# Patient Record
Sex: FEMALE | Race: WHITE | NOT HISPANIC OR LATINO | Employment: OTHER | URBAN - METROPOLITAN AREA
[De-identification: names, ages, dates, MRNs, and addresses within clinical notes are randomized per-mention and may not be internally consistent; named-entity substitution may affect disease eponyms.]

---

## 2017-01-27 ENCOUNTER — HOSPITAL ENCOUNTER (OUTPATIENT)
Dept: NON INVASIVE DIAGNOSTICS | Facility: HOSPITAL | Age: 77
Discharge: HOME | End: 2017-01-27
Attending: INTERNAL MEDICINE
Payer: MEDICARE

## 2017-01-27 ENCOUNTER — HOSPITAL ENCOUNTER (OUTPATIENT)
Dept: NUCLEAR MEDICINE | Facility: HOSPITAL | Age: 77
Discharge: HOME | End: 2017-01-27
Attending: INTERNAL MEDICINE
Payer: MEDICARE

## 2017-01-27 DIAGNOSIS — R07.89 OTHER CHEST PAIN: ICD-10-CM

## 2017-01-27 DIAGNOSIS — I35.0 NONRHEUMATIC AORTIC VALVE STENOSIS: ICD-10-CM

## 2017-01-27 PROCEDURE — A9502 TC99M TETROFOSMIN: HCPCS

## 2017-01-27 PROCEDURE — 93017 CV STRESS TEST TRACING ONLY: CPT

## 2017-01-27 PROCEDURE — 78452 HT MUSCLE IMAGE SPECT MULT: CPT

## 2017-01-27 PROCEDURE — 93306 TTE W/DOPPLER COMPLETE: CPT

## 2017-02-28 ENCOUNTER — ALLSCRIPTS OFFICE VISIT (OUTPATIENT)
Dept: OTHER | Facility: OTHER | Age: 77
End: 2017-02-28

## 2017-05-23 ENCOUNTER — HOSPITAL ENCOUNTER (EMERGENCY)
Facility: HOSPITAL | Age: 77
Discharge: HOME | End: 2017-05-23
Attending: EMERGENCY MEDICINE | Admitting: EMERGENCY MEDICINE
Payer: MEDICARE

## 2017-05-23 ENCOUNTER — APPOINTMENT (EMERGENCY)
Dept: RADIOLOGY | Facility: HOSPITAL | Age: 77
End: 2017-05-23
Payer: MEDICARE

## 2017-05-23 ENCOUNTER — GENERIC CONVERSION - ENCOUNTER (OUTPATIENT)
Dept: OTHER | Facility: OTHER | Age: 77
End: 2017-05-23

## 2017-05-23 VITALS
TEMPERATURE: 97.7 F | DIASTOLIC BLOOD PRESSURE: 78 MMHG | HEART RATE: 79 BPM | BODY MASS INDEX: 23.22 KG/M2 | OXYGEN SATURATION: 98 % | HEIGHT: 61 IN | RESPIRATION RATE: 18 BRPM | SYSTOLIC BLOOD PRESSURE: 163 MMHG | WEIGHT: 123 LBS

## 2017-05-23 DIAGNOSIS — R09.81 NASAL CONGESTION: ICD-10-CM

## 2017-05-23 DIAGNOSIS — R53.1 ASTHENIA: Primary | ICD-10-CM

## 2017-05-23 LAB
ALBUMIN SERPL BCP-MCNC: 3.5 G/DL (ref 3.5–5)
ALP SERPL-CCNC: 60 U/L (ref 46–116)
ALT SERPL W P-5'-P-CCNC: 11 U/L (ref 12–78)
ANION GAP SERPL CALCULATED.3IONS-SCNC: 6 MMOL/L (ref 4–13)
AST SERPL W P-5'-P-CCNC: 12 U/L (ref 5–45)
BACTERIA UR QL AUTO: ABNORMAL /HPF
BASOPHILS # BLD AUTO: 0.01 THOUSANDS/ΜL (ref 0–0.1)
BASOPHILS NFR BLD AUTO: 0 % (ref 0–1)
BILIRUB SERPL-MCNC: 0.69 MG/DL (ref 0.2–1)
BILIRUB UR QL STRIP: NEGATIVE
BUN SERPL-MCNC: 24 MG/DL (ref 5–25)
CALCIUM SERPL-MCNC: 8.7 MG/DL (ref 8.3–10.1)
CHLORIDE SERPL-SCNC: 104 MMOL/L (ref 100–108)
CLARITY UR: CLEAR
CO2 SERPL-SCNC: 30 MMOL/L (ref 21–32)
COLOR UR: YELLOW
COLOR, POC: NORMAL
CREAT SERPL-MCNC: 0.88 MG/DL (ref 0.6–1.3)
EOSINOPHIL # BLD AUTO: 0.08 THOUSAND/ΜL (ref 0–0.61)
EOSINOPHIL NFR BLD AUTO: 1 % (ref 0–6)
ERYTHROCYTE [DISTWIDTH] IN BLOOD BY AUTOMATED COUNT: 12.9 % (ref 11.6–15.1)
GFR SERPL CREATININE-BSD FRML MDRD: >60 ML/MIN/1.73SQ M
GLUCOSE SERPL-MCNC: 95 MG/DL (ref 65–140)
GLUCOSE UR STRIP-MCNC: NEGATIVE MG/DL
HCT VFR BLD AUTO: 38.5 % (ref 34.8–46.1)
HGB BLD-MCNC: 13 G/DL (ref 11.5–15.4)
HGB UR QL STRIP.AUTO: ABNORMAL
KETONES UR STRIP-MCNC: NEGATIVE MG/DL
LEUKOCYTE ESTERASE UR QL STRIP: ABNORMAL
LYMPHOCYTES # BLD AUTO: 1.01 THOUSANDS/ΜL (ref 0.6–4.47)
LYMPHOCYTES NFR BLD AUTO: 18 % (ref 14–44)
MCH RBC QN AUTO: 31.2 PG (ref 26.8–34.3)
MCHC RBC AUTO-ENTMCNC: 33.8 G/DL (ref 31.4–37.4)
MCV RBC AUTO: 92 FL (ref 82–98)
MONOCYTES # BLD AUTO: 0.31 THOUSAND/ΜL (ref 0.17–1.22)
MONOCYTES NFR BLD AUTO: 6 % (ref 4–12)
NEUTROPHILS # BLD AUTO: 4.19 THOUSANDS/ΜL (ref 1.85–7.62)
NEUTS SEG NFR BLD AUTO: 75 % (ref 43–75)
NITRITE UR QL STRIP: NEGATIVE
NON-SQ EPI CELLS URNS QL MICRO: ABNORMAL /HPF
NRBC BLD AUTO-RTO: 0 /100 WBCS
PH UR STRIP.AUTO: 6 [PH] (ref 4.5–8)
PLATELET # BLD AUTO: 141 THOUSANDS/UL (ref 149–390)
PMV BLD AUTO: 11.8 FL (ref 8.9–12.7)
POTASSIUM SERPL-SCNC: 3.5 MMOL/L (ref 3.5–5.3)
PROT SERPL-MCNC: 7 G/DL (ref 6.4–8.2)
PROT UR STRIP-MCNC: NEGATIVE MG/DL
RBC # BLD AUTO: 4.17 MILLION/UL (ref 3.81–5.12)
RBC #/AREA URNS AUTO: ABNORMAL /HPF
SODIUM SERPL-SCNC: 140 MMOL/L (ref 136–145)
SP GR UR STRIP.AUTO: 1.02 (ref 1–1.03)
SPECIMEN SOURCE: NORMAL
TROPONIN I BLD-MCNC: 0 NG/ML (ref 0–0.08)
TSH SERPL DL<=0.05 MIU/L-ACNC: 5.37 UIU/ML (ref 0.36–3.74)
UROBILINOGEN UR QL STRIP.AUTO: 1 E.U./DL
WBC # BLD AUTO: 5.61 THOUSAND/UL (ref 4.31–10.16)
WBC #/AREA URNS AUTO: ABNORMAL /HPF

## 2017-05-23 PROCEDURE — 93005 ELECTROCARDIOGRAM TRACING: CPT

## 2017-05-23 PROCEDURE — 81001 URINALYSIS AUTO W/SCOPE: CPT

## 2017-05-23 PROCEDURE — 99285 EMERGENCY DEPT VISIT HI MDM: CPT

## 2017-05-23 PROCEDURE — 36415 COLL VENOUS BLD VENIPUNCTURE: CPT | Performed by: EMERGENCY MEDICINE

## 2017-05-23 PROCEDURE — 71020 HB CHEST X-RAY 2VW FRONTAL&LATL: CPT

## 2017-05-23 PROCEDURE — 96360 HYDRATION IV INFUSION INIT: CPT

## 2017-05-23 PROCEDURE — 85025 COMPLETE CBC W/AUTO DIFF WBC: CPT | Performed by: EMERGENCY MEDICINE

## 2017-05-23 PROCEDURE — 80053 COMPREHEN METABOLIC PANEL: CPT | Performed by: EMERGENCY MEDICINE

## 2017-05-23 PROCEDURE — 84443 ASSAY THYROID STIM HORMONE: CPT | Performed by: EMERGENCY MEDICINE

## 2017-05-23 PROCEDURE — 84484 ASSAY OF TROPONIN QUANT: CPT

## 2017-05-23 PROCEDURE — 81002 URINALYSIS NONAUTO W/O SCOPE: CPT | Performed by: EMERGENCY MEDICINE

## 2017-05-23 RX ORDER — DIPHENHYDRAMINE HCL 25 MG
25 CAPSULE ORAL
Qty: 30 CAPSULE | Refills: 0 | Status: SHIPPED | OUTPATIENT
Start: 2017-05-23 | End: 2018-05-27 | Stop reason: HOSPADM

## 2017-05-23 RX ORDER — OXYMETAZOLINE HYDROCHLORIDE 0.05 G/100ML
2 SPRAY NASAL ONCE
Status: COMPLETED | OUTPATIENT
Start: 2017-05-23 | End: 2017-05-23

## 2017-05-23 RX ORDER — FLUTICASONE PROPIONATE 50 MCG
2 SPRAY, SUSPENSION (ML) NASAL
Qty: 16 G | Refills: 0 | Status: SHIPPED | OUTPATIENT
Start: 2017-05-23 | End: 2018-05-27 | Stop reason: HOSPADM

## 2017-05-23 RX ADMIN — OXYMETAZOLINE HYDROCHLORIDE 2 SPRAY: 5 SPRAY NASAL at 11:07

## 2017-05-23 RX ADMIN — SODIUM CHLORIDE 1000 ML: 0.9 INJECTION, SOLUTION INTRAVENOUS at 11:07

## 2017-05-24 LAB
ATRIAL RATE: 234 BPM
QRS AXIS: -32 DEGREES
QRSD INTERVAL: 88 MS
QT INTERVAL: 364 MS
QTC INTERVAL: 450 MS
T WAVE AXIS: 44 DEGREES
VENTRICULAR RATE: 92 BPM

## 2017-06-05 ENCOUNTER — TRANSCRIBE ORDERS (OUTPATIENT)
Dept: ADMINISTRATIVE | Facility: HOSPITAL | Age: 77
End: 2017-06-05

## 2017-06-05 DIAGNOSIS — H93.11 RIGHT-SIDED TINNITUS: Primary | ICD-10-CM

## 2017-06-05 DIAGNOSIS — R42 DIZZINESS AND GIDDINESS: ICD-10-CM

## 2017-06-05 DIAGNOSIS — R55 SYNCOPE AND COLLAPSE: ICD-10-CM

## 2017-06-05 DIAGNOSIS — G93.3 POSTVIRAL FATIGUE SYNDROME: ICD-10-CM

## 2017-06-05 DIAGNOSIS — R55 SYNCOPE AND COLLAPSE: Primary | ICD-10-CM

## 2017-06-08 ENCOUNTER — HOSPITAL ENCOUNTER (OUTPATIENT)
Dept: NON INVASIVE DIAGNOSTICS | Facility: HOSPITAL | Age: 77
Discharge: HOME | End: 2017-06-08
Payer: MEDICARE

## 2017-06-08 DIAGNOSIS — R55 SYNCOPE AND COLLAPSE: ICD-10-CM

## 2017-06-08 DIAGNOSIS — G93.3 POSTVIRAL FATIGUE SYNDROME: ICD-10-CM

## 2017-06-08 PROCEDURE — 93226 XTRNL ECG REC<48 HR SCAN A/R: CPT

## 2017-06-08 PROCEDURE — 93225 XTRNL ECG REC<48 HRS REC: CPT

## 2017-06-11 ENCOUNTER — HOSPITAL ENCOUNTER (OUTPATIENT)
Dept: RADIOLOGY | Facility: HOSPITAL | Age: 77
Discharge: HOME | End: 2017-06-11
Payer: MEDICARE

## 2017-06-11 DIAGNOSIS — R55 SYNCOPE AND COLLAPSE: ICD-10-CM

## 2017-06-11 DIAGNOSIS — H93.11 RIGHT-SIDED TINNITUS: ICD-10-CM

## 2017-06-11 DIAGNOSIS — R42 DIZZINESS AND GIDDINESS: ICD-10-CM

## 2017-06-11 PROCEDURE — 70551 MRI BRAIN STEM W/O DYE: CPT

## 2017-06-22 ENCOUNTER — ALLSCRIPTS OFFICE VISIT (OUTPATIENT)
Dept: OTHER | Facility: OTHER | Age: 77
End: 2017-06-22

## 2017-06-22 DIAGNOSIS — I10 ESSENTIAL (PRIMARY) HYPERTENSION: ICD-10-CM

## 2017-06-22 DIAGNOSIS — R00.2 PALPITATIONS: ICD-10-CM

## 2017-06-22 DIAGNOSIS — I35.0 NONRHEUMATIC AORTIC VALVE STENOSIS: ICD-10-CM

## 2018-01-13 VITALS
DIASTOLIC BLOOD PRESSURE: 88 MMHG | SYSTOLIC BLOOD PRESSURE: 138 MMHG | HEART RATE: 94 BPM | HEIGHT: 61 IN | WEIGHT: 138 LBS | BODY MASS INDEX: 26.06 KG/M2

## 2018-01-14 VITALS
SYSTOLIC BLOOD PRESSURE: 122 MMHG | HEIGHT: 61 IN | HEART RATE: 71 BPM | BODY MASS INDEX: 26.46 KG/M2 | DIASTOLIC BLOOD PRESSURE: 80 MMHG | WEIGHT: 140.13 LBS

## 2018-03-07 ENCOUNTER — HOSPITAL ENCOUNTER (EMERGENCY)
Facility: HOSPITAL | Age: 78
Discharge: HOME | End: 2018-03-07
Attending: EMERGENCY MEDICINE | Admitting: EMERGENCY MEDICINE
Payer: MEDICARE

## 2018-03-07 ENCOUNTER — APPOINTMENT (EMERGENCY)
Dept: RADIOLOGY | Facility: HOSPITAL | Age: 78
End: 2018-03-07
Payer: MEDICARE

## 2018-03-07 VITALS
DIASTOLIC BLOOD PRESSURE: 65 MMHG | RESPIRATION RATE: 18 BRPM | HEART RATE: 85 BPM | OXYGEN SATURATION: 98 % | SYSTOLIC BLOOD PRESSURE: 150 MMHG | TEMPERATURE: 97.8 F

## 2018-03-07 DIAGNOSIS — R26.2 AMBULATORY DYSFUNCTION: ICD-10-CM

## 2018-03-07 DIAGNOSIS — R44.3 HALLUCINATION: Primary | ICD-10-CM

## 2018-03-07 LAB
ALBUMIN SERPL BCP-MCNC: 4.2 G/DL (ref 3.5–5)
ALP SERPL-CCNC: 64 U/L (ref 46–116)
ALT SERPL W P-5'-P-CCNC: 11 U/L (ref 12–78)
AMPHETAMINES SERPL QL SCN: NEGATIVE
ANION GAP SERPL CALCULATED.3IONS-SCNC: 7 MMOL/L (ref 4–13)
APAP SERPL-MCNC: <2 UG/ML (ref 10–30)
AST SERPL W P-5'-P-CCNC: 29 U/L (ref 5–45)
BACTERIA UR QL AUTO: NORMAL /HPF
BARBITURATES UR QL: NEGATIVE
BASOPHILS # BLD AUTO: 0.01 THOUSANDS/ΜL (ref 0–0.1)
BASOPHILS NFR BLD AUTO: 0 % (ref 0–1)
BENZODIAZ UR QL: NEGATIVE
BILIRUB SERPL-MCNC: 0.95 MG/DL (ref 0.2–1)
BILIRUB UR QL STRIP: NEGATIVE
BUN SERPL-MCNC: 18 MG/DL (ref 5–25)
CALCIUM SERPL-MCNC: 8.8 MG/DL (ref 8.3–10.1)
CHLORIDE SERPL-SCNC: 106 MMOL/L (ref 100–108)
CLARITY UR: CLEAR
CO2 SERPL-SCNC: 28 MMOL/L (ref 21–32)
COCAINE UR QL: NEGATIVE
COLOR UR: YELLOW
CREAT SERPL-MCNC: 0.91 MG/DL (ref 0.6–1.3)
EOSINOPHIL # BLD AUTO: 0.07 THOUSAND/ΜL (ref 0–0.61)
EOSINOPHIL NFR BLD AUTO: 1 % (ref 0–6)
ERYTHROCYTE [DISTWIDTH] IN BLOOD BY AUTOMATED COUNT: 13.1 % (ref 11.6–15.1)
ETHANOL SERPL-MCNC: <3 MG/DL (ref 0–3)
GFR SERPL CREATININE-BSD FRML MDRD: 61 ML/MIN/1.73SQ M
GLUCOSE SERPL-MCNC: 101 MG/DL (ref 65–140)
GLUCOSE UR STRIP-MCNC: NEGATIVE MG/DL
HCT VFR BLD AUTO: 41.1 % (ref 34.8–46.1)
HGB BLD-MCNC: 13.8 G/DL (ref 11.5–15.4)
HGB UR QL STRIP.AUTO: ABNORMAL
HYALINE CASTS #/AREA URNS LPF: NORMAL /LPF
KETONES UR STRIP-MCNC: NEGATIVE MG/DL
LEUKOCYTE ESTERASE UR QL STRIP: NEGATIVE
LYMPHOCYTES # BLD AUTO: 1.36 THOUSANDS/ΜL (ref 0.6–4.47)
LYMPHOCYTES NFR BLD AUTO: 20 % (ref 14–44)
MCH RBC QN AUTO: 30.6 PG (ref 26.8–34.3)
MCHC RBC AUTO-ENTMCNC: 33.6 G/DL (ref 31.4–37.4)
MCV RBC AUTO: 91 FL (ref 82–98)
METHADONE UR QL: NEGATIVE
MONOCYTES # BLD AUTO: 0.42 THOUSAND/ΜL (ref 0.17–1.22)
MONOCYTES NFR BLD AUTO: 6 % (ref 4–12)
NEUTROPHILS # BLD AUTO: 5.07 THOUSANDS/ΜL (ref 1.85–7.62)
NEUTS SEG NFR BLD AUTO: 73 % (ref 43–75)
NITRITE UR QL STRIP: NEGATIVE
NON-SQ EPI CELLS URNS QL MICRO: NORMAL /HPF
NRBC BLD AUTO-RTO: 0 /100 WBCS
OPIATES UR QL SCN: NEGATIVE
PCP UR QL: NEGATIVE
PH UR STRIP.AUTO: 7 [PH] (ref 4.5–8)
PLATELET # BLD AUTO: 120 THOUSANDS/UL (ref 149–390)
PMV BLD AUTO: 11.9 FL (ref 8.9–12.7)
POTASSIUM SERPL-SCNC: 5.2 MMOL/L (ref 3.5–5.3)
PROT SERPL-MCNC: 7.5 G/DL (ref 6.4–8.2)
PROT UR STRIP-MCNC: NEGATIVE MG/DL
RBC # BLD AUTO: 4.51 MILLION/UL (ref 3.81–5.12)
RBC #/AREA URNS AUTO: NORMAL /HPF
SALICYLATES SERPL-MCNC: <3 MG/DL (ref 3–20)
SODIUM SERPL-SCNC: 141 MMOL/L (ref 136–145)
SP GR UR STRIP.AUTO: 1.01 (ref 1–1.03)
THC UR QL: NEGATIVE
TSH SERPL DL<=0.05 MIU/L-ACNC: 4.24 UIU/ML (ref 0.36–3.74)
UROBILINOGEN UR QL STRIP.AUTO: 1 E.U./DL
WBC # BLD AUTO: 6.96 THOUSAND/UL (ref 4.31–10.16)
WBC #/AREA URNS AUTO: NORMAL /HPF

## 2018-03-07 PROCEDURE — 85025 COMPLETE CBC W/AUTO DIFF WBC: CPT | Performed by: EMERGENCY MEDICINE

## 2018-03-07 PROCEDURE — 36415 COLL VENOUS BLD VENIPUNCTURE: CPT | Performed by: EMERGENCY MEDICINE

## 2018-03-07 PROCEDURE — 81001 URINALYSIS AUTO W/SCOPE: CPT

## 2018-03-07 PROCEDURE — 80053 COMPREHEN METABOLIC PANEL: CPT | Performed by: EMERGENCY MEDICINE

## 2018-03-07 PROCEDURE — 80329 ANALGESICS NON-OPIOID 1 OR 2: CPT | Performed by: EMERGENCY MEDICINE

## 2018-03-07 PROCEDURE — 81002 URINALYSIS NONAUTO W/O SCOPE: CPT | Performed by: EMERGENCY MEDICINE

## 2018-03-07 PROCEDURE — 84443 ASSAY THYROID STIM HORMONE: CPT | Performed by: EMERGENCY MEDICINE

## 2018-03-07 PROCEDURE — 80320 DRUG SCREEN QUANTALCOHOLS: CPT | Performed by: EMERGENCY MEDICINE

## 2018-03-07 PROCEDURE — 93005 ELECTROCARDIOGRAM TRACING: CPT | Performed by: EMERGENCY MEDICINE

## 2018-03-07 PROCEDURE — 80307 DRUG TEST PRSMV CHEM ANLYZR: CPT | Performed by: EMERGENCY MEDICINE

## 2018-03-07 PROCEDURE — 99285 EMERGENCY DEPT VISIT HI MDM: CPT

## 2018-03-07 PROCEDURE — 70450 CT HEAD/BRAIN W/O DYE: CPT

## 2018-03-07 NOTE — ED CARE HANDOFF
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  Recommend OP follow up  Denies HI and SI  Home health/CM referral made  I personally discussed return precautions with this patient and family  I provided the patient with written discharge instructions and particularly highlighted specific areas of interest to this patient, including but not limited to: medications for symptom managment, follow up recommendations, and return precautions  Patient and family are in agreement with this plan as outlined above  Procedures  MDM  CritCare Time      Disposition  Final diagnoses:   Hallucination   Ambulatory dysfunction     Time reflects when diagnosis was documented in both MDM as applicable and the Disposition within this note     Time User Action Codes Description Comment    3/7/2018  5:03 PM Kimmy Kruger [R44 3] Hallucination     3/7/2018  5:04 PM Kimmy Kruger [R26 2] Ambulatory dysfunction       ED Disposition     ED Disposition Condition Comment    Discharge  Emre Daley discharge to home/self care  Condition at discharge: Stable        Follow-up Information     Follow up With Specialties Details Why [address], DO Family Medicine Schedule an appointment as soon as possible for a visit in 1 week For reevaluation [address]  [phone]          Patient's Medications   Discharge Prescriptions    No medications on file       Outpatient Discharge Orders  Ambulatory Referral to Home Health   Standing Status: Future  Standing Exp  Date: 09/07/18            ED Provider  Electronically Signed by     Roxanna Yancey MD  03/07/18 7142

## 2018-03-07 NOTE — DISCHARGE INSTRUCTIONS
Hallucinations   WHAT YOU NEED TO KNOW:   Hallucinations are things you see, hear, feel, taste, or smell that seem real but are not  Some hallucinations are temporary  Hallucinations that continue, interfere with daily activities, or worsen may be a sign of a serious medical or mental condition that needs treatment  DISCHARGE INSTRUCTIONS:   Call 911 if you or someone else notices any of the following:   · You want to harm yourself or someone else  · You hear voices telling you to harm yourself or someone else  · You have a seizure  · You are confused, do not know where you are, or are not making sense when you speak  Return to the emergency department if:   · Your hallucinations get worse  · You vomit several times in a row  · Your heartbeat or breathing is faster or slower than usual      · You have trouble breathing or shortness of breath  Contact your healthcare provider if:   · You have new hallucinations  · You have questions or concerns about your condition or care  Medicines:   · Medicines  may be given to stop the hallucinations, reduce anxiety, or relax your muscles  · Take your medicine as directed  Contact your healthcare provider if you think your medicine is not helping or if you have side effects  Tell him or her if you are allergic to any medicine  Keep a list of the medicines, vitamins, and herbs you take  Include the amounts, and when and why you take them  Bring the list or the pill bottles to follow-up visits  Carry your medicine list with you in case of an emergency  Follow up with your healthcare provider as directed:  Write down your questions so you remember to ask them during your visits  © 2017 [address] Information is for End User's use only and may not be sold, redistributed or otherwise used for commercial purposes   All illustrations and images included in CareNotes® are the copyrighted property of A D [employer] M [employer]  or [employer]  The above information is an  only  It is not intended as medical advice for individual conditions or treatments  Talk to your doctor, nurse or pharmacist before following any medical regimen to see if it is safe and effective for you

## 2018-03-07 NOTE — ED ATTENDING ATTESTATION
Taty Lechuga MD, saw and evaluated the patient  All available labs and X-rays were ordered by me or the resident and have been reviewed by myself  I discussed the patient with the resident / non-physician and agree with the resident's / non-physician practitioner's findings and plan as documented in the resident's / non-physician practicitioner's note, except where noted  At this point, I agree with the current assessment done in the ED  Chief Complaint   Patient presents with    Altered Mental Status     as per EMS, patient has been having episodes where she's been seeing shadows of things thar aren't in the room  Patient also has been feeling tightness in her ears  When asking patient questions during triage, patient becomes very tearful, stating that she isn't allowed to do things she used to do at home  Patiient has been woozy since this morning     This is a 66year old female who has been feeling anxious x6 months (worked in ) and thinks that her job made her anxious  She is anxious about the world, because so much is going wrong  She instead is watching cowboy movies to avoid watching news  +unstbale / unsteady on feet  She feels her ears are tight non-specifically without changes in hearing  +visual hallucinations: seeing dogs, children floating into her vision without any auditory hallucination component  Today, EMS was called b/c  is concerned that she is crazy  No reported f/ch/n/v/cp/sob/oral intake  PMH:  - HTN  - Hypothyroidism  - HLD  PSH:  - Hysterectomy  Denies smoking, drinking, drugs  PE:  Vitals:    03/07/18 1417 03/07/18 1600 03/07/18 1630 03/07/18 1730   BP:  138/63 149/73 150/65   BP Location:  Right arm  Right arm   Pulse:  93 103 85   Resp:  20 18 18   Temp: 97 8 °F (36 6 °C)      TempSrc: Oral      SpO2:  97% 99% 98%   General: VSS, NAD, awake, alert  Well-nourished, well-developed  Appears stated age  Speaking normally in full sentences     Head: Normocephalic, atraumatic, nontender  Eyes: PERRL, EOM-I  No diplopia  No hyphema  No subconjunctival hemorrhages  Symmetrical lids  ENT: Atraumatic external nose and ears  MMM  No malocclusion  No stridor  Normal phonation  No drooling  Normal swallowing  Neck: Symmetric, trachea midline  No JVD  CV: RRR  +S1/S2  No murmurs or gallops  Peripheral pulses +2 throughout  No chest wall tenderness  Lungs:   Unlabored No retractions  CTAB, lungs sounds equal bilateral    No tachypnea  Abd: +BS, soft, NT/ND    MSK:   FROM   Back:   No rashes  Skin: Dry, intact  Neuro: AAOx3, GCS 15, CN II-XII grossly intact  Motor grossly intact  Psychiatric/Behavioral: tangential, frequent crying during exam   Exam: deferred  A/P:  - Unclear reason for this CHRONIC gradually worsening syndrome  - Will do WAYNE-psych workup for this  - Including HCT for the ambulatory dysfunction / over months for NPH  - Urine for UTI  - Disposition: admission to [facility] given gradual changes if negative w/u    - 13 point ROS was performed and all are normal unless stated in the history above  - Nursing note reviewed  Vitals reviewed  - Orders placed by myself and/or advanced practitioner / resident     - Previous chart was not reviewed  - No language barrier    - History obtained from  ems patient  - There are no limitations to the history obtained  - Critical care time: Not applicable for this patient  Final Diagnosis:  1  Hallucination    2  Ambulatory dysfunction      ED Course      Medications - No data to display  CT head without contrast   ED Interpretation   CT ordered by myself and the report from radiologist has been reviewed  Final Result      No acute intracranial abnormality                    Workstation performed: [id number]           Orders Placed This Encounter   Procedures    CT head without contrast    Comprehensive metabolic panel    CBC and differential    TSH    Rapid drug screen, urine    Ethanol    Salicylate level    Acetaminophen level    Urine Microscopic    Ambulatory Referral to [address] to ED crisis worker    POCT urinalysis dipstick    ECG 12 lead     Labs Reviewed   COMPREHENSIVE METABOLIC PANEL - Abnormal        Result Value Ref Range Status    Sodium 141  136 - 145 mmol/L Final    Potassium 5 2  3 5 - 5 3 mmol/L Final    Comment: Slightly Hemolyzed; Results May be Affected    Chloride 106  100 - 108 mmol/L Final    CO2 28  21 - 32 mmol/L Final    Anion Gap 7  4 - 13 mmol/L Final    BUN 18  5 - 25 mg/dL Final    Creatinine 0 91  0 60 - 1 30 mg/dL Final    Comment: Standardized to IDMS reference method    Glucose 101  65 - 140 mg/dL Final    Comment:   If the patient is fasting, the ADA then defines impaired fasting glucose as > 100 mg/dL and diabetes as > or equal to 123 mg/dL  Specimen collection should occur prior to Sulfasalazine administration due to the potential for falsely depressed results  Specimen collection should occur prior to Sulfapyridine administration due to the potential for falsely elevated results  Calcium 8 8  8 3 - 10 1 mg/dL Final    AST 29  5 - 45 U/L Final    Comment: Slightly Hemolyzed; Results May be Affected  Specimen collection should occur prior to Sulfasalazine administration due to the potential for falsely depressed results  ALT 11 (*) 12 - 78 U/L Final    Comment:   Specimen collection should occur prior to Sulfasalazine and/or Sulfapyridine administration due to the potential for falsely depressed results       Alkaline Phosphatase 64  46 - 116 U/L Final    Total Protein 7 5  6 4 - 8 2 g/dL Final    Albumin 4 2  3 5 - 5 0 g/dL Final    Total Bilirubin 0 95  0 20 - 1 00 mg/dL Final    eGFR 61  ml/min/1 73sq m Final    Narrative:     National Kidney Disease Education Program recommendations are as follows:  GFR calculation is accurate only with a steady state creatinine  Chronic Kidney disease less than 60 ml/min/1 73 sq  meters  Kidney failure less than 15 ml/min/1 73 sq  meters  CBC AND DIFFERENTIAL - Abnormal     WBC 6 96  4 31 - 10 16 Thousand/uL Final    RBC 4 51  3 81 - 5 12 Million/uL Final    Hemoglobin 13 8  11 5 - 15 4 g/dL Final    Hematocrit 41 1  34 8 - 46 1 % Final    MCV 91  82 - 98 fL Final    MCH 30 6  26 8 - 34 3 pg Final    MCHC 33 6  31 4 - 37 4 g/dL Final    RDW 13 1  11 6 - 15 1 % Final    MPV 11 9  8 9 - 12 7 fL Final    Platelets 982 (*) 600 - 390 Thousands/uL Final    nRBC 0  /100 WBCs Final    Neutrophils Relative 73  43 - 75 % Final    Lymphocytes Relative 20  14 - 44 % Final    Monocytes Relative 6  4 - 12 % Final    Eosinophils Relative 1  0 - 6 % Final    Basophils Relative 0  0 - 1 % Final    Neutrophils Absolute 5 07  1 85 - 7 62 Thousands/µL Final    Lymphocytes Absolute 1 36  0 60 - 4 47 Thousands/µL Final    Monocytes Absolute 0 42  0 17 - 1 22 Thousand/µL Final    Eosinophils Absolute 0 07  0 00 - 0 61 Thousand/µL Final    Basophils Absolute 0 01  0 00 - 0 10 Thousands/µL Final   TSH, 3RD GENERATION - Abnormal     TSH 3RD GENERATON 4 240 (*) 0 358 - 3 740 uIU/mL Final    Narrative:     Patients undergoing fluorescein dye angiography may retain small amounts of fluorescein in the body for 48-72 hours post procedure  Samples containing fluorescein can produce falsely depressed TSH values  If the patient had this procedure,a specimen should be resubmitted post fluorescein clearance            The recommended reference ranges for TSH during pregnancy are as follows:  First trimester 0 1 to 2 5 uIU/mL  Second trimester  0 2 to 3 0 uIU/mL  Third trimester 0 3 to 3 0 uIU/m     SALICYLATE LEVEL - Abnormal     Salicylate Lvl <3 (*) 3 - 20 mg/dL Final   ACETAMINOPHEN LEVEL - Abnormal     Acetaminophen Level <2 (*) 10 - 30 ug/mL Final   POCT URINALYSIS DIPSTICK - Abnormal    ED URINE MACROSCOPIC - Abnormal     Color, UA Yellow   Final    Clarity, UA Clear   Final    pH, UA 7 0  4 5 - 8 0 Final    Leukocytes, UA Negative  Negative Final    Nitrite, UA Negative  Negative Final    Protein, UA Negative  Negative mg/dl Final    Glucose, UA Negative  Negative mg/dl Final    Ketones, UA Negative  Negative mg/dl Final    Urobilinogen, UA 1 0  0 2, 1 0 E U /dl E U /dl Final    Bilirubin, UA Negative  Negative Final    Blood, UA Trace (*) Negative Final    Specific [place], UA 1 015  1 003 - 1 030 Final    Narrative:     CLINITEK RESULT   RAPID DRUG SCREEN, URINE - Normal    Amph/Meth UR Negative  Negative Final    Barbiturate Ur Negative  Negative Final    Benzodiazepine Urine Negative  Negative Final    Cocaine Urine Negative  Negative Final    Methadone Urine Negative  Negative Final    Opiate Urine Negative  Negative Final    PCP Ur Negative  Negative Final    THC Urine Negative  Negative Final    Narrative:     FOR MEDICAL PURPOSES ONLY  IF CONFIRMATION NEEDED PLEASE CONTACT THE LAB WITHIN 5 DAYS  Drug Screen Cutoff Levels:  AMPHETAMINE/METHAMPHETAMINES  1000 ng/mL  BARBITURATES     200 ng/mL  BENZODIAZEPINES     200 ng/mL  COCAINE      300 ng/mL  METHADONE      300 ng/mL  OPIATES      300 ng/mL  PHENCYCLIDINE     25 ng/mL  THC       50 ng/mL   MEDICAL ALCOHOL - Normal    Ethanol Lvl <3  0 - 3 mg/dL Final   URINE MICROSCOPIC - Normal    RBC, UA None Seen  None Seen, 0-5 /hpf Final    WBC, UA None Seen  None Seen, 0-5, 5-55, 5-65 /hpf Final    Epithelial Cells None Seen  None Seen, Occasional /hpf Final    Bacteria, UA None Seen  None Seen, Occasional /hpf Final    Hyaline Casts, UA None Seen  None Seen /lpf Final   COMA PANEL    Narrative: The following orders were created for panel order Coma panel    Procedure                               Abnormality         Status                     ---------                               -----------         ------                     [facility][[id number]]                       Normal              Final result               Salicylate FXGQG[[id number]] Abnormal            Final result               Acetaminophen level[[id number]]           Abnormal            Final result                 Please view results for these tests on the individual orders  Time reflects when diagnosis was documented in both MDM as applicable and the Disposition within this note     Time User Action Codes Description Comment    3/7/2018  5:03 PM Keri Tapia Add [R44 3] Hallucination     3/7/2018  5:04 PM Keri Tapia Add [R26 2] Ambulatory dysfunction       ED Disposition     ED Disposition Condition Comment    Discharge  Luma Gallegos discharge to home/self care  Condition at discharge: Stable        MD Documentation    David Nydia Most Recent Value   Sending MD Dr Rome      Follow-up Information     Follow up With Specialties Details Why Contact Info    Chandu Campos DO Family Medicine Schedule an appointment as soon as possible for a visit in 1 week For reevaluation [address] [facility] [address]  [phone]          Discharge Medication List as of 3/7/2018  5:04 PM      CONTINUE these medications which have NOT CHANGED    Details   diphenhydrAMINE (BENADRYL) 25 mg capsule Take 1 capsule by mouth daily at bedtime for 30 days, Starting 2017, Until u 17, Print      fluticasone (FLONASE) 50 mcg/act nasal spray 2 sprays into each nostril daily at bedtime for 30 days, Starting 2017, Until Thu 17, Print             Outpatient Discharge Orders  Ambulatory Referral to [address] [place] [address] Nilesh Mari   Standing Status: Future  Standing Exp  Date: 18       Prior to Admission Medications   Prescriptions Last Dose Informant Patient Reported? Taking?    diphenhydrAMINE (BENADRYL) 25 mg capsule   No No   Sig: Take 1 capsule by mouth daily at bedtime for 30 days   fluticasone (FLONASE) 50 mcg/act nasal spray   No No   Si sprays into each nostril daily at bedtime for 30 days      Facility-Administered Medications: None Portions of the record may have been created with voice recognition software  Occasional wrong word or "sound a like" substitutions may have occurred due to the inherent limitations of voice recognition software  Read the chart carefully and recognize, using context, where substitutions have occurred      Electronically signed by:  Myrtle King

## 2018-03-07 NOTE — ED NOTES
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  The pt recently about 2 months ago start to become forgetful and confused  The pt also has complaints of feeling light headed  The pt stated that she only came to the ED for her medical concerns  The pt denies current SI/AH/HI  The pt is having VH of cats and dogs  The pt states that this start about 2 months ago  The pt has no past history of SI/HI/AH/VH  The pt has no past SA  The pt is requesting DC with OP resources  The pt states that she wont hurt herself or others if DC home  Pt will be DC with OP resources given  CW gave her the OP MH, support group and [facility] phone number [phone]  Dr Prado in agreement with DC and DC plan

## 2018-03-07 NOTE — ED PROVIDER NOTES
History  Chief Complaint   Patient presents with    Altered Mental Status     as per EMS, patient has been having episodes where she's been seeing shadows of things thar aren't in the room  Patient also has been feeling tightness in her ears  When asking patient questions during triage, patient becomes very tearful, stating that she isn't allowed to do things she used to do at home  Patiient has been woozy since this morning     Patient is a 51-year-old female presenting to the ER today with a chief complaint of altered mental status  Patient states that over the past 6 months she has had worsening anxiety  Patient states she is anxious because of the world  Patient stated repeatedly that she had to stop watching the news in favor of cold caliber white movies because of her anxiety about the world  Patient also complains that she has been unsteady on her feet for the past 6 months  Has not fallen but feels unsteady  Additionally patient complains that her ears feel tight  When asked to describe this patient is unable to  Finally, patient complains about seeing dogs, cats and little kids flashing in front of her field of vision  States they come and go and do not harm her  Because of these symptoms patient's  called EMS and patient was brought to the ER to be evaluated        History provided by:  Patient   used: No    Altered Mental Status   Presenting symptoms: behavior changes and confusion    Severity:  Mild  Episode history:  Single  Timing:  Constant  Progression:  Worsening  Chronicity:  New  Associated symptoms: no abdominal pain, no fever, no nausea and no vomiting        Prior to Admission Medications   Prescriptions Last Dose Informant Patient Reported? Taking?    diphenhydrAMINE (BENADRYL) 25 mg capsule   No No   Sig: Take 1 capsule by mouth daily at bedtime for 30 days   fluticasone (FLONASE) 50 mcg/act nasal spray   No No   Si sprays into each nostril daily at bedtime for 30 days      Facility-Administered Medications: None       Past Medical History:   Diagnosis Date    Disease of thyroid gland     Hyperlipidemia     Hypertension        Past Surgical History:   Procedure Laterality Date    HYSTERECTOMY         History reviewed  No pertinent family history  I have reviewed and agree with the history as documented  Social History   Substance Use Topics    Smoking status: Former Smoker    Smokeless tobacco: Not on file    Alcohol use No        Review of Systems   Constitutional: Negative  Negative for appetite change, chills, diaphoresis, fatigue and fever  HENT: Negative  Eyes: Negative  Respiratory: Negative  Cardiovascular: Negative  Gastrointestinal: Negative for abdominal pain, blood in stool, constipation, diarrhea, nausea and vomiting  Endocrine: Negative  Genitourinary: Negative for decreased urine volume, difficulty urinating, dyspareunia, dysuria, flank pain, frequency, hematuria, pelvic pain, urgency, vaginal bleeding, vaginal discharge and vaginal pain  Musculoskeletal: Negative  Skin: Negative  Allergic/Immunologic: Negative  Neurological: Negative  Psychiatric/Behavioral: Positive for confusion  The patient is nervous/anxious  All other systems reviewed and are negative        Physical Exam  ED Triage Vitals   Temperature Pulse Respirations Blood Pressure SpO2   03/07/18 1417 03/07/18 1410 03/07/18 1410 03/07/18 1410 03/07/18 1410   97 8 °F (36 6 °C) (!) 107 16 (!) 189/94 98 %      Temp Source Heart Rate Source Patient Position - Orthostatic VS BP Location FiO2 (%)   03/07/18 1417 03/07/18 1600 03/07/18 1410 03/07/18 1410 --   Oral Monitor Sitting Right arm       Pain Score       03/07/18 1410       No Pain           Orthostatic Vital Signs  Vitals:    03/07/18 1410 03/07/18 1600 03/07/18 1630 03/07/18 1730   BP: (!) 189/94 138/63 149/73 150/65   Pulse: (!) 107 93 103 85   Patient Position - Orthostatic VS: Sitting Lying  Lying       Physical Exam   Constitutional: She is oriented to person, place, and time  She appears well-developed and well-nourished  HENT:   Head: Normocephalic and atraumatic  Right Ear: External ear normal    Left Ear: External ear normal    Nose: Nose normal    Mouth/Throat: Oropharynx is clear and moist    Eyes: Conjunctivae and EOM are normal  Pupils are equal, round, and reactive to light  Neck: Normal range of motion  Neck supple  No JVD present  No tracheal deviation present  No thyromegaly present  Cardiovascular: Normal rate, regular rhythm, normal heart sounds and intact distal pulses  Exam reveals no gallop and no friction rub  No murmur heard  Pulmonary/Chest: Effort normal and breath sounds normal  No stridor  No respiratory distress  She has no wheezes  She has no rales  She exhibits no tenderness  Abdominal: Soft  Bowel sounds are normal  She exhibits no distension and no mass  There is no tenderness  There is no rebound and no guarding  No hernia  Musculoskeletal: Normal range of motion  She exhibits no edema, tenderness or deformity  Lymphadenopathy:     She has no cervical adenopathy  Neurological: She is alert and oriented to person, place, and time  She has normal reflexes  She displays normal reflexes  No cranial nerve deficit  She exhibits normal muscle tone  Coordination normal    Skin: Skin is warm  No rash noted  No erythema  No pallor  Psychiatric: Her mood appears anxious  Cognition and memory are impaired  She exhibits a depressed mood  Nursing note and vitals reviewed        ED Medications  Medications - No data to display    Diagnostic Studies  Results Reviewed     Procedure Component Value Units Date/Time    Comprehensive metabolic panel [[id number]]  (Abnormal) Collected:  03/07/18 1530    Lab Status:  Final result Specimen:  Blood from Arm, Right Updated:  03/07/18 1607     Sodium 141 mmol/L      Potassium 5 2 mmol/L      Chloride 106 mmol/L CO2 28 mmol/L      Anion Gap 7 mmol/L      BUN 18 mg/dL      Creatinine 0 91 mg/dL      Glucose 101 mg/dL      Calcium 8 8 mg/dL      AST 29 U/L      ALT 11 (L) U/L      Alkaline Phosphatase 64 U/L      Total Protein 7 5 g/dL      Albumin 4 2 g/dL      Total Bilirubin 0 95 mg/dL      eGFR 61 ml/min/1 73sq m     Narrative:         National Kidney Disease Education Program recommendations are as follows:  GFR calculation is accurate only with a steady state creatinine  Chronic Kidney disease less than 60 ml/min/1 73 sq  meters  Kidney failure less than 15 ml/min/1 73 sq  meters  TSH [[id number]]  (Abnormal) Collected:  03/07/18 1530    Lab Status:  Final result Specimen:  Blood from Arm, Right Updated:  03/07/18 1607     TSH 3RD GENERATON 4 240 (H) uIU/mL     Narrative:         Patients undergoing fluorescein dye angiography may retain small amounts of fluorescein in the body for 48-72 hours post procedure  Samples containing fluorescein can produce falsely depressed TSH values  If the patient had this procedure,a specimen should be resubmitted post fluorescein clearance            The recommended reference ranges for TSH during pregnancy are as follows:  First trimester 0 1 to 2 5 uIU/mL  Second trimester  0 2 to 3 0 uIU/mL  Third trimester 0 3 to 3 0 uIU/m      Salicylate level [[id number]]  (Abnormal) Collected:  03/07/18 1530    Lab Status:  Final result Specimen:  Blood from Arm, Right Updated:  44/55/32 2013     Salicylate Lvl <3 (L) mg/dL     Acetaminophen level [[id number]]  (Abnormal) Collected:  03/07/18 1530    Lab Status:  Final result Specimen:  Blood from Arm, Right Updated:  03/07/18 1607     Acetaminophen Level <2 (L) ug/mL     Urine Microscopic [[id number]]  (Normal) Collected:  03/07/18 1529    Lab Status:  Final result Specimen:  Urine from Urine, Clean Catch Updated:  03/07/18 1555     RBC, UA None Seen /hpf      WBC, UA None Seen /hpf      Epithelial Cells None Seen /hpf      Bacteria, UA None Seen /hpf      Hyaline Casts, UA None Seen /lpf     Rapid drug screen, urine [[id number]]  (Normal) Collected:  03/07/18 1524    Lab Status:  Final result Specimen:  Urine from Urine, Clean Catch Updated:  03/07/18 1552     Amph/Meth UR Negative     Barbiturate Ur Negative     Benzodiazepine Urine Negative     Cocaine Urine Negative     Methadone Urine Negative     Opiate Urine Negative     PCP Ur Negative     THC Urine Negative    Narrative:         FOR MEDICAL PURPOSES ONLY  IF CONFIRMATION NEEDED PLEASE CONTACT THE LAB WITHIN 5 DAYS      Drug Screen Cutoff Levels:  AMPHETAMINE/METHAMPHETAMINES  1000 ng/mL  BARBITURATES     200 ng/mL  BENZODIAZEPINES     200 ng/mL  COCAINE      300 ng/mL  METHADONE      300 ng/mL  OPIATES      300 ng/mL  PHENCYCLIDINE     25 ng/mL  THC       50 ng/mL    Ethanol [[id number]]  (Normal) Collected:  03/07/18 1530    Lab Status:  Final result Specimen:  Blood from Arm, Right Updated:  03/07/18 1552     Ethanol Lvl <3 mg/dL     CBC and differential [[id number]]  (Abnormal) Collected:  03/07/18 1530    Lab Status:  Final result Specimen:  Blood from Arm, Right Updated:  03/07/18 1539     WBC 6 96 Thousand/uL      RBC 4 51 Million/uL      Hemoglobin 13 8 g/dL      Hematocrit 41 1 %      MCV 91 fL      MCH 30 6 pg      MCHC 33 6 g/dL      RDW 13 1 %      MPV 11 9 fL      Platelets 479 (L) Thousands/uL      nRBC 0 /100 WBCs      Neutrophils Relative 73 %      Lymphocytes Relative 20 %      Monocytes Relative 6 %      Eosinophils Relative 1 %      Basophils Relative 0 %      Neutrophils Absolute 5 07 Thousands/µL      Lymphocytes Absolute 1 36 Thousands/µL      Monocytes Absolute 0 42 Thousand/µL      Eosinophils Absolute 0 07 Thousand/µL      Basophils Absolute 0 01 Thousands/µL     POCT urinalysis dipstick [[id number]]  (Abnormal) Resulted:  03/07/18 1524    Lab Status:  Final result Specimen:  Urine Updated:  03/07/18 1524    ED Urine Macroscopic [[id number]]  (Abnormal) Collected:  03/07/18 1529 Lab Status:  Final result Specimen:  Urine Updated:  03/07/18 1522     Color, UA Yellow     Clarity, UA Clear     pH, UA 7 0     Leukocytes, UA Negative     Nitrite, UA Negative     Protein, UA Negative mg/dl      Glucose, UA Negative mg/dl      Ketones, UA Negative mg/dl      Urobilinogen, UA 1 0 E U /dl      Bilirubin, UA Negative     Blood, UA Trace (A)     Specific [place]ota, UA 1 015    Narrative:       CLINITEK RESULT                 CT head without contrast   ED Interpretation by Todd Shaw MD (03/07 1711)   CT ordered by myself and the report from radiologist has been reviewed  Final Result by Goldie Nova MD (03/07 1540)      No acute intracranial abnormality  Workstation performed: [id number]               Procedures  Procedures      Phone Consults  ED Phone Contact    ED Course  ED Course            Identification of Seniors at [facility] Most Recent Value   (ISAR) Identification of Seniors at Risk   Before the illness or injury that brought you to the Emergency, did you need someone to help you on a regular basis? 1 Filed at: 03/07/2018 1411   In the last 24 hours, have you needed more help than usual?  0 Filed at: 03/07/2018 1411   Have you been hospitalized for one or more nights during the past 6 months? 0 Filed at: 03/07/2018 1411   In general, do you see well?  0 Filed at: 03/07/2018 1411   In general, do you have serious problems with your memory? 1 Filed at: 03/07/2018 1411   Do you take more than three different medications every day?   1 Filed at: 03/07/2018 1411   ISAR Score  3 Filed at: 03/07/2018 1411                          MDM  Number of Diagnoses or Management Options  Ambulatory dysfunction: new and requires workup  Hallucination: new and requires workup     Amount and/or Complexity of Data Reviewed  Clinical lab tests: ordered and reviewed  Tests in the radiology section of CPT®: ordered and reviewed  Tests in the medicine section of CPT®: reviewed and ordered  Decide to obtain previous medical records or to obtain history from someone other than the patient: yes  Independent visualization of images, tracings, or specimens: yes    Patient Progress  Patient progress: stable    CritCare Time    Disposition  Final diagnoses:   Hallucination   Ambulatory dysfunction     Time reflects when diagnosis was documented in both MDM as applicable and the Disposition within this note     Time User Action Codes Description Comment    3/7/2018  5:03 PM Efrain Sanchez [R44 3] Hallucination     3/7/2018  5:04 PM Keri Kruger [R26 2] Ambulatory dysfunction       ED Disposition     ED Disposition Condition Comment    Discharge  Luma Gallegos discharge to home/self care  Condition at discharge: Stable        MD Documentation    [address] Most Recent Value   Sending MD Dr Rome      Follow-up Information     Follow up With Specialties Details Why Contact Info    Chandu Campos DO Family Medicine Schedule an appointment as soon as possible for a visit in 1 week For reevaluation [address] [address]  [phone]          Discharge Medication List as of 3/7/2018  5:04 PM      CONTINUE these medications which have NOT CHANGED    Details   diphenhydrAMINE (BENADRYL) 25 mg capsule Take 1 capsule by mouth daily at bedtime for 30 days, Starting 5/23/2017, Until Thu 6/22/17, Print      fluticasone (FLONASE) 50 mcg/act nasal spray 2 sprays into each nostril daily at bedtime for 30 days, Starting 5/23/2017, Until Thu 6/22/17, Print             Outpatient Discharge Orders  [facility] [address] [place] [address] [facility]   Standing Status: Future  Standing Exp  Date: 09/07/18         ED Provider  Attending physically available and evaluated Luma Gallegos I managed the patient along with the ED Attending      Electronically Signed by         Pankaj Peng DO  03/08/18 9081

## 2018-03-08 LAB
ATRIAL RATE: 95 BPM
P AXIS: 262 DEGREES
PR INTERVAL: 128 MS
QRS AXIS: -19 DEGREES
QRSD INTERVAL: 86 MS
QT INTERVAL: 370 MS
QTC INTERVAL: 464 MS
T WAVE AXIS: 36 DEGREES
VENTRICULAR RATE: 95 BPM

## 2018-03-08 NOTE — SOCIAL WORK
CM consulted to follow up with Pt's  to ensure they have resources at home  Pt's  Arnaldo Khan reported they his has been working with the PCP and they gave him the information for assistance through the [facility] and [facility] psych referral  Pt will see her PCP Monday and they want to review the best option for her on Monday with the PCP

## 2018-03-20 ENCOUNTER — TRANSCRIBE ORDERS (OUTPATIENT)
Dept: ADMINISTRATIVE | Facility: HOSPITAL | Age: 78
End: 2018-03-20

## 2018-03-20 ENCOUNTER — HOSPITAL ENCOUNTER (OUTPATIENT)
Dept: RADIOLOGY | Facility: HOSPITAL | Age: 78
Discharge: HOME | End: 2018-03-20
Payer: MEDICARE

## 2018-03-20 ENCOUNTER — APPOINTMENT (OUTPATIENT)
Dept: LAB | Facility: HOSPITAL | Age: 78
End: 2018-03-20
Payer: MEDICARE

## 2018-03-20 DIAGNOSIS — E55.9 VITAMIN D DEFICIENCY: ICD-10-CM

## 2018-03-20 DIAGNOSIS — R53.83 OTHER FATIGUE: ICD-10-CM

## 2018-03-20 DIAGNOSIS — R53.83 OTHER FATIGUE: Primary | ICD-10-CM

## 2018-03-20 DIAGNOSIS — R41.3 MEMORY LOSS: ICD-10-CM

## 2018-03-20 LAB
25(OH)D3 SERPL-MCNC: 21.4 NG/ML (ref 30–100)
ANION GAP SERPL CALCULATED.3IONS-SCNC: 8 MMOL/L (ref 4–13)
BACTERIA UR QL AUTO: ABNORMAL /HPF
BASOPHILS # BLD AUTO: 0.02 THOUSANDS/ΜL (ref 0–0.1)
BASOPHILS NFR BLD AUTO: 0 % (ref 0–1)
BILIRUB UR QL STRIP: NEGATIVE
BUN SERPL-MCNC: 24 MG/DL (ref 5–25)
CALCIUM SERPL-MCNC: 9.5 MG/DL (ref 8.3–10.1)
CHLORIDE SERPL-SCNC: 102 MMOL/L (ref 100–108)
CLARITY UR: CLEAR
CO2 SERPL-SCNC: 29 MMOL/L (ref 21–32)
COLOR UR: YELLOW
CREAT SERPL-MCNC: 1.03 MG/DL (ref 0.6–1.3)
EOSINOPHIL # BLD AUTO: 0.06 THOUSAND/ΜL (ref 0–0.61)
EOSINOPHIL NFR BLD AUTO: 1 % (ref 0–6)
ERYTHROCYTE [DISTWIDTH] IN BLOOD BY AUTOMATED COUNT: 13.4 % (ref 11.6–15.1)
FOLATE SERPL-MCNC: 10.9 NG/ML (ref 3.1–17.5)
GFR SERPL CREATININE-BSD FRML MDRD: 52 ML/MIN/1.73SQ M
GLUCOSE P FAST SERPL-MCNC: 96 MG/DL (ref 65–99)
GLUCOSE UR STRIP-MCNC: NEGATIVE MG/DL
HCT VFR BLD AUTO: 40.2 % (ref 34.8–46.1)
HGB BLD-MCNC: 13.4 G/DL (ref 11.5–15.4)
HGB UR QL STRIP.AUTO: ABNORMAL
KETONES UR STRIP-MCNC: NEGATIVE MG/DL
LEUKOCYTE ESTERASE UR QL STRIP: ABNORMAL
LYMPHOCYTES # BLD AUTO: 1.1 THOUSANDS/ΜL (ref 0.6–4.47)
LYMPHOCYTES NFR BLD AUTO: 22 % (ref 14–44)
MCH RBC QN AUTO: 31.2 PG (ref 26.8–34.3)
MCHC RBC AUTO-ENTMCNC: 33.3 G/DL (ref 31.4–37.4)
MCV RBC AUTO: 94 FL (ref 82–98)
MONOCYTES # BLD AUTO: 0.31 THOUSAND/ΜL (ref 0.17–1.22)
MONOCYTES NFR BLD AUTO: 6 % (ref 4–12)
NEUTROPHILS # BLD AUTO: 3.58 THOUSANDS/ΜL (ref 1.85–7.62)
NEUTS SEG NFR BLD AUTO: 71 % (ref 43–75)
NITRITE UR QL STRIP: NEGATIVE
NON-SQ EPI CELLS URNS QL MICRO: ABNORMAL /HPF
NRBC BLD AUTO-RTO: 0 /100 WBCS
PH UR STRIP.AUTO: 5.5 [PH] (ref 4.5–8)
PLATELET # BLD AUTO: 131 THOUSANDS/UL (ref 149–390)
PMV BLD AUTO: 12.2 FL (ref 8.9–12.7)
POTASSIUM SERPL-SCNC: 3.8 MMOL/L (ref 3.5–5.3)
PROT UR STRIP-MCNC: ABNORMAL MG/DL
RBC # BLD AUTO: 4.3 MILLION/UL (ref 3.81–5.12)
RBC #/AREA URNS AUTO: ABNORMAL /HPF
SODIUM SERPL-SCNC: 139 MMOL/L (ref 136–145)
SP GR UR STRIP.AUTO: >=1.03 (ref 1–1.03)
T4 FREE SERPL-MCNC: 1.22 NG/DL (ref 0.76–1.46)
TSH SERPL DL<=0.05 MIU/L-ACNC: 3.55 UIU/ML (ref 0.36–3.74)
UROBILINOGEN UR QL STRIP.AUTO: 1 E.U./DL
VIT B12 SERPL-MCNC: 386 PG/ML (ref 100–900)
WBC # BLD AUTO: 5.07 THOUSAND/UL (ref 4.31–10.16)
WBC #/AREA URNS AUTO: ABNORMAL /HPF

## 2018-03-20 PROCEDURE — 82607 VITAMIN B-12: CPT

## 2018-03-20 PROCEDURE — 82306 VITAMIN D 25 HYDROXY: CPT

## 2018-03-20 PROCEDURE — 71046 X-RAY EXAM CHEST 2 VIEWS: CPT

## 2018-03-20 PROCEDURE — 85025 COMPLETE CBC W/AUTO DIFF WBC: CPT

## 2018-03-20 PROCEDURE — 82746 ASSAY OF FOLIC ACID SERUM: CPT

## 2018-03-20 PROCEDURE — 36415 COLL VENOUS BLD VENIPUNCTURE: CPT

## 2018-03-20 PROCEDURE — 81001 URINALYSIS AUTO W/SCOPE: CPT | Performed by: FAMILY MEDICINE

## 2018-03-20 PROCEDURE — 84443 ASSAY THYROID STIM HORMONE: CPT

## 2018-03-20 PROCEDURE — 84439 ASSAY OF FREE THYROXINE: CPT

## 2018-03-20 PROCEDURE — 80048 BASIC METABOLIC PNL TOTAL CA: CPT

## 2018-03-20 PROCEDURE — 86618 LYME DISEASE ANTIBODY: CPT

## 2018-03-20 PROCEDURE — 87086 URINE CULTURE/COLONY COUNT: CPT

## 2018-03-21 LAB — BACTERIA UR CULT: NORMAL

## 2018-03-22 LAB
B BURGDOR IGG SER IA-ACNC: 0.15
B BURGDOR IGM SER IA-ACNC: 0.19

## 2018-03-26 ENCOUNTER — TRANSCRIBE ORDERS (OUTPATIENT)
Dept: ADMINISTRATIVE | Facility: HOSPITAL | Age: 78
End: 2018-03-26

## 2018-03-26 DIAGNOSIS — R31.9 HEMATURIA SYNDROME: Primary | ICD-10-CM

## 2018-03-27 ENCOUNTER — TELEPHONE (OUTPATIENT)
Dept: UROLOGY | Facility: AMBULATORY SURGERY CENTER | Age: 78
End: 2018-03-27

## 2018-03-27 NOTE — TELEPHONE ENCOUNTER
Spoke with PCP's office, they stated patient has gross hematuria and is scheduled for an ultrasound for tomorrow  Please advise on when we can get patient in  They would like the [place] office   Thank you

## 2018-03-27 NOTE — TELEPHONE ENCOUNTER
Spoke with  and scheduled patient for 3/29/18 with Dr Eliana Alvarez at [facility]

## 2018-03-29 ENCOUNTER — TELEPHONE (OUTPATIENT)
Dept: UROLOGY | Facility: AMBULATORY SURGERY CENTER | Age: 78
End: 2018-03-29

## 2018-03-29 NOTE — TELEPHONE ENCOUNTER
I called to see if patient wanted to reschedule ov  Patient's  stated that they are going to [facility] and did not have the [address] done yet  They do not want to reschedule at this time but will call back when patient is feeling better  I explained the importance of f/u  They said they would speak with their family doctor

## 2018-03-30 ENCOUNTER — TELEPHONE (OUTPATIENT)
Dept: UROLOGY | Facility: CLINIC | Age: 78
End: 2018-03-30

## 2018-03-30 NOTE — TELEPHONE ENCOUNTER
New patient appointment for gross hematuria was cancelled yesterday per patient   called today to reschedule appointment  Patient scheduled for 4/5/18 at 9:45 in [place] office with Dr Lisa Hernandez per Alexandrea Castellanos  Patient to have an ultrasound done on 4/4/18  Please add patient to Dr Eliud Hess schedule

## 2018-04-02 RX ORDER — LEVOTHYROXINE SODIUM 0.05 MG/1
50 TABLET ORAL DAILY
Refills: 3 | COMMUNITY
Start: 2018-01-23

## 2018-04-02 RX ORDER — HYDROCHLOROTHIAZIDE 25 MG/1
TABLET ORAL
COMMUNITY
Start: 2018-03-16 | End: 2018-05-27 | Stop reason: HOSPADM

## 2018-04-02 RX ORDER — OMEPRAZOLE 20 MG/1
20 CAPSULE, DELAYED RELEASE ORAL DAILY
Refills: 3 | COMMUNITY
Start: 2018-01-23

## 2018-04-02 RX ORDER — SIMVASTATIN 40 MG
40 TABLET ORAL DAILY
COMMUNITY
Start: 2018-03-19 | End: 2018-08-17 | Stop reason: SDUPTHER

## 2018-04-03 ENCOUNTER — APPOINTMENT (OUTPATIENT)
Dept: LAB | Facility: HOSPITAL | Age: 78
End: 2018-04-03
Payer: MEDICARE

## 2018-04-03 ENCOUNTER — TRANSCRIBE ORDERS (OUTPATIENT)
Dept: ADMINISTRATIVE | Facility: HOSPITAL | Age: 78
End: 2018-04-03

## 2018-04-03 DIAGNOSIS — R31.9 HEMATURIA SYNDROME: Primary | ICD-10-CM

## 2018-04-03 DIAGNOSIS — R31.9 HEMATURIA SYNDROME: ICD-10-CM

## 2018-04-03 LAB
BACTERIA UR QL AUTO: ABNORMAL /HPF
BILIRUB UR QL STRIP: NEGATIVE
CLARITY UR: CLEAR
COLOR UR: YELLOW
GLUCOSE UR STRIP-MCNC: NEGATIVE MG/DL
HGB UR QL STRIP.AUTO: ABNORMAL
KETONES UR STRIP-MCNC: NEGATIVE MG/DL
LEUKOCYTE ESTERASE UR QL STRIP: ABNORMAL
NITRITE UR QL STRIP: NEGATIVE
NON-SQ EPI CELLS URNS QL MICRO: ABNORMAL /HPF
PH UR STRIP.AUTO: 5.5 [PH] (ref 4.5–8)
PROT UR STRIP-MCNC: ABNORMAL MG/DL
RBC #/AREA URNS AUTO: ABNORMAL /HPF
SP GR UR STRIP.AUTO: >=1.03 (ref 1–1.03)
UROBILINOGEN UR QL STRIP.AUTO: 1 E.U./DL
WBC #/AREA URNS AUTO: ABNORMAL /HPF

## 2018-04-03 PROCEDURE — 81001 URINALYSIS AUTO W/SCOPE: CPT | Performed by: FAMILY MEDICINE

## 2018-04-03 PROCEDURE — 88112 CYTOPATH CELL ENHANCE TECH: CPT | Performed by: PATHOLOGY

## 2018-04-04 ENCOUNTER — HOSPITAL ENCOUNTER (OUTPATIENT)
Dept: ULTRASOUND IMAGING | Facility: HOSPITAL | Age: 78
Discharge: HOME | End: 2018-04-04
Payer: MEDICARE

## 2018-04-04 DIAGNOSIS — R31.9 HEMATURIA SYNDROME: ICD-10-CM

## 2018-04-04 PROCEDURE — 76770 US EXAM ABDO BACK WALL COMP: CPT

## 2018-04-05 ENCOUNTER — OFFICE VISIT (OUTPATIENT)
Dept: UROLOGY | Facility: AMBULATORY SURGERY CENTER | Age: 78
End: 2018-04-05
Payer: MEDICARE

## 2018-04-05 VITALS
DIASTOLIC BLOOD PRESSURE: 60 MMHG | BODY MASS INDEX: 26.43 KG/M2 | SYSTOLIC BLOOD PRESSURE: 110 MMHG | HEIGHT: 61 IN | WEIGHT: 140 LBS | HEART RATE: 64 BPM

## 2018-04-05 DIAGNOSIS — R31.29 MICROSCOPIC HEMATURIA: Primary | ICD-10-CM

## 2018-04-05 PROCEDURE — 99204 OFFICE O/P NEW MOD 45 MIN: CPT | Performed by: UROLOGY

## 2018-04-05 NOTE — PROGRESS NOTES
Assessment/Plan:    Microscopic hematuria  I reviewed her urinalysis results with her and her   Although her dipstick test do show trace blood whenever she has had formal urine microscopy there has been no blood cells really found  She does not meet the criteria for microscopic hematuria and needs no further workup  She will follow up with us on an as-needed basis  Diagnoses and all orders for this visit:    Microscopic hematuria    Other orders  -     aspirin 81 MG tablet; Take 1 tablet by mouth every other day  -     Cholecalciferol ([employer] VITAMIN D3) 1000 units capsule; Take by mouth  -     hydrochlorothiazide (HYDRODIURIL) 25 mg tablet;   -     levothyroxine 50 mcg tablet; Take 50 mcg by mouth daily  -     omeprazole (PriLOSEC) 20 mg delayed release capsule; Take 20 mg by mouth daily  -     simvastatin (ZOCOR) 40 mg tablet; Total visit time was 60 minutes of which over 50% was spent on counseling  Subjective:     Patient ID: Grazyna Stovall is a 66 y o  female    71-year-old female presents for evaluation of microscopic hematuria  She denies any lower urinary tract symptoms  She denies any gross hematuria  She has very minimal smoking history in the 1950s  She denies any family history of kidney or bladder malignancies  She has no other complaints  The following portions of the patient's history were reviewed and updated as appropriate: allergies, current medications, past family history, past medical history, past social history, past surgical history and problem list     Review of Systems   Constitutional: Negative  HENT: Negative  Eyes: Negative  Respiratory: Negative  Cardiovascular: Negative  Gastrointestinal: Negative  Endocrine: Negative  Genitourinary:        As noted per HPI   Musculoskeletal: Negative  Skin: Negative  Allergic/Immunologic: Negative  Neurological: Negative  Hematological: Negative      Psychiatric/Behavioral: Negative  Urinary Incontinence Screening    Flowsheet Row Most Recent Value   Urinary Incontinence   Urinary Incontinence?  -- [just wears one as precaution]   Incomplete emptying? Yes   Urinary frequency? No   Urinary urgency? No   Urinary hesitancy? No   Dysuria (painful difficult urination)? No   Nocturia (waking up to use the bathroom)? No   Straining (having to push to go)? No   Weak stream?  No   Intermittent stream?  No   Vaginal pressure? No   Vaginal dryness? No          Objective:    Physical Exam   Constitutional: She is oriented to person, place, and time  She appears well-developed and well-nourished  HENT:   Head: Normocephalic and atraumatic  Neck: Normal range of motion  Neck supple  Cardiovascular: Intact distal pulses  Pulmonary/Chest: Effort normal    Abdominal: Soft  Bowel sounds are normal  She exhibits no distension and no mass  There is no tenderness  There is no rebound and no guarding  Musculoskeletal: Normal range of motion  Neurological: She is alert and oriented to person, place, and time  Skin: Skin is warm and dry  Psychiatric: She has a normal mood and affect  Vitals reviewed          Results  No results found for: PSA  Lab Results   Component Value Date    GLUCOSE 101 03/07/2018    CALCIUM 9 5 03/20/2018     03/20/2018    K 3 8 03/20/2018    CO2 29 03/20/2018     03/20/2018    BUN 24 03/20/2018    CREATININE 1 03 03/20/2018     Lab Results   Component Value Date    WBC 5 07 03/20/2018    HGB 13 4 03/20/2018    HCT 40 2 03/20/2018    MCV 94 03/20/2018     (L) 03/20/2018       No results found for this or any previous visit (from the past 1 hour(s)) ]

## 2018-04-05 NOTE — LETTER
April 5, 2018     Regina Abt, DO  [address]  [address] [facility]    Patient: Samuel Talley   YOB: 1940   Date of Visit: 4/5/2018       Dear Dr Portia Mays: Thank you for referring Kaylynn Ang to me for evaluation  Below are my notes for this consultation  If you have questions, please do not hesitate to call me  I look forward to following your patient along with you  Sincerely,        Georgina Escoto MD        CC: No Recipients  Georgina Escoto MD  4/5/2018 10:33 AM  Sign at close encounter  Assessment/Plan:    Microscopic hematuria  I reviewed her urinalysis results with her and her   Although her dipstick test do show trace blood whenever she has had formal urine microscopy there has been no blood cells really found  She does not meet the criteria for microscopic hematuria and needs no further workup  She will follow up with us on an as-needed basis  Diagnoses and all orders for this visit:    Microscopic hematuria    Other orders  -     aspirin 81 MG tablet; Take 1 tablet by mouth every other day  -     Cholecalciferol ([employer]S VITAMIN D3) 1000 units capsule; Take by mouth  -     hydrochlorothiazide (HYDRODIURIL) 25 mg tablet;   -     levothyroxine 50 mcg tablet; Take 50 mcg by mouth daily  -     omeprazole (PriLOSEC) 20 mg delayed release capsule; Take 20 mg by mouth daily  -     simvastatin (ZOCOR) 40 mg tablet; Total visit time was 60 minutes of which over 50% was spent on counseling  Subjective:     Patient ID: Samuel Talley is a 66 y o  female    66-year-old female presents for evaluation of microscopic hematuria  She denies any lower urinary tract symptoms  She denies any gross hematuria  She has very minimal smoking history in the 1950s  She denies any family history of kidney or bladder malignancies  She has no other complaints            The following portions of the patient's history were reviewed and updated as appropriate: allergies, current medications, past family history, past medical history, past social history, past surgical history and problem list     Review of Systems   Constitutional: Negative  HENT: Negative  Eyes: Negative  Respiratory: Negative  Cardiovascular: Negative  Gastrointestinal: Negative  Endocrine: Negative  Genitourinary:        As noted per HPI   Musculoskeletal: Negative  Skin: Negative  Allergic/Immunologic: Negative  Neurological: Negative  Hematological: Negative  Psychiatric/Behavioral: Negative  Urinary Incontinence Screening    Flowsheet Row Most Recent Value   Urinary Incontinence   Urinary Incontinence?  -- [just wears one as precaution]   Incomplete emptying? Yes   Urinary frequency? No   Urinary urgency? No   Urinary hesitancy? No   Dysuria (painful difficult urination)? No   Nocturia (waking up to use the bathroom)? No   Straining (having to push to go)? No   Weak stream?  No   Intermittent stream?  No   Vaginal pressure? No   Vaginal dryness? No          Objective:    Physical Exam   Constitutional: She is oriented to person, place, and time  She appears well-developed and well-nourished  HENT:   Head: Normocephalic and atraumatic  Neck: Normal range of motion  Neck supple  Cardiovascular: Intact distal pulses  Pulmonary/Chest: Effort normal    Abdominal: Soft  Bowel sounds are normal  She exhibits no distension and no mass  There is no tenderness  There is no rebound and no guarding  Musculoskeletal: Normal range of motion  Neurological: She is alert and oriented to person, place, and time  Skin: Skin is warm and dry  Psychiatric: She has a normal mood and affect  Vitals reviewed          Results  No results found for: PSA  Lab Results   Component Value Date    GLUCOSE 101 03/07/2018    CALCIUM 9 5 03/20/2018     03/20/2018    K 3 8 03/20/2018    CO2 29 03/20/2018     03/20/2018    BUN 24 03/20/2018    CREATININE 1 03 03/20/2018     Lab Results   Component Value Date    WBC 5 07 03/20/2018    HGB 13 4 03/20/2018    HCT 40 2 03/20/2018    MCV 94 03/20/2018     (L) 03/20/2018       No results found for this or any previous visit (from the past 1 hour(s)) ]

## 2018-04-05 NOTE — ASSESSMENT & PLAN NOTE
I reviewed her urinalysis results with her and her   Although her dipstick test do show trace blood whenever she has had formal urine microscopy there has been no blood cells really found  She does not meet the criteria for microscopic hematuria and needs no further workup  She will follow up with us on an as-needed basis

## 2018-05-17 PROBLEM — I35.0 AORTIC STENOSIS, MODERATE: Status: ACTIVE | Noted: 2017-02-28

## 2018-05-26 ENCOUNTER — APPOINTMENT (EMERGENCY)
Dept: RADIOLOGY | Facility: HOSPITAL | Age: 78
End: 2018-05-26
Payer: MEDICARE

## 2018-05-26 ENCOUNTER — HOSPITAL ENCOUNTER (OUTPATIENT)
Facility: HOSPITAL | Age: 78
Setting detail: OBSERVATION
Discharge: HOME | End: 2018-05-27
Attending: EMERGENCY MEDICINE | Admitting: FAMILY MEDICINE
Payer: MEDICARE

## 2018-05-26 DIAGNOSIS — R53.1 GENERALIZED WEAKNESS: ICD-10-CM

## 2018-05-26 DIAGNOSIS — I48.0 PAROXYSMAL ATRIAL FIBRILLATION (HCC): Primary | ICD-10-CM

## 2018-05-26 DIAGNOSIS — R06.00 DYSPNEA: ICD-10-CM

## 2018-05-26 DIAGNOSIS — I48.91 NEW ONSET ATRIAL FIBRILLATION (HCC): ICD-10-CM

## 2018-05-26 PROBLEM — E03.9 HYPOTHYROIDISM: Status: ACTIVE | Noted: 2018-05-26

## 2018-05-26 PROBLEM — R41.89 COGNITIVE IMPAIRMENT: Status: ACTIVE | Noted: 2018-05-26

## 2018-05-26 LAB
ANION GAP SERPL CALCULATED.3IONS-SCNC: 9 MMOL/L (ref 4–13)
ATRIAL RATE: 131 BPM
ATRIAL RATE: 91 BPM
BASOPHILS # BLD AUTO: 0.01 THOUSANDS/ΜL (ref 0–0.1)
BASOPHILS NFR BLD AUTO: 0 % (ref 0–1)
BUN SERPL-MCNC: 30 MG/DL (ref 5–25)
CALCIUM SERPL-MCNC: 8.9 MG/DL (ref 8.3–10.1)
CHLORIDE SERPL-SCNC: 106 MMOL/L (ref 100–108)
CO2 SERPL-SCNC: 26 MMOL/L (ref 21–32)
CREAT SERPL-MCNC: 1.1 MG/DL (ref 0.6–1.3)
DEPRECATED D DIMER PPP: 229 NG/ML (FEU) (ref 0–424)
EOSINOPHIL # BLD AUTO: 0.06 THOUSAND/ΜL (ref 0–0.61)
EOSINOPHIL NFR BLD AUTO: 1 % (ref 0–6)
ERYTHROCYTE [DISTWIDTH] IN BLOOD BY AUTOMATED COUNT: 12.9 % (ref 11.6–15.1)
GFR SERPL CREATININE-BSD FRML MDRD: 48 ML/MIN/1.73SQ M
GLUCOSE SERPL-MCNC: 100 MG/DL (ref 65–140)
HCT VFR BLD AUTO: 40 % (ref 34.8–46.1)
HGB BLD-MCNC: 13.2 G/DL (ref 11.5–15.4)
LYMPHOCYTES # BLD AUTO: 1.48 THOUSANDS/ΜL (ref 0.6–4.47)
LYMPHOCYTES NFR BLD AUTO: 28 % (ref 14–44)
MAGNESIUM SERPL-MCNC: 2 MG/DL (ref 1.6–2.6)
MCH RBC QN AUTO: 30.8 PG (ref 26.8–34.3)
MCHC RBC AUTO-ENTMCNC: 33 G/DL (ref 31.4–37.4)
MCV RBC AUTO: 94 FL (ref 82–98)
MONOCYTES # BLD AUTO: 0.36 THOUSAND/ΜL (ref 0.17–1.22)
MONOCYTES NFR BLD AUTO: 7 % (ref 4–12)
NEUTROPHILS # BLD AUTO: 3.44 THOUSANDS/ΜL (ref 1.85–7.62)
NEUTS SEG NFR BLD AUTO: 64 % (ref 43–75)
NRBC BLD AUTO-RTO: 0 /100 WBCS
NT-PROBNP SERPL-MCNC: 109 PG/ML
P AXIS: 216 DEGREES
PLATELET # BLD AUTO: 135 THOUSANDS/UL (ref 149–390)
PMV BLD AUTO: 11.7 FL (ref 8.9–12.7)
POTASSIUM SERPL-SCNC: 3.5 MMOL/L (ref 3.5–5.3)
QRS AXIS: -22 DEGREES
QRS AXIS: 5 DEGREES
QRSD INTERVAL: 82 MS
QRSD INTERVAL: 86 MS
QT INTERVAL: 306 MS
QT INTERVAL: 324 MS
QTC INTERVAL: 420 MS
QTC INTERVAL: 441 MS
RBC # BLD AUTO: 4.28 MILLION/UL (ref 3.81–5.12)
SODIUM SERPL-SCNC: 141 MMOL/L (ref 136–145)
T WAVE AXIS: -6 DEGREES
T WAVE AXIS: 56 DEGREES
T4 FREE SERPL-MCNC: 1.32 NG/DL (ref 0.76–1.46)
TROPONIN I SERPL-MCNC: <0.02 NG/ML
TSH SERPL DL<=0.05 MIU/L-ACNC: 6.72 UIU/ML (ref 0.36–3.74)
VENTRICULAR RATE: 101 BPM
VENTRICULAR RATE: 125 BPM
WBC # BLD AUTO: 5.37 THOUSAND/UL (ref 4.31–10.16)

## 2018-05-26 PROCEDURE — 84439 ASSAY OF FREE THYROXINE: CPT | Performed by: EMERGENCY MEDICINE

## 2018-05-26 PROCEDURE — 93010 ELECTROCARDIOGRAM REPORT: CPT | Performed by: INTERNAL MEDICINE

## 2018-05-26 PROCEDURE — 83735 ASSAY OF MAGNESIUM: CPT | Performed by: EMERGENCY MEDICINE

## 2018-05-26 PROCEDURE — 71045 X-RAY EXAM CHEST 1 VIEW: CPT

## 2018-05-26 PROCEDURE — 96360 HYDRATION IV INFUSION INIT: CPT

## 2018-05-26 PROCEDURE — 99220 PR INITIAL OBSERVATION CARE/DAY 70 MINUTES: CPT | Performed by: FAMILY MEDICINE

## 2018-05-26 PROCEDURE — 83880 ASSAY OF NATRIURETIC PEPTIDE: CPT | Performed by: EMERGENCY MEDICINE

## 2018-05-26 PROCEDURE — 84443 ASSAY THYROID STIM HORMONE: CPT | Performed by: EMERGENCY MEDICINE

## 2018-05-26 PROCEDURE — 93005 ELECTROCARDIOGRAM TRACING: CPT

## 2018-05-26 PROCEDURE — 99285 EMERGENCY DEPT VISIT HI MDM: CPT

## 2018-05-26 PROCEDURE — 85025 COMPLETE CBC W/AUTO DIFF WBC: CPT | Performed by: EMERGENCY MEDICINE

## 2018-05-26 PROCEDURE — 80048 BASIC METABOLIC PNL TOTAL CA: CPT | Performed by: EMERGENCY MEDICINE

## 2018-05-26 PROCEDURE — 36415 COLL VENOUS BLD VENIPUNCTURE: CPT | Performed by: EMERGENCY MEDICINE

## 2018-05-26 PROCEDURE — 85379 FIBRIN DEGRADATION QUANT: CPT | Performed by: EMERGENCY MEDICINE

## 2018-05-26 PROCEDURE — 84484 ASSAY OF TROPONIN QUANT: CPT | Performed by: EMERGENCY MEDICINE

## 2018-05-26 RX ORDER — LEVOTHYROXINE SODIUM 0.05 MG/1
50 TABLET ORAL
Status: DISCONTINUED | OUTPATIENT
Start: 2018-05-27 | End: 2018-05-27 | Stop reason: HOSPADM

## 2018-05-26 RX ORDER — MELATONIN
1000 DAILY
Status: DISCONTINUED | OUTPATIENT
Start: 2018-05-27 | End: 2018-05-27 | Stop reason: HOSPADM

## 2018-05-26 RX ORDER — DOCUSATE SODIUM 100 MG/1
100 CAPSULE, LIQUID FILLED ORAL 2 TIMES DAILY
Status: DISCONTINUED | OUTPATIENT
Start: 2018-05-26 | End: 2018-05-27 | Stop reason: HOSPADM

## 2018-05-26 RX ORDER — HYDROCHLOROTHIAZIDE 25 MG/1
25 TABLET ORAL DAILY
Status: DISCONTINUED | OUTPATIENT
Start: 2018-05-27 | End: 2018-05-27

## 2018-05-26 RX ORDER — POTASSIUM CHLORIDE 20 MEQ/1
40 TABLET, EXTENDED RELEASE ORAL ONCE
Status: COMPLETED | OUTPATIENT
Start: 2018-05-26 | End: 2018-05-26

## 2018-05-26 RX ORDER — ACETAMINOPHEN 325 MG/1
650 TABLET ORAL EVERY 6 HOURS PRN
Status: DISCONTINUED | OUTPATIENT
Start: 2018-05-26 | End: 2018-05-27 | Stop reason: HOSPADM

## 2018-05-26 RX ORDER — CALCIUM CARBONATE 200(500)MG
1000 TABLET,CHEWABLE ORAL DAILY PRN
Status: DISCONTINUED | OUTPATIENT
Start: 2018-05-26 | End: 2018-05-27 | Stop reason: HOSPADM

## 2018-05-26 RX ORDER — ONDANSETRON 2 MG/ML
4 INJECTION INTRAMUSCULAR; INTRAVENOUS EVERY 6 HOURS PRN
Status: DISCONTINUED | OUTPATIENT
Start: 2018-05-26 | End: 2018-05-27 | Stop reason: HOSPADM

## 2018-05-26 RX ORDER — PANTOPRAZOLE SODIUM 40 MG/1
40 TABLET, DELAYED RELEASE ORAL
Status: DISCONTINUED | OUTPATIENT
Start: 2018-05-27 | End: 2018-05-27 | Stop reason: HOSPADM

## 2018-05-26 RX ORDER — PRAVASTATIN SODIUM 80 MG/1
80 TABLET ORAL
Status: DISCONTINUED | OUTPATIENT
Start: 2018-05-26 | End: 2018-05-27 | Stop reason: HOSPADM

## 2018-05-26 RX ADMIN — ENOXAPARIN SODIUM 60 MG: 60 INJECTION SUBCUTANEOUS at 21:44

## 2018-05-26 RX ADMIN — POTASSIUM CHLORIDE 40 MEQ: 1500 TABLET, EXTENDED RELEASE ORAL at 16:56

## 2018-05-26 RX ADMIN — METOPROLOL TARTRATE 25 MG: 25 TABLET, FILM COATED ORAL at 21:44

## 2018-05-26 RX ADMIN — SODIUM CHLORIDE 1000 ML: 0.9 INJECTION, SOLUTION INTRAVENOUS at 13:53

## 2018-05-26 RX ADMIN — PRAVASTATIN SODIUM 80 MG: 80 TABLET ORAL at 16:56

## 2018-05-26 NOTE — H&P
H&P- Tara Hess 1940, 66 y o  female MRN: [id number]    Unit/Bed#: ED 14 Encounter: [id number]    Primary Care Provider: Erum Sams DO   Date and time admitted to hospital: 5/26/2018  1:12 PM    * New onset atrial fibrillation (HCC)   Assessment & Plan    · Start BB  · [id number] score 4  Will start therapeutic Lovenox and consult with case management tomorrow regarding costs of NOACs  · Check echo  · Cardiology consult        Cognitive impairment   Assessment & Plan    · Patient has upcoming appointment for evaluation by geriatrician        Hypothyroidism   Assessment & Plan    · On Synthroid  · TSH high but free T4 within normal limits  Recheck labs in 6 weeks        Hypertension   Assessment & Plan    · Continue HCTZ        Dyslipidemia   Assessment & Plan    · Continue statin          VTE Prophylaxis: Enoxaparin (Lovenox)  / sequential compression device   Code Status: Full code  POLST: There is no POLST form on file for this patient (pre-hospital)  Discussion with family:  and sister at bedside    Anticipated Length of Stay:  Patient will be admitted on an Observation basis with an anticipated length of stay of  < 2 midnights  Justification for Hospital Stay: New onset atrial fibrillation  Cardiac evaluation  Total Time for Visit, including Counseling / Coordination of Care: 45 minutes  Greater than 50% of this total time spent on direct patient counseling and coordination of care  Chief Complaint:   Back pain    History of Present Illness:    Tara Hess is a 66 y o  female with a history of HTN, HLD, hypothyroidism, and aortic stenosis who presents with episode today of feeling unwell, shaking, back discomfort, SOB, and fatigue  She states she felt weak as if she would be unable to walk  She has had episodes like this in the past which she attributed to anxiety  She has a history of palpitations and underwent Holter monitor in the past which was unrevealing   In the ED, patient was found to be in rapid atrial fibrillation which is a new diagnosis for her  Heart rate improved without medication but she remains in a fib   also mentions she has been having memory issues lately and is due to see a geriatrician soon  Review of Systems:    Review of Systems   Constitutional: Positive for fatigue  Negative for chills, diaphoresis and fever  HENT: Negative  Eyes: Negative  Respiratory: Positive for shortness of breath  Cardiovascular: Positive for leg swelling  Negative for chest pain and palpitations  Gastrointestinal: Negative  Endocrine: Negative  Genitourinary: Negative  Musculoskeletal: Positive for back pain  Skin: Negative  Allergic/Immunologic: Negative  Neurological: Positive for light-headedness  Hematological: Negative  Psychiatric/Behavioral: Negative  Past Medical and Surgical History:     Past Medical History:   Diagnosis Date    Disease of thyroid gland     Hyperlipidemia     Hypertension        Past Surgical History:   Procedure Laterality Date    HYSTERECTOMY         Meds/Allergies:    Prior to Admission medications    Medication Sig Start Date End Date Taking?  Authorizing Provider   aspirin 81 MG tablet Take 1 tablet by mouth every other day    Historical Provider, MD   Cholecalciferol ([employer] VITAMIN D3) 1000 units capsule Take by mouth    Historical Provider, MD   diphenhydrAMINE (BENADRYL) 25 mg capsule Take 1 capsule by mouth daily at bedtime for 30 days 5/23/17 6/22/17  Chintan Beard DO   fluticasone (FLONASE) 50 mcg/act nasal spray 2 sprays into each nostril daily at bedtime for 30 days 5/23/17 6/22/17  Chintan Beard DO   hydrochlorothiazide (HYDRODIURIL) 25 mg tablet  3/16/18   Historical Provider, MD   levothyroxine 50 mcg tablet Take 50 mcg by mouth daily 1/23/18   Historical Provider, MD   omeprazole (PriLOSEC) 20 mg delayed release capsule Take 20 mg by mouth daily 1/23/18   Historical Provider, MD   simvastatin (ZOCOR) 40 mg tablet  3/19/18   Historical Provider, MD     I have reviewed home medications with patient personally  Allergies: Allergies   Allergen Reactions    Oxycodone-Acetaminophen        Social History:     Marital Status: /Civil Union   Occupation:    Patient Pre-hospital Living Situation: Lives at home   Patient Pre-hospital Level of Mobility:    Patient Pre-hospital Diet Restrictions:    Substance Use History:   History   Alcohol Use No     History   Smoking Status    Former Smoker    Quit date: 1960   Smokeless Tobacco    Never Used     History   Drug Use No       Family History:    non-contributory    Physical Exam:     Vitals:   Blood Pressure: 164/83 (05/26/18 1511)  Pulse: 85 (05/26/18 1511)  Temperature: 98 1 °F (36 7 °C) (05/26/18 1334)  Temp Source: Oral (05/26/18 1334)  Respirations: 18 (05/26/18 1511)  Height: 5' 1" (154 9 cm) (05/26/18 1326)  Weight - Scale: 63 5 kg (139 lb 15 9 oz) (05/26/18 1326)  SpO2: 96 % (05/26/18 1511)    Physical Exam   Constitutional: She is oriented to person, place, and time  No distress  HENT:   Head: Normocephalic and atraumatic  Eyes: No scleral icterus  Neck: Normal range of motion  Neck supple  Cardiovascular:   Irregular  +holosystolic murmur   Pulmonary/Chest: Effort normal and breath sounds normal  No respiratory distress  She has no wheezes  She has no rales  Abdominal: Soft  Bowel sounds are normal  She exhibits no distension  There is no tenderness  There is no rebound  Musculoskeletal: She exhibits no edema  Neurological: She is alert and oriented to person, place, and time  Skin: Skin is warm and dry  She is not diaphoretic  Psychiatric: She has a normal mood and affect  Her behavior is normal  Thought content normal          Additional Data:     Lab Results: I have personally reviewed pertinent reports          Results from last 7 days  Lab Units 05/26/18  1352   WBC Thousand/uL 5 37   HEMOGLOBIN g/dL 13  2   HEMATOCRIT % 40 0   PLATELETS Thousands/uL 135*   NEUTROS PCT % 64   LYMPHS PCT % 28   MONOS PCT % 7   EOS PCT % 1       Results from last 7 days  Lab Units 05/26/18  1352   SODIUM mmol/L 141   POTASSIUM mmol/L 3 5   CHLORIDE mmol/L 106   CO2 mmol/L 26   BUN mg/dL 30*   CREATININE mg/dL 1 10   CALCIUM mg/dL 8 9   GLUCOSE RANDOM mg/dL 100                   Imaging: I have personally reviewed pertinent films in PACS No acute disease from my read  Await official radiologist reading  XR chest portable    (Results Pending)       EKG, Pathology, and Other Studies Reviewed on Admission:   · EKG: Atrial fibrillation    Allscripts / Epic Records Reviewed: Yes     ** Please Note: This note has been constructed using a voice recognition system   **

## 2018-05-26 NOTE — ASSESSMENT & PLAN NOTE
· Start BB  · [id number] score 4   Will start therapeutic Lovenox and consult with case management tomorrow regarding costs of NOACs  · Check echo  · Cardiology consult

## 2018-05-26 NOTE — ED NOTES
Patient feels less shaky  Ambulated to bathroom with steady gait        Rosalinda Hollins RN  05/26/18 1750

## 2018-05-26 NOTE — ED PROVIDER NOTES
History  Chief Complaint   Patient presents with    Shaking     Patient reports shakiness starting today, reported chest pain and back for 2 weeks  66year-old female presenting for evaluation of multiple symptoms  Patient reports that she went out to breakfast and when she was walking into her house afterwards she had acute onset of feeling unwell, palpitations, shortness of breath and chest discomfort  Patient reports the symptoms have mostly resolved at this time  She reports that she has had multiple episodes similar to this in the past, however after multiple workups there was never a cause found  Patient is noted to be in atrial fibrillation at a rate in the 160s  She denies any prior history of atrial fibrillation  Denies any recent illness, fevers, chills, cough, abdominal pain, nausea, vomiting, changes in stool urinary complaints  Patient follows with [address] [place] [address] Cardiology  Per records, patient had an unremarkable stress test in 2017 and an echo at that time showing an EF of 60% and moderate aortic stenosis  A/P:  66year-old female with new onset atrial fibrillation, rate improved on its own- will continue to monitor and treat if needed, CBC to rule out anemia, BMP to assess renal function/electrolytes, TSH to assess thyroid function, troponin to evaluate for ischemia, D-dimer given low risk for PE, admit             2017 Stress:   Normal study after pharmacologic vasodilation  Myocardial perfusion imaging was normal at rest and with stress  Left ventricular systolic function was normal     2017 ECHO  EF 60%        Prior to Admission Medications   Prescriptions Last Dose Informant Patient Reported? Taking?    Cholecalciferol ([employer] VITAMIN D3) 1000 units capsule  Self Yes No   Sig: Take by mouth   diphenhydrAMINE (BENADRYL) 25 mg capsule   No No   Sig: Take 1 capsule by mouth daily at bedtime for 30 days   fluticasone (FLONASE) 50 mcg/act nasal spray   No No   Si sprays into each nostril daily at bedtime for 30 days   hydrochlorothiazide (HYDRODIURIL) 25 mg tablet  Self Yes No   levothyroxine 50 mcg tablet  Self Yes No   Sig: Take 50 mcg by mouth daily   omeprazole (PriLOSEC) 20 mg delayed release capsule  Self Yes No   Sig: Take 20 mg by mouth daily   simvastatin (ZOCOR) 40 mg tablet  Self Yes No      Facility-Administered Medications: None       Past Medical History:   Diagnosis Date    Disease of thyroid gland     Hyperlipidemia     Hypertension        Past Surgical History:   Procedure Laterality Date    HYSTERECTOMY         History reviewed  No pertinent family history  I have reviewed and agree with the history as documented  Social History   Substance Use Topics    Smoking status: Former Smoker     Quit date: 1960    Smokeless tobacco: Never Used    Alcohol use No        Review of Systems   Constitutional: Negative for chills and fever  HENT: Negative for rhinorrhea and sore throat  Eyes: Negative for pain and visual disturbance  Respiratory: Positive for shortness of breath  Negative for cough  Cardiovascular: Positive for chest pain  Negative for leg swelling  Gastrointestinal: Negative for abdominal pain, constipation, diarrhea, nausea and vomiting  Endocrine: Negative for polydipsia, polyphagia and polyuria  Genitourinary: Negative for dysuria, frequency, hematuria and urgency  Musculoskeletal: Negative for back pain, myalgias and neck pain  Skin: Negative for color change and rash  Allergic/Immunologic: Negative for environmental allergies and immunocompromised state  Neurological: Positive for weakness  Negative for dizziness, light-headedness, numbness and headaches  Hematological: Negative for adenopathy  Does not bruise/bleed easily  Psychiatric/Behavioral: Negative for agitation and confusion  All other systems reviewed and are negative        Physical Exam  ED Triage Vitals   Temperature Pulse Respirations Blood Pressure SpO2   05/26/18 1334 05/26/18 1326 05/26/18 1326 05/26/18 1326 05/26/18 1326   98 1 °F (36 7 °C) (!) 160 20 (!) 154/113 97 %      Temp Source Heart Rate Source Patient Position - Orthostatic VS BP Location FiO2 (%)   05/26/18 1334 05/26/18 1326 05/26/18 1326 05/26/18 1326 --   Oral Monitor Sitting Right arm       Pain Score       05/26/18 1326       No Pain           Orthostatic Vital Signs  Vitals:    05/26/18 1511 05/26/18 1530 05/26/18 1637 05/26/18 1900   BP: 164/83 (!) 177/84 135/85 148/78   Pulse: 85 86 99 86   Patient Position - Orthostatic VS: Lying Lying         Physical Exam   Constitutional: She is oriented to person, place, and time  She appears well-developed and well-nourished  HENT:   Head: Normocephalic and atraumatic  Nose: Nose normal    Mouth/Throat: Oropharynx is clear and moist    Eyes: Conjunctivae and EOM are normal    Neck: Normal range of motion  Neck supple  Cardiovascular: Normal heart sounds and intact distal pulses  Tachycardic, irregular    Pulmonary/Chest: Effort normal and breath sounds normal  No stridor  No respiratory distress  She has no wheezes  She has no rales  She exhibits no tenderness  Abdominal: Soft  She exhibits no distension  There is no tenderness  There is no rebound and no guarding  Musculoskeletal: She exhibits no edema or deformity  No calf tenderness/swelling, no peripheral edema   Neurological: She is alert and oriented to person, place, and time  She exhibits normal muscle tone  Coordination normal    Skin: Skin is warm and dry  No rash noted  Psychiatric: She has a normal mood and affect  Judgment and thought content normal    Nursing note and vitals reviewed        ED Medications  Medications   enoxaparin (LOVENOX) subcutaneous injection 60 mg (not administered)   cholecalciferol (VITAMIN D3) tablet 1,000 Units (not administered)   hydrochlorothiazide (HYDRODIURIL) tablet 25 mg (not administered)   levothyroxine tablet 50 mcg (not administered) pantoprazole (PROTONIX) EC tablet 40 mg (not administered)   pravastatin (PRAVACHOL) tablet 80 mg (80 mg Oral Given 5/26/18 1656)   docusate sodium (COLACE) capsule 100 mg (not administered)   ondansetron (ZOFRAN) injection 4 mg (not administered)   calcium carbonate (TUMS) chewable tablet 1,000 mg (not administered)   acetaminophen (TYLENOL) tablet 650 mg (not administered)   metoprolol tartrate (LOPRESSOR) tablet 25 mg (not administered)   sodium chloride 0 9 % bolus 1,000 mL (0 mL Intravenous Stopped 5/26/18 1501)   potassium chloride (K-DUR,KLOR-CON) CR tablet 40 mEq (40 mEq Oral Given 5/26/18 1656)       Diagnostic Studies  Results Reviewed     Procedure Component Value Units Date/Time    T4, free [[id number]]  (Normal) Collected:  05/26/18 1352    Lab Status:  Final result Specimen:  Blood from Arm, Left Updated:  05/26/18 1447     Free T4 1 32 ng/dL     D-dimer, quantitative [[id number]]  (Normal) Collected:  05/26/18 1352    Lab Status:  Final result Specimen:  Blood from Arm, Left Updated:  05/26/18 1432     D-Dimer, Quant 229 ng/ml (FEU)     TSH, 3rd generation with T4 reflex [[id number]]  (Abnormal) Collected:  05/26/18 1352    Lab Status:  Final result Specimen:  Blood from Arm, Left Updated:  05/26/18 1431     TSH 3RD GENERATON 6 720 (H) uIU/mL     Narrative:         Patients undergoing fluorescein dye angiography may retain small amounts of fluorescein in the body for 48-72 hours post procedure  Samples containing fluorescein can produce falsely depressed TSH values  If the patient had this procedure,a specimen should be resubmitted post fluorescein clearance            The recommended reference ranges for TSH during pregnancy are as follows:  First trimester 0 1 to 2 5 uIU/mL  Second trimester  0 2 to 3 0 uIU/mL  Third trimester 0 3 to 3 0 uIU/m      Basic metabolic panel [[id number]]  (Abnormal) Collected:  05/26/18 1352    Lab Status:  Final result Specimen:  Blood from Arm, Left Updated:  05/26/18 1431 Sodium 141 mmol/L      Potassium 3 5 mmol/L      Chloride 106 mmol/L      CO2 26 mmol/L      Anion Gap 9 mmol/L      BUN 30 (H) mg/dL      Creatinine 1 10 mg/dL      Glucose 100 mg/dL      Calcium 8 9 mg/dL      eGFR 48 ml/min/1 73sq m     Narrative:         National Kidney Disease Education Program recommendations are as follows:  GFR calculation is accurate only with a steady state creatinine  Chronic Kidney disease less than 60 ml/min/1 73 sq  meters  Kidney failure less than 15 ml/min/1 73 sq  meters  NT-BNP PRO [[id number]]  (Normal) Collected:  05/26/18 1352    Lab Status:  Final result Specimen:  Blood from Arm, Left Updated:  05/26/18 1431     NT-proBNP 109 pg/mL     Magnesium [[id number]]  (Normal) Collected:  05/26/18 1352    Lab Status:  Final result Specimen:  Blood from Arm, Left Updated:  05/26/18 1431     Magnesium 2 0 mg/dL     Troponin I [[id number]]  (Normal) Collected:  05/26/18 1352    Lab Status:  Final result Specimen:  Blood from Arm, Left Updated:  05/26/18 1424     Troponin I <0 02 ng/mL     Narrative:         [employer] Chemistry analyzer 99% cutoff is > 0 04 ng/mL in network labs    o cTnI 99% cutoff is useful only when applied to patients in the clinical setting of myocardial ischemia  o cTnI 99% cutoff should be interpreted in the context of clinical history, ECG findings and possibly cardiac imaging to establish correct diagnosis  o cTnI 99% cutoff may be suggestive but clearly not indicative of a coronary event without the clinical setting of myocardial ischemia      CBC and differential [[id number]]  (Abnormal) Collected:  05/26/18 1352    Lab Status:  Final result Specimen:  Blood from Arm, Left Updated:  05/26/18 1409     WBC 5 37 Thousand/uL      RBC 4 28 Million/uL      Hemoglobin 13 2 g/dL      Hematocrit 40 0 %      MCV 94 fL      MCH 30 8 pg      MCHC 33 0 g/dL      RDW 12 9 %      MPV 11 7 fL      Platelets 828 (L) Thousands/uL      nRBC 0 /100 WBCs      Neutrophils Relative 64 % Lymphocytes Relative 28 %      Monocytes Relative 7 %      Eosinophils Relative 1 %      Basophils Relative 0 %      Neutrophils Absolute 3 44 Thousands/µL      Lymphocytes Absolute 1 48 Thousands/µL      Monocytes Absolute 0 36 Thousand/µL      Eosinophils Absolute 0 06 Thousand/µL      Basophils Absolute 0 01 Thousands/µL                  XR chest portable    (Results Pending)         Procedures  ECG 12 Lead Documentation  Date/Time: 5/26/2018 2:12 PM  Performed by: Norberto Gotti  Authorized by: Sepideh QUIROZ     Indications / Diagnosis:  Palpitations/SOB  ECG reviewed by me, the ED Provider: yes    Patient location:  ED  Rate:     ECG rate:  101  Rhythm:     Rhythm: atrial fibrillation    QRS:     QRS axis:  Normal  ST segments:     ST segments:  Non-specific  T waves:     T waves: normal            Phone Consults  ED Phone Contact    ED Course  ED Course as of May 26 2129   Sat May 26, 2018   1406 Afib with RVR Pulse: (!) 160   1503 Regular rhythm in 80s  Pt agreeable with admission            Identification of Seniors at Risk      Most Recent Value   (ISAR) Identification of Seniors at Risk   Before the illness or injury that brought you to the Emergency, did you need someone to help you on a regular basis? 1 Filed at: 05/26/2018 1333   In the last 24 hours, have you needed more help than usual?  0 Filed at: 05/26/2018 1333   Have you been hospitalized for one or more nights during the past 6 months? 0 Filed at: 05/26/2018 1333   In general, do you see well?  0 Filed at: 05/26/2018 1333   In general, do you have serious problems with your memory? 0 Filed at: 05/26/2018 1333   Do you take more than three different medications every day?   1 Filed at: 05/26/2018 1333   ISAR Score  2 Filed at: 05/26/2018 1333                          MDM  Number of Diagnoses or Management Options  Dyspnea:   Generalized weakness:   Paroxysmal atrial fibrillation [facility]):   Diagnosis management comments: 66year-old female presenting with generalized weakness/CP/SOB, found to be in AFib with RVR, converted and resolved spontaneously       Amount and/or Complexity of Data Reviewed  Clinical lab tests: ordered and reviewed  Tests in the radiology section of CPT®: ordered and reviewed  Tests in the medicine section of CPT®: ordered and reviewed      CritCare Time    Disposition  Final diagnoses:   Paroxysmal atrial fibrillation (HCC)   Generalized weakness   Dyspnea     Time reflects when diagnosis was documented in both MDM as applicable and the Disposition within this note     Time User Action Codes Description Comment    5/26/2018  2:50 PM Kaitlin QUIROZ Add [I48 0] Paroxysmal atrial fibrillation ([facility] [address] )     5/26/2018  2:50 PM Kathrin Evangelista A Add [R53 1] Generalized weakness     5/26/2018  2:51 PM Kaitlin QUIROZ Add [R06 00] Dyspnea     5/26/2018  4:04 PM Irving Sauers Add [I48 91] New onset atrial fibrillation ([facility] [address] )     5/26/2018  4:04 PM Irving Sauers Modify [I48 91] New onset atrial fibrillation [facility])       ED Disposition     ED Disposition Condition Comment    Admit  Case was discussed with NASEEM and the patient's admission status was agreed to be Admission Status: observation status to the service of Dr Janine Ken           Follow-up Information    None         Current Discharge Medication List      CONTINUE these medications which have NOT CHANGED    Details   Cholecalciferol ([employer] VITAMIN D3) 1000 units capsule Take by mouth      diphenhydrAMINE (BENADRYL) 25 mg capsule Take 1 capsule by mouth daily at bedtime for 30 days  Qty: 30 capsule, Refills: 0      fluticasone (FLONASE) 50 mcg/act nasal spray 2 sprays into each nostril daily at bedtime for 30 days  Qty: 16 g, Refills: 0      hydrochlorothiazide (HYDRODIURIL) 25 mg tablet       levothyroxine 50 mcg tablet Take 50 mcg by mouth daily  Refills: 3      omeprazole (PriLOSEC) 20 mg delayed release capsule Take 20 mg by mouth daily  Refills: 3      simvastatin (ZOCOR) 40 mg tablet No discharge procedures on file  ED Provider  Attending physically available and evaluated Minus Putt  I managed the patient along with the ED Attending      Electronically Signed by         Severiano Nixon DO  05/26/18 2129

## 2018-05-26 NOTE — PLAN OF CARE
Problem: Potential for Falls  Goal: Patient will remain free of falls  INTERVENTIONS:  - Assess patient frequently for physical needs  -  Identify cognitive and physical deficits and behaviors that affect risk of falls    -  [place] fall precautions as indicated by assessment   - Educate patient/family on patient safety including physical limitations  - Instruct patient to call for assistance with activity based on assessment  - Modify environment to reduce risk of injury  - Consider OT/PT consult to assist with strengthening/mobility   Outcome: Progressing

## 2018-05-26 NOTE — ED ATTENDING ATTESTATION
Ana Linares DO, saw and evaluated the patient  I have discussed the patient with the resident/non-physician practitioner and agree with the resident's/non-physician practitioner's findings, Plan of Care, and MDM as documented in the resident's/non-physician practitioner's note, except where noted  All available labs and Radiology studies were reviewed  At this point I agree with the current assessment done in the Emergency Department  I have conducted an independent evaluation of this patient a history and physical is as follows: Pt with generalized weakness and non-specific complaints  Has been seen by cards in the past but not for afib  On initial ekg and monitor for afib with RVR  Plan to do cardiac labs, ddimer, admit for new onset afib      Critical Care Time  CritCare Time    Procedures

## 2018-05-27 VITALS
OXYGEN SATURATION: 98 % | HEIGHT: 61 IN | WEIGHT: 136 LBS | RESPIRATION RATE: 20 BRPM | DIASTOLIC BLOOD PRESSURE: 72 MMHG | TEMPERATURE: 97.7 F | BODY MASS INDEX: 25.68 KG/M2 | HEART RATE: 68 BPM | SYSTOLIC BLOOD PRESSURE: 168 MMHG

## 2018-05-27 LAB
ANION GAP SERPL CALCULATED.3IONS-SCNC: 8 MMOL/L (ref 4–13)
BUN SERPL-MCNC: 22 MG/DL (ref 5–25)
CALCIUM SERPL-MCNC: 8.7 MG/DL (ref 8.3–10.1)
CHLORIDE SERPL-SCNC: 108 MMOL/L (ref 100–108)
CO2 SERPL-SCNC: 25 MMOL/L (ref 21–32)
CREAT SERPL-MCNC: 0.89 MG/DL (ref 0.6–1.3)
ERYTHROCYTE [DISTWIDTH] IN BLOOD BY AUTOMATED COUNT: 12.8 % (ref 11.6–15.1)
GFR SERPL CREATININE-BSD FRML MDRD: 62 ML/MIN/1.73SQ M
GLUCOSE P FAST SERPL-MCNC: 86 MG/DL (ref 65–99)
GLUCOSE SERPL-MCNC: 86 MG/DL (ref 65–140)
HCT VFR BLD AUTO: 38.1 % (ref 34.8–46.1)
HGB BLD-MCNC: 12.2 G/DL (ref 11.5–15.4)
MAGNESIUM SERPL-MCNC: 2 MG/DL (ref 1.6–2.6)
MCH RBC QN AUTO: 30.1 PG (ref 26.8–34.3)
MCHC RBC AUTO-ENTMCNC: 32 G/DL (ref 31.4–37.4)
MCV RBC AUTO: 94 FL (ref 82–98)
PLATELET # BLD AUTO: 121 THOUSANDS/UL (ref 149–390)
PMV BLD AUTO: 11.6 FL (ref 8.9–12.7)
POTASSIUM SERPL-SCNC: 3.8 MMOL/L (ref 3.5–5.3)
RBC # BLD AUTO: 4.05 MILLION/UL (ref 3.81–5.12)
SODIUM SERPL-SCNC: 141 MMOL/L (ref 136–145)
WBC # BLD AUTO: 5.12 THOUSAND/UL (ref 4.31–10.16)

## 2018-05-27 PROCEDURE — 83735 ASSAY OF MAGNESIUM: CPT | Performed by: PHYSICIAN ASSISTANT

## 2018-05-27 PROCEDURE — 85027 COMPLETE CBC AUTOMATED: CPT | Performed by: PHYSICIAN ASSISTANT

## 2018-05-27 PROCEDURE — 99214 OFFICE O/P EST MOD 30 MIN: CPT | Performed by: INTERNAL MEDICINE

## 2018-05-27 PROCEDURE — 80048 BASIC METABOLIC PNL TOTAL CA: CPT | Performed by: PHYSICIAN ASSISTANT

## 2018-05-27 PROCEDURE — 99217 PR OBSERVATION CARE DISCHARGE MANAGEMENT: CPT | Performed by: PHYSICIAN ASSISTANT

## 2018-05-27 RX ORDER — POTASSIUM CHLORIDE 20 MEQ/1
40 TABLET, EXTENDED RELEASE ORAL ONCE
Status: COMPLETED | OUTPATIENT
Start: 2018-05-27 | End: 2018-05-27

## 2018-05-27 RX ADMIN — ENOXAPARIN SODIUM 60 MG: 60 INJECTION SUBCUTANEOUS at 05:22

## 2018-05-27 RX ADMIN — LEVOTHYROXINE SODIUM 50 MCG: 50 TABLET ORAL at 05:21

## 2018-05-27 RX ADMIN — PANTOPRAZOLE SODIUM 40 MG: 40 TABLET, DELAYED RELEASE ORAL at 05:23

## 2018-05-27 RX ADMIN — POTASSIUM CHLORIDE 40 MEQ: 1500 TABLET, EXTENDED RELEASE ORAL at 11:12

## 2018-05-27 RX ADMIN — VITAMIN D, TAB 1000IU (100/BT) 1000 UNITS: 25 TAB at 08:26

## 2018-05-27 RX ADMIN — HYDROCHLOROTHIAZIDE 25 MG: 25 TABLET ORAL at 08:26

## 2018-05-27 RX ADMIN — METOPROLOL TARTRATE 25 MG: 25 TABLET, FILM COATED ORAL at 08:26

## 2018-05-27 RX ADMIN — PRAVASTATIN SODIUM 80 MG: 80 TABLET ORAL at 16:54

## 2018-05-27 NOTE — CONSULTS
Consultation - Cardiology   Antonio Reaves 66 y o  female MRN: [id number]  Unit/Bed#: CW2 211-02 Encounter: [id number]  05/27/18  10:05 AM    Assessment/ Plan:  1  New onset Atrial fibrillation- [id number]: 4 (htn, female, and age)  3  Continue metoprolol tartrate 25mg po BID- she is adequately rate controlled with heart rates in the 60-70s  2  Given lightheadedness and cognigitive impairment will need to assess functional status and risk of fall prior to initiation of long term anticoagulation  I attempted to contact her  by phone however there was no response  Cont enoxaparin for now  Will consult case management for affordability as well  3  Would consider LEONARD/DCCV if continued symptoms and no conversion to nsr  4  She does have subclinical hypothyroidism     2  Moderate aortic stenosis   1  Follow up echocardiogram   2  Previous echo in 1/2017 with mean gradient of 26mmhg  3  Will discontinue HCTZ  4  Cont metoprolol     3  Hypertension- dc hctz and continue metoprolol      History of Present Illness   Physician Requesting Consult: Andra Travis,*  Reason for Consult / Principal Problem: atrial fibrillation   HPI: Antonio Reaves is a 66y o  year old female with history of moderate aortic stenosis presented to the ED with progressive palpitations and shortness of breath  She has significant cognitive impairment and is a poor historian at baseline  Her  was unavailable by phone to provide any further history  In the ED she was noted to be in atrial fibrillation with RVR  This has not been a reported condition for her although she states that she has a history of possible arrhythmia  She follows with Dr Jeanette Velasquez in the office for moderate AS  She was subsequently admitted and started on lovenox and metoprolol with improvement in her heart rate  She denies recent viral illness but was found to have subclinical hypothyroidism  She denies any other concerns at this time  Inpatient consult to Cardiology     Performed by  Paty Dodd     Authorized by Abad Kaur              EKG: AF with RVR      Review of Systems: a 12 point review of systems was conducted and is negative except for as mentioned in the HPI    Review of Systems   Constitutional: Negative  Respiratory: Positive for chest tightness and shortness of breath  Cardiovascular: Negative for palpitations and leg swelling  Neurological: Positive for dizziness and light-headedness  Negative for syncope  All other systems reviewed and are negative  Historical Information   Past Medical History:   Diagnosis Date    Disease of thyroid gland     Hyperlipidemia     Hypertension      Past Surgical History:   Procedure Laterality Date    HYSTERECTOMY       History   Alcohol Use No     History   Drug Use No     History   Smoking Status    Former Smoker    Quit date: 1960   Smokeless Tobacco    Never Used       Family History: History reviewed  No pertinent family history  Meds/Allergies   all current active meds have been reviewed  Allergies   Allergen Reactions    Oxycodone-Acetaminophen        Objective   Vitals: Blood pressure 135/60, pulse 71, temperature 97 5 °F (36 4 °C), temperature source Oral, resp  rate 18, height 5' 1" (1 549 m), weight 61 7 kg (136 lb), SpO2 95 %  , Body mass index is 25 7 kg/m² , Orthostatic Blood Pressures      Most Recent Value   Blood Pressure  135/60 filed at 05/27/2018 0713   Patient Position - Orthostatic VS  Sitting filed at 05/27/2018 1431          Systolic (20FFT), FEN:705 , Min:99 , DJI:520     Diastolic (17AUT), AJV:52, Min:56, Max:113        Intake/Output Summary (Last 24 hours) at 05/27/18 1005  Last data filed at 05/27/18 0817   Gross per 24 hour   Intake             1520 ml   Output              400 ml   Net             1120 ml       Invasive Devices     Peripheral Intravenous Line            Peripheral IV 05/26/18 Left Antecubital less than 1 day Physical Exam:  Physical Exam   Constitutional: She appears well-developed and well-nourished  No distress  Neck: Neck supple  No JVD present  Cardiovascular: Normal rate, regular rhythm, normal heart sounds and intact distal pulses  Exam reveals no gallop and no friction rub  No murmur heard  Pulmonary/Chest: Effort normal and breath sounds normal  No respiratory distress  She has no rales  Musculoskeletal: She exhibits edema (trace edema)  Skin: She is not diaphoretic  Lab Results:     Troponins:   Results from last 7 days  Lab Units 05/26/18  1352   TROPONIN I ng/mL <0 02       CBC with diff:   Results from last 7 days  Lab Units 05/27/18  0521 05/26/18  1352   WBC Thousand/uL 5 12 5 37   HEMOGLOBIN g/dL 12 2 13 2   HEMATOCRIT % 38 1 40 0   MCV fL 94 94   PLATELETS Thousands/uL 121* 135*   MCH pg 30 1 30 8   MCHC g/dL 32 0 33 0   RDW % 12 8 12 9   MPV fL 11 6 11 7   NRBC AUTO /100 WBCs  --  0         CMP:   Results from last 7 days  Lab Units 05/27/18  0520 05/26/18  1352   SODIUM mmol/L 141 141   POTASSIUM mmol/L 3 8 3 5   CHLORIDE mmol/L 108 106   CO2 mmol/L 25 26   ANION GAP mmol/L 8 9   BUN mg/dL 22 30*   CREATININE mg/dL 0 89 1 10   GLUCOSE RANDOM mg/dL 86 100   CALCIUM mg/dL 8 7 8 9   EGFR ml/min/1 73sq m 62 48           Counseling / Coordination of Care  Total floor / unit time spent today 60 minutes  Greater than 50% of total time was spent with the patient and / or family counseling and / or coordination of care  A description of the counseling / coordination of care: 30

## 2018-05-27 NOTE — DISCHARGE INSTRUCTIONS
Stop taking hydrochlorothiazide  Start taking Eliquis and metoprolol  Please follow-up with Dr Adelfo Arellano and geriatrics  A-fib (Atrial Fibrillation)   WHAT YOU NEED TO KNOW:   A-fib may come and go, or it may be a long-term condition  A-fib can cause blood clots, stroke, or heart failure  These conditions may become life-threatening  It is important to treat and manage a-fib to help prevent a blood clot, stroke, or heart failure  DISCHARGE INSTRUCTIONS:   Call 911 for any of the following:   · You have any of the following signs of a heart attack:      ¨ Squeezing, pressure, or pain in your chest that lasts longer than 5 minutes or returns    ¨ Discomfort or pain in your back, neck, jaw, stomach, or arm     ¨ Trouble breathing    ¨ Nausea or vomiting    ¨ Lightheadedness or a sudden cold sweat, especially with chest pain or trouble breathing    · You have any of the following signs of a stroke:      ¨ Numbness or drooping on one side of your face     ¨ Weakness in an arm or leg    ¨ Confusion or difficulty speaking    ¨ Dizziness, a severe headache, or vision loss  Seek care immediately if:  You have any of the following signs of a blood clot:  · You feel lightheaded, are short of breath, and have chest pain  · You cough up blood  · You have swelling, redness, pain, or warmth in your arm or leg  Contact your cardiologist or healthcare provider if:   · Your heart rate is higher than your healthcare provider said it should be  · You have new or worsening swelling in your legs, feet, ankles, or abdomen  · You are short of breath, even at rest      · You have questions or concerns about your condition or care  Medicines: You may need any of the following:  · Heart medicines  help control your heart rate and rhythm  You may need more than one medicine to treat your symptoms  · Blood thinners    help prevent blood clots  Examples of blood thinners include heparin and warfarin   Clots can cause strokes, heart attacks, and death  The following are general safety guidelines to follow while you are taking a blood thinner:    ¨ Watch for bleeding and bruising while you take blood thinners  Watch for bleeding from your gums or nose  Watch for blood in your urine and bowel movements  Use a soft washcloth on your skin, and a soft toothbrush to brush your teeth  This can keep your skin and gums from bleeding  If you shave, use an electric shaver  Do not play contact sports  ¨ Tell your dentist and other healthcare providers that you take anticoagulants  Wear a bracelet or necklace that says you take this medicine  ¨ Do not start or stop any medicines unless your healthcare provider tells you to  Many medicines cannot be used with blood thinners  ¨ Tell your healthcare provider right away if you forget to take the medicine, or if you take too much  ¨ Warfarin  is a blood thinner that you may need to take  The following are things you should be aware of if you take warfarin  § Foods and medicines can affect the amount of warfarin in your blood  Do not make major changes to your diet while you take warfarin  Warfarin works best when you eat about the same amount of vitamin K every day  Vitamin K is found in green leafy vegetables and certain other foods  Ask for more information about what to eat when you are taking warfarin  § You will need to see your healthcare provider for follow-up visits when you are on warfarin  You will need regular blood tests  These tests are used to decide how much medicine you need  · Antiplatelets , such as aspirin, help prevent blood clots  Take your antiplatelet medicine exactly as directed  These medicines make it more likely for you to bleed or bruise  If you are told to take aspirin, do not take acetaminophen or ibuprofen instead  · Take your medicine as directed    Contact your healthcare provider if you think your medicine is not helping or if you have side effects  Tell him or her if you are allergic to any medicine  Keep a list of the medicines, vitamins, and herbs you take  Include the amounts, and when and why you take them  Bring the list or the pill bottles to follow-up visits  Carry your medicine list with you in case of an emergency  Follow up with your cardiologist as directed: You will need regular blood tests and monitoring  Write down your questions so you remember to ask them during your visits  Manage A-fib:   · Know your target heart rate  Learn how to take your pulse and monitor your heart rate  · Manage other health conditions  This includes high blood pressure, sleep apnea, thyroid disease, diabetes, and other heart conditions  Take medicine as directed and follow your treatment plan  · Limit or do not drink alcohol  Alcohol can make a-fib hard to manage  Ask your healthcare provider if it is safe for you to drink alcohol  A drink of alcohol is 12 ounces of beer, 5 ounces of wine, or 1½ ounces of liquor  · Do not smoke  Nicotine and other chemicals in cigarettes and cigars can cause heart and lung damage  Ask your healthcare provider for information if you currently smoke and need help to quit  E-cigarettes or smokeless tobacco still contain nicotine  Talk to your healthcare provider before you use these products  Eat heart-healthy foods  Heart healthy foods will help keep your cholesterol low  These include fruits, vegetables, whole-grain breads, low-fat dairy products, beans, lean meats, and fish  Replace butter and margarine with heart-healthy oils such as olive oil and canola oil  · Maintain a healthy weight  Ask your healthcare provider how much you should weigh  Ask him to help you create a weight loss plan if you are overweight  · Exercise for 30 minutes  most days of the week  Ask your healthcare provider about the best exercise plan for you    © 2017 [employer] Information is for End User's use only and may not be sold, redistributed or otherwise used for commercial purposes  All illustrations and images included in CareNotes® are the copyrighted property of [employer]  or [employer]  The above information is an  only  It is not intended as medical advice for individual conditions or treatments  Talk to your doctor, nurse or pharmacist before following any medical regimen to see if it is safe and effective for you

## 2018-05-27 NOTE — CASE MANAGEMENT
Initial Clinical Review    Admission: Date/Time/Statement: 05/26/18 @ 1452 -- OBS    Orders Placed This Encounter   Procedures    Place in Observation (expected length of stay for this patient is less than two midnights)     Standing Status:   Standing     Number of Occurrences:   1     Order Specific Question:   Admitting Physician     Answer:   Rian Burroughs [[id number]]     Order Specific Question:   Level of Care     Answer:   Med Surg [16]       ED: Date/Time/Mode of Arrival:   ED Arrival Information     Expected Arrival Acuity Means of Arrival Escorted By Service Admission Type    5/26/2018 13:07 5/26/2018 13:11 Emergent Ambulance [facility]  Emergency    Arrival Complaint    chest pain          Chief Complaint:   Chief Complaint   Patient presents with    Shaking     Patient reports shakiness starting today, reported chest pain and back for 2 weeks  History of Illness: 66 y o  female with a history of HTN, HLD, hypothyroidism, and aortic stenosis who presents with episode today of feeling unwell, shaking, back discomfort, SOB, and fatigue  She states she felt weak as if she would be unable to walk  She has had episodes like this in the past which she attributed to anxiety  She has a history of palpitations and underwent Holter monitor in the past which was unrevealing  In the ED, patient was found to be in rapid atrial fibrillation which is a new diagnosis for her  Heart rate improved without medication but she remains in a fib   also mentions she has been having memory issues lately and is due to see a geriatrician soon      ED Vital Signs:   ED Triage Vitals   Temperature Pulse Respirations Blood Pressure SpO2   05/26/18 1334 05/26/18 1326 05/26/18 1326 05/26/18 1326 05/26/18 1326   98 1 °F (36 7 °C) (!) 160 20 (!) 154/113 97 %      Temp Source Heart Rate Source Patient Position - Orthostatic VS BP Location FiO2 (%)   05/26/18 1334 05/26/18 1326 05/26/18 1326 05/26/18 1326 --   Oral Monitor Sitting Right arm       Pain Score       05/26/18 1326       No Pain        Wt Readings from Last 1 Encounters:   05/26/18 61 7 kg (136 lb)       Vital Signs:  05/26 0701  05/27 0700 05/27 0701  05/27 0918  Most Recent     Temperature (°F) 97 9-98 3 97 5  97 5 (36 4)    Pulse  71  71    Respirations 18-20 18  18    Blood Pressure 99//84 135/60  135/60    SpO2 (%) 94-97 95  95        Abnormal Labs/Diagnostic Test Results: , BUN 30  Trop -- neg, BNP -- WNL  EKG: Atrial fibrillation  CXR -- COPD  No acute cardiopulmonary disease  Similar to previous      ED Treatment:   Medication Administration from 05/26/2018 1311 to 05/26/2018 1621       Date/Time Order Dose Route Action Action by Comments     05/26/2018 1353 sodium chloride 0 9 % bolus 1,000 mL 1,000 mL Intravenous New Bag Geena Peters RN           Past Medical/Surgical History: Active Ambulatory Problems     Diagnosis Date Noted    Microscopic hematuria 04/05/2018    Aortic stenosis, moderate 02/28/2017    Dyslipidemia 01/31/2014    Hypertension 01/31/2014    Palpitations 01/31/2014     Past Medical History:   Diagnosis Date    Disease of thyroid gland     Hyperlipidemia     Hypertension        Admitting Diagnosis: Shaking [R25 1]  Paroxysmal atrial fibrillation (HCC) [I48 0]  Dyspnea [R06 00]  New onset atrial fibrillation (HCC) [I48 91]  Generalized weakness [R53 1]    Age/Sex: 66 y o  female    Assessment/Plan:   * New onset atrial fibrillation (HCC)   Assessment & Plan     · Start BB  · [id number] score 4  Will start therapeutic Lovenox and consult with case management tomorrow regarding costs of NOACs  · Check echo  · Cardiology consult          Cognitive impairment   Assessment & Plan     · Patient has upcoming appointment for evaluation by geriatrician          Hypothyroidism   Assessment & Plan     · On Synthroid  · TSH high but free T4 within normal limits   Recheck labs in 6 weeks        Hypertension   Assessment & Plan     · Continue HCTZ          Dyslipidemia   Assessment & Plan     · Continue statin             VTE Prophylaxis: Enoxaparin (Lovenox)  / sequential compression device   Code Status: Full code  POLST: There is no POLST form on file for this patient (pre-hospital)  Discussion with family:  and sister at bedside     Anticipated Length of Stay:  Patient will be admitted on an Observation basis with an anticipated length of stay of  < 2 midnights  Justification for Hospital Stay: New onset atrial fibrillation   Cardiac evaluation          Admission Orders:  M/S/Tele unit  Telem  Serial troponins  Echo  Regular diet  SCD's  Up with assistance  Consult cardiology    Scheduled Meds:   Current Facility-Administered Medications:  acetaminophen 650 mg Oral Q6H PRN Carlock Chatters, PA-C   calcium carbonate 1,000 mg Oral Daily PRN Carlock Chatters, PA-C   cholecalciferol 1,000 Units Oral Daily Carlock Chatters, PA-C   docusate sodium 100 mg Oral BID Carlock Chatters, PA-C   enoxaparin 1 mg/kg Subcutaneous Q12H [address] Carlock Chatters, PA-C   hydrochlorothiazide 25 mg Oral Daily Carlock Chatters, PA-C   levothyroxine 50 mcg Oral Early Morning Carlock Chatters, PA-C   metoprolol tartrate 25 mg Oral Q12H [address] Carlock Chatters, PA-C   ondansetron 4 mg Intravenous Q6H PRN Carlock Chatters, PA-C   pantoprazole 40 mg Oral Early Morning Carlock Chatters, PA-C   pravastatin 80 mg Oral Daily With Vicenta Hernandez, PA-C     Continuous Infusions:    PRN Meds:   acetaminophen    calcium carbonate    ondansetron

## 2018-05-27 NOTE — ASSESSMENT & PLAN NOTE
· Started on metoprolol  · [id number] score 4   · Discussed risks/benefits of anticoagulation with patient and   Patient does clearly have some cognitive impariment  Discussed bleeding side effects including devastating ICH if patient has a fall on anticoagulation  He states she does get unsteady and dizzy at times upon standing but is always able to lower herself and never had a fall  Both the patient and her  have opted for anticoagulation  · Will start Eliquis  Given 30 day free card  · Unfortunately, echocardiogram not able to be performed until tomorrow and patient is anxious for discharge  Discussed with Dr Christa Hoang who was okay with patient having echocardiogram performed as an outpatient  · Appreciate cardiology input  · Patient is established with Dr Lizz Montesinos and will follow-up with him  I recommended follow-up in one to two weeks  His office can assist with arranging echo

## 2018-05-27 NOTE — DISCHARGE SUMMARY
Discharge- Alisa Reyes 1940, 66 y o  female MRN: [id number]    Unit/Bed#: 2 211-02 Encounter: [id number]    Primary Care Provider: Meryle Applebaum, DO   Date and time admitted to hospital: 5/26/2018  1:12 PM    * New onset atrial fibrillation ([address] )   Assessment & Plan    · Started on metoprolol  · MYL5MT2-FEJh score 4   · Discussed risks/benefits of anticoagulation with patient and   Patient does clearly have some cognitive impariment  Discussed bleeding side effects including devastating ICH if patient has a fall on anticoagulation  He states she does get unsteady and dizzy at times upon standing but is always able to lower herself and never had a fall  Both the patient and her  have opted for anticoagulation  · Will start Eliquis  Given 30 day free card  · Unfortunately, echocardiogram not able to be performed until tomorrow and patient is anxious for discharge  Discussed with Dr Manas Epps who was okay with patient having echocardiogram performed as an outpatient  · Appreciate cardiology input  · Patient is established with Dr Jose G Bates and will follow-up with him  I recommended follow-up in one to two weeks  His office can assist with arranging echo  Cognitive impairment   Assessment & Plan    · Patient has upcoming appointment for evaluation by geriatrician        Hypothyroidism   Assessment & Plan    · On Synthroid  · TSH high but free T4 within normal limits   Recheck labs in 6 weeks        Hypertension   Assessment & Plan    · HCTZ discontinued to allow BP room for metoprolol        Dyslipidemia   Assessment & Plan    · Continue statin          Discharging Physician / Practitioner: Jacqueline Hernandez PA-C  PCP: Meryle Applebaum, DO  Admission Date:   Admission Orders     Ordered        05/26/18 1452  Place in Observation (expected length of stay for this patient is less than two midnights)  Once             Discharge Date: 05/27/18    Resolved Problems  Date Reviewed: 5/27/2018    None          Consultations During Hospital Stay:  · Cardiology    Procedures Performed:     · None    Significant Findings / Test Results:     · Chest x-ray: COPD  No acute cardiopulmonary disease    Incidental Findings:   · None     Test Results Pending at Discharge (will require follow up): · None     Outpatient Tests Requested:  · Echocardiogram  · TSH 6 weeks  Complications:  None    Reason for Admission: New onset a fib    Hospital Course:     Art Staples is a 66 y o  female patient with a history of hypertension, aortic stenosis, HLD, and hypothyroidism who originally presented to the hospital on 5/26/2018 due to episode of feeling unwell, shaking, back discomfort, SOB, and fatigued  She was found to be in new onset atrial fibrillation  She was started on metoprolol and Eliquis  She will follow-up with cardiology and needs echocardiogram     Please see above list of diagnoses and related plan for additional information  Condition at Discharge: good     Discharge Day Visit / Exam:     Subjective:  Feels well  No palpitations, chest pain, SOB, or dizziness  Shannan Handler to go home  Vitals: Blood Pressure: 135/60 (05/27/18 0713)  Pulse: 71 (05/27/18 0713)  Temperature: 97 5 °F (36 4 °C) (05/27/18 0713)  Temp Source: Oral (05/27/18 0713)  Respirations: 18 (05/27/18 0713)  Height: 5' 1" (154 9 cm) (05/26/18 1637)  Weight - Scale: 61 7 kg (136 lb) (05/26/18 1637)  SpO2: 95 % (05/27/18 0713)  Exam:   Physical Exam   Constitutional: No distress  HENT:   Head: Normocephalic and atraumatic  Eyes: No scleral icterus  Neck: Normal range of motion  Neck supple  Cardiovascular:   Irregular  +holosystolic murmur   Pulmonary/Chest: Effort normal and breath sounds normal  No respiratory distress  She has no wheezes  She has no rales  Abdominal: Soft  Bowel sounds are normal  She exhibits no distension  There is no tenderness  There is no rebound     Musculoskeletal:   Trace edema Neurological: She is alert  Oriented x 3 but clearly has some cognitive impairments   Skin: Skin is warm and dry  She is not diaphoretic  Psychiatric: She has a normal mood and affect  Her behavior is normal  Thought content normal        Discussion with Family:  at bedside  Discharge instructions/Information to patient and family:   See after visit summary for information provided to patient and family  Provisions for Follow-Up Care:  See after visit summary for information related to follow-up care and any pertinent home health orders  Disposition:     Home    For Discharges to [facility] SNF:   · Not Applicable to this Patient - Not Applicable to this Patient    Planned Readmission: None     Discharge Statement:  I spent 45 minutes discharging the patient  This time was spent on the day of discharge  I had direct contact with the patient on the day of discharge  Greater than 50% of the total time was spent examining patient, answering all patient questions, arranging and discussing plan of care with patient as well as directly providing post-discharge instructions  Additional time then spent on discharge activities  Discharge Medications:  See after visit summary for reconciled discharge medications provided to patient and family        ** Please Note: This note has been constructed using a voice recognition system **

## 2018-05-29 ENCOUNTER — TELEPHONE (OUTPATIENT)
Dept: CARDIOLOGY CLINIC | Facility: CLINIC | Age: 78
End: 2018-05-29

## 2018-05-29 NOTE — TELEPHONE ENCOUNTER
Louise Martinez from Dr Soraya Christensen office left a v/m stating their PA stated pt needs a hospital f/u w/cardiology this week or next for afib   Please call back when she is scheduled  #[phone]

## 2018-06-05 ENCOUNTER — TELEPHONE (OUTPATIENT)
Dept: GERIATRICS | Age: 78
End: 2018-06-05

## 2018-06-06 ENCOUNTER — OFFICE VISIT (OUTPATIENT)
Dept: GERIATRICS | Age: 78
End: 2018-06-06
Payer: MEDICARE

## 2018-06-06 VITALS
TEMPERATURE: 98.8 F | HEART RATE: 70 BPM | HEIGHT: 62 IN | DIASTOLIC BLOOD PRESSURE: 80 MMHG | SYSTOLIC BLOOD PRESSURE: 120 MMHG | BODY MASS INDEX: 25.76 KG/M2 | RESPIRATION RATE: 18 BRPM | WEIGHT: 140 LBS

## 2018-06-06 DIAGNOSIS — G31.84 MCI (MILD COGNITIVE IMPAIRMENT): ICD-10-CM

## 2018-06-06 DIAGNOSIS — I48.91 NEW ONSET ATRIAL FIBRILLATION (HCC): ICD-10-CM

## 2018-06-06 DIAGNOSIS — R26.9 GAIT DISTURBANCE: Primary | ICD-10-CM

## 2018-06-06 DIAGNOSIS — E03.9 ACQUIRED HYPOTHYROIDISM: ICD-10-CM

## 2018-06-06 PROCEDURE — 99205 OFFICE O/P NEW HI 60 MIN: CPT | Performed by: FAMILY MEDICINE

## 2018-06-06 NOTE — PROGRESS NOTES
MSW met with patient for initial geriatric evaluation  Pt presented as alert, and oriented X3  Pt completed MoCA 14/30 and Geriatric Depression Scale 2/15  Pt reported that she retired apx a few months ago which is when memory concerns began  Pt reports enjoying family dinners and outings with friends  Also reports goal is to gain more stamina and walk more which she previously enjoyed doing  MSW met with pt  and daughter, Meryle Johnson, who report pt dizziness, instability, and stamina are main concerns  Will discuss with team and create care plan

## 2018-06-06 NOTE — PROGRESS NOTES
Assessment & Plan:   Deion Cherry was seen today for geriatric evaluation  Diagnoses and all orders for this visit:    Gait disturbance  -     Ambulatory referral to Physical Therapy; Future    MCI (mild cognitive impairment)  -     Cancel: Ambulatory referral to Physical Therapy; Future    New onset atrial fibrillation (HCC)    Acquired hypothyroidism      MCI (mild cognitive impairment)  MoCA 14/30  GDS 2/15  Neurotrax cognitive assessment ordered  Care conference in 2 weeks  Gait disturbance  TUGT 16 sec  PT evaluation recommended  Fall precautions discussed  New onset atrial fibrillation (HCC)  Continue apixaban  Continue metoprolol    Hypothyroidism  Continue levothyroxine  HPI:  We had the pleasure of evaluating Tracy Martinez who is a 66 y o  female in Geriatric consultation today  Ms Adarsh Martinez is in the office with her  and daughter  As per family, PCP referred for geriatric evaluation  She retired in sep,2017, since then her cognition is getting worse  She lives with   She worked as head of HR at [employer]   She has a recent hospital admission with A  Fib  She is independent of ADLs   helps with cooking, shopping and paying bills  She feels tired mostly, stopped going out, sleeps in lounge chair, can't lie flat  Memory Issues noticed since 9/2017  Memory affected:  Short term   Over time the memory is: getting worse  Memory issue(s) were noted by: family  She has no problems operating household appliance such as TV remote, kitchen appliances, computer     She has difficulty finding the right word while speaking: Yes  Patient requires repeat information or ask the same question repeatedly: Yes  Do you drive: No       Have you had any recent accidents, citations or getting lost in familiar places :No  Do you handle your own financial affairs such as balancing your checkbook, paying bills, investments: No  Have you or your family noted any change in your mood or personality:No  Are you currently or have you been treated in the past for depression or anxiety: No  Have you noticed any gait or balance disorder: Yes  Any hallucination or delusion: No  Fluctuation in alertness: No  Sleep Issues: No  Urinary/Stool Incontinence: No  Hearing and vision issue: No  Past Medical, surgical, social, medication and allergy history and patients previous records reviewed  Family Review of Behavior [facility]:    pacing  No    agressive/combative behavior  No    agitated  No   wandering  No   resistance to care  No   hoarding/hiding objects  No    suspicious  No  withdrawn No  inappropriate sexual behaviorNo  rummaging/pillaging  No    misplacing/losing objects No  personal hygiene problems  No  forgetfulness of actions Yes   temper outbursts  No     throwing items No      Family member with dementia and what type? no  Have you had any head trauma No  Does patient have history of alcohol abuse No      ROS: Review of Systems   Constitutional: Positive for activity change and fatigue  HENT: Negative  Eyes: Negative  Respiratory: Negative  Cardiovascular: Positive for palpitations and leg swelling  Gastrointestinal: Negative  Endocrine: Negative  Genitourinary: Negative  Musculoskeletal: Positive for gait problem  Psychiatric/Behavioral: Positive for confusion  Allergies:    Allergies   Allergen Reactions    Oxycodone-Acetaminophen        Medications:      Current Outpatient Prescriptions:     apixaban (ELIQUIS) 5 mg, Take 1 tablet (5 mg total) by mouth 2 (two) times a day, Disp: 60 tablet, Rfl: 0    Cholecalciferol ([employer]S VITAMIN D3) 1000 units capsule, Take by mouth, Disp: , Rfl:     levothyroxine 50 mcg tablet, Take 50 mcg by mouth daily, Disp: , Rfl: 3    metoprolol tartrate (LOPRESSOR) 25 mg tablet, Take 1 tablet (25 mg total) by mouth every 12 (twelve) hours, Disp: 60 tablet, Rfl: 0    omeprazole (PriLOSEC) 20 mg delayed release capsule, Take 20 mg by mouth daily, Disp: , Rfl: 3    simvastatin (ZOCOR) 40 mg tablet, , Disp: , Rfl:     Vitals:  Vitals:    06/06/18 1328   BP: 120/80   Pulse:    Resp:    Temp:        History:  Past Medical History:   Diagnosis Date    Disease of thyroid gland     Hyperlipidemia     Hypertension      Past Surgical History:   Procedure Laterality Date    HYSTERECTOMY       No family history on file  Social History     Social History    Marital status: /Civil Union     Spouse name: N/A    Number of children: N/A    Years of education: N/A     Occupational History    Not on file  Social History Main Topics    Smoking status: Former Smoker     Quit date: 1960    Smokeless tobacco: Never Used    Alcohol use No    Drug use: No    Sexual activity: Not on file     Other Topics Concern    Not on file     Social History Narrative    No narrative on file     Past Surgical History:   Procedure Laterality Date    HYSTERECTOMY           Physical Exam:   Physical Exam   Constitutional: She is oriented to person, place, and time  She appears well-developed and well-nourished  No distress  HENT:   Head: Normocephalic and atraumatic  Right Ear: External ear normal    Left Ear: External ear normal    Nose: Nose normal    Mouth/Throat: Oropharynx is clear and moist  No oropharyngeal exudate  Eyes: Conjunctivae and EOM are normal  Pupils are equal, round, and reactive to light  Right eye exhibits no discharge  Neck: Normal range of motion  Neck supple  Cardiovascular: Normal rate  Murmur heard  Irregular   Pulmonary/Chest: Effort normal and breath sounds normal  No respiratory distress  She has no wheezes  She exhibits no tenderness  Abdominal: Soft  Bowel sounds are normal  She exhibits no distension  There is no tenderness  Musculoskeletal: Normal range of motion  She exhibits edema  She exhibits no tenderness or deformity  Neurological: She is alert and oriented to person, place, and time   No cranial nerve deficit  Skin: Skin is warm and dry  Dry, scaly skin  Psychiatric: She has a normal mood and affect   Her behavior is normal  Thought content normal

## 2018-06-15 ENCOUNTER — OFFICE VISIT (OUTPATIENT)
Dept: CARDIOLOGY CLINIC | Facility: CLINIC | Age: 78
End: 2018-06-15
Payer: MEDICARE

## 2018-06-15 VITALS
WEIGHT: 138.9 LBS | SYSTOLIC BLOOD PRESSURE: 142 MMHG | BODY MASS INDEX: 26.22 KG/M2 | DIASTOLIC BLOOD PRESSURE: 84 MMHG | HEIGHT: 61 IN | HEART RATE: 75 BPM

## 2018-06-15 DIAGNOSIS — I10 ESSENTIAL HYPERTENSION: ICD-10-CM

## 2018-06-15 DIAGNOSIS — I35.0 AORTIC STENOSIS, MODERATE: ICD-10-CM

## 2018-06-15 DIAGNOSIS — I48.91 ATRIAL FIBRILLATION, UNSPECIFIED TYPE (HCC): Primary | ICD-10-CM

## 2018-06-15 PROCEDURE — 93000 ELECTROCARDIOGRAM COMPLETE: CPT | Performed by: NURSE PRACTITIONER

## 2018-06-15 PROCEDURE — 99214 OFFICE O/P EST MOD 30 MIN: CPT | Performed by: NURSE PRACTITIONER

## 2018-06-15 RX ORDER — HYDROCHLOROTHIAZIDE 12.5 MG/1
12.5 TABLET ORAL DAILY
Qty: 30 TABLET | Refills: 1 | Status: SHIPPED | OUTPATIENT
Start: 2018-06-15 | End: 2018-08-17 | Stop reason: SDUPTHER

## 2018-06-15 NOTE — PROGRESS NOTES
Cardiology Office Visit   Christie Hays 66 y o  female MRN: [id number]    [facility]  Christie Hays is a 66y o  year old female with history of moderate aortic stenosis, HTN and palpitations who follos with Dr Esequiel Ya  Ms Duyen Cotter was recently admitted to [facility] on 5/26-5/27/18 with new onset atrial fibrillation  She presented to the ED with progressive palpitations and shortness of breath  She has significant cognitive impairment and is a poor historian at baseline  In the ED she was noted to be in atrial fibrillation with RVR  This has not been a reported condition for her although she states that she has a history of possible arrhythmia  She was subsequently admitted and started on metoprolol with improvement in her heart rate  Her [id number] and she was started on Eliquis 5mg BID  Today Ms Duyen Cotter presents to our office for a recent hospitalization follow up visit  She is accompanied by her  and daughter  Omid Ene denies dyspnea, lightheadedness or dizziness  She admits to roddy LE edema  She continues on Eliquis    have provided in depth education on monitoring for SS of bleeding and to seek medical attention for any trauma            Patient Active Problem List   Diagnosis    Microscopic hematuria    Aortic stenosis, moderate    Dyslipidemia    Hypertension    Palpitations    New onset atrial fibrillation (HCC)    Hypothyroidism    Cognitive impairment    MCI (mild cognitive impairment)    Gait disturbance       Review of Systems   Constitution: Negative  HENT: Negative  Eyes: Negative  Cardiovascular: Negative  Respiratory: Negative  Endocrine: Negative  Skin: Negative  Musculoskeletal: Negative  Psychiatric/Behavioral: Positive for altered mental status           Objective:   Vitals: 140/86  Vitals:    06/15/18 1344   BP: 142/84   BP Location: Left arm   Patient Position: Sitting   Cuff Size: Standard   Pulse: 75   Weight: 63 kg (138 lb 14 4 oz)   Height: 5' 1" (1 549 m)     Body mass index is 26 24 kg/m²  Wt Readings from Last 3 Encounters:   06/15/18 63 kg (138 lb 14 4 oz)   06/06/18 63 5 kg (140 lb)   05/26/18 61 7 kg (136 lb)         Physical Exam:  GEN: Tracy Martinez appears well, alert and oriented x 3, pleasant and cooperative   HEENT: pupils equal, round, and reactive to light; extraocular muscles intact  NECK: supple, no carotid bruits   HEART: irregular rate and rhythm, normal S1 and S2, 4/6 ROBERT   LUNGS: clear to auscultation bilaterally; no wheezes, rales, or rhonchi   ABDOMEN: normal bowel sounds, soft, no tenderness, no distention  EXTREMITIES: peripheral pulses normal; no clubbing, cyanosis, trace to 1+ roddy LE tibial edema  NEURO: no focal findings   SKIN: normal without suspicious lesions on exposed skin      Current Outpatient Prescriptions:     apixaban (ELIQUIS) 5 mg, Take 1 tablet (5 mg total) by mouth 2 (two) times a day, Disp: 60 tablet, Rfl: 0    Cholecalciferol (CVS VITAMIN D3) 1000 units capsule, Take by mouth, Disp: , Rfl:     levothyroxine 50 mcg tablet, Take 50 mcg by mouth daily, Disp: , Rfl: 3    metoprolol tartrate (LOPRESSOR) 25 mg tablet, Take 1 tablet (25 mg total) by mouth every 12 (twelve) hours, Disp: 60 tablet, Rfl: 0    omeprazole (PriLOSEC) 20 mg delayed release capsule, Take 20 mg by mouth daily, Disp: , Rfl: 3    simvastatin (ZOCOR) 40 mg tablet, , Disp: , Rfl:                EKG personally reviewed by IRMA Sanford  Assessment/Plan:   1  Atrial fibrillation controlled ventricular response continue on Metoprolol 25mg Q12 hours and Eliquis 5mg BID  2  Mod AS-2 D echocardiogram to evaluate progression of AS and wall function      3   HTN- /86 add HCTZ 12 5mg daily   2gm sodium diet   Cutitta in one month    IRMA Sanford  6/15/2018  2:02 PM

## 2018-06-25 ENCOUNTER — HOSPITAL ENCOUNTER (OUTPATIENT)
Dept: NON INVASIVE DIAGNOSTICS | Facility: CLINIC | Age: 78
Discharge: HOME | End: 2018-06-25
Payer: MEDICARE

## 2018-06-25 DIAGNOSIS — I35.0 AORTIC STENOSIS, MODERATE: ICD-10-CM

## 2018-06-25 DIAGNOSIS — I48.91 ATRIAL FIBRILLATION, UNSPECIFIED TYPE (HCC): ICD-10-CM

## 2018-06-25 PROCEDURE — 93306 TTE W/DOPPLER COMPLETE: CPT | Performed by: INTERNAL MEDICINE

## 2018-06-25 PROCEDURE — 93306 TTE W/DOPPLER COMPLETE: CPT

## 2018-06-27 DIAGNOSIS — I48.0 PAROXYSMAL ATRIAL FIBRILLATION (HCC): ICD-10-CM

## 2018-07-16 ENCOUNTER — OFFICE VISIT (OUTPATIENT)
Dept: CARDIOLOGY CLINIC | Facility: CLINIC | Age: 78
End: 2018-07-16
Payer: MEDICARE

## 2018-07-16 VITALS
HEIGHT: 61 IN | HEART RATE: 72 BPM | SYSTOLIC BLOOD PRESSURE: 140 MMHG | BODY MASS INDEX: 25.94 KG/M2 | WEIGHT: 137.4 LBS | DIASTOLIC BLOOD PRESSURE: 72 MMHG

## 2018-07-16 DIAGNOSIS — R41.89 COGNITIVE IMPAIRMENT: ICD-10-CM

## 2018-07-16 DIAGNOSIS — I35.0 AORTIC STENOSIS, MODERATE: ICD-10-CM

## 2018-07-16 DIAGNOSIS — E78.5 DYSLIPIDEMIA: ICD-10-CM

## 2018-07-16 DIAGNOSIS — I48.0 PAROXYSMAL ATRIAL FIBRILLATION (HCC): ICD-10-CM

## 2018-07-16 DIAGNOSIS — I48.91 ATRIAL FIBRILLATION, UNSPECIFIED TYPE (HCC): Primary | ICD-10-CM

## 2018-07-16 DIAGNOSIS — I10 ESSENTIAL HYPERTENSION: ICD-10-CM

## 2018-07-16 DIAGNOSIS — R00.2 PALPITATIONS: ICD-10-CM

## 2018-07-16 DIAGNOSIS — I48.91 NEW ONSET ATRIAL FIBRILLATION (HCC): ICD-10-CM

## 2018-07-16 PROCEDURE — 99214 OFFICE O/P EST MOD 30 MIN: CPT | Performed by: INTERNAL MEDICINE

## 2018-07-16 PROCEDURE — 93000 ELECTROCARDIOGRAM COMPLETE: CPT | Performed by: INTERNAL MEDICINE

## 2018-07-16 NOTE — PROGRESS NOTES
Cardiology Follow Up    Art Staples  1940  [id number]  [address] [facility] Randy Hernández  [address] [place] [address] [address]    1  Atrial fibrillation, unspecified type ([address] )  POCT ECG   2  Aortic stenosis, moderate  Echo follow up/limited   3  Essential hypertension     4  New onset atrial fibrillation ([address] )     5  Dyslipidemia     6  Palpitations     7  Cognitive impairment     8  Paroxysmal atrial fibrillation (HCC)  apixaban (ELIQUIS) 5 mg       Discussion/Summary:Overall she is doing well in euvolemic from a heart failure standpoint  She is in sinus rhythm today  I encouraged her to continue anticoagulation  She has  Developed onset of Alzheimer's disease  Aortic valve on echocardiogram per my review was moderate to severe and stenosis  Would continue surveillance every 6 months  At this point time I would favor being more conservative  Interval History:   70-year-old female history of aortic stenosis, hypertension, dyslipidemia presents for post hospital follow-up after new episode of atrial fibrillation  She is currently in sinus rhythm  They started her anticoagulation  Fall risk is low to intermediate this time  Overall she is doing well denies any chest pain, shortness of breath, palpitations, lightheadedness, dizziness, or syncope  Has some mild generalized fatigue  There has been no significant lower extremity edema, PND, orthopnea      Problem List     Microscopic hematuria    Aortic stenosis, moderate    Dyslipidemia    Hypertension    Palpitations    Atrial fibrillation (HCC)    Hypothyroidism    Cognitive impairment    MCI (mild cognitive impairment)    Gait disturbance        Past Medical History:   Diagnosis Date    Atrial fibrillation ([address] )     Disease of thyroid gland     Hyperlipidemia     Hypertension      Social History     Social History    Marital status: /Civil Union     Spouse name: N/A    Number of children: N/A    Years of education: N/A     Occupational History    Not on file  Social History Main Topics    Smoking status: Former Smoker     Quit date: 1960    Smokeless tobacco: Never Used    Alcohol use No    Drug use: No    Sexual activity: Not on file     Other Topics Concern    Not on file     Social History Narrative    No narrative on file      No family history on file  Past Surgical History:   Procedure Laterality Date    HYSTERECTOMY         Current Outpatient Prescriptions:     apixaban (ELIQUIS) 5 mg, Take 1 tablet (5 mg total) by mouth 2 (two) times a day, Disp: 180 tablet, Rfl: 3    Cholecalciferol (CVS VITAMIN D3) 1000 units capsule, Take by mouth, Disp: , Rfl:     hydrochlorothiazide (HYDRODIURIL) 12 5 mg tablet, Take 1 tablet (12 5 mg total) by mouth daily, Disp: 30 tablet, Rfl: 1    levothyroxine 50 mcg tablet, Take 50 mcg by mouth daily, Disp: , Rfl: 3    metoprolol tartrate (LOPRESSOR) 25 mg tablet, Take 1 tablet (25 mg total) by mouth every 12 (twelve) hours, Disp: 60 tablet, Rfl: 3    omeprazole (PriLOSEC) 20 mg delayed release capsule, Take 20 mg by mouth daily, Disp: , Rfl: 3    simvastatin (ZOCOR) 40 mg tablet, 40 mg daily  , Disp: , Rfl:   Allergies   Allergen Reactions    Oxycodone-Acetaminophen        Labs:     Chemistry        Component Value Date/Time     05/27/2018 0520    K 3 8 05/27/2018 0520     05/27/2018 0520    CO2 25 05/27/2018 0520    BUN 22 05/27/2018 0520    CREATININE 0 89 05/27/2018 0520        Component Value Date/Time    CALCIUM 8 7 05/27/2018 0520    ALKPHOS 64 03/07/2018 1530    AST 29 03/07/2018 1530    ALT 11 (L) 03/07/2018 1530    BILITOT 0 95 03/07/2018 1530            No results found for: CHOL  No results found for: HDL  No results found for: LDLCALC  No results found for: TRIG  No components found for: CHOLHDL    Imaging: No results found      ECG:    Sinus rhythm nonspecific ST changes low-voltage      ROS    Vitals:    07/16/18 1315   BP: 140/72   Pulse: 72     Vitals:    07/16/18 1315   Weight: 62 3 kg (137 lb 6 4 oz)     Height: 5' 1" (154 9 cm)   Body mass index is 25 96 kg/m²      Physical Exam:   General appearance:  Appears stated age, alert, well appearing and in no distress  HEENT:  PERRLA, EOMI, no scleral icterus, no conjunctival pallor  NECK:  Supple, No elevated JVP, no thyromegaly, no carotid bruits  HEART:  Regular rate and rhythm, normal S1/S2, no T7/A7,  2/6 systolic ejection murmur clear S2  LUNGS:  Clear to auscultation bilaterally, no wheezes rales or rhonchi  ABDOMEN:  Soft, non-tender, positive bowel sounds, no rebound or guarding, no organomegaly   EXTREMITIES:  No edema, normal range of motion  VASCULAR:  Normal pedal pulses, good pulse volume   SKIN: No lesions or rashes on exposed skin  NEURO:  CN II-XII intact, no focal deficits

## 2018-08-17 DIAGNOSIS — I48.91 ATRIAL FIBRILLATION, UNSPECIFIED TYPE (HCC): ICD-10-CM

## 2018-08-17 DIAGNOSIS — I10 HYPERTENSION, UNSPECIFIED TYPE: Primary | ICD-10-CM

## 2018-08-17 DIAGNOSIS — I35.0 AORTIC STENOSIS, MODERATE: ICD-10-CM

## 2018-08-17 RX ORDER — HYDROCHLOROTHIAZIDE 12.5 MG/1
12.5 TABLET ORAL DAILY
Qty: 30 TABLET | Refills: 11 | Status: SHIPPED | OUTPATIENT
Start: 2018-08-17 | End: 2018-12-25 | Stop reason: HOSPADM

## 2018-08-17 RX ORDER — SIMVASTATIN 40 MG
40 TABLET ORAL DAILY
Qty: 30 TABLET | Refills: 10 | Status: SHIPPED | OUTPATIENT
Start: 2018-08-17 | End: 2018-12-25 | Stop reason: HOSPADM

## 2018-08-24 ENCOUNTER — APPOINTMENT (OUTPATIENT)
Dept: LAB | Facility: HOSPITAL | Age: 78
End: 2018-08-24
Payer: MEDICARE

## 2018-08-24 ENCOUNTER — TRANSCRIBE ORDERS (OUTPATIENT)
Dept: ADMINISTRATIVE | Facility: HOSPITAL | Age: 78
End: 2018-08-24

## 2018-08-24 DIAGNOSIS — I10 HYPERTENSION, UNSPECIFIED TYPE: ICD-10-CM

## 2018-08-24 DIAGNOSIS — E55.9 VITAMIN D DEFICIENCY: ICD-10-CM

## 2018-08-24 DIAGNOSIS — E78.5 HYPERLIPIDEMIA, UNSPECIFIED HYPERLIPIDEMIA TYPE: ICD-10-CM

## 2018-08-24 DIAGNOSIS — E78.5 HYPERLIPIDEMIA, UNSPECIFIED HYPERLIPIDEMIA TYPE: Primary | ICD-10-CM

## 2018-08-24 LAB
25(OH)D3 SERPL-MCNC: 35.8 NG/ML (ref 30–100)
ANION GAP SERPL CALCULATED.3IONS-SCNC: 10 MMOL/L (ref 4–13)
BASOPHILS # BLD AUTO: 0.02 THOUSANDS/ΜL (ref 0–0.1)
BASOPHILS NFR BLD AUTO: 0 % (ref 0–1)
BUN SERPL-MCNC: 24 MG/DL (ref 5–25)
CALCIUM SERPL-MCNC: 9.4 MG/DL (ref 8.3–10.1)
CHLORIDE SERPL-SCNC: 104 MMOL/L (ref 100–108)
CHOLEST SERPL-MCNC: 180 MG/DL (ref 50–200)
CO2 SERPL-SCNC: 28 MMOL/L (ref 21–32)
CREAT SERPL-MCNC: 1.11 MG/DL (ref 0.6–1.3)
EOSINOPHIL # BLD AUTO: 0.07 THOUSAND/ΜL (ref 0–0.61)
EOSINOPHIL NFR BLD AUTO: 1 % (ref 0–6)
ERYTHROCYTE [DISTWIDTH] IN BLOOD BY AUTOMATED COUNT: 13.1 % (ref 11.6–15.1)
GFR SERPL CREATININE-BSD FRML MDRD: 48 ML/MIN/1.73SQ M
GLUCOSE P FAST SERPL-MCNC: 93 MG/DL (ref 65–99)
HCT VFR BLD AUTO: 41.8 % (ref 34.8–46.1)
HDLC SERPL-MCNC: 60 MG/DL (ref 40–60)
HGB BLD-MCNC: 13.5 G/DL (ref 11.5–15.4)
LDLC SERPL CALC-MCNC: 102 MG/DL (ref 0–100)
LYMPHOCYTES # BLD AUTO: 1.45 THOUSANDS/ΜL (ref 0.6–4.47)
LYMPHOCYTES NFR BLD AUTO: 21 % (ref 14–44)
MCH RBC QN AUTO: 31 PG (ref 26.8–34.3)
MCHC RBC AUTO-ENTMCNC: 32.3 G/DL (ref 31.4–37.4)
MCV RBC AUTO: 96 FL (ref 82–98)
MONOCYTES # BLD AUTO: 0.4 THOUSAND/ΜL (ref 0.17–1.22)
MONOCYTES NFR BLD AUTO: 6 % (ref 4–12)
NEUTROPHILS # BLD AUTO: 4.95 THOUSANDS/ΜL (ref 1.85–7.62)
NEUTS SEG NFR BLD AUTO: 72 % (ref 43–75)
NONHDLC SERPL-MCNC: 120 MG/DL
NRBC BLD AUTO-RTO: 0 /100 WBCS
PLATELET # BLD AUTO: 136 THOUSANDS/UL (ref 149–390)
PMV BLD AUTO: 12.8 FL (ref 8.9–12.7)
POTASSIUM SERPL-SCNC: 4.1 MMOL/L (ref 3.5–5.3)
RBC # BLD AUTO: 4.36 MILLION/UL (ref 3.81–5.12)
SODIUM SERPL-SCNC: 142 MMOL/L (ref 136–145)
TRIGL SERPL-MCNC: 90 MG/DL
WBC # BLD AUTO: 6.89 THOUSAND/UL (ref 4.31–10.16)

## 2018-08-24 PROCEDURE — 80048 BASIC METABOLIC PNL TOTAL CA: CPT

## 2018-08-24 PROCEDURE — 82306 VITAMIN D 25 HYDROXY: CPT

## 2018-08-24 PROCEDURE — 85025 COMPLETE CBC W/AUTO DIFF WBC: CPT

## 2018-08-24 PROCEDURE — 80061 LIPID PANEL: CPT

## 2018-08-24 PROCEDURE — 36415 COLL VENOUS BLD VENIPUNCTURE: CPT

## 2018-10-20 DIAGNOSIS — I48.0 PAROXYSMAL ATRIAL FIBRILLATION (HCC): ICD-10-CM

## 2018-10-24 DIAGNOSIS — I48.0 PAROXYSMAL ATRIAL FIBRILLATION (HCC): ICD-10-CM

## 2018-10-25 DIAGNOSIS — I48.0 PAROXYSMAL ATRIAL FIBRILLATION (HCC): ICD-10-CM

## 2018-11-06 ENCOUNTER — HOSPITAL ENCOUNTER (EMERGENCY)
Facility: HOSPITAL | Age: 78
Discharge: HOME | End: 2018-11-06
Attending: EMERGENCY MEDICINE
Payer: MEDICARE

## 2018-11-06 VITALS
DIASTOLIC BLOOD PRESSURE: 79 MMHG | SYSTOLIC BLOOD PRESSURE: 146 MMHG | RESPIRATION RATE: 18 BRPM | TEMPERATURE: 98.2 F | OXYGEN SATURATION: 100 % | HEART RATE: 67 BPM

## 2018-11-06 DIAGNOSIS — K06.8 GINGIVAL BLEEDING: Primary | ICD-10-CM

## 2018-11-06 LAB
ALBUMIN SERPL BCP-MCNC: 4.4 G/DL (ref 3.5–5)
ALP SERPL-CCNC: 63 U/L (ref 46–116)
ALT SERPL W P-5'-P-CCNC: 13 U/L (ref 12–78)
ANION GAP SERPL CALCULATED.3IONS-SCNC: 8 MMOL/L (ref 4–13)
APTT PPP: 39 SECONDS (ref 24–36)
AST SERPL W P-5'-P-CCNC: 13 U/L (ref 5–45)
BASOPHILS # BLD AUTO: 0.02 THOUSANDS/ΜL (ref 0–0.1)
BASOPHILS NFR BLD AUTO: 0 % (ref 0–1)
BILIRUB SERPL-MCNC: 0.95 MG/DL (ref 0.2–1)
BUN SERPL-MCNC: 25 MG/DL (ref 5–25)
CALCIUM SERPL-MCNC: 9.8 MG/DL (ref 8.3–10.1)
CHLORIDE SERPL-SCNC: 104 MMOL/L (ref 100–108)
CO2 SERPL-SCNC: 27 MMOL/L (ref 21–32)
CREAT SERPL-MCNC: 1.16 MG/DL (ref 0.6–1.3)
EOSINOPHIL # BLD AUTO: 0.09 THOUSAND/ΜL (ref 0–0.61)
EOSINOPHIL NFR BLD AUTO: 1 % (ref 0–6)
ERYTHROCYTE [DISTWIDTH] IN BLOOD BY AUTOMATED COUNT: 12.5 % (ref 11.6–15.1)
GFR SERPL CREATININE-BSD FRML MDRD: 45 ML/MIN/1.73SQ M
GLUCOSE SERPL-MCNC: 105 MG/DL (ref 65–140)
HCT VFR BLD AUTO: 41.1 % (ref 34.8–46.1)
HGB BLD-MCNC: 13.3 G/DL (ref 11.5–15.4)
IMM GRANULOCYTES # BLD AUTO: 0.04 THOUSAND/UL (ref 0–0.2)
IMM GRANULOCYTES NFR BLD AUTO: 1 % (ref 0–2)
INR PPP: 1.18 (ref 0.86–1.17)
LYMPHOCYTES # BLD AUTO: 1.68 THOUSANDS/ΜL (ref 0.6–4.47)
LYMPHOCYTES NFR BLD AUTO: 25 % (ref 14–44)
MCH RBC QN AUTO: 30.4 PG (ref 26.8–34.3)
MCHC RBC AUTO-ENTMCNC: 32.4 G/DL (ref 31.4–37.4)
MCV RBC AUTO: 94 FL (ref 82–98)
MONOCYTES # BLD AUTO: 0.45 THOUSAND/ΜL (ref 0.17–1.22)
MONOCYTES NFR BLD AUTO: 7 % (ref 4–12)
NEUTROPHILS # BLD AUTO: 4.53 THOUSANDS/ΜL (ref 1.85–7.62)
NEUTS SEG NFR BLD AUTO: 66 % (ref 43–75)
NRBC BLD AUTO-RTO: 0 /100 WBCS
PLATELET # BLD AUTO: 144 THOUSANDS/UL (ref 149–390)
PMV BLD AUTO: 12 FL (ref 8.9–12.7)
POTASSIUM SERPL-SCNC: 3.4 MMOL/L (ref 3.5–5.3)
PROT SERPL-MCNC: 7.9 G/DL (ref 6.4–8.2)
PROTHROMBIN TIME: 15.1 SECONDS (ref 11.8–14.2)
RBC # BLD AUTO: 4.37 MILLION/UL (ref 3.81–5.12)
SODIUM SERPL-SCNC: 139 MMOL/L (ref 136–145)
WBC # BLD AUTO: 6.81 THOUSAND/UL (ref 4.31–10.16)

## 2018-11-06 PROCEDURE — 85730 THROMBOPLASTIN TIME PARTIAL: CPT | Performed by: EMERGENCY MEDICINE

## 2018-11-06 PROCEDURE — 36415 COLL VENOUS BLD VENIPUNCTURE: CPT | Performed by: EMERGENCY MEDICINE

## 2018-11-06 PROCEDURE — 99283 EMERGENCY DEPT VISIT LOW MDM: CPT

## 2018-11-06 PROCEDURE — 85025 COMPLETE CBC W/AUTO DIFF WBC: CPT | Performed by: EMERGENCY MEDICINE

## 2018-11-06 PROCEDURE — 80053 COMPREHEN METABOLIC PANEL: CPT | Performed by: EMERGENCY MEDICINE

## 2018-11-06 PROCEDURE — 85610 PROTHROMBIN TIME: CPT | Performed by: EMERGENCY MEDICINE

## 2018-11-06 RX ORDER — TRANEXAMIC ACID 100 MG/ML
1000 INJECTION, SOLUTION INTRAVENOUS ONCE
Status: COMPLETED | OUTPATIENT
Start: 2018-11-06 | End: 2018-11-06

## 2018-11-06 RX ADMIN — TRANEXAMIC ACID 1000 MG: 100 INJECTION, SOLUTION INTRAVENOUS at 19:00

## 2018-11-06 NOTE — ED ATTENDING ATTESTATION
Florencia Fry MD, saw and evaluated the patient  I have discussed the patient with the resident/non-physician practitioner and agree with the resident's/non-physician practitioner's findings, Plan of Care, and MDM as documented in the resident's/non-physician practitioner's note, except where noted  All available labs and Radiology studies were reviewed  At this point I agree with the current assessment done in the Emergency Department  I have conducted an independent evaluation of this patient a history and physical is as follows:  Pt is on eloquis and has had bleeding from tooth for a couple of hours  No history of same Pt took asa 2 times in the last 3 days patient denies weakness lightheadedness or trauma to her teeth she does wear a partial denture  She does not currently have the denture in but she does usually wear that daily    PE alert NAD heart regular lungs clear abdomen soft nondistended neuro nonfocal patient does have an area of bleeding between her gum and tooth MDM:  We will try TXA a to stop bleeding and check labs to make sure there is no significant abnormality    Critical Care Time  CritCare Time    Procedures

## 2018-11-06 NOTE — ED PROVIDER NOTES
History  Chief Complaint   Patient presents with    Bleeding/Bruising     painless bleeding from upper left gums approximately one hour  on eliquis     HPI    60-year-old female pmhx aortic stenosis, hypertension, atrial fibrillation on Eliquis, dyslipidemia presents for evaluation of gum bleeding  Patient says a "clot" came out of her top gum (unsure about where) then proceeded to bleed for last couple of hours  Patient and dad denies prior episode of gingival or nose bleeds   notes patient took a 325 mg aspirin this morning for a headache  Admits to feeling nervous and anxious  Denies current headache, lightheadedness, visual changes, chest pain, shortness of breath, nausea, vomiting, abdominal pain, diarrhea, or dysuria  Denies any trauma  Prior to Admission Medications   Prescriptions Last Dose Informant Patient Reported? Taking?    Cholecalciferol ([employer] VITAMIN D3) 1000 units capsule  Self Yes No   Sig: Take by mouth   apixaban (ELIQUIS) 5 mg   No No   Sig: Take 1 tablet (5 mg total) by mouth 2 (two) times a day   hydrochlorothiazide (HYDRODIURIL) 12 5 mg tablet   No No   Sig: Take 1 tablet (12 5 mg total) by mouth daily   levothyroxine 50 mcg tablet  Self Yes No   Sig: Take 50 mcg by mouth daily   metoprolol tartrate (LOPRESSOR) 25 mg tablet   No No   Sig: Take 1 tablet (25 mg total) by mouth every 12 (twelve) hours   metoprolol tartrate (LOPRESSOR) 25 mg tablet   No No   Sig: Take 1 tablet (25 mg total) by mouth every 12 (twelve) hours   omeprazole (PriLOSEC) 20 mg delayed release capsule  Self Yes No   Sig: Take 20 mg by mouth daily   simvastatin (ZOCOR) 40 mg tablet   No No   Sig: Take 1 tablet (40 mg total) by mouth daily      Facility-Administered Medications: None       Past Medical History:   Diagnosis Date    Atrial fibrillation (HCC)     Disease of thyroid gland     Hyperlipidemia     Hypertension        Past Surgical History:   Procedure Laterality Date    HYSTERECTOMY         No family history on file  I have reviewed and agree with the history as documented  Social History   Substance Use Topics    Smoking status: Former Smoker     Quit date: 1960    Smokeless tobacco: Never Used    Alcohol use No        Review of Systems   Constitutional: Negative for chills, diaphoresis, fatigue and fever  HENT: Positive for dental problem  Negative for congestion, rhinorrhea, sore throat, trouble swallowing and voice change  Eyes: Negative for photophobia and visual disturbance  Respiratory: Negative for cough, choking, chest tightness and shortness of breath  Cardiovascular: Negative for chest pain and palpitations  Gastrointestinal: Negative for abdominal pain, blood in stool, constipation, diarrhea, nausea and vomiting  Genitourinary: Negative for decreased urine volume, dysuria, frequency and hematuria  Musculoskeletal: Negative for back pain, gait problem, myalgias, neck pain and neck stiffness  Skin: Negative for pallor and rash  Neurological: Negative for syncope, weakness, light-headedness, numbness and headaches  Hematological: Negative for adenopathy  Bruises/bleeds easily  All other systems reviewed and are negative  Physical Exam  ED Triage Vitals [11/06/18 1616]   Temperature Pulse Respirations Blood Pressure SpO2   98 2 °F (36 8 °C) 76 18 127/81 100 %      Temp Source Heart Rate Source Patient Position - Orthostatic VS BP Location FiO2 (%)   Tympanic Monitor Sitting Right arm --      Pain Score       No Pain           Orthostatic Vital Signs  Vitals:    11/06/18 1616 11/06/18 1737 11/06/18 1817   BP: 127/81 (!) 199/99 146/79   Pulse: 76 82 67   Patient Position - Orthostatic VS: Sitting         Physical Exam   Constitutional: She is oriented to person, place, and time  No distress  Patient is alert and oriented, appears well and nontoxic, in no acute distress   HENT:   Head: Normocephalic and atraumatic     Mouth/Throat: Oropharynx is clear and moist  No oropharyngeal exudate  Eyes: Pupils are equal, round, and reactive to light  Conjunctivae and EOM are normal    Neck: Normal range of motion  Neck supple  Cardiovascular: Normal rate, regular rhythm, normal heart sounds and intact distal pulses  Pulmonary/Chest: Effort normal and breath sounds normal  No respiratory distress  Abdominal: Soft  Bowel sounds are normal  She exhibits no distension  There is no tenderness  Musculoskeletal: Normal range of motion  Lymphadenopathy:     She has no cervical adenopathy  Neurological: She is alert and oriented to person, place, and time  Skin: Skin is warm and dry  Capillary refill takes less than 2 seconds  No rash noted  She is not diaphoretic  No erythema  No pallor  Psychiatric: She has a normal mood and affect  Her behavior is normal  Judgment and thought content normal    Nursing note and vitals reviewed  ED Medications  Medications   tranexamic acid 100mg/mL (for epistaxis) 1,000 mg (1,000 mg Nasal Given 11/6/18 1900)       Diagnostic Studies  Results Reviewed     Procedure Component Value Units Date/Time    Comprehensive metabolic panel [[id number]]  (Abnormal) Collected:  11/06/18 1752    Lab Status:  Final result Specimen:  Blood from Arm, Left Updated:  11/06/18 1834     Sodium 139 mmol/L      Potassium 3 4 (L) mmol/L      Chloride 104 mmol/L      CO2 27 mmol/L      ANION GAP 8 mmol/L      BUN 25 mg/dL      Creatinine 1 16 mg/dL      Glucose 105 mg/dL      Calcium 9 8 mg/dL      AST 13 U/L      ALT 13 U/L      Alkaline Phosphatase 63 U/L      Total Protein 7 9 g/dL      Albumin 4 4 g/dL      Total Bilirubin 0 95 mg/dL      eGFR 45 ml/min/1 73sq m     Narrative:         National Kidney Disease Education Program recommendations are as follows:  GFR calculation is accurate only with a steady state creatinine  Chronic Kidney disease less than 60 ml/min/1 73 sq  meters  Kidney failure less than 15 ml/min/1 73 sq  meters      Protime-INR [[id number]]  (Abnormal) Collected:  11/06/18 1752    Lab Status:  Final result Specimen:  Blood from Arm, Left Updated:  11/06/18 1832     Protime 15 1 (H) seconds      INR 1 18 (H)    APTT [[id number]]  (Abnormal) Collected:  11/06/18 1752    Lab Status:  Final result Specimen:  Blood from Arm, Left Updated:  11/06/18 1832     PTT 39 (H) seconds     CBC and differential [[id number]]  (Abnormal) Collected:  11/06/18 1752    Lab Status:  Final result Specimen:  Blood from Arm, Left Updated:  11/06/18 1810     WBC 6 81 Thousand/uL      RBC 4 37 Million/uL      Hemoglobin 13 3 g/dL      Hematocrit 41 1 %      MCV 94 fL      MCH 30 4 pg      MCHC 32 4 g/dL      RDW 12 5 %      MPV 12 0 fL      Platelets 415 (L) Thousands/uL      nRBC 0 /100 WBCs      Neutrophils Relative 66 %      Immat GRANS % 1 %      Lymphocytes Relative 25 %      Monocytes Relative 7 %      Eosinophils Relative 1 %      Basophils Relative 0 %      Neutrophils Absolute 4 53 Thousands/µL      Immature Grans Absolute 0 04 Thousand/uL      Lymphocytes Absolute 1 68 Thousands/µL      Monocytes Absolute 0 45 Thousand/µL      Eosinophils Absolute 0 09 Thousand/µL      Basophils Absolute 0 02 Thousands/µL                  No orders to display         Procedures  Procedures      Phone Consults  ED Phone Contact    ED Course  ED Course as of Nov 06 2014 Tue Nov 06, 2018   1915 TXA applied     1959 On re-evaluation, gingiva/tooth does not appear to be actively bleeding  Will discharge home with follow up tomorrow morning with dental clinic and PCP                                MDM  Number of Diagnoses or Management Options  Gingival bleeding:   Diagnosis management comments: Impression: 68yo female presents for evaluation of gingival bleeding  Ddx: anemia, TCP  Plan: cbc, cmp, pt/ptt/inr, TXA    The patient was instructed to follow up as documented   Strict return precautions were discussed with the patient and the patient was instructed to return to the emergency department immediately if symptoms worsen  The patient/patient family member acknowledged and were in agreement with plan  CritCare Time    Disposition  Final diagnoses:   Gingival bleeding     Time reflects when diagnosis was documented in both MDM as applicable and the Disposition within this note     Time User Action Codes Description Comment    11/6/2018  7:38 PM Onel Piña Add [K06 8] Gingival bleeding       ED Disposition     ED Disposition Condition Comment    Discharge  Cely Hotter discharge to home/self care      Condition at discharge: Good        Follow-up Information     Follow up With Specialties Details Why Contact Info Additional Lupe 27, DO Family Medicine Call in 1 day As needed, If symptoms worsen [address] [address] [facility]       [address] [place] [address] Emergency Department Emergency Medicine Go to As needed, If symptoms worsen [address] [facility] ED, [address], [address] in 1 day As needed, If symptoms worsen [address]  [address]  [phone]           Discharge Medication List as of 11/6/2018  8:00 PM      CONTINUE these medications which have NOT CHANGED    Details   apixaban (ELIQUIS) 5 mg Take 1 tablet (5 mg total) by mouth 2 (two) times a day, Starting Mon 7/16/2018, Normal      Cholecalciferol ([employer] VITAMIN D3) 1000 units capsule Take by mouth, Historical Med      hydrochlorothiazide (HYDRODIURIL) 12 5 mg tablet Take 1 tablet (12 5 mg total) by mouth daily, Starting Fri 8/17/2018, Until Sat 8/17/2019, Normal      levothyroxine 50 mcg tablet Take 50 mcg by mouth daily, Starting Tue 1/23/2018, Historical Med      !! metoprolol tartrate (LOPRESSOR) 25 mg tablet Take 1 tablet (25 mg total) by mouth every 12 (twelve) hours, Starting Fri 10/26/2018, Normal      !! metoprolol tartrate (LOPRESSOR) 25 mg tablet Take 1 tablet (25 mg total) by mouth every 12 (twelve) hours, Starting Fri 10/26/2018, Normal      omeprazole (PriLOSEC) 20 mg delayed release capsule Take 20 mg by mouth daily, Starting Tue 1/23/2018, Historical Med      simvastatin (ZOCOR) 40 mg tablet Take 1 tablet (40 mg total) by mouth daily, Starting Fri 8/17/2018, Normal       !! - Potential duplicate medications found  Please discuss with provider  No discharge procedures on file  ED Provider  Attending physically available and evaluated Bishop Valencia I managed the patient along with the ED Attending      Electronically Signed by         Alexy Viramontes MD  11/06/18 2014

## 2018-11-07 NOTE — DISCHARGE INSTRUCTIONS
Tranexamic acid (By mouth)   Tranexamic Acid ([id number]-ax-PZ-ik AS-id)  Treats heavy monthly periods (menstrual cycles) in women  Brand Name(s): Roxy   There may be other brand names for this medicine  When This Medicine Should Not Be Used: This medicine is not right for everyone  Do not use if you had an allergic reaction to tranexamic acid, or if you have blood clot problems or a history of blood clots  How to Use This Medicine:   Tablet  · Your doctor will tell you how much medicine to use  Do not use more than directed  · Start taking this medicine when your monthly period starts  Do not take this medicine for more than 5 days during your period  Do not take more than 6 tablets in one day  · Swallow the tablet whole with liquids  Do not break, crush, or chew it  · Read and follow the patient instructions that come with this medicine  Talk to your doctor or pharmacist if you have any questions  · Missed dose: If you miss a dose or forget to use your medicine, use it as soon as you can  Wait at least 6 hours before you take another dose  Do not use extra medicine to make up for a missed dose  · Store the medicine in a closed container at room temperature, away from heat, moisture, and direct light  Drugs and Foods to Avoid:   Ask your doctor or pharmacist before using any other medicine, including over-the-counter medicines, vitamins, and herbal products  · Do not use this medicine together with birth control that uses hormones, such as pills or a vaginal ring  · Some medicines and foods can affect how tranexamic acid works  Tell your doctor if you are using oral tretinoin or a medicine that changes how your blood clots, such as factor IX  Warnings While Using This Medicine:   · Tell your doctor if you are pregnant or breastfeeding, or if you have kidney disease, bleeding problems, or leukemia    · This medicine may cause the following problems:  ¨ Higher risk of blood clots (which can lead to heart attack or stroke) when used with hormone birth control, such as pills or a patch  ¨ Eye and vision problems  · If this medicine does not reduce your bleeding after 2 menstrual cycles or if it seems to stop working, check with your doctor  · Your doctor will check your progress and the effects of this medicine at regular visits  Keep all appointments  · Keep all medicine out of the reach of children  Never share your medicine with anyone  Possible Side Effects While Using This Medicine:   Call your doctor right away if you notice any of these side effects:  · Allergic reaction: Itching or hives, swelling in your face or hands, swelling or tingling in your mouth or throat, chest tightness, trouble breathing  · Chest pain, trouble breathing, or coughing up blood  · Numbness or weakness on one side of your body, sudden or severe headache, problems with vision, speech, or walking  · Pain in your lower leg  · Trouble seeing, vision changes  · Unusual bleeding, bruising, or weakness  If you notice these less serious side effects, talk with your doctor:   · Muscle, joint, or back pain  · Runny or stuffy nose  If you notice other side effects that you think are caused by this medicine, tell your doctor  Call your doctor for medical advice about side effects  You may report side effects to FDA at 3-966-FDA-8219  © 2017 [address] Information is for End User's use only and may not be sold, redistributed or otherwise used for commercial purposes  The above information is an  only  It is not intended as medical advice for individual conditions or treatments  Talk to your doctor, nurse or pharmacist before following any medical regimen to see if it is safe and effective for you

## 2018-11-08 ENCOUNTER — TELEPHONE (OUTPATIENT)
Dept: CARDIOLOGY CLINIC | Facility: CLINIC | Age: 78
End: 2018-11-08

## 2018-11-08 NOTE — TELEPHONE ENCOUNTER
Received a call from [employer]  asking if pt safe to receive a dental cleaning, and does pt need to pre-med prior to visit?   C/b#[phone]

## 2018-12-03 ENCOUNTER — APPOINTMENT (EMERGENCY)
Dept: RADIOLOGY | Facility: HOSPITAL | Age: 78
DRG: 469 | End: 2018-12-03
Payer: MEDICARE

## 2018-12-03 ENCOUNTER — APPOINTMENT (INPATIENT)
Dept: RADIOLOGY | Facility: HOSPITAL | Age: 78
DRG: 469 | End: 2018-12-03
Payer: MEDICARE

## 2018-12-03 ENCOUNTER — ANESTHESIA (INPATIENT)
Dept: PERIOP | Facility: HOSPITAL | Age: 78
DRG: 469 | End: 2018-12-03
Payer: MEDICARE

## 2018-12-03 ENCOUNTER — HOSPITAL ENCOUNTER (INPATIENT)
Facility: HOSPITAL | Age: 78
LOS: 5 days | DRG: 469 | End: 2018-12-08
Attending: SURGERY | Admitting: SURGERY
Payer: MEDICARE

## 2018-12-03 ENCOUNTER — ANESTHESIA EVENT (INPATIENT)
Dept: PERIOP | Facility: HOSPITAL | Age: 78
DRG: 469 | End: 2018-12-03
Payer: MEDICARE

## 2018-12-03 DIAGNOSIS — S72.141A CLOSED INTERTROCHANTERIC FRACTURE OF HIP, RIGHT, INITIAL ENCOUNTER (HCC): Primary | ICD-10-CM

## 2018-12-03 DIAGNOSIS — S72.001A CLOSED FRACTURE OF NECK OF RIGHT FEMUR, INITIAL ENCOUNTER (HCC): ICD-10-CM

## 2018-12-03 DIAGNOSIS — I35.0 SEVERE AORTIC STENOSIS: ICD-10-CM

## 2018-12-03 PROBLEM — R53.81 PHYSICAL DECONDITIONING: Status: ACTIVE | Noted: 2018-12-03

## 2018-12-03 PROBLEM — E86.0 DEHYDRATION: Status: ACTIVE | Noted: 2018-12-03
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
## 2018-12-03 PROBLEM — R26.2 AMBULATORY DYSFUNCTION: Status: ACTIVE | Noted: 2018-12-03

## 2018-12-03 LAB
25(OH)D3 SERPL-MCNC: 25.9 NG/ML (ref 30–100)
ABO GROUP BLD: NORMAL
ALBUMIN SERPL BCP-MCNC: 2.7 G/DL (ref 3.5–5)
ALBUMIN SERPL BCP-MCNC: 3.9 G/DL (ref 3.5–5)
ALP SERPL-CCNC: 43 U/L (ref 46–116)
ALP SERPL-CCNC: 56 U/L (ref 46–116)
ALT SERPL W P-5'-P-CCNC: 11 U/L (ref 12–78)
ALT SERPL W P-5'-P-CCNC: 15 U/L (ref 12–78)
ANION GAP SERPL CALCULATED.3IONS-SCNC: 13 MMOL/L (ref 4–13)
ANION GAP SERPL CALCULATED.3IONS-SCNC: 8 MMOL/L (ref 4–13)
APTT PPP: 36 SECONDS (ref 26–38)
AST SERPL W P-5'-P-CCNC: 12 U/L (ref 5–45)
AST SERPL W P-5'-P-CCNC: 34 U/L (ref 5–45)
ATRIAL RATE: 123 BPM
BASE EXCESS BLDA CALC-SCNC: 3 MMOL/L (ref -2–3)
BASOPHILS # BLD AUTO: 0.02 THOUSANDS/ΜL (ref 0–0.1)
BASOPHILS NFR BLD AUTO: 0 % (ref 0–1)
BILIRUB SERPL-MCNC: 0.72 MG/DL (ref 0.2–1)
BILIRUB SERPL-MCNC: 0.76 MG/DL (ref 0.2–1)
BLD GP AB SCN SERPL QL: NEGATIVE
BUN SERPL-MCNC: 22 MG/DL (ref 5–25)
BUN SERPL-MCNC: 30 MG/DL (ref 5–25)
CA-I BLD-SCNC: 1.13 MMOL/L (ref 1.12–1.32)
CALCIUM SERPL-MCNC: 8.9 MG/DL (ref 8.3–10.1)
CALCIUM SERPL-MCNC: 9.2 MG/DL (ref 8.3–10.1)
CHLORIDE SERPL-SCNC: 104 MMOL/L (ref 100–108)
CHLORIDE SERPL-SCNC: 107 MMOL/L (ref 100–108)
CK SERPL-CCNC: 52 U/L (ref 26–192)
CO2 SERPL-SCNC: 18 MMOL/L (ref 21–32)
CO2 SERPL-SCNC: 27 MMOL/L (ref 21–32)
CREAT SERPL-MCNC: 0.71 MG/DL (ref 0.6–1.3)
CREAT SERPL-MCNC: 1.17 MG/DL (ref 0.6–1.3)
EOSINOPHIL # BLD AUTO: 0.1 THOUSAND/ΜL (ref 0–0.61)
EOSINOPHIL NFR BLD AUTO: 1 % (ref 0–6)
ERYTHROCYTE [DISTWIDTH] IN BLOOD BY AUTOMATED COUNT: 12.5 % (ref 11.6–15.1)
ERYTHROCYTE [SEDIMENTATION RATE] IN BLOOD: 11 MM/HOUR (ref 0–20)
GFR SERPL CREATININE-BSD FRML MDRD: 45 ML/MIN/1.73SQ M
GFR SERPL CREATININE-BSD FRML MDRD: 82 ML/MIN/1.73SQ M
GLUCOSE SERPL-MCNC: 128 MG/DL (ref 65–140)
GLUCOSE SERPL-MCNC: 130 MG/DL (ref 65–140)
GLUCOSE SERPL-MCNC: 98 MG/DL (ref 65–140)
HCO3 BLDA-SCNC: 27.5 MMOL/L (ref 24–30)
HCT VFR BLD AUTO: 38.8 % (ref 34.8–46.1)
HCT VFR BLD CALC: 38 % (ref 34.8–46.1)
HGB BLD-MCNC: 12.4 G/DL (ref 11.5–15.4)
HGB BLDA-MCNC: 12.9 G/DL (ref 11.5–15.4)
IMM GRANULOCYTES # BLD AUTO: 0.06 THOUSAND/UL (ref 0–0.2)
IMM GRANULOCYTES NFR BLD AUTO: 1 % (ref 0–2)
INR PPP: 1.41 (ref 0.86–1.17)
LACTATE SERPL-SCNC: 2.5 MMOL/L (ref 0.5–2)
LYMPHOCYTES # BLD AUTO: 2.06 THOUSANDS/ΜL (ref 0.6–4.47)
LYMPHOCYTES NFR BLD AUTO: 29 % (ref 14–44)
MCH RBC QN AUTO: 30.4 PG (ref 26.8–34.3)
MCHC RBC AUTO-ENTMCNC: 32 G/DL (ref 31.4–37.4)
MCV RBC AUTO: 95 FL (ref 82–98)
MONOCYTES # BLD AUTO: 0.34 THOUSAND/ΜL (ref 0.17–1.22)
MONOCYTES NFR BLD AUTO: 5 % (ref 4–12)
NEUTROPHILS # BLD AUTO: 4.43 THOUSANDS/ΜL (ref 1.85–7.62)
NEUTS SEG NFR BLD AUTO: 64 % (ref 43–75)
NRBC BLD AUTO-RTO: 0 /100 WBCS
P AXIS: 0 DEGREES
PCO2 BLD: 29 MMOL/L (ref 21–32)
PCO2 BLD: 41.2 MM HG (ref 42–50)
PH BLD: 7.43 [PH] (ref 7.3–7.4)
PLATELET # BLD AUTO: 154 THOUSANDS/UL (ref 149–390)
PMV BLD AUTO: 12.1 FL (ref 8.9–12.7)
PO2 BLD: 30 MM HG (ref 35–45)
POTASSIUM BLD-SCNC: 3.9 MMOL/L (ref 3.5–5.3)
POTASSIUM SERPL-SCNC: 3.7 MMOL/L (ref 3.5–5.3)
POTASSIUM SERPL-SCNC: 4.8 MMOL/L (ref 3.5–5.3)
PROT SERPL-MCNC: 5.4 G/DL (ref 6.4–8.2)
PROT SERPL-MCNC: 7.4 G/DL (ref 6.4–8.2)
PROTHROMBIN TIME: 17.4 SECONDS (ref 11.8–14.2)
PTH-INTACT SERPL-MCNC: 53.4 PG/ML (ref 18.4–80.1)
QRS AXIS: 62 DEGREES
QRSD INTERVAL: 78 MS
QT INTERVAL: 286 MS
QTC INTERVAL: 409 MS
RBC # BLD AUTO: 4.08 MILLION/UL (ref 3.81–5.12)
RH BLD: POSITIVE
SAO2 % BLD FROM PO2: 59 % (ref 95–98)
SODIUM BLD-SCNC: 143 MMOL/L (ref 136–145)
SODIUM SERPL-SCNC: 135 MMOL/L (ref 136–145)
SODIUM SERPL-SCNC: 142 MMOL/L (ref 136–145)
SPECIMEN EXPIRATION DATE: NORMAL
SPECIMEN SOURCE: ABNORMAL
T WAVE AXIS: -55 DEGREES
TROPONIN I SERPL-MCNC: <0.02 NG/ML
TSH SERPL DL<=0.05 MIU/L-ACNC: 2.95 UIU/ML (ref 0.36–3.74)
VENTRICULAR RATE: 123 BPM
WBC # BLD AUTO: 7.01 THOUSAND/UL (ref 4.31–10.16)

## 2018-12-03 PROCEDURE — 86900 BLOOD TYPING SEROLOGIC ABO: CPT | Performed by: PHYSICIAN ASSISTANT

## 2018-12-03 PROCEDURE — 82550 ASSAY OF CK (CPK): CPT | Performed by: SURGERY

## 2018-12-03 PROCEDURE — 99223 1ST HOSP IP/OBS HIGH 75: CPT | Performed by: ORTHOPAEDIC SURGERY

## 2018-12-03 PROCEDURE — 96361 HYDRATE IV INFUSION ADD-ON: CPT

## 2018-12-03 PROCEDURE — 84484 ASSAY OF TROPONIN QUANT: CPT | Performed by: SURGERY

## 2018-12-03 PROCEDURE — 99223 1ST HOSP IP/OBS HIGH 75: CPT | Performed by: INTERNAL MEDICINE

## 2018-12-03 PROCEDURE — 85610 PROTHROMBIN TIME: CPT | Performed by: EMERGENCY MEDICINE

## 2018-12-03 PROCEDURE — 84165 PROTEIN E-PHORESIS SERUM: CPT | Performed by: INTERNAL MEDICINE

## 2018-12-03 PROCEDURE — 99222 1ST HOSP IP/OBS MODERATE 55: CPT | Performed by: FAMILY MEDICINE

## 2018-12-03 PROCEDURE — 70450 CT HEAD/BRAIN W/O DYE: CPT

## 2018-12-03 PROCEDURE — 99222 1ST HOSP IP/OBS MODERATE 55: CPT | Performed by: SURGERY

## 2018-12-03 PROCEDURE — 85652 RBC SED RATE AUTOMATED: CPT | Performed by: ORTHOPAEDIC SURGERY

## 2018-12-03 PROCEDURE — 84165 PROTEIN E-PHORESIS SERUM: CPT | Performed by: PATHOLOGY

## 2018-12-03 PROCEDURE — 82947 ASSAY GLUCOSE BLOOD QUANT: CPT

## 2018-12-03 PROCEDURE — 83970 ASSAY OF PARATHORMONE: CPT | Performed by: ORTHOPAEDIC SURGERY

## 2018-12-03 PROCEDURE — 74177 CT ABD & PELVIS W/CONTRAST: CPT

## 2018-12-03 PROCEDURE — 72170 X-RAY EXAM OF PELVIS: CPT

## 2018-12-03 PROCEDURE — 82330 ASSAY OF CALCIUM: CPT

## 2018-12-03 PROCEDURE — 82306 VITAMIN D 25 HYDROXY: CPT | Performed by: ORTHOPAEDIC SURGERY

## 2018-12-03 PROCEDURE — 93010 ELECTROCARDIOGRAM REPORT: CPT | Performed by: INTERNAL MEDICINE

## 2018-12-03 PROCEDURE — 80053 COMPREHEN METABOLIC PANEL: CPT | Performed by: SURGERY

## 2018-12-03 PROCEDURE — 96374 THER/PROPH/DIAG INJ IV PUSH: CPT

## 2018-12-03 PROCEDURE — 73600 X-RAY EXAM OF ANKLE: CPT

## 2018-12-03 PROCEDURE — 85730 THROMBOPLASTIN TIME PARTIAL: CPT | Performed by: STUDENT IN AN ORGANIZED HEALTH CARE EDUCATION/TRAINING PROGRAM

## 2018-12-03 PROCEDURE — 99222 1ST HOSP IP/OBS MODERATE 55: CPT | Performed by: INTERNAL MEDICINE

## 2018-12-03 PROCEDURE — 80053 COMPREHEN METABOLIC PANEL: CPT | Performed by: ORTHOPAEDIC SURGERY

## 2018-12-03 PROCEDURE — 84443 ASSAY THYROID STIM HORMONE: CPT | Performed by: ORTHOPAEDIC SURGERY

## 2018-12-03 PROCEDURE — 84132 ASSAY OF SERUM POTASSIUM: CPT

## 2018-12-03 PROCEDURE — 84295 ASSAY OF SERUM SODIUM: CPT

## 2018-12-03 PROCEDURE — 93005 ELECTROCARDIOGRAM TRACING: CPT

## 2018-12-03 PROCEDURE — 84166 PROTEIN E-PHORESIS/URINE/CSF: CPT | Performed by: PATHOLOGY

## 2018-12-03 PROCEDURE — 71045 X-RAY EXAM CHEST 1 VIEW: CPT

## 2018-12-03 PROCEDURE — 71260 CT THORAX DX C+: CPT

## 2018-12-03 PROCEDURE — 96375 TX/PRO/DX INJ NEW DRUG ADDON: CPT

## 2018-12-03 PROCEDURE — 99285 EMERGENCY DEPT VISIT HI MDM: CPT

## 2018-12-03 PROCEDURE — 86920 COMPATIBILITY TEST SPIN: CPT

## 2018-12-03 PROCEDURE — 36415 COLL VENOUS BLD VENIPUNCTURE: CPT | Performed by: SURGERY

## 2018-12-03 PROCEDURE — 85014 HEMATOCRIT: CPT

## 2018-12-03 PROCEDURE — 85025 COMPLETE CBC W/AUTO DIFF WBC: CPT | Performed by: SURGERY

## 2018-12-03 PROCEDURE — 86850 RBC ANTIBODY SCREEN: CPT | Performed by: PHYSICIAN ASSISTANT

## 2018-12-03 PROCEDURE — 83605 ASSAY OF LACTIC ACID: CPT | Performed by: SURGERY

## 2018-12-03 PROCEDURE — 82803 BLOOD GASES ANY COMBINATION: CPT

## 2018-12-03 PROCEDURE — 84166 PROTEIN E-PHORESIS/URINE/CSF: CPT | Performed by: INTERNAL MEDICINE

## 2018-12-03 PROCEDURE — 86901 BLOOD TYPING SEROLOGIC RH(D): CPT | Performed by: PHYSICIAN ASSISTANT

## 2018-12-03 PROCEDURE — 72125 CT NECK SPINE W/O DYE: CPT

## 2018-12-03 PROCEDURE — 73552 X-RAY EXAM OF FEMUR 2/>: CPT

## 2018-12-03 RX ORDER — OXYCODONE HYDROCHLORIDE 5 MG/1
5 TABLET ORAL EVERY 4 HOURS PRN
Status: DISCONTINUED | OUTPATIENT
Start: 2018-12-03 | End: 2018-12-08 | Stop reason: HOSPADM

## 2018-12-03 RX ORDER — MELATONIN
5000 DAILY
Status: DISCONTINUED | OUTPATIENT
Start: 2018-12-04 | End: 2018-12-08 | Stop reason: HOSPADM

## 2018-12-03 RX ORDER — HYDROMORPHONE HCL/PF 1 MG/ML
0.2 SYRINGE (ML) INJECTION EVERY 4 HOURS PRN
Status: DISCONTINUED | OUTPATIENT
Start: 2018-12-03 | End: 2018-12-06

## 2018-12-03 RX ORDER — DILTIAZEM HYDROCHLORIDE 5 MG/ML
10 INJECTION INTRAVENOUS ONCE
Status: COMPLETED | OUTPATIENT
Start: 2018-12-03 | End: 2018-12-03

## 2018-12-03 RX ORDER — KETOROLAC TROMETHAMINE 30 MG/ML
15 INJECTION, SOLUTION INTRAMUSCULAR; INTRAVENOUS ONCE
Status: COMPLETED | OUTPATIENT
Start: 2018-12-03 | End: 2018-12-03

## 2018-12-03 RX ORDER — ACETAMINOPHEN 325 MG/1
975 TABLET ORAL EVERY 8 HOURS SCHEDULED
Status: DISCONTINUED | OUTPATIENT
Start: 2018-12-03 | End: 2018-12-08 | Stop reason: HOSPADM

## 2018-12-03 RX ORDER — ONDANSETRON 2 MG/ML
4 INJECTION INTRAMUSCULAR; INTRAVENOUS ONCE
Status: COMPLETED | OUTPATIENT
Start: 2018-12-03 | End: 2018-12-03

## 2018-12-03 RX ORDER — FENTANYL CITRATE 50 UG/ML
25 INJECTION, SOLUTION INTRAMUSCULAR; INTRAVENOUS ONCE
Status: COMPLETED | OUTPATIENT
Start: 2018-12-03 | End: 2018-12-03

## 2018-12-03 RX ORDER — LEVOTHYROXINE SODIUM 0.05 MG/1
50 TABLET ORAL
Status: DISCONTINUED | OUTPATIENT
Start: 2018-12-03 | End: 2018-12-08 | Stop reason: HOSPADM

## 2018-12-03 RX ORDER — POTASSIUM CHLORIDE 14.9 MG/ML
20 INJECTION INTRAVENOUS ONCE
Status: COMPLETED | OUTPATIENT
Start: 2018-12-03 | End: 2018-12-03

## 2018-12-03 RX ORDER — OXYCODONE HYDROCHLORIDE 5 MG/1
2.5 TABLET ORAL EVERY 4 HOURS PRN
Status: DISCONTINUED | OUTPATIENT
Start: 2018-12-03 | End: 2018-12-08 | Stop reason: HOSPADM

## 2018-12-03 RX ORDER — CALCIUM CARBONATE 200(500)MG
500 TABLET,CHEWABLE ORAL 2 TIMES DAILY
Status: DISCONTINUED | OUTPATIENT
Start: 2018-12-03 | End: 2018-12-08 | Stop reason: HOSPADM

## 2018-12-03 RX ORDER — FENTANYL CITRATE 50 UG/ML
INJECTION, SOLUTION INTRAMUSCULAR; INTRAVENOUS CODE/TRAUMA/SEDATION MEDICATION
Status: COMPLETED | OUTPATIENT
Start: 2018-12-03 | End: 2018-12-03

## 2018-12-03 RX ORDER — SODIUM CHLORIDE, SODIUM GLUCONATE, SODIUM ACETATE, POTASSIUM CHLORIDE, MAGNESIUM CHLORIDE, SODIUM PHOSPHATE, DIBASIC, AND POTASSIUM PHOSPHATE .53; .5; .37; .037; .03; .012; .00082 G/100ML; G/100ML; G/100ML; G/100ML; G/100ML; G/100ML; G/100ML
75 INJECTION, SOLUTION INTRAVENOUS CONTINUOUS
Status: DISCONTINUED | OUTPATIENT
Start: 2018-12-04 | End: 2018-12-05

## 2018-12-03 RX ORDER — HYDROCHLOROTHIAZIDE 12.5 MG/1
12.5 TABLET ORAL DAILY
Status: DISCONTINUED | OUTPATIENT
Start: 2018-12-03 | End: 2018-12-08 | Stop reason: HOSPADM

## 2018-12-03 RX ORDER — PANTOPRAZOLE SODIUM 20 MG/1
20 TABLET, DELAYED RELEASE ORAL
Status: DISCONTINUED | OUTPATIENT
Start: 2018-12-03 | End: 2018-12-08 | Stop reason: HOSPADM

## 2018-12-03 RX ORDER — PRAVASTATIN SODIUM 40 MG
40 TABLET ORAL
Status: DISCONTINUED | OUTPATIENT
Start: 2018-12-03 | End: 2018-12-08 | Stop reason: HOSPADM

## 2018-12-03 RX ORDER — METOPROLOL TARTRATE 5 MG/5ML
5 INJECTION INTRAVENOUS EVERY 6 HOURS PRN
Status: DISCONTINUED | OUTPATIENT
Start: 2018-12-03 | End: 2018-12-08 | Stop reason: HOSPADM

## 2018-12-03 RX ORDER — ONDANSETRON 2 MG/ML
INJECTION INTRAMUSCULAR; INTRAVENOUS
Status: COMPLETED
Start: 2018-12-03 | End: 2018-12-03

## 2018-12-03 RX ORDER — DIAZEPAM 2 MG/1
2 TABLET ORAL EVERY 8 HOURS PRN
Status: DISCONTINUED | OUTPATIENT
Start: 2018-12-03 | End: 2018-12-04

## 2018-12-03 RX ORDER — SODIUM CHLORIDE, SODIUM LACTATE, POTASSIUM CHLORIDE, CALCIUM CHLORIDE 600; 310; 30; 20 MG/100ML; MG/100ML; MG/100ML; MG/100ML
100 INJECTION, SOLUTION INTRAVENOUS CONTINUOUS
Status: DISCONTINUED | OUTPATIENT
Start: 2018-12-03 | End: 2018-12-03

## 2018-12-03 RX ORDER — AMOXICILLIN 250 MG
1 CAPSULE ORAL 2 TIMES DAILY
Status: DISCONTINUED | OUTPATIENT
Start: 2018-12-03 | End: 2018-12-08 | Stop reason: HOSPADM

## 2018-12-03 RX ORDER — LIDOCAINE 50 MG/G
1 PATCH TOPICAL DAILY
Status: ACTIVE | OUTPATIENT
Start: 2018-12-03 | End: 2018-12-04

## 2018-12-03 RX ORDER — ONDANSETRON 2 MG/ML
4 INJECTION INTRAMUSCULAR; INTRAVENOUS EVERY 4 HOURS PRN
Status: DISCONTINUED | OUTPATIENT
Start: 2018-12-03 | End: 2018-12-08 | Stop reason: HOSPADM

## 2018-12-03 RX ADMIN — ONDANSETRON 4 MG: 2 INJECTION INTRAMUSCULAR; INTRAVENOUS at 05:29

## 2018-12-03 RX ADMIN — IOHEXOL 100 ML: 350 INJECTION, SOLUTION INTRAVENOUS at 04:56

## 2018-12-03 RX ADMIN — SODIUM CHLORIDE, SODIUM GLUCONATE, SODIUM ACETATE, POTASSIUM CHLORIDE, MAGNESIUM CHLORIDE, SODIUM PHOSPHATE, DIBASIC, AND POTASSIUM PHOSPHATE 50 ML/HR: .53; .5; .37; .037; .03; .012; .00082 INJECTION, SOLUTION INTRAVENOUS at 23:52

## 2018-12-03 RX ADMIN — ACETAMINOPHEN 975 MG: 325 TABLET, FILM COATED ORAL at 14:11

## 2018-12-03 RX ADMIN — FENTANYL CITRATE 25 MCG: 50 INJECTION, SOLUTION INTRAMUSCULAR; INTRAVENOUS at 04:49

## 2018-12-03 RX ADMIN — METOPROLOL TARTRATE 25 MG: 25 TABLET, FILM COATED ORAL at 11:53

## 2018-12-03 RX ADMIN — ACETAMINOPHEN 975 MG: 325 TABLET, FILM COATED ORAL at 22:37

## 2018-12-03 RX ADMIN — METOPROLOL TARTRATE 25 MG: 25 TABLET, FILM COATED ORAL at 22:37

## 2018-12-03 RX ADMIN — OXYCODONE HYDROCHLORIDE 5 MG: 5 TABLET ORAL at 14:11

## 2018-12-03 RX ADMIN — FENTANYL CITRATE 25 MCG: 50 INJECTION, SOLUTION INTRAMUSCULAR; INTRAVENOUS at 04:41

## 2018-12-03 RX ADMIN — ONDANSETRON 4 MG: 2 INJECTION INTRAMUSCULAR; INTRAVENOUS at 09:07

## 2018-12-03 RX ADMIN — POTASSIUM CHLORIDE 20 MEQ: 200 INJECTION, SOLUTION INTRAVENOUS at 11:21

## 2018-12-03 RX ADMIN — DILTIAZEM HYDROCHLORIDE 10 MG: 5 INJECTION INTRAVENOUS at 13:00

## 2018-12-03 RX ADMIN — HYDROMORPHONE HYDROCHLORIDE 0.2 MG: 1 INJECTION, SOLUTION INTRAMUSCULAR; INTRAVENOUS; SUBCUTANEOUS at 06:50

## 2018-12-03 RX ADMIN — ONDANSETRON 4 MG: 2 INJECTION INTRAMUSCULAR; INTRAVENOUS at 14:15

## 2018-12-03 RX ADMIN — FENTANYL CITRATE 25 MCG: 50 INJECTION, SOLUTION INTRAMUSCULAR; INTRAVENOUS at 04:26

## 2018-12-03 RX ADMIN — SODIUM CHLORIDE, SODIUM LACTATE, POTASSIUM CHLORIDE, AND CALCIUM CHLORIDE 100 ML/HR: .6; .31; .03; .02 INJECTION, SOLUTION INTRAVENOUS at 05:30

## 2018-12-03 RX ADMIN — METOPROLOL TARTRATE 5 MG: 1 INJECTION, SOLUTION INTRAVENOUS at 14:03

## 2018-12-03 RX ADMIN — DIAZEPAM 2 MG: 2 TABLET ORAL at 14:31

## 2018-12-03 RX ADMIN — KETOROLAC TROMETHAMINE 15 MG: 30 INJECTION, SOLUTION INTRAMUSCULAR at 12:06

## 2018-12-03 NOTE — CONSULTS
Consultation - Cardiology   Jesus Levine 66 y o  female MRN: [id number]  Unit/Bed#: [facility] 905-01 Encounter: [id number]    Assessment/Plan      Principal Problem:    Closed fracture of neck of right femur [facility])  Active Problems:    Cognitive impairment    Closed intertrochanteric fracture of hip, right, initial encounter ([facility] [address] )    Severe aortic stenosis    Dehydration    Pre-operative evaluation for anterior total hip arthroplasty  -patient has an elevated risk for intraoperative complications, including hypotension and hemodynamic compromise, given her severe aortic stenosis  -patient's EKG appears to show normal sinus rhythm without any evidence of acute ischemia  -patient denies any current chest pain, dizziness, lightheadedness, shortness of breath  -patient is preload dependent and her blood pressure and hemodynamic status should be closely monitored intraoperatively  Okay to proceed with operation from a cardiac standpoint  -continue to hold anticoagulation  Can continue patient's beta-blocker and other medications preoperatively    Severe aortic stenosis  -last echo from 06/25/2018 - EF 55%, normal LV wall thickness, LV diastolic parameters normal, normal right ventricle, mild MR, severe aortic stenosis, no aortic regurgitation  -patient does not appear to endorse any symptoms related to aortic stenosis  -patient being monitored as an outpatient by Dr Donna Navas of atrial fibrillation  -diagnosed in May of 2018  -patient takes Lopressor 25 mg b i d , Eliquis 5 mg b i d   At home  -patient currently appears to be in normal sinus rhythm  -continue to hold anticoagulation, continue Lopressor      History of Present Illness   Physician Requesting Consult: Elieser Hawthorne MD  Reason for Consult / Principal Problem:  Cardiac preoperative evaluation prior to anterior total hip arthroplasty  HPI: Jesus Levine is a 66y o  year old female with a past medical history of paroxysmal atrial fibrillation diagnosed in May 2018, severe aortic stenosis, hypertension, hyperlipidemia who presents to the hospital after a mechanical fall at home  According to the patient's family, the patient may have tripped over a foot stool at home and landed on her right hip  Patient felt immediate pain in the right hip and was unable to bear weight on the right side  She presented to the ED and was found to have a displaced right femoral neck fracture on x-ray  The patient has been evaluated by Orthopedic surgery and is planned to go to the OR today for and anterior total hip arthroplasty  Cardiology service is consulted for preoperative clearance prior to the patient's procedure given her history of severe aortic stenosis and atrial fibrillation  The patient follows her cardiologist, Dr Carole Grigsby, as an outpatient and last saw him in July of 2018  Her last echocardiogram in June of 2018 showed an EF of 55%, normal LV systolic and diastolic function, with severe aortic stenosis without evidence of aortic regurgitation  Dr Carole Grigsby has recommended conservative management for the patient's AS with 6 month surveillance  The patient denies having any symptoms of dizziness, lightheadedness, chest pain, or prior syncopal episodes  Per the family she may have occasional dyspnea on exertion though they state that the patient is usually able to ambulate on her own without any respiratory difficulties  The patient further denies orthopnea, paroxysmal nocturnal dyspnea, and peripheral edema  At the bedside the patient complains of nausea but has no other acute complaints  Inpatient consult to Cardiology     Performed by  Arjun Rodarte by Julio Cesar [place]              Review of Systems   Constitutional: Negative for chills and fever  HENT: Negative for rhinorrhea and trouble swallowing  Eyes: Negative for photophobia and visual disturbance     Respiratory: Negative for cough, shortness of breath and wheezing  Cardiovascular: Negative for chest pain, palpitations and leg swelling  Gastrointestinal: Positive for nausea  Negative for abdominal distention, abdominal pain and vomiting  Genitourinary: Negative for dysuria and frequency  Musculoskeletal: Positive for arthralgias and gait problem  Skin: Negative for color change and pallor  Neurological: Negative for dizziness, light-headedness and numbness  Psychiatric/Behavioral: Negative for agitation and behavioral problems  Historical Information   Past Medical History:   Diagnosis Date    Atrial fibrillation ([address] )     Disease of thyroid gland     Hyperlipidemia     Hypertension     Psychiatric disorder     dementia     Past Surgical History:   Procedure Laterality Date    HYSTERECTOMY       History   Alcohol Use No     History   Drug Use No     History   Smoking Status    Former Smoker    Quit date: 1960   Smokeless Tobacco    Never Used     Family History: History reviewed  No pertinent family history      Meds/Allergies   current meds:   Current Facility-Administered Medications   Medication Dose Route Frequency    acetaminophen (TYLENOL) tablet 975 mg  975 mg Oral Q8H [facility]    hydrochlorothiazide (HYDRODIURIL) tablet 12 5 mg  12 5 mg Oral Daily    HYDROmorphone (DILAUDID) injection 0 2 mg  0 2 mg Intravenous Q4H PRN    lactated ringers infusion  100 mL/hr Intravenous Continuous    levothyroxine tablet 50 mcg  50 mcg Oral Early Morning    lidocaine (LIDODERM) 5 % patch 1 patch  1 patch Topical Daily    metoprolol tartrate (LOPRESSOR) tablet 25 mg  25 mg Oral Q12H [facility]    naloxone (NARCAN) 0 04 mg/mL syringe 0 04 mg  0 04 mg Intravenous Q1MIN PRN    ondansetron (ZOFRAN) injection 4 mg  4 mg Intravenous Q4H PRN    oxyCODONE (ROXICODONE) IR tablet 2 5 mg  2 5 mg Oral Q4H PRN    oxyCODONE (ROXICODONE) IR tablet 5 mg  5 mg Oral Q4H PRN    pantoprazole (PROTONIX) EC tablet 20 mg  20 mg Oral Early Morning    pravastatin (PRAVACHOL) tablet 40 mg  40 mg Oral Daily With Dinner    senna-docusate sodium (SENOKOT S) 8 6-50 mg per tablet 1 tablet  1 tablet Oral BID     Allergies   Allergen Reactions    Oxycodone-Acetaminophen        Objective   Vitals: Blood pressure 137/63, pulse 95, temperature 98 5 °F (36 9 °C), temperature source Oral, resp  rate 17, height 5' 3" (1 6 m), weight 62 6 kg (138 lb 0 1 oz), SpO2 95 %  Orthostatic Blood Pressures      Most Recent Value   Blood Pressure  137/63 filed at 12/03/2018 0807   Patient Position - Orthostatic VS  Lying filed at 12/03/2018 3817            Intake/Output Summary (Last 24 hours) at 12/03/18 0913  Last data filed at 12/03/18 0900   Gross per 24 hour   Intake                0 ml   Output              250 ml   Net             -250 ml       Invasive Devices     Peripheral Intravenous Line            Peripheral IV 12/03/18 Left Hand less than 1 day    Peripheral IV 12/03/18 Right Antecubital less than 1 day          Drain            Urethral Catheter Straight-tip 18 Fr  less than 1 day                Physical Exam   Constitutional: She is oriented to person, place, and time  She appears well-developed and well-nourished  She appears distressed  HENT:   Head: Normocephalic and atraumatic  Eyes: Right eye exhibits no discharge  Left eye exhibits no discharge  No scleral icterus  Cardiovascular: Intact distal pulses  Irregularly irregular rhythm  Grade 2/6 systolic murmur, most prominent at the right upper sternal border   Pulmonary/Chest: Effort normal and breath sounds normal  No respiratory distress  She has no wheezes  She has no rales  Abdominal: Soft  Bowel sounds are normal  She exhibits no distension  There is no tenderness  Musculoskeletal: She exhibits no tenderness  Mild bilateral pitting edema of the lower extremities   Neurological: She is alert and oriented to person, place, and time  Skin: Skin is warm and dry  No rash noted  No erythema     Psychiatric: She has a normal mood and affect  Her behavior is normal        Lab Results:   CBC with diff:   Results from last 7 days  Lab Units 12/03/18  0434   WBC Thousand/uL 7 01   RBC Million/uL 4 08   HEMOGLOBIN g/dL 12 4   HEMATOCRIT % 38 8   MCV fL 95   MCH pg 30 4   MCHC g/dL 32 0   RDW % 12 5   MPV fL 12 1   PLATELETS Thousands/uL 154     CMP:   Results from last 7 days  Lab Units 12/03/18  0434 12/03/18  0424   SODIUM mmol/L 142  --    POTASSIUM mmol/L 3 7  --    CHLORIDE mmol/L 107  --    CO2 mmol/L 27  --    CO2, I-STAT mmol/L  --  29   BUN mg/dL 30*  --    CREATININE mg/dL 1 17  --    GLUCOSE, ISTAT mg/dl  --  128   CALCIUM mg/dL 9 2  --    AST U/L 12  --    ALT U/L 15  --    ALK PHOS U/L 56  --    EGFR ml/min/1 73sq m 45  --      Troponin:   0  Lab Value Date/Time   TROPONINI <0 02 12/03/2018 0434   TROPONINI <0 02 05/26/2018 1352     BNP:   Results from last 7 days  Lab Units 12/03/18  0434 12/03/18  0424   POTASSIUM mmol/L 3 7  --    CHLORIDE mmol/L 107  --    CO2 mmol/L 27  --    CO2, I-STAT mmol/L  --  29   BUN mg/dL 30*  --    CREATININE mg/dL 1 17  --    GLUCOSE, ISTAT mg/dl  --  128   CALCIUM mg/dL 9 2  --    EGFR ml/min/1 73sq m 45  --      Coags:   Results from last 7 days  Lab Units 12/03/18  0650   PTT seconds 36   INR  1 41*     TSH:     Magnesium:     Lipid Profile:     Imaging: I have personally reviewed pertinent reports  EKG:  Sinus rhythm    No evidence of acute ischemia  VTE Prophylaxis: Reason for no pharmacologic prophylaxis Hip fracture    Code Status: Level 1 - Full Code  Advance Directive and Living Will:      Power of :    POLST:      Rl Piña  PGY-1 Internal Medicine

## 2018-12-03 NOTE — CONSULTS
Consultation - Farshad Amador 66 y o  female MRN: [id number]    [facility] En[facility]: [id number]      Assessment/Plan     Right femoral neck fracture status post mechanical fall in patient with history of osteoporosis  Patient with known osteoporosis on DEXA scan in 2016, however no repeat since then  2016 scan showed osteoporosis in left femoral neck with T-score -3 2, and forearm with T-score -3 0  Scan also showed T-score -2 2 in lumbar spine and -2 2 in left total hip  · Patient was only on a vitamin-D supplement at home with no calcium or bisphosphonate therapy  · PTH 53 4  Vitamin-D 25 9 today  Calcium 8 9, albumin 2 7 on CMP today as well  Corrected Ca 9 9   · FRAX and hip fracture automatically places her at need for treatment  · Recommend starting weekly vitamin D with 96645 units weekly for next 4 weeks  · Start daily vitamin-D supplementation after initial repletion  · Start calcium supplementation daily  · Hold off on repeat DEXA scan for now due to traumatic fracture, but previous osteoporosis and high   · Will need bisphosphonate therapy  · Surgical management per orthopedic team  · Pain management per primary team    Hypothyroidism  · Last TSH 2 95 today  Free T4 1 32 on 05/26/2018  · Continue levothyroxine 50 mcg daily  · No need to repeat TSH or free T4 at this point  Atrial fibrillation with severe aortic valve stenosis  · Hold Eliquis for now per cardio recommendations given plan for surgery  · Management per Cardiology and primary team recommendations    Hypertension  · Continue statin while in hospital      History of Present Illness     HPI: Farshad Amador is a 66y o  year old female who presents with a traumatic right subcapital femoral neck fracture secondary to mechanical fall, unwitnessed  She has a history of atrial fibrillation on Eliquis, severe aortic valve stenosis, and dementia  She lives at home with her family    She was in her usual state of health before this admission when this morning she was screaming out in pain and found near the bathroom unable to ambulate  Patient states she had gotten up to feed cats when she tripped over a foot stool and landed on her right hip  Patient's major complaint currently is pain, which currently rates at a 10/10  It is localized to the right hip  Patient has never had a fracture before  Patient has a known history of osteoporosis with her last DEXA scan being in 11/28/2016 which showed osteoporosis at the femoral neck and forearm, with moderate osteopenia at the total hip and spine areas  She has a 10 year hip fracture risk of 9 3%, with major osteoporotic fracture risk being 23%  She takes a 1000 units of vitamin D at home daily  Vitamin D level 21 4 in March this year, 35 8 in August, and repeat Vitamin D 25 from today is 25 9  PTH 53 4 today  Calcium 8 9 on CMP today with a albumin of 2 7  Patient has not been on bisphosphonate therapy in the past          Inpatient consult to Endocrinology     Date/Time 12/3/2018 10:32 AM     Performed by  Norma John     Authorized by Qing Vernon              Review of Systems   Constitutional: Negative for activity change, chills and fever  HENT: Negative  Negative for hearing loss, sore throat and trouble swallowing  Eyes: Negative for visual disturbance  Respiratory: Negative for cough, shortness of breath and wheezing  Cardiovascular: Negative for chest pain, palpitations and leg swelling  Gastrointestinal: Negative for abdominal distention, abdominal pain, blood in stool, constipation, diarrhea, nausea and vomiting  Endocrine: Negative  Genitourinary: Negative for dysuria, frequency and hematuria  Musculoskeletal: Positive for arthralgias and myalgias  Negative for joint swelling  Skin: Negative  Allergic/Immunologic: Negative for immunocompromised state     Neurological: Negative for dizziness, facial asymmetry, speech difficulty, weakness, numbness and headaches  Hematological: Negative  Psychiatric/Behavioral: Negative for behavioral problems, confusion, dysphoric mood and self-injury         Historical Information   Past Medical History:   Diagnosis Date    Atrial fibrillation ([address] )     Disease of thyroid gland     Hyperlipidemia     Hypertension     Psychiatric disorder     dementia     Past Surgical History:   Procedure Laterality Date    HYSTERECTOMY       Social History   History   Alcohol Use No     History   Drug Use No     History   Smoking Status    Former Smoker    Quit date: 1960   Smokeless Tobacco    Never Used     Family History: non-contributory    Meds/Allergies   all current active meds have been reviewed, current meds:   Current Facility-Administered Medications   Medication Dose Route Frequency    acetaminophen (TYLENOL) tablet 975 mg  975 mg Oral Q8H [address]    hydrochlorothiazide (HYDRODIURIL) tablet 12 5 mg  12 5 mg Oral Daily    HYDROmorphone (DILAUDID) injection 0 2 mg  0 2 mg Intravenous Q4H PRN    levothyroxine tablet 50 mcg  50 mcg Oral Early Morning    lidocaine (LIDODERM) 5 % patch 1 patch  1 patch Topical Daily    metoprolol (LOPRESSOR) injection 5 mg  5 mg Intravenous Q6H PRN    metoprolol tartrate (LOPRESSOR) tablet 25 mg  25 mg Oral Q12H [address]    naloxone (NARCAN) 0 04 mg/mL syringe 0 04 mg  0 04 mg Intravenous Q1MIN PRN    ondansetron (ZOFRAN) injection 4 mg  4 mg Intravenous Q4H PRN    oxyCODONE (ROXICODONE) IR tablet 2 5 mg  2 5 mg Oral Q4H PRN    oxyCODONE (ROXICODONE) IR tablet 5 mg  5 mg Oral Q4H PRN    pantoprazole (PROTONIX) EC tablet 20 mg  20 mg Oral Early Morning    potassium chloride 20 mEq IVPB (premix)  20 mEq Intravenous Once    pravastatin (PRAVACHOL) tablet 40 mg  40 mg Oral Daily With Dinner    senna-docusate sodium (SENOKOT S) 8 6-50 mg per tablet 1 tablet  1 tablet Oral BID    and PTA meds:   Prior to Admission Medications   Prescriptions Last Dose Informant Patient Reported? Taking? Cholecalciferol ([facility] VITAMIN D3) 1000 units capsule 12/2/2018 at Unknown time Self Yes Yes   Sig: Take by mouth   apixaban (ELIQUIS) 5 mg 12/2/2018 at Unknown time  No Yes   Sig: Take 1 tablet (5 mg total) by mouth 2 (two) times a day   hydrochlorothiazide (HYDRODIURIL) 12 5 mg tablet 12/2/2018 at Unknown time  No Yes   Sig: Take 1 tablet (12 5 mg total) by mouth daily   levothyroxine 50 mcg tablet 12/2/2018 at Unknown time Self Yes Yes   Sig: Take 50 mcg by mouth daily   metoprolol tartrate (LOPRESSOR) 25 mg tablet 12/2/2018 at Unknown time  No Yes   Sig: Take 1 tablet (25 mg total) by mouth every 12 (twelve) hours   metoprolol tartrate (LOPRESSOR) 25 mg tablet   No No   Sig: Take 1 tablet (25 mg total) by mouth every 12 (twelve) hours   omeprazole (PriLOSEC) 20 mg delayed release capsule 12/2/2018 at Unknown time Self Yes Yes   Sig: Take 20 mg by mouth daily   simvastatin (ZOCOR) 40 mg tablet 12/2/2018 at Unknown time  No Yes   Sig: Take 1 tablet (40 mg total) by mouth daily      Facility-Administered Medications: None     Allergies   Allergen Reactions    Oxycodone-Acetaminophen        Objective   Vitals: Blood pressure 140/95, pulse (!) 132, temperature 98 5 °F (36 9 °C), temperature source Oral, resp  rate 17, height 5' 3" (1 6 m), weight 62 6 kg (138 lb 0 1 oz), SpO2 95 %  Intake/Output Summary (Last 24 hours) at 12/03/18 1303  Last data filed at 12/03/18 0900   Gross per 24 hour   Intake                0 ml   Output              250 ml   Net             -250 ml     Invasive Devices     Peripheral Intravenous Line            Peripheral IV 12/03/18 Left Hand less than 1 day    Peripheral IV 12/03/18 Right Antecubital less than 1 day          Drain            Urethral Catheter Straight-tip 18 Fr  less than 1 day                Physical Exam   Constitutional: She appears well-developed and well-nourished  No distress  HENT:   Head: Normocephalic and atraumatic     Eyes: Pupils are equal, round, and reactive to light  Conjunctivae and EOM are normal    Neck: Neck supple  No thyromegaly present  Cardiovascular: Normal rate  An irregularly irregular rhythm present  Murmur heard  Systolic murmur is present with a grade of 3/6   Pulmonary/Chest: Effort normal and breath sounds normal    Abdominal: Soft  She exhibits no distension  There is no tenderness  Musculoskeletal: She exhibits no edema  Right hip: She exhibits decreased range of motion and tenderness  Left hip: She exhibits normal range of motion and no tenderness  Right lower leg: She exhibits no edema  Left lower leg: She exhibits no edema  Lymphadenopathy:        Head (right side): No submental and no submandibular adenopathy present  Head (left side): No submental and no submandibular adenopathy present  Right: No supraclavicular adenopathy present  Left: No supraclavicular adenopathy present  Neurological: She is alert  No cranial nerve deficit  Skin: Skin is warm and dry  She is not diaphoretic  Psychiatric: She has a normal mood and affect  Vitals reviewed  Lab Results: I have personally reviewed pertinent reports  Imaging Studies: I have personally reviewed pertinent reports  EKG, Pathology, and Other Studies: I have personally reviewed pertinent reports  Code Status: Level 1 - Full Code  Advance Directive and Living Will:      Power of :    POLST:      Portions of the record may have been created with voice recognition software   Occasional wrong word or "sound a like" substitutions may have occurred due to the inherent limitations of voice recognition software   Read the chart carefully and recognize, using context, where substitutions have occurred

## 2018-12-03 NOTE — ASSESSMENT & PLAN NOTE
NWB RLE  Ortho following, plan for OR 12/3 for anterior TH pending cards preoperative evaluation  Pain controlled with tylenol and prn dilaudid/oxy

## 2018-12-03 NOTE — ED PROVIDER NOTES
Emergency Department Airway Evaluation and Management Form    History  Obtained from: pt   Oxycodone-acetaminophen  Chief Complaint   Patient presents with    Fall     Pt was on recliner at home when she got up and tripped falling on her right hip  Complaints of "100/10" pain to the hip upon arrival  Pt is on elliquis  -LOC     HPI  Pt fall, poor historian  On eliquis c/o hip pain and "whole body hurts"    Past Medical History:   Diagnosis Date    Atrial fibrillation ([address] )     Disease of thyroid gland     Hyperlipidemia     Hypertension     Psychiatric disorder     dementia     Past Surgical History:   Procedure Laterality Date    HYSTERECTOMY       History reviewed  No pertinent family history  Social History   Substance Use Topics    Smoking status: Former Smoker     Quit date: 1960    Smokeless tobacco: Never Used    Alcohol use No     I have reviewed and agree with the history as documented      Review of Systems    Physical Exam  /59 (BP Location: Right arm)   Pulse 71   Temp 98 7 °F (37 1 °C) (Oral)   Resp 18   Ht 5' 3" (1 6 m)   Wt 62 6 kg (138 lb 0 1 oz)   SpO2 97%   BMI 24 45 kg/m²     Physical Exam  Airway intact, breath sounds equal, heart tachy, GCS 15    ED Medications  Medications   acetaminophen (TYLENOL) tablet 975 mg (975 mg Oral Given 12/3/18 2237)   lidocaine (LIDODERM) 5 % patch 1 patch (0 patches Topical Hold 12/3/18 0909)   oxyCODONE (ROXICODONE) IR tablet 2 5 mg (not administered)   oxyCODONE (ROXICODONE) IR tablet 5 mg (5 mg Oral Given 12/3/18 1411)   HYDROmorphone (DILAUDID) injection 0 2 mg (0 2 mg Intravenous Given 12/3/18 0650)   ondansetron (ZOFRAN) injection 4 mg (4 mg Intravenous Given 12/3/18 1415)   senna-docusate sodium (SENOKOT S) 8 6-50 mg per tablet 1 tablet (1 tablet Oral Not Given 12/3/18 1838)   naloxone (NARCAN) 0 04 mg/mL syringe 0 04 mg (not administered)   hydrochlorothiazide (HYDRODIURIL) tablet 12 5 mg (12 5 mg Oral Not Given 12/3/18 0905) levothyroxine tablet 50 mcg (50 mcg Oral Not Given 12/3/18 0905)   metoprolol tartrate (LOPRESSOR) tablet 25 mg (25 mg Oral Given 12/3/18 2237)   pantoprazole (PROTONIX) EC tablet 20 mg (20 mg Oral Not Given 12/3/18 0909)   pravastatin (PRAVACHOL) tablet 40 mg (40 mg Oral Not Given 12/3/18 1838)   metoprolol (LOPRESSOR) injection 5 mg (5 mg Intravenous Given 12/3/18 1403)   diazepam (VALIUM) tablet 2 mg (2 mg Oral Given 12/3/18 1431)   multi-electrolyte (ISOLYTE-S PH 7 4 equivalent) IV solution (not administered)   cholecalciferol (VITAMIN D3) tablet 5,000 Units (not administered)   calcium carbonate (TUMS) chewable tablet 500 mg (500 mg Oral Not Given 12/3/18 1838)    EMS REPLENISHMENT MED ( Does not apply Given to EMS 12/3/18 0411)   fentanyl citrate (PF) 100 MCG/2ML 25 mcg (25 mcg Intravenous Given 12/3/18 0426)   iohexol (OMNIPAQUE) 350 MG/ML injection (MULTI-DOSE) 100 mL (100 mL Intravenous Given 12/3/18 0456)   ondansetron (ZOFRAN) injection 4 mg (4 mg Intravenous Given 12/3/18 0529)   fentanyl citrate (PF) 100 MCG/2ML (25 mcg Intravenous Given 12/3/18 0449)   potassium chloride 20 mEq IVPB (premix) (0 mEq Intravenous Stopped 12/3/18 1321)   ketorolac (TORADOL) injection 15 mg (15 mg Intravenous Given 12/3/18 1206)   diltiazem (CARDIZEM) injection 10 mg (10 mg Intravenous Given 12/3/18 1300)       Intubation  Procedures    Notes      CritCare Time    Final Diagnosis  Final diagnoses:   Closed intertrochanteric fracture of hip, right, initial encounter [facility])   Severe aortic stenosis       ED Provider  Electronically Signed by     Luz Rothman DO  12/03/18 1247

## 2018-12-03 NOTE — SOCIAL WORK
CM met with pt to discuss the role of CM  Pt lives with her  in a 1 story home which has 3STE  Pt doesn't drive and was fully independent PTA  Pt owns a cane and shower seat  Pt doesn't have a living will  Pt's [facility] [address] [facility]  Pt reports no hx of mental health, substance abuse or IP rehab  Pt's family will transport at the time of d/c  Pt's plan is for OR today with Ortho  CM reviewed d/c planning process including the following: identifying help at home, patient preference for d/c planning needs, Discharge Lounge, [facility] Meds to Bed program, availability of treatment team to discuss questions or concerns patient and/or family may have regarding understanding medications and recognizing signs and symptoms once discharged  CM also encouraged patient to follow up with all recommended appointments after discharge  Patient advised of importance for patient and family to participate in managing patients medical well being

## 2018-12-03 NOTE — PROGRESS NOTES
Trauma Tertiary Progress Note - Leticia Gasca 1940, 66 y o  female MRN: [id number]    [facility]: [id number]    Primary Care Provider: Azam Gamez DO   Date and time admitted to hospital: 12/3/2018  3:59 AM        Dehydration   Assessment & Plan    Currently NPO for poss  OR today  LR @ 100mL/hr     Severe aortic stenosis   Assessment & Plan    Cardiology consulted for inpatient management and perioperative evaluation     Closed intertrochanteric fracture of hip, right, initial encounter [facility])   Assessment & Plan    NWB RLE  Ortho following, plan for OR 12/3 for anterior TH pending cards preoperative evaluation  Pain controlled with tylenol and prn dilaudid/oxy       Subjective: Pt reports right hip pain slightly improved from AM, but still 8/10  Additionally complains of nausea for which PRN zofran was added  No new pain reported  Summary of Diagnosed Injuries: see above    Clinical Plan: See above    Mechanism of Injury: Fall    Transfer from: n/a  Outside Films Received: not applicable  Tertiary Exam Due on: today    Vitals: Blood pressure 137/63, pulse 95, temperature 98 5 °F (36 9 °C), temperature source Oral, resp  rate 17, height 5' 3" (1 6 m), weight 62 6 kg (138 lb 0 1 oz), SpO2 95 %  ,Body mass index is 24 45 kg/m²  Xr Femur 2 Vw Right    Result Date: 12/3/2018  Impression: Slightly impacted and displaced subcapital femoral neck fracture on the right  Workstation performed: [id number]     Xr Ankle 2 Vw Right    Result Date: 12/3/2018  Impression: No acute osseous abnormality  Workstation performed: [id number]     Ct Head Without Contrast    Result Date: 12/3/2018  Impression: No acute intracranial abnormality  Mild age-appropriate atrophy and chronic appearing small vessel white matter disease appears similar to March 7, 2018  There is partial opacification of the inferior mastoid air cells bilaterally, similar to the prior study    I personally discussed this study with Rupesh Panmaribell on 12/3/2018 at 5:47 AM  Workstation performed: [id number]     Ct Spine Cervical Without Contrast    Result Date: 12/3/2018  Impression: No acute cervical spine fracture or traumatic malalignment  Multilevel degenerative changes are present  The thyroid appears somewhat small  There appear to be tiny subcentimeter thyroid nodules bilaterally  These do not require further evaluation  Please see above  There is mild biapical pleural-parenchymal scarring  I personally discussed this study with Rupesh Panmaribell on 12/3/2018 at 5:47 AM   Workstation performed: [id number]     Ct Chest Abdomen Pelvis W Contrast    Result Date: 12/3/2018  Impression: There is an acute impacted fracture of the right femoral neck  This results in mild varus angulation  There is mild deformity of the anterior aspect of the left inferior pubic ramus  The appearance suggests old healed fracture  Clinical correlation regarding the possibility of pain in this area is recommended  No evidence of acute intrathoracic or intra-abdominal parenchymal organ injury  No pneumothorax  There is minimal atelectasis at the lung bases  Atherosclerosis  Other findings as described above, please see discussion  I personally discussed this study with Rupesh Panmaribell on 12/3/2018 at 5:43 AM  Workstation performed: [id number]     Xr Trauma Multiple    Result Date: 12/3/2018  Impression: No acute pulmonary disease on limited supine imaging  Displaced right subcapital femoral neck fracture   Workstation performed: [id number]       Consultants - List Service/ Faculty and Date: Orthopedics, Cardiology, Gerontology, Endocrinology    Active medications:           Current Facility-Administered Medications:     acetaminophen (TYLENOL) tablet 975 mg, 975 mg, Oral, Q8H [address]    hydrochlorothiazide (HYDRODIURIL) tablet 12 5 mg, 12 5 mg, Oral, Daily, 12 5 mg at 12/03/18 0905    HYDROmorphone (DILAUDID) injection 0 2 mg, 0 2 mg, Intravenous, Q4H PRN, 0 2 mg at 12/03/18 0650    lactated ringers infusion, 100 mL/hr, Intravenous, Continuous, 100 mL/hr at 12/03/18 0530    levothyroxine tablet 50 mcg, 50 mcg, Oral, Early Morning, 50 mcg at 12/03/18 0905    lidocaine (LIDODERM) 5 % patch 1 patch, 1 patch, Topical, Daily, Stopped at 12/03/18 0909    metoprolol tartrate (LOPRESSOR) tablet 25 mg, 25 mg, Oral, Q12H AMY, 25 mg at 12/03/18 0906    naloxone (NARCAN) 0 04 mg/mL syringe 0 04 mg, 0 04 mg, Intravenous, Q1MIN PRN    ondansetron (ZOFRAN) injection 4 mg, 4 mg, Intravenous, Q4H PRN, 4 mg at 12/03/18 0907    oxyCODONE (ROXICODONE) IR tablet 2 5 mg, 2 5 mg, Oral, Q4H PRN    oxyCODONE (ROXICODONE) IR tablet 5 mg, 5 mg, Oral, Q4H PRN    pantoprazole (PROTONIX) EC tablet 20 mg, 20 mg, Oral, Early Morning    pravastatin (PRAVACHOL) tablet 40 mg, 40 mg, Oral, Daily With Dinner    senna-docusate sodium (SENOKOT S) 8 6-50 mg per tablet 1 tablet, 1 tablet, Oral, BID, 1 tablet at 12/03/18 0906      Intake/Output Summary (Last 24 hours) at 12/03/18 5139  Last data filed at 12/03/18 0900   Gross per 24 hour   Intake                0 ml   Output              250 ml   Net             -250 ml       Invasive Devices     Peripheral Intravenous Line            Peripheral IV 12/03/18 Left Hand less than 1 day    Peripheral IV 12/03/18 Right Antecubital less than 1 day          Drain            Urethral Catheter Straight-tip 18 Fr  less than 1 day                CAGE-AID Questionnaire:    Was the patient able to participate in the CAGE-AID screening questions on admission? Yes    Is the patient 65 years or older: YES:    1  Before the illness or injury that brought you to the Emergency, did you need someone to help you on a regular basis? 1=Yes   2  Since the illness or injury that brought you to the Emergency, have you needed more help than usual to take care of yourself? 1=Yes   3   Have you been hospitalized for one or more nights during the past 6 months (excluding a stay in the Emergency Department)? 0=No   4  In general, do you see well? 0=Yes   5  In general, do you have serious problems with your memory? 1=Yes   6  Do you take more than three different medications everyday? 1=Yes   TOTAL   4     Did you order a geriatric consult if the score was 2 or greater?: yes    1  GCS:  GCS Total:  15, Eye Opening:   Spontaneous = 4, Motor Response: Obeys commands = 6 and Verbal Response:  Oriented = 5  2  Head:   WNL  3  Neck:   Supple, ROM intact  No cervical tenderness  4  Chest:   CTA B/L, no tenderness or crepitus  5  Abdomen/Pelvis:   Tenderness to palpation over r  hip  No tenderness over l  hip  Abdomen soft, non-distended, non-tender without guarding or rebound tenderness  6  Back (log roll with spinal immobilization unless cleared radiographically): WNL  7  Extremities:   Lacs, abrasions, swelling, ecchymosis: None noted   Tenderness, pain with motor, instability: Limited ROM of right LE, able to move r  ankle and toes but this produces pain in the r  hip fx site  8  Peripheral Nerves:  Gross sensation intact b/l in both distal extremities    Do NOT use the following abbreviations: DTO, gr, Rizwan, MS, MSO4, MgSO4, Nitro, QD, QID, QOD, u, , ?, ?g or trailing zeros   Always use a zero before a decimal     Labs:   CBC:   Lab Results   Component Value Date    WBC 7 01 12/03/2018    HGB 12 4 12/03/2018    HCT 38 8 12/03/2018    MCV 95 12/03/2018     12/03/2018    MCH 30 4 12/03/2018    MCHC 32 0 12/03/2018    RDW 12 5 12/03/2018    MPV 12 1 12/03/2018    NRBC 0 12/03/2018     CMP:   Lab Results   Component Value Date     12/03/2018    CO2 27 12/03/2018    CO2 29 12/03/2018    BUN 30 (H) 12/03/2018    CREATININE 1 17 12/03/2018    GLUCOSE 128 12/03/2018    CALCIUM 9 2 12/03/2018    AST 12 12/03/2018    ALT 15 12/03/2018    ALKPHOS 56 12/03/2018    EGFR 45 12/03/2018     Phosphorus: No results found for: PHOS  Magnesium: No results found for: MG  Ionized Calcium: No results found for: GAMA  Coagulation:   Lab Results   Component Value Date    INR 1 41 (H) 12/03/2018     Troponin:   Lab Results   Component Value Date    TROPONINI <0 02 12/03/2018         Nurse-provider rounds completed with Leslee Raza  Spoke to  in room with patient

## 2018-12-03 NOTE — ED NOTES
Per Dr Jaylon Goldberg 2nd lactic not to be drawn, no indication of sepsis        Jackie Leonard  12/03/18 3219

## 2018-12-03 NOTE — CONSULTS
Orthopedics   [facility] 66 y o  female MRN: [id number]  Unit/Bed#: X ray      Chief Complaint:   right hip pain    HPI:   66 y  o female community ambulator status post mechanical fall from standing complaining of right hip pain and inability to bear weight  Patient states that she had gone up feed cats when she tripped over the foot stool and landed on her right hip  She felt immediate pain in the right hip and was unable to bear any weight  Her  was present at the time of injury head was at bedside for the history and physical exam   Pain is well localized, is worse with palpation or attempted motion improve somewhat with rest   Patient denies any numbness or tingling to the right lower extremity, though she does complain of knee and ankle pain  She has no complaints this time  Review Of Systems:   · Skin: Normal  · Neuro: See HPI  · Musculoskeletal: See HPI  · 14 point review of systems negative except as stated above     Past Medical History:   Past Medical History:   Diagnosis Date    Atrial fibrillation ([address] )     Disease of thyroid gland     Hyperlipidemia     Hypertension     Psychiatric disorder     dementia       Past Surgical History:   Past Surgical History:   Procedure Laterality Date    HYSTERECTOMY         Family History:  Family history reviewed and non-contributory  History reviewed  No pertinent family history  Social History:  Social History     Social History    Marital status: /Civil Union     Spouse name: N/A    Number of children: N/A    Years of education: N/A     Social History Main Topics    Smoking status: Former Smoker     Quit date: 1960    Smokeless tobacco: Never Used    Alcohol use No    Drug use: No    Sexual activity: Not Asked     Other Topics Concern    None     Social History Narrative    None       Allergies:    Allergies   Allergen Reactions    Oxycodone-Acetaminophen            Labs:    0  Lab Value Date/Time   HCT 38 8 12/03/2018 0434   HCT 38 12/03/2018 0424   HCT 41 1 11/06/2018 1752   HCT 41 8 08/24/2018 0923   HGB 12 4 12/03/2018 0434   HGB 12 9 12/03/2018 0424   HGB 13 3 11/06/2018 1752   HGB 13 5 08/24/2018 0923   INR 1 18 (H) 11/06/2018 1752   WBC 7 01 12/03/2018 0434   WBC 6 81 11/06/2018 1752   WBC 6 89 08/24/2018 0923       Meds:    Current Facility-Administered Medications:     acetaminophen (TYLENOL) tablet 975 mg, 975 mg, Oral, Q8H [facility], Dashawn Mao MD    HYDROmorphone (DILAUDID) injection 0 2 mg, 0 2 mg, Intravenous, Q4H PRN, Dashawn Mao MD    lactated ringers infusion, 100 mL/hr, Intravenous, Continuous, Dashawn Mao MD, Last Rate: 100 mL/hr at 12/03/18 0530, 100 mL/hr at 12/03/18 0530    lidocaine (LIDODERM) 5 % patch 1 patch, 1 patch, Topical, Daily, Dashawn Mao MD    naloxone [facility]) 0 04 mg/mL syringe 0 04 mg, 0 04 mg, Intravenous, Q1MIN PRN, Dashawn Mao MD    ondansetron [facility]) injection 4 mg, 4 mg, Intravenous, Q4H PRN, Dashawn Mao MD    oxyCODONE (ROXICODONE) IR tablet 2 5 mg, 2 5 mg, Oral, Q4H PRN, Dashawn Mao MD    oxyCODONE (ROXICODONE) IR tablet 5 mg, 5 mg, Oral, Q4H PRN, Dashawn Mao MD    senna-docusate sodium (SENOKOT S) 8 6-50 mg per tablet 1 tablet, 1 tablet, Oral, BID, Dashawn Mao MD    Current Outpatient Prescriptions:     apixaban (ELIQUIS) 5 mg, Take 1 tablet (5 mg total) by mouth 2 (two) times a day, Disp: 180 tablet, Rfl: 3    Cholecalciferol (CVS VITAMIN D3) 1000 units capsule, Take by mouth, Disp: , Rfl:     hydrochlorothiazide (HYDRODIURIL) 12 5 mg tablet, Take 1 tablet (12 5 mg total) by mouth daily, Disp: 30 tablet, Rfl: 11    levothyroxine 50 mcg tablet, Take 50 mcg by mouth daily, Disp: , Rfl: 3    metoprolol tartrate (LOPRESSOR) 25 mg tablet, Take 1 tablet (25 mg total) by mouth every 12 (twelve) hours, Disp: 60 tablet, Rfl: 6    omeprazole (PriLOSEC) 20 mg delayed release capsule, Take 20 mg by mouth daily, Disp: , Rfl: 3    simvastatin (ZOCOR) 40 mg tablet, Take 1 tablet (40 mg total) by mouth daily, Disp: 30 tablet, Rfl: 10    metoprolol tartrate (LOPRESSOR) 25 mg tablet, Take 1 tablet (25 mg total) by mouth every 12 (twelve) hours, Disp: 180 tablet, Rfl: 2    Blood Culture:   No results found for: BLOODCX    Wound Culture:   No results found for: WOUNDCULT    Ins and Outs:  No intake/output data recorded  Physical Exam:   /71   Pulse (!) 110   Temp 97 8 °F (36 6 °C) (Tympanic)   Resp 18   Ht 5' 3" (1 6 m)   Wt 62 6 kg (138 lb)   SpO2 100%   BMI 24 45 kg/m²   Gen: Alert  Answers questions appropriately, though there is some confusion and scanning speech  HEENT: EOMI, eyes clear, moist mucus membranes, hearing intact  Respiratory: Bilateral chest rise  No audible wheezing found  Cardiovascular: Regular Rate and Rhythm  Musculoskeletal: right lower extremity  · Skin intact  · Tender to palpation over hip  · Pain with internal external rotation about the hip  · Sensation intact DP/SP/tib/ana maría  · Positive ankle dorsi/plantar flexion, EHL/FHL  · Distal pulse intact      Radiology:   I personally reviewed the films  X-rays right hip shows displaced femoral neck fracture    _*_*_*_*_*_*_*_*_*_*_*_*_*_*_*_*_*_*_*_*_*_*_*_*_*_*_*_*_*_*_*_*_*_*_*_*_*_*_*_*_*    Assessment:  66 y  o female status post mechanical fall from standing with a displaced right femoral neck fracture    Plan:   · Non weight bearing right lower extremity  · Analgesics for pain  · Informed consent obtained  · Pre op labs in ED  · NPO  · Ahumada Catheter insertion  · Will likely need cardiology consult for all medical management and preoperative risk stratification for history of AFib and aortic stenosis  · Trauma team for clearance otherwise  · To OR for Anterior total hip arthroplasty  · Dispo: Ortho will follow      Song MD [place]

## 2018-12-03 NOTE — CONSULTS
Consultation - Frieda Prim 66 y o  female MRN: [id number]  [facility] Encounter: [id number]      Assessment/Plan  1  Fall  Fall precautions  Home meds unremarkable  Continue with vitamin-D supplement as patient takes at home  PT, OT once appropriate    2  Cognitive impairment  Previously diagnosed  Follows at [facility] for positive aging as outpatient  14/30 on [place] in June  Will defer B12, folate assessment as patient has been worked up in the past  Continue supportive care    3  Delirium precautions  Patient high risk given underlying cognitive impairment  Tylenol 975 mg Q 8  Continue with Lidoderm patch  Continue with p r n  Oxycodone as ordered  Continue with bowel regimen as ordered  Patient at risk for developing delirium, delirium preventing tactics advised  Redirect unwanted patient behaviors as first line tx  Reorient patient frequently  Avoid deliriogenic meds including tramadol, benzodiazepines, benadryl  Good sleep hygiene important, limit night time interruptions  Encourage patient to stay awake during the day  Ensure adequate hydration/nutrition  Mobilize often     4  Ambulatory dysfunction  Diagnosed in June  Patient was not using assistive device at home but did own cane  PT, OT once appropriate  Patient will benefit from rehab    5  Deconditioning  Secondary to injuries, hospital stay  Mobilize frequently to prevent further decline  PT, OT    6  Displaced femoral neck fracture  Orthopedics consulted, recommending surgery, patient going for surgery today    7  Vitamin D deficiency  Continue with home supplementation      History of Present Illness   Physician Requesting Consult: Thiago Hayden MD  Reason for Consult / Principal Problem: isar   Hx and PE limited by: n/a  HPI: Leticia Gasca is a 66y o  year old female who presents [facility] after falling  Patient tripped over a foot stool and landed on her right hip    Was found to have displaced femoral neck fracture  Patient was admitted under trauma team, Orthopedics was consulted, recommended anterior total hip arthroplasty  Patient was seen at the [address] [place] [address], [place] [address] for positive aging in June 2018  At this time she was diagnosed cognitive impairment, scored a 14/30 on the [place]  She was supposed to follow up but did not  Family reports they plan to follow up in the future  She also at baseline has gait disturbance  She was referred to physical therapy at this time  Prior to arrival patient lives with her   She is independent with ADLs, does not drive  Owns a cane  Upon exam patient is uncomfortable, annoyed that someone else's in her room  She says she feels nauseous  She is alert and oriented to person, place, month, does not know the year  Family reports this is baseline    Inpatient consult to Gerontology  Consult performed by: Shelby Cisneros ordered by: Odell Mccain    Inpatient consult to Gerontology  Consult performed by: Shelby Cisneros ordered by: Jaycee Borja          Review of Systems   Unable to perform ROS: Mental status change       Historical Information   Past Medical History:   Diagnosis Date    Atrial fibrillation ([address] )     Disease of thyroid gland     Hyperlipidemia     Hypertension     Psychiatric disorder     dementia     Past Surgical History:   Procedure Laterality Date    HYSTERECTOMY       Social History   History   Alcohol Use No     History   Drug Use No     History   Smoking Status    Former Smoker    Quit date: ANURAG [address] 73 Never Used         Family History: non-contributory    Meds/Allergies   Current meds:   Current Facility-Administered Medications   Medication Dose Route Frequency    acetaminophen (TYLENOL) tablet 975 mg  975 mg Oral Q8H [facility]    hydrochlorothiazide (HYDRODIURIL) tablet 12 5 mg  12 5 mg Oral Daily    HYDROmorphone (DILAUDID) injection 0 2 mg  0 2 mg Intravenous Q4H PRN    levothyroxine tablet 50 mcg  50 mcg Oral Early Morning    lidocaine (LIDODERM) 5 % patch 1 patch  1 patch Topical Daily    metoprolol tartrate (LOPRESSOR) tablet 25 mg  25 mg Oral Q12H [facility]    naloxone (NARCAN) 0 04 mg/mL syringe 0 04 mg  0 04 mg Intravenous Q1MIN PRN    ondansetron (ZOFRAN) injection 4 mg  4 mg Intravenous Q4H PRN    oxyCODONE (ROXICODONE) IR tablet 2 5 mg  2 5 mg Oral Q4H PRN    oxyCODONE (ROXICODONE) IR tablet 5 mg  5 mg Oral Q4H PRN    pantoprazole (PROTONIX) EC tablet 20 mg  20 mg Oral Early Morning    potassium chloride 20 mEq IVPB (premix)  20 mEq Intravenous Once    pravastatin (PRAVACHOL) tablet 40 mg  40 mg Oral Daily With Dinner    senna-docusate sodium (SENOKOT S) 8 6-50 mg per tablet 1 tablet  1 tablet Oral BID      Current PTA meds:  Prescriptions Prior to Admission   Medication    apixaban (ELIQUIS) 5 mg    Cholecalciferol (CVS VITAMIN D3) 1000 units capsule    hydrochlorothiazide (HYDRODIURIL) 12 5 mg tablet    levothyroxine 50 mcg tablet    metoprolol tartrate (LOPRESSOR) 25 mg tablet    omeprazole (PriLOSEC) 20 mg delayed release capsule    simvastatin (ZOCOR) 40 mg tablet    metoprolol tartrate (LOPRESSOR) 25 mg tablet        Allergies   Allergen Reactions    Oxycodone-Acetaminophen        Objective   Vitals: Blood pressure 137/63, pulse 95, temperature 98 5 °F (36 9 °C), temperature source Oral, resp  rate 17, height 5' 3" (1 6 m), weight 62 6 kg (138 lb 0 1 oz), SpO2 95 %  ,Body mass index is 24 45 kg/m²  Physical Exam   Constitutional: She is oriented to person, place, and time  She appears well-developed and well-nourished  No distress  HENT:   Head: Normocephalic and atraumatic  Mouth/Throat: No oropharyngeal exudate  Eyes: Conjunctivae and EOM are normal  No scleral icterus  Neck: Neck supple  Cardiovascular: Normal rate  Pulmonary/Chest: Effort normal and breath sounds normal  She has no wheezes  She has no rales  Abdominal: Soft   Bowel sounds are normal  She exhibits no distension  Musculoskeletal: She exhibits no edema  Neurological: She is alert and oriented to person, place, and time  Skin: Skin is warm and dry  Psychiatric: She is not agitated and not actively hallucinating  Cognition and memory are impaired  Patient is alert and oriented to person, place, month  Does not know year  Family reports is baseline  Patient has cognitive impairment at baseline, diagnosed in June  No signs of delirium at this time  Nursing note and vitals reviewed  Lab Results:   Results from last 7 days  Lab Units 12/03/18  0434   WBC Thousand/uL 7 01   HEMOGLOBIN g/dL 12 4   HEMATOCRIT % 38 8   PLATELETS Thousands/uL 154        Results from last 7 days  Lab Units 12/03/18  0943  12/03/18  0424   POTASSIUM mmol/L 4 8  < >  --    CHLORIDE mmol/L 104  < >  --    CO2 mmol/L 18*  < >  --    CO2, I-STAT mmol/L  --   --  29   BUN mg/dL 22  < >  --    CREATININE mg/dL 0 71  < >  --    CALCIUM mg/dL 8 9  < >  --    ALK PHOS U/L 43*  < >  --    ALT U/L 11*  < >  --    AST U/L 34  < >  --    GLUCOSE, ISTAT mg/dl  --   --  128   < > = values in this interval not displayed  Imaging Studies: I have personally reviewed pertinent reports  EKG, Pathology, and Other Studies: I have personally reviewed pertinent reports      VTE Prophylaxis: Sequential compression device (Venodyne)     Code Status: Level 1 - Full Code

## 2018-12-03 NOTE — H&P
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    Trauma Plan:     Admit to trauma service  Orthopedics consultation - likely operative management and fixation of fracture  NWB RLE  Hold eliquis preoperatively  Has not had AM dose  NPO  Cardiology consultation given high risk for anesthesia due to AS  Scheduled tylenol, lidoderm patch  Fentanyl as needed for pain for now  Oxycodone PRN for pain postop  Dilaudid for breakthrough  100/hr isolyte as patient appears volume depleted  Geriatrics consultation  PT/OT  Full code    Chief Complaint:  I think I fell    History of Present Illness   HPI:  Lani Adhikari is a 66 y o  female who presents status post an unwitnessed fall  The patient is in severe distress secondary to pain, is minimally redirectable, and is difficult to obtain history from secondary to pain  She has a history of atrial fibrillation for which she takes chronic Eliquis, aortic stenosis, hypertension, hyperlipidemia, dementia  She lives at home with her family  The patient was noted to be in her otherwise stated health, when her family was awoken by her screaming out in pain, and she was found near the bathroom, unable to ambulate  Family wrapped her and multiple sheets around her pelvis, and called 911, who brought the patient to the emergency department for evaluation  The patient is currently complaining of severe right hip pain, but also states everything hurts  Mechanism:Fall    Review of Systems   Constitutional: Negative for chills and fever     HENT: Negative for congestion, rhinorrhea and sore throat  Eyes: Negative for photophobia and visual disturbance  Respiratory: Negative for cough and shortness of breath  Cardiovascular: Negative for chest pain and palpitations  Gastrointestinal: Negative for abdominal pain, diarrhea, nausea and vomiting  Endocrine: Negative for polyuria  Genitourinary: Negative for difficulty urinating, dysuria and frequency  Musculoskeletal: Positive for back pain  Negative for neck pain and neck stiffness  Skin: Negative for pallor and rash  Neurological: Negative for syncope, weakness, light-headedness and headaches  Hematological: Does not bruise/bleed easily  All other systems reviewed and are negative  Historical Information       Past Medical History:   Diagnosis Date    Atrial fibrillation ([address] )     Disease of thyroid gland     Hyperlipidemia     Hypertension     Psychiatric disorder     dementia     Past Surgical History:   Procedure Laterality Date    HYSTERECTOMY       Social History   History   Alcohol Use No     History   Drug Use No     History   Smoking Status    Former Smoker    Quit date: 1960   Smokeless Tobacco    Never Used       There is no immunization history on file for this patient  Last Tetanus:  Unknown  Family History: Non-contributory  Unable to obtain/limited by dementia      Meds/Allergies   all current active meds have been reviewed    Allergies   Allergen Reactions    Oxycodone-Acetaminophen          PHYSICAL EXAM    PE limited by:  No limitations    Objective   Vitals:   First set: Temperature: (!) 97 3 °F (36 3 °C) (12/03/18 0402)  Pulse: (!) 119 (12/03/18 0402)  Respirations: 20 (12/03/18 0402)  Blood Pressure: (!) 174/80 (12/03/18 0402)    Primary Survey:   (A) Airway:  Patent, intact, not obstructed  (B) Breathing:  Respiratory rate is normal, no distress  Breath sounds clear and equal bilaterally    (C) Circulation: Pulses:   carotid  2/4, pedal  2/4, radial  2/4 and femoral  2/4  (D) Disabliity:  GCS Total:  14  (E) Expose:  Completed    Secondary Survey: (Click on Physical Exam tab above)  Physical Exam   Constitutional:   Awake, alert  Mildly confused     Screaming out in pain  Nontoxic   HENT:   Head: Normocephalic and atraumatic  No hemotympanum bilaterally  No anterior septal hematoma  No raccoon eyes  No Morrison sign  Mucous membranes are dry  Eyes: EOM are normal  No scleral icterus  Pupils 3-2, equal and reactive to light bilaterally  Neck:   Supple, trachea midline, no JVD  There is no tenderness palpation, without step-off, deformity, or overlying skin change   Cardiovascular:   Pulses:       Carotid pulses are 2+ on the right side, and 2+ on the left side  Radial pulses are 2+ on the right side, and 2+ on the left side  Femoral pulses are 2+ on the right side, and 2+ on the left side  Dorsalis pedis pulses are 2+ on the right side, and 2+ on the left side  Tachycardia, regular rhythm, systolic murmur appreciated at the right parasternal border   Pulmonary/Chest: Effort normal  No respiratory distress  She has no wheezes  She has no rales  She exhibits no tenderness  Abdominal:   Abdomen is soft, nontender, nondistended  Musculoskeletal:   There is severe tenderness to palpation of the right hip diffusely, with mild external rotation at the hip  Knee is flexed in a position of comfort, but the rest lower extremity being nontender to palpation  There is no step-off, deformity, or overlying skin changes  Left upper, left lower, right upper extremities diffusely nontender to palpation, without step-off or deformity  Neurological: GCS eye subscore is 4  GCS verbal subscore is 4  GCS motor subscore is 6  Alert and oriented to self, place, but not year  Tone is normal   Strength is decreased in the right lower extremity secondary to pain  Otherwise, strength is 5/5 in all extremities bilaterally  Sensation grossly intact in all extremities  No cranial nerve deficits appreciated  Skin: Skin is warm  No rash noted  Invasive Devices     Peripheral Intravenous Line            Peripheral IV 12/03/18 Left Hand less than 1 day                Lab Results: Results: I have personally reviewed pertinent reports  Imaging/EKG Studies: Results: I have personally reviewed pertinent reports      Other Studies: none    Code Status: Prior  Advance Directive and Living Will:      Power of :    POLST:

## 2018-12-03 NOTE — OCCUPATIONAL THERAPY NOTE
OCCUPATIONAL THERAPY SCREEN:    ORDERS RECEIVED  CHART REVIEW COMPLETED  PT PLANNED FOR OR WITH ORTHO FOR ANTERIOR MIKE  PLEASE RECONSULT POST-OP FOR COMPLETION OF OT EVALUATION       Chisé Diacik, MOT, OTR/L

## 2018-12-04 ENCOUNTER — APPOINTMENT (INPATIENT)
Dept: RADIOLOGY | Facility: HOSPITAL | Age: 78
DRG: 469 | End: 2018-12-04
Payer: MEDICARE

## 2018-12-04 LAB
ANION GAP SERPL CALCULATED.3IONS-SCNC: 10 MMOL/L (ref 4–13)
BASE EX.OXY STD BLDV CALC-SCNC: 61.5 % (ref 60–80)
BASE EXCESS BLDA CALC-SCNC: -3 MMOL/L (ref -2–3)
BASE EXCESS BLDV CALC-SCNC: -4.2 MMOL/L
BASOPHILS # BLD AUTO: 0.02 THOUSANDS/ΜL (ref 0–0.1)
BASOPHILS NFR BLD AUTO: 0 % (ref 0–1)
BUN SERPL-MCNC: 28 MG/DL (ref 5–25)
CA-I BLD-SCNC: 1.11 MMOL/L (ref 1.12–1.32)
CALCIUM SERPL-MCNC: 8.7 MG/DL (ref 8.3–10.1)
CHLORIDE SERPL-SCNC: 106 MMOL/L (ref 100–108)
CO2 SERPL-SCNC: 24 MMOL/L (ref 21–32)
CREAT SERPL-MCNC: 1.19 MG/DL (ref 0.6–1.3)
EOSINOPHIL # BLD AUTO: 0.05 THOUSAND/ΜL (ref 0–0.61)
EOSINOPHIL NFR BLD AUTO: 1 % (ref 0–6)
ERYTHROCYTE [DISTWIDTH] IN BLOOD BY AUTOMATED COUNT: 12.7 % (ref 11.6–15.1)
ERYTHROCYTE [DISTWIDTH] IN BLOOD BY AUTOMATED COUNT: 12.8 % (ref 11.6–15.1)
FIO2 GAS DIL.REBREATH: 21 L
GFR SERPL CREATININE-BSD FRML MDRD: 44 ML/MIN/1.73SQ M
GLUCOSE SERPL-MCNC: 136 MG/DL (ref 65–140)
GLUCOSE SERPL-MCNC: 89 MG/DL (ref 65–140)
HCO3 BLDA-SCNC: 20.8 MMOL/L (ref 22–28)
HCO3 BLDV-SCNC: 20.8 MMOL/L (ref 24–30)
HCT VFR BLD AUTO: 25.6 % (ref 34.8–46.1)
HCT VFR BLD AUTO: 34.5 % (ref 34.8–46.1)
HCT VFR BLD CALC: 21 % (ref 34.8–46.1)
HGB BLD-MCNC: 11.3 G/DL (ref 11.5–15.4)
HGB BLD-MCNC: 8.4 G/DL (ref 11.5–15.4)
HGB BLDA-MCNC: 7.1 G/DL (ref 11.5–15.4)
IMM GRANULOCYTES # BLD AUTO: 0.02 THOUSAND/UL (ref 0–0.2)
IMM GRANULOCYTES NFR BLD AUTO: 0 % (ref 0–2)
INR PPP: 1.29 (ref 0.86–1.17)
LYMPHOCYTES # BLD AUTO: 0.93 THOUSANDS/ΜL (ref 0.6–4.47)
LYMPHOCYTES NFR BLD AUTO: 13 % (ref 14–44)
MAGNESIUM SERPL-MCNC: 2.1 MG/DL (ref 1.6–2.6)
MCH RBC QN AUTO: 30.9 PG (ref 26.8–34.3)
MCH RBC QN AUTO: 31 PG (ref 26.8–34.3)
MCHC RBC AUTO-ENTMCNC: 32.8 G/DL (ref 31.4–37.4)
MCHC RBC AUTO-ENTMCNC: 32.8 G/DL (ref 31.4–37.4)
MCV RBC AUTO: 94 FL (ref 82–98)
MCV RBC AUTO: 95 FL (ref 82–98)
MONOCYTES # BLD AUTO: 0.48 THOUSAND/ΜL (ref 0.17–1.22)
MONOCYTES NFR BLD AUTO: 7 % (ref 4–12)
NEUTROPHILS # BLD AUTO: 5.54 THOUSANDS/ΜL (ref 1.85–7.62)
NEUTS SEG NFR BLD AUTO: 79 % (ref 43–75)
NRBC BLD AUTO-RTO: 0 /100 WBCS
O2 CT BLDV-SCNC: 7.9 ML/DL
PCO2 BLD: 22 MMOL/L (ref 21–32)
PCO2 BLD: 30.1 MM HG (ref 36–44)
PCO2 BLDV: 37.5 MM HG (ref 42–50)
PH BLD: 7.45 [PH] (ref 7.35–7.45)
PH BLDV: 7.36 [PH] (ref 7.3–7.4)
PLATELET # BLD AUTO: 118 THOUSANDS/UL (ref 149–390)
PLATELET # BLD AUTO: 128 THOUSANDS/UL (ref 149–390)
PMV BLD AUTO: 12.9 FL (ref 8.9–12.7)
PMV BLD AUTO: 13 FL (ref 8.9–12.7)
PO2 BLD: 58 MM HG (ref 75–129)
PO2 BLDV: 34.6 MM HG (ref 35–45)
POTASSIUM BLD-SCNC: 3.6 MMOL/L (ref 3.5–5.3)
POTASSIUM SERPL-SCNC: 3.8 MMOL/L (ref 3.5–5.3)
PROTHROMBIN TIME: 16.2 SECONDS (ref 11.8–14.2)
RBC # BLD AUTO: 2.72 MILLION/UL (ref 3.81–5.12)
RBC # BLD AUTO: 3.64 MILLION/UL (ref 3.81–5.12)
SAO2 % BLD FROM PO2: 91 % (ref 95–98)
SODIUM BLD-SCNC: 138 MMOL/L (ref 136–145)
SODIUM SERPL-SCNC: 140 MMOL/L (ref 136–145)
SPECIMEN SOURCE: ABNORMAL
WBC # BLD AUTO: 10 THOUSAND/UL (ref 4.31–10.16)
WBC # BLD AUTO: 7.04 THOUSAND/UL (ref 4.31–10.16)

## 2018-12-04 PROCEDURE — C1776 JOINT DEVICE (IMPLANTABLE): HCPCS | Performed by: ORTHOPAEDIC SURGERY

## 2018-12-04 PROCEDURE — 85025 COMPLETE CBC W/AUTO DIFF WBC: CPT | Performed by: SURGERY

## 2018-12-04 PROCEDURE — 84484 ASSAY OF TROPONIN QUANT: CPT | Performed by: EMERGENCY MEDICINE

## 2018-12-04 PROCEDURE — 82805 BLOOD GASES W/O2 SATURATION: CPT | Performed by: EMERGENCY MEDICINE

## 2018-12-04 PROCEDURE — C1713 ANCHOR/SCREW BN/BN,TIS/BN: HCPCS | Performed by: ORTHOPAEDIC SURGERY

## 2018-12-04 PROCEDURE — 85014 HEMATOCRIT: CPT

## 2018-12-04 PROCEDURE — 80048 BASIC METABOLIC PNL TOTAL CA: CPT | Performed by: SURGERY

## 2018-12-04 PROCEDURE — 84295 ASSAY OF SERUM SODIUM: CPT

## 2018-12-04 PROCEDURE — 82947 ASSAY GLUCOSE BLOOD QUANT: CPT

## 2018-12-04 PROCEDURE — 84132 ASSAY OF SERUM POTASSIUM: CPT

## 2018-12-04 PROCEDURE — 71045 X-RAY EXAM CHEST 1 VIEW: CPT

## 2018-12-04 PROCEDURE — P9021 RED BLOOD CELLS UNIT: HCPCS

## 2018-12-04 PROCEDURE — 27130 TOTAL HIP ARTHROPLASTY: CPT | Performed by: ORTHOPAEDIC SURGERY

## 2018-12-04 PROCEDURE — 82330 ASSAY OF CALCIUM: CPT

## 2018-12-04 PROCEDURE — 99232 SBSQ HOSP IP/OBS MODERATE 35: CPT | Performed by: PHYSICIAN ASSISTANT

## 2018-12-04 PROCEDURE — 99232 SBSQ HOSP IP/OBS MODERATE 35: CPT | Performed by: EMERGENCY MEDICINE

## 2018-12-04 PROCEDURE — 0SR902A REPLACEMENT OF RIGHT HIP JOINT WITH METAL ON POLYETHYLENE SYNTHETIC SUBSTITUTE, UNCEMENTED, OPEN APPROACH: ICD-10-PCS | Performed by: ORTHOPAEDIC SURGERY

## 2018-12-04 PROCEDURE — 30233N1 TRANSFUSION OF NONAUTOLOGOUS RED BLOOD CELLS INTO PERIPHERAL VEIN, PERCUTANEOUS APPROACH: ICD-10-PCS | Performed by: EMERGENCY MEDICINE

## 2018-12-04 PROCEDURE — 82803 BLOOD GASES ANY COMBINATION: CPT

## 2018-12-04 PROCEDURE — 85610 PROTHROMBIN TIME: CPT | Performed by: SURGERY

## 2018-12-04 PROCEDURE — 83735 ASSAY OF MAGNESIUM: CPT | Performed by: PHYSICIAN ASSISTANT

## 2018-12-04 PROCEDURE — 85027 COMPLETE CBC AUTOMATED: CPT | Performed by: EMERGENCY MEDICINE

## 2018-12-04 DEVICE — PINNACLE POROCOAT ACETABULAR SHELL SECTOR II 48MM OD
Type: IMPLANTABLE DEVICE | Site: HIP | Status: FUNCTIONAL
Brand: PINNACLE POROCOAT

## 2018-12-04 DEVICE — PINNACLE CANCELLOUS BONE SCREW 6.5MM X 25MM
Type: IMPLANTABLE DEVICE | Site: HIP | Status: FUNCTIONAL
Brand: PINNACLE

## 2018-12-04 DEVICE — PINNACLE HIP SOLUTIONS ALTRX POLYETHYLENE ACETABULAR LINER +4 10 DEGREES 32MM ID 48MM OD
Type: IMPLANTABLE DEVICE | Site: HIP | Status: FUNCTIONAL
Brand: PINNACLE ALTRX

## 2018-12-04 DEVICE — ARTICUL/EZE FEMORAL HEAD DIAMETER 32MM +1 12/14 TAPER
Type: IMPLANTABLE DEVICE | Site: HIP | Status: FUNCTIONAL
Brand: ARTICUL/EZE

## 2018-12-04 DEVICE — CORAIL HIP SYSTEM CEMENTLESS FEMORAL STEM 12/14 AMT 135 DEGREES KS SIZE 10 HA COATED STANDARD NO COLLAR
Type: IMPLANTABLE DEVICE | Site: HIP | Status: FUNCTIONAL
Brand: CORAIL

## 2018-12-04 RX ORDER — METOCLOPRAMIDE HYDROCHLORIDE 5 MG/ML
10 INJECTION INTRAMUSCULAR; INTRAVENOUS EVERY 6 HOURS PRN
Status: DISCONTINUED | OUTPATIENT
Start: 2018-12-04 | End: 2018-12-04

## 2018-12-04 RX ORDER — ALBUMIN, HUMAN INJ 5% 5 %
SOLUTION INTRAVENOUS CONTINUOUS PRN
Status: DISCONTINUED | OUTPATIENT
Start: 2018-12-04 | End: 2018-12-04 | Stop reason: SURG

## 2018-12-04 RX ORDER — LIDOCAINE 50 MG/G
1 PATCH TOPICAL DAILY
Status: DISCONTINUED | OUTPATIENT
Start: 2018-12-04 | End: 2018-12-08 | Stop reason: HOSPADM

## 2018-12-04 RX ORDER — NEOSTIGMINE METHYLSULFATE 1 MG/ML
INJECTION INTRAVENOUS AS NEEDED
Status: DISCONTINUED | OUTPATIENT
Start: 2018-12-04 | End: 2018-12-04 | Stop reason: SURG

## 2018-12-04 RX ORDER — OXYCODONE HYDROCHLORIDE 5 MG/1
TABLET ORAL
Qty: 30 TABLET | Refills: 0 | Status: SHIPPED | OUTPATIENT
Start: 2018-12-04 | End: 2018-12-25 | Stop reason: HOSPADM

## 2018-12-04 RX ORDER — CEFAZOLIN SODIUM 2 G/50ML
2000 SOLUTION INTRAVENOUS EVERY 8 HOURS
Status: COMPLETED | OUTPATIENT
Start: 2018-12-04 | End: 2018-12-05

## 2018-12-04 RX ORDER — FENTANYL CITRATE 50 UG/ML
INJECTION, SOLUTION INTRAMUSCULAR; INTRAVENOUS AS NEEDED
Status: DISCONTINUED | OUTPATIENT
Start: 2018-12-04 | End: 2018-12-04 | Stop reason: SURG

## 2018-12-04 RX ORDER — METOCLOPRAMIDE HYDROCHLORIDE 5 MG/ML
5 INJECTION INTRAMUSCULAR; INTRAVENOUS ONCE
Status: DISCONTINUED | OUTPATIENT
Start: 2018-12-04 | End: 2018-12-04 | Stop reason: HOSPADM

## 2018-12-04 RX ORDER — ONDANSETRON 2 MG/ML
4 INJECTION INTRAMUSCULAR; INTRAVENOUS ONCE AS NEEDED
Status: COMPLETED | OUTPATIENT
Start: 2018-12-04 | End: 2018-12-04

## 2018-12-04 RX ORDER — ONDANSETRON 2 MG/ML
4 INJECTION INTRAMUSCULAR; INTRAVENOUS ONCE
Status: DISCONTINUED | OUTPATIENT
Start: 2018-12-04 | End: 2018-12-08 | Stop reason: HOSPADM

## 2018-12-04 RX ORDER — MAGNESIUM HYDROXIDE 1200 MG/15ML
LIQUID ORAL AS NEEDED
Status: DISCONTINUED | OUTPATIENT
Start: 2018-12-04 | End: 2018-12-04 | Stop reason: HOSPADM

## 2018-12-04 RX ORDER — GLYCOPYRROLATE 0.2 MG/ML
INJECTION INTRAMUSCULAR; INTRAVENOUS AS NEEDED
Status: DISCONTINUED | OUTPATIENT
Start: 2018-12-04 | End: 2018-12-04 | Stop reason: SURG

## 2018-12-04 RX ORDER — PROPOFOL 10 MG/ML
INJECTION, EMULSION INTRAVENOUS CONTINUOUS PRN
Status: DISCONTINUED | OUTPATIENT
Start: 2018-12-04 | End: 2018-12-04 | Stop reason: SURG

## 2018-12-04 RX ORDER — DEXAMETHASONE SODIUM PHOSPHATE 4 MG/ML
INJECTION, SOLUTION INTRA-ARTICULAR; INTRALESIONAL; INTRAMUSCULAR; INTRAVENOUS; SOFT TISSUE AS NEEDED
Status: DISCONTINUED | OUTPATIENT
Start: 2018-12-04 | End: 2018-12-04 | Stop reason: SURG

## 2018-12-04 RX ORDER — FENTANYL CITRATE/PF 50 MCG/ML
25 SYRINGE (ML) INJECTION
Status: DISCONTINUED | OUTPATIENT
Start: 2018-12-04 | End: 2018-12-04 | Stop reason: HOSPADM

## 2018-12-04 RX ORDER — CEFAZOLIN SODIUM 1 G/3ML
INJECTION, POWDER, FOR SOLUTION INTRAMUSCULAR; INTRAVENOUS AS NEEDED
Status: DISCONTINUED | OUTPATIENT
Start: 2018-12-04 | End: 2018-12-04 | Stop reason: SURG

## 2018-12-04 RX ORDER — SODIUM CHLORIDE 9 MG/ML
INJECTION, SOLUTION INTRAVENOUS CONTINUOUS PRN
Status: DISCONTINUED | OUTPATIENT
Start: 2018-12-04 | End: 2018-12-04 | Stop reason: SURG

## 2018-12-04 RX ORDER — PROPOFOL 10 MG/ML
INJECTION, EMULSION INTRAVENOUS AS NEEDED
Status: DISCONTINUED | OUTPATIENT
Start: 2018-12-04 | End: 2018-12-04 | Stop reason: SURG

## 2018-12-04 RX ORDER — DILTIAZEM HYDROCHLORIDE 5 MG/ML
15 INJECTION INTRAVENOUS ONCE
Status: COMPLETED | OUTPATIENT
Start: 2018-12-04 | End: 2018-12-04

## 2018-12-04 RX ORDER — ONDANSETRON 2 MG/ML
INJECTION INTRAMUSCULAR; INTRAVENOUS AS NEEDED
Status: DISCONTINUED | OUTPATIENT
Start: 2018-12-04 | End: 2018-12-04 | Stop reason: SURG

## 2018-12-04 RX ORDER — ROCURONIUM BROMIDE 10 MG/ML
INJECTION, SOLUTION INTRAVENOUS AS NEEDED
Status: DISCONTINUED | OUTPATIENT
Start: 2018-12-04 | End: 2018-12-04 | Stop reason: SURG

## 2018-12-04 RX ADMIN — CEFAZOLIN SODIUM 2000 MG: 2 SOLUTION INTRAVENOUS at 19:54

## 2018-12-04 RX ADMIN — FENTANYL CITRATE 25 MCG: 50 INJECTION, SOLUTION INTRAMUSCULAR; INTRAVENOUS at 13:10

## 2018-12-04 RX ADMIN — GLYCOPYRROLATE 0.6 MG: 0.2 INJECTION, SOLUTION INTRAMUSCULAR; INTRAVENOUS at 12:50

## 2018-12-04 RX ADMIN — ONDANSETRON 4 MG: 2 INJECTION INTRAMUSCULAR; INTRAVENOUS at 12:42

## 2018-12-04 RX ADMIN — FENTANYL CITRATE 25 MCG: 50 INJECTION, SOLUTION INTRAMUSCULAR; INTRAVENOUS at 14:14

## 2018-12-04 RX ADMIN — ALBUMIN (HUMAN): 12.5 SOLUTION INTRAVENOUS at 12:29

## 2018-12-04 RX ADMIN — FENTANYL CITRATE 25 MCG: 50 INJECTION, SOLUTION INTRAMUSCULAR; INTRAVENOUS at 12:12

## 2018-12-04 RX ADMIN — ACETAMINOPHEN 975 MG: 325 TABLET, FILM COATED ORAL at 06:25

## 2018-12-04 RX ADMIN — ONDANSETRON 4 MG: 2 INJECTION INTRAMUSCULAR; INTRAVENOUS at 23:04

## 2018-12-04 RX ADMIN — FENTANYL CITRATE 25 MCG: 50 INJECTION, SOLUTION INTRAMUSCULAR; INTRAVENOUS at 11:29

## 2018-12-04 RX ADMIN — PROPOFOL 60 MG: 10 INJECTION, EMULSION INTRAVENOUS at 11:31

## 2018-12-04 RX ADMIN — FENTANYL CITRATE 25 MCG: 50 INJECTION, SOLUTION INTRAMUSCULAR; INTRAVENOUS at 14:06

## 2018-12-04 RX ADMIN — PHENYLEPHRINE HYDROCHLORIDE 40 MCG/MIN: 10 INJECTION INTRAVENOUS at 11:31

## 2018-12-04 RX ADMIN — LIDOCAINE HYDROCHLORIDE 60 MG: 20 INJECTION, SOLUTION INTRAVENOUS at 11:31

## 2018-12-04 RX ADMIN — DILTIAZEM HYDROCHLORIDE 15 MG: 5 INJECTION INTRAVENOUS at 23:23

## 2018-12-04 RX ADMIN — NEOSTIGMINE METHYLSULFATE 3 MG: 1 INJECTION INTRAVENOUS at 12:50

## 2018-12-04 RX ADMIN — LEVOTHYROXINE SODIUM 50 MCG: 50 TABLET ORAL at 06:25

## 2018-12-04 RX ADMIN — ROCURONIUM BROMIDE 30 MG: 10 INJECTION INTRAVENOUS at 11:31

## 2018-12-04 RX ADMIN — PANTOPRAZOLE SODIUM 20 MG: 20 TABLET, DELAYED RELEASE ORAL at 06:25

## 2018-12-04 RX ADMIN — DEXAMETHASONE SODIUM PHOSPHATE 4 MG: 4 INJECTION, SOLUTION INTRAMUSCULAR; INTRAVENOUS at 12:11

## 2018-12-04 RX ADMIN — METOPROLOL TARTRATE 5 MG: 1 INJECTION, SOLUTION INTRAVENOUS at 23:47

## 2018-12-04 RX ADMIN — PROPOFOL 70 MCG/KG/MIN: 10 INJECTION, EMULSION INTRAVENOUS at 11:31

## 2018-12-04 RX ADMIN — METOPROLOL TARTRATE 25 MG: 25 TABLET, FILM COATED ORAL at 08:31

## 2018-12-04 RX ADMIN — CEFAZOLIN 2000 MG: 1 INJECTION, POWDER, FOR SOLUTION INTRAVENOUS at 11:38

## 2018-12-04 RX ADMIN — DILTIAZEM HYDROCHLORIDE 5 MG/HR: 5 INJECTION INTRAVENOUS at 23:35

## 2018-12-04 RX ADMIN — HYDROCHLOROTHIAZIDE 12.5 MG: 12.5 TABLET ORAL at 08:31

## 2018-12-04 RX ADMIN — LIDOCAINE 1 PATCH: 50 PATCH TOPICAL at 15:02

## 2018-12-04 RX ADMIN — SODIUM CHLORIDE, SODIUM GLUCONATE, SODIUM ACETATE, POTASSIUM CHLORIDE, MAGNESIUM CHLORIDE, SODIUM PHOSPHATE, DIBASIC, AND POTASSIUM PHOSPHATE 50 ML/HR: .53; .5; .37; .037; .03; .012; .00082 INJECTION, SOLUTION INTRAVENOUS at 13:57

## 2018-12-04 RX ADMIN — SODIUM CHLORIDE: 9 INJECTION, SOLUTION INTRAVENOUS at 11:37

## 2018-12-04 RX ADMIN — FENTANYL CITRATE 25 MCG: 50 INJECTION, SOLUTION INTRAMUSCULAR; INTRAVENOUS at 12:05

## 2018-12-04 RX ADMIN — ONDANSETRON 4 MG: 2 INJECTION INTRAMUSCULAR; INTRAVENOUS at 13:26

## 2018-12-04 NOTE — ANESTHESIA PROCEDURE NOTES
Arterial Line Insertion  Date/Time: 12/4/2018 11:36 AM  Performed by: Cedric Pagan  Authorized by: Radha Hensley   Consent: Written consent obtained  Risks and benefits: risks, benefits and alternatives were discussed  Consent given by: spouse  Patient identity confirmed: verbally with patient and arm band  Time out: Immediately prior to procedure a "time out" was called to verify the correct patient, procedure, equipment, support staff and site/side marked as required  Preparation: Patient was prepped and draped in the usual sterile fashion    Indications: hemodynamic monitoring  Orientation:  Left  Location: radial artery  Sedation:  Patient sedated: no (geta)    Procedure Details:  Paolo's test normal: yes  Needle gauge: 20  Seldinger technique: Seldinger technique used  Number of attempts: 1    Post-procedure:  Post-procedure: dressing applied  Waveform: good waveform  Post-procedure CNS: normal  Patient tolerance: Patient tolerated the procedure well with no immediate complications

## 2018-12-04 NOTE — DISCHARGE INSTRUCTIONS
Discharge Instructions - Orthopedics  Luma Gallegos 66 y o  female MRN: [id number]  Unit/Bed#: Operating Room    Weight Bearing Status:                                           Weight Bearing as tolerated to right leg  Be sure to maintain Hip Precautions (no bending at waist, no crossing legs, on internally rotating surgical leg)    DVT prophylaxis:  Complete course of Lovenox as directed    Pain:  Continue analgesics as directed    Showering Instructions:   Do not shower until followup     Dressing Instructions:   Keep dressing clean, dry and intact until follow up appointment  Driving Instructions:  No driving until cleared by Orthopaedic Surgery  PT/OT:  Continue PT/OT on outpatient basis as directed    Appt Instructions: If you do not have your appointment, please call the clinic at [phone]  Otherwise followup as scheduled below:  Dr Heidi De La Cruz in 1 week    Contact the office sooner if you experience any increased numbness/tingling in the extremities  Miscellaneous: Follow up with Endocrine in 4-6 weeks  Call for appt once discharged from hospital       Prevention of Falls and Fractures  Safety first to prevent falls: At any age, people can change their environments to reduce their risk of falling and breaking a bone  Outdoor safety tips: In nasty weather, use a walker or cane for added stability  Wear warm boots with rubber soles for added traction  Look carefully at floor surfaces in public buildings  Many floors are made of highly polished marble or tile that can be very slippery  If floors have plastic or carpet runners in place, stay on them whenever possible  Identify community services that can provide assistance, such as 24-hour pharmacies and grocery stores that take orders over the phone and deliver  It is especially important to use these services in bad weather  Use a shoulder bag, pradeep pack, or backpack to leave hands free    Stop at curbs and check their height before stepping up or down  Be cautious at curbs that have been cut away to allow access for bikes or wheelchairs  The incline up or down may lead to a fall  Indoor safety tips:  Keep all rooms free from clutter, especially the floors  Keep floor surfaces smooth but not slippery  When entering rooms, be aware of differences in floor levels and thresholds  Wear supportive, low-heeled shoes, even at home  Avoid walking around in socks, stockings, or floppy, backless slippers  Check that all carpets and area rugs have skid-proof backing or are tacked to the floor, including carpeting on stairs  Keep electrical and telephone cords and wires out of walkways  Be sure that all stairwells are adequately lit and that stairs have handrails on both sides  Consider placing fluorescent tape on the edges of the top and bottom steps  For optimal safety, install grab bars on bathroom walls beside tubs, showers, and toilets  If you are unstable on your feet, consider using a plastic chair with a back and nonskid leg tips in the shower  Use a rubber bath mat in the shower or tub  Keep a flashlight with fresh batteries beside your bed  Add ceiling fixtures to rooms lit by lamps only, or install lamps that can be turned on by a switch near the entry point into the room  Another option is to install voice- or sound-activated lamps  Use bright light bulbs in your home  If you must use a step-stool for hard-to-reach areas, use a sturdy one with a handrail and wide steps  A better option is to reorganize work and storage areas to minimize the need for stooping or excessive reaching  Consider purchasing a portable phone that you can take with you from room to room  It provides security because you can answer the phone without rushing for it and you can call for help should an accident occur  Dont let prescriptions run low  Always keep at least 1 weeks worth of medications on hand at home   Check prescriptions with your doctor and pharmacist to see if they may be increasing your risk of falling  If you take multiple medications, check with your doctor and pharmacist about possible interactions between the different medications  Arrange with a family member or friend for daily contact  Try to have at least one person who knows where you are  If you live alone, you may wish to contract with a monitoring company that will respond to your call 24 hours a day  Watch yourself in a mirror  Does your body lean or sway back and forth or side to side? People with decreased ability to balance often have a high degree of body sway and are more likely to fall  ([employer] au  nih gov/Health_Info/Bone/Osteoporosis/Fracture/prevent_falls  asp#d)

## 2018-12-04 NOTE — PROGRESS NOTES
Subjective: Pain in right hip  No numbness or tingling   Patient somewhat confused this AM     Objective:  Lab Results   Component Value Date/Time    WBC 7 01 12/03/2018 04:34 AM    HGB 12 4 12/03/2018 04:34 AM       Vitals:    12/04/18 0321   BP: 151/66   Pulse: 70   Resp: 18   Temp: 98 7 °F (37 1 °C)   SpO2: 98%     RLE extremity  Short and externally rotated   Motor & sensation grossly intact distally  Toes WWP    Assessment: s/p fall with displaced right fem neck fx     Plan:  NWB RLE   Pain control  DVT ppx on hold for OR   PT post op  Plan for R MIKE today with Dr Aj Arnold pending clearance   Dispo

## 2018-12-04 NOTE — UTILIZATION REVIEW
Initial Clinical Review    Admission: Date/Time/Statement: 12/3/18 @ 0628     Orders Placed This Encounter   Procedures    Inpatient Admission     Standing Status:   Standing     Number of Occurrences:   1     Order Specific Question:   Admitting Physician     Answer:   Marely Marquez     Order Specific Question:   Level of Care     Answer:   Med Surg [16]     Order Specific Question:   Estimated length of stay     Answer:   More than 2 Midnights     Order Specific Question:   Certification     Answer:   I certify that inpatient services are medically necessary for this patient for a duration of greater than two midnights  See H&P and MD Progress Notes for additional information about the patient's course of treatment  ED: Date/Time/Mode of Arrival:   ED Arrival Information     Expected Arrival Acuity Means of Arrival Escorted By Service Admission Type    - 12/3/2018 03:58 Immediate Ambulance [facility] Surgery-General Emergency    Arrival Complaint    Fall          Chief Complaint:   Chief Complaint   Patient presents with    Fall     Pt was on recliner at home when she got up and tripped falling on her right hip  Complaints of "100/10" pain to the hip upon arrival  Pt is on elliquis  -LOC       History of Illness:  66 y o  female who presents status post an unwitnessed fall    The patient is in severe distress secondary to pain    ED Vital Signs:   ED Triage Vitals [12/03/18 0402]   Temperature Pulse Respirations Blood Pressure SpO2   (!) 97 3 °F (36 3 °C) (!) 119 20 (!) 174/80 100 %      Temp Source Heart Rate Source Patient Position - Orthostatic VS BP Location FiO2 (%)   Oral Monitor Lying Right arm --      Pain Score       Worst Possible Pain        Wt Readings from Last 1 Encounters:   12/04/18 61 4 kg (135 lb 5 8 oz)       Vital Signs (abnormal):   12/03/18 0525  --   110  18  148/71  100 %  --  --   12/03/18 0510  --   134  18  155/69  99 %  --  Lying   12/03/18 0455  --   130 18  156/69  99 %  --  --   12/03/18 0440  --   130  18  153/70  99 %  --  Lying   12/03/18 0428  --   140  20  160/74  99 %  --  --   12/03/18 0418  97 8 °F (36 6 °C)   146  20   147/112  100 %         Abnormal Labs: Na = 133  H/H = 11 3/ 34 5    Diagnostic Test Results: Xray Right Femur - Slightly impacted and displaced subcapital femoral neck fracture on the right  ED Treatment:   Medication Administration from 12/03/2018 0358 to 12/03/2018 0705       Date/Time Order Dose Route Action     12/03/2018 0426 fentanyl citrate (PF) 100 MCG/2ML 25 mcg 25 mcg Intravenous Given     12/03/2018 0456 iohexol (OMNIPAQUE) 350 MG/ML injection (MULTI-DOSE) 100 mL 100 mL Intravenous Given     12/03/2018 0529 ondansetron (ZOFRAN) injection 4 mg 4 mg Intravenous Given     12/03/2018 0530 lactated ringers infusion 100 mL/hr Intravenous New Bag     12/03/2018 0650 HYDROmorphone (DILAUDID) injection 0 2 mg 0 2 mg Intravenous Given     12/03/2018 0449 fentanyl citrate (PF) 100 MCG/2ML 25 mcg Intravenous Given     12/03/2018 0441 fentanyl citrate (PF) 100 MCG/2ML 25 mcg Intravenous Given          Past Medical/Surgical History:    Active Ambulatory Problems     Diagnosis Date Noted    Microscopic hematuria 04/05/2018    Aortic stenosis, moderate 02/28/2017    Dyslipidemia 01/31/2014    Hypertension 01/31/2014    Palpitations 01/31/2014    Atrial fibrillation ([facility] ) 05/26/2018    Hypothyroidism 05/26/2018    Cognitive impairment 05/26/2018    MCI (mild cognitive impairment) 06/06/2018    Gait disturbance 06/06/2018     Resolved Ambulatory Problems     Diagnosis Date Noted    No Resolved Ambulatory Problems     Past Medical History:   Diagnosis Date    [facility]     Disease of thyroid gland     Hyperlipidemia     Hypertension     Psychiatric disorder        Admitting Diagnosis: Severe aortic stenosis [I35 0]  Closed fracture of neck of right femur, initial encounter ([facility] ) [S72 001A]  Closed intertrochanteric fracture of hip, right, initial encounter ([facility] ) [S72 141A]  Unspecified multiple injuries, initial encounter [T07  XXXA]    Age/Sex: 66 y o  female    Assessment/Plan:   65y Female to ED with S/P Fall  Pt found near bathroom, unable to ambulate and screaming in pain  Pt admitted with Closed R intertrochanteric fracture/ Ambulatory Dysfunction  Orthopedic consult  NWB RLE    NPO  Cardiology consult  Pain control  Geriatrics consult  PT/OT    Admission Orders:  12/4 OR - S/P ARTHROPLASTY HIP TOTAL (Right)  Total hip arthroplasty for femoral neck fracture right side    Scheduled Meds:  Current Facility-Administered Medications:  acetaminophen 975 mg Oral Q8H [facility]   [START ON 12/5/2018] apixaban 5 mg Oral BID   calcium carbonate 500 mg Oral BID   cefazolin 2,000 mg Intravenous Q8H   cholecalciferol 5,000 Units Oral Daily   hydrochlorothiazide 12 5 mg Oral Daily   levothyroxine 50 mcg Oral Early Morning   lidocaine 1 patch Topical Daily   metoprolol tartrate 25 mg Oral Q12H AMY   pantoprazole 20 mg Oral Early Morning   pravastatin 40 mg Oral Daily With Dinner   senna-docusate sodium 1 tablet Oral BID     Continuous Infusions:  multi-electrolyte 50 mL/hr Last Rate: 50 mL/hr (12/04/18 1357)     lactated ringers infusion  Rate: 100 mL/hr Dose: 100 mL/hr  Freq: Continuous Route: IV  Last Dose: 100 mL/hr (12/03/18 0530)  Start: 12/03/18 0530 End: 12/03/18 1029      PRN Meds:  HYDROmorphone    metoprolol    naloxone    ondansetron    oxyCODONE po x1

## 2018-12-04 NOTE — ASSESSMENT & PLAN NOTE
Gerontology consult appreciated  Pt follows up as outpt at [facility] for positive aging  Continue delirium precautions- avoid deliriogenic medications and regulate sleep-wake cycle

## 2018-12-04 NOTE — PLAN OF CARE
Discharge to post-acute care or home with appropriate resources Progressing      Maintains or returns to baseline urinary function Progressing      Absence of urinary retention Progressing      Urinary catheter remains patent Progressing      Maintains hematologic stability Progressing      Maintain or return mobility to safest level of function Progressing      Maintain proper alignment of affected body part Progressing      Nutrient/Hydration intake appropriate for improving, restoring or maintaining nutritional needs Progressing      Patient will remain free of falls Progressing      Skin integrity is maintained or improved Progressing      Skin integrity remains intact Progressing      Incision(s), wounds(s) or drain site(s) healing without S/S of infection Progressing      Oral mucous membranes remain intact Progressing

## 2018-12-04 NOTE — PROGRESS NOTES
Progress Note - Jesus Levine 66 y o  female MRN: [id number]    Unit/Bed#: [facility] 905-01 Encounter: [id number]      Assessment/Plan  1  Fall  Recommendations remain the same  Fall precautions  Home meds unremarkable  Continue with vitamin-D supplement as patient takes at home  PT, OT once appropriate     2  Cognitive impairment  Complicated by postop delirium today, patient is alert oriented to self, does not know where she is, knows it is December, does not know year, appears more confused than yesterday, see 3  Previously diagnosed  Follows at [facility] for positive aging as outpatient  14/30 on [address] in June  Will defer B12, folate assessment as patient has been worked up in the past  Continue supportive care     3  Delirium   Post operatively  Patient alert and oriented to self, does not know where she is, does not know year  Does know month  This is worse than yesterday  Continue with Tylenol 975 mg Q 8  Will add Lidoderm patch  Continue with p r n  Oxycodone as ordered  Will discontinue diazepam, do not recommend an elderly as can cause delirium  If patient needs muscle relaxant would recommend Robaxin 250 mg p r n  Q 8  Continue with bowel regimen as ordered  Patient at risk for developing delirium, delirium preventing tactics advised  Redirect unwanted patient behaviors as first line tx  Reorient patient frequently  Avoid deliriogenic meds including tramadol, benzodiazepines, benadryl  Good sleep hygiene important, limit night time interruptions  Encourage patient to stay awake during the day  Ensure adequate hydration/nutrition  Mobilize often      4  Ambulatory dysfunction  Recommendations remain the same  Diagnosed in June  Patient was not using assistive device at home but did own cane  PT, OT once appropriate  Patient will benefit from rehab     5   Deconditioning  Recommendations remain the same  Secondary to injuries, hospital stay  Mobilize frequently to prevent further decline  PT, OT     6  Displaced femoral neck fracture  Orthopedics following  Went for surgery yesterday     7  Vitamin D deficiency  Continue with home supplementation        Subjective:   Patient more confused this a m  When compared to yesterday  Patient is alert and oriented to person, month  Does not know where she is or the year  Admits to feeling more confused  Pertinent review of systems cannot be gathered secondary to above  Objective:     Vitals: Blood pressure 146/83, pulse 81, temperature 99 °F (37 2 °C), temperature source Oral, resp  rate 17, height 5' 3" (1 6 m), weight 61 4 kg (135 lb 5 8 oz), SpO2 97 %  ,Body mass index is 23 98 kg/m²  Intake/Output Summary (Last 24 hours) at 12/04/18 1027  Last data filed at 12/04/18 0622   Gross per 24 hour   Intake              425 ml   Output              700 ml   Net             -275 ml       Physical Exam: General : WD in NAD  HEENT : MMM no erythema or exudates  EOMI, sclera anicteric  Heart : Normal rate  Lungs : CTA  Abdomen : Soft, NT/ND, BS auscultated in all 4 quads  No organomegaly  Ext :  Pulses +2/4 B/L  Neg edema B/L  Neg calf swelling B/L  Skin : Pink, warm, dry, age appropriate turgor and mobility  Neuro : Nonfocal  Psych : A * O x person, month  Not year or place  +delirium  Invasive Devices     Peripheral Intravenous Line            Peripheral IV 12/03/18 Right Antecubital 1 day          Drain            Urethral Catheter Straight-tip 18 Fr  1 day                Lab, Imaging and other studies: I have personally reviewed pertinent reports      VTE Pharmacologic Prophylaxis: Sequential compression device (Venodyne)   VTE Mechanical Prophylaxis: sequential compression device

## 2018-12-04 NOTE — ANESTHESIA POSTPROCEDURE EVALUATION
Post-Op Assessment Note      CV Status:  Stable    Mental Status:  Alert and awake    Hydration Status:  Euvolemic    PONV Controlled:  Controlled    Airway Patency:  Patent    Post Op Vitals Reviewed: Yes          Staff: CRNA           BP   102/77   Temp   100   Pulse  85   Resp      SpO2   100

## 2018-12-04 NOTE — ASSESSMENT & PLAN NOTE
Gerontology consult appreciated:  Fall precautions  Continue with vitamin-D supplement as patient takes at home  PT, OT once appropriate

## 2018-12-04 NOTE — ASSESSMENT & PLAN NOTE
NWB RLE  Ortho following, plan for OR 12/4 for anterior TH  Pain controlled with tylenol and prn dilaudid/oxy

## 2018-12-04 NOTE — ASSESSMENT & PLAN NOTE
Cardiology consulted for inpatient management and perioperative evaluation  Pt had episode of rapid afib 12/3 PM, which resolved with cardizem  No episodes since then    Hold AC/AP until safe from surgical standpoint post-op  Continue home BP medications

## 2018-12-04 NOTE — PROGRESS NOTES
Post-Op Check:    Subjective: Pt seen at bedside post-op right total hip arthroplasty  C/o 9/10 pain in the right hip, but denies pain in left hip and bilateral ankles/knees  Denies any headache or changes in vision  Continues to c/o nausea, but denies any abdominal pain  Does complain of increased SOB from prior to operation  Objective:    Physical Exam   Constitutional: She is oriented to person, place, and time  She appears well-developed and well-nourished  HENT:   Head: Normocephalic and atraumatic  Eyes: Pupils are equal, round, and reactive to light  Conjunctivae and EOM are normal    Neck: Normal range of motion  Neck supple  No JVD present  Cardiovascular: Normal rate, regular rhythm and intact distal pulses  Exam reveals no gallop and no friction rub  Murmur (Blowing holosystolic murmur) heard  Pulmonary/Chest: Effort normal and breath sounds normal  No respiratory distress  She has no wheezes  She has no rales  She exhibits no tenderness  Abdominal: Soft  Bowel sounds are normal  She exhibits no distension and no mass  There is no tenderness  There is no rebound and no guarding  Neurological: She is alert and oriented to person, place, and time  Skin: Skin is dry  Capillary refill takes less than 2 seconds  No rash noted  No erythema  Cool     Plan: Encourage IS post-op  Ortho following  Restart home [facility] tomorrow morning

## 2018-12-04 NOTE — ASSESSMENT & PLAN NOTE
Cardiology consulted for inpatient management and perioperative evaluation  Avoid hypotension perioperatively  Hold AC/AP until safe from surgical standpoint post-op  Continue home BP medications

## 2018-12-04 NOTE — ANESTHESIA PREPROCEDURE EVALUATION
Review of Systems/Medical History  Patient summary reviewed  Chart reviewed  No history of anesthetic complications (only prior anesthetics for childbirth; no fam hx)     Cardiovascular  Negative cardio ROS Hyperlipidemia, Hypertension , Valvular heart disease (severe AS) , aortic valve stenosis, Dysrhythmias (NSR on EKG ysterday) , atrial fibrillation,    Pulmonary  COPD (on CXR) ,        GI/Hepatic  Negative GI/hepatic ROS          Negative  ROS        Endo/Other  History of thyroid disease , hypothyroidism,      GYN    Hysterectomy,        Hematology  Anemia ,  Coagulation disorder (eliquis taken 12/2) currently taking oral anticoagulants,    Musculoskeletal    Comment: Right hip fracture      Neurology      Comment: dementia Psychology   Negative psychology ROS            Physical Exam    Airway    Mallampati score: II  TM Distance: >3 FB  Neck ROM: full     Dental   Comment: Denies loose teeth; appears intact  Bridges and caps,     Cardiovascular  Comment: Negative ROS,     Pulmonary      Other Findings      Lab Results   Component Value Date    WBC 7 04 12/04/2018    HGB 11 3 (L) 12/04/2018     (L) 12/04/2018     Lab Results   Component Value Date    K 3 8 12/04/2018    BUN 28 (H) 12/04/2018    CREATININE 1 19 12/04/2018    GLUCOSE 128 12/03/2018     Lab Results   Component Value Date    PTT 36 12/03/2018      Lab Results   Component Value Date    INR 1 29 (H) 12/04/2018     Blood type A+/antibody neg  No results found for: HGBA1C    TTE 6/2018 SUMMARY     LEFT VENTRICLE:  Size was normal   Systolic function was normal  Ejection fraction was estimated to be 55 %    Wall thickness was normal   Left ventricular diastolic function parameters were normal      RIGHT VENTRICLE:  The size was normal   Systolic function was normal      MITRAL VALVE:  There was mild regurgitation      AORTIC VALVE:  There was severe stenosis      TRICUSPID VALVE:  There was trace regurgitation        Anesthesia Plan  ASA Score- 4     Anesthesia Type- general with ASA Monitors  Additional Monitors:   Airway Plan: ETT  Comment: Increased cardiac risks due to afib/severe AS discussed  Plan Factors-    Induction- intravenous  Postoperative Plan-     Informed Consent- Anesthetic plan and risks discussed with patient, spouse and daughter  I personally reviewed this patient with the CRNA  Discussed and agreed on the Anesthesia Plan with the CRNA  Karrie Nissen

## 2018-12-04 NOTE — PROGRESS NOTES
Progress Note - Carmelo Dyson 1940, 66 y o  female MRN: [id number]    Unit/Bed#: [facility] 905-01 Encounter: [id number]    Primary Care Provider: Lary Sanchez DO   Date and time admitted to hospital: 12/3/2018  3:59 AM        Physical deconditioning   Assessment & Plan    Early mobility as clinical course allows and patient tolerates post-op  PT/OT eval placed     Ambulatory dysfunction   Assessment & Plan    Pt currently NWB RLE  Fall precautions  PT/OT consult post-operatively     Fall   Assessment & Plan    Gerontology consult appreciated:  Fall precautions  Continue with vitamin-D supplement as patient takes at home  PT, OT once appropriate     Dehydration   Assessment & Plan    Currently NPO for poss  OR today  Isolyte @ 50mL/hr     Severe aortic stenosis   Assessment & Plan    Cardiology consulted for inpatient management and perioperative evaluation  Avoid hypotension perioperatively  Hold AC/AP until safe from surgical standpoint post-op  Continue home BP medications     Closed intertrochanteric fracture of hip, right, initial encounter [facility])   Assessment & Plan    NWB RLE  Ortho following, plan for OR 12/4 for anterior TH  Pain controlled with tylenol and prn dilaudid/oxy     Cognitive impairment   Assessment & Plan    Gerontology consult appreciated  Pt follows up as outpt at [address] for positive aging  Continue delirium precautions- avoid deliriogenic medications and regulate sleep-wake cycle  Atrial fibrillation [facility])   Assessment & Plan    Cardiology consulted for inpatient management and perioperative evaluation  Pt had episode of rapid afib 12/3 PM, which resolved with cardizem  No episodes since then    Hold AC/AP until safe from surgical standpoint post-op  Continue home BP medications         ------------------------------------------------------------------------------------------------  Chief Complaint: "I'm just waiting for my surgery"    HPI/24hr events: Pt developed episode of rapid A-fib 12/3 PM  Given home metoprolol and cardizem  A-fib broke, but OR was postponed until 12/4  No episodes since then  Pt to OR today (12/4)  Cardiology following   ----------------------------------------------------------------------------------------------  Physical Exam   Constitutional: She is oriented to person, place, and time  She appears well-developed  No distress  HENT:   Head: Normocephalic and atraumatic  Eyes: Pupils are equal, round, and reactive to light  Conjunctivae and EOM are normal    Neck: Normal range of motion  Neck supple  No JVD present  No tracheal deviation present  Cardiovascular: Normal rate, regular rhythm, normal heart sounds and intact distal pulses  Exam reveals no gallop and no friction rub  No murmur heard  Pulmonary/Chest: Effort normal and breath sounds normal  No respiratory distress  She has no wheezes  She has no rales  She exhibits no tenderness  Abdominal: Soft  Bowel sounds are normal  She exhibits no distension and no mass  There is no tenderness  There is no guarding  Musculoskeletal: She exhibits tenderness (Palpated over right hip)  She exhibits no edema  Neurological: She is alert and oriented to person, place, and time  Gross sensation intact   Skin: Skin is warm and dry  Capillary refill takes less than 2 seconds  No rash noted  She is not diaphoretic  No erythema  No ecchymosis  Vitals reviewed  ------------------------------------------------------------------------------------------  Review of Systems   Constitutional: Negative for chills, diaphoresis, fatigue and fever  HENT: Negative  Eyes: Negative  Respiratory: Negative for cough, chest tightness, shortness of breath and wheezing  Cardiovascular: Negative for chest pain and palpitations  Gastrointestinal: Positive for nausea (Mild, but improved from yesterday)  Negative for abdominal distention and abdominal pain  Genitourinary: Negative  Musculoskeletal:        Pain over right hip- 3/10   Skin: Negative  Neurological: Negative for dizziness, numbness and headaches      ------------------------------------------------------------------------------------------    Vitals   Vitals:    18 2309 18 0321 18 0600 18 0805   BP: 123/59 151/66  146/83   BP Location: Left arm Left arm  Left arm   Pulse: 72 70  81   Resp:    Temp: 99 °F (37 2 °C) 98 7 °F (37 1 °C)  99 °F (37 2 °C)   TempSrc: Oral Oral  Oral   SpO2: 98% 98%  97%   Weight:   61 4 kg (135 lb 5 8 oz)    Height:         Temp (24hrs), Av 8 °F (37 1 °C), Min:98 6 °F (37 °C), Max:99 °F (37 2 °C)  Current: Temperature: 99 °F (37 2 °C)  HR: 80  BP: 146/83  RR: 16  SpO2: 97%    Height and Weights   Height: 5' 3" (160 cm)  IBW: 52 4 kg  Body mass index is 23 98 kg/m²  Weight (last 2 days)     Date/Time   Weight    18 0600  61 4 (135 36)    18 0807  62 6 (138 01)    18 0402  62 6 (138)              Intake and Output  I/O       701 -  0700 701 -  07 -  0700    P  O   0     I V  (mL/kg)  325 (5 3)     IV Piggyback  100     Total Intake(mL/kg)  425 (6 9)     Urine (mL/kg/hr) 250 700 (0 5)     Total Output 250 700      Net -250 -275             Unmeasured Urine Occurrence  1 x         UOP: 0 5 ml/hr      Nutrition       Diet Orders            Start     Ordered    18 0001  Diet NPO; Sips with meds  Diet effective midnight     Question Answer Comment   Diet Type NPO    NPO Except: Sips with meds    RD to adjust diet per protocol?  Yes        18 1429          Laboratory and Diagnostics:    Results from last 7 days  Lab Units 18  0441 18  0434 18  0424   WBC Thousand/uL 7 04 7 01  --    HEMOGLOBIN g/dL 11 3* 12 4  --    I STAT HEMOGLOBIN g/dl  --   --  12 9   HEMATOCRIT % 34 5* 38 8  --    HEMATOCRIT, ISTAT %  --   --  38   PLATELETS Thousands/uL 118* 154  --    NEUTROS PCT % 79* 64  -- MONOS PCT % 7 5  --        Results from last 7 days  Lab Units 12/04/18  0441 12/03/18  0943 12/03/18  0434 12/03/18  0424   SODIUM mmol/L 140 135* 142  --    POTASSIUM mmol/L 3 8 4 8 3 7  --    CHLORIDE mmol/L 106 104 107  --    CO2 mmol/L 24 18* 27  --    CO2, I-STAT mmol/L  --   --   --  29   ANION GAP mmol/L 10 13 8  --    BUN mg/dL 28* 22 30*  --    CREATININE mg/dL 1 19 0 71 1 17  --    CALCIUM mg/dL 8 7 8 9 9 2  --    ALT U/L  --  11* 15  --    AST U/L  --  34 12  --    ALK PHOS U/L  --  43* 56  --    ALBUMIN g/dL  --  2 7* 3 9  --    TOTAL BILIRUBIN mg/dL  --  0 76 0 72  --        Results from last 7 days  Lab Units 12/04/18  0441   MAGNESIUM mg/dL 2 1        Results from last 7 days  Lab Units 12/04/18  0441 12/03/18  0650   INR  1 29* 1 41*   PTT seconds  --  36       Results from last 7 days  Lab Units 12/03/18  0434   TROPONIN I ng/mL <0 02       Results from last 7 days  Lab Units 12/03/18  0434   LACTIC ACID mmol/L 2 5*     ABG:No results found for: PHART, MPF8ICD, PO2ART, NOO5HJY, R9ALEBZH, BEART, SOURCE  VBG:        No results found for: [facility]     EKG: Reviewed  Imaging: I have personally reviewed pertinent reports        Micro        Allergies   Allergies   Allergen Reactions    Oxycodone-Acetaminophen        Active Medications  Scheduled Meds:  Current Facility-Administered Medications:  acetaminophen 975 mg Oral [facility] Marina Roach MD    calcium carbonate 500 mg Oral BID Najma Sosa DO    cholecalciferol 5,000 Units Oral Daily Jackie Haile DO    diazepam 2 mg Oral Q8H PRN Feliciano Ospina PA-C    hydrochlorothiazide 12 5 mg Oral Daily Marina Roach MD    HYDROmorphone 0 2 mg Intravenous Q4H PRN Marina Roach MD    levothyroxine 50 mcg Oral Early Morning Marina Roach MD    metoprolol 5 mg Intravenous Q6H PRN Feliciano Ospina PA-C    metoprolol tartrate 25 mg Oral Q12H [address] Marina Roach MD    multi-electrolyte 50 mL/hr Intravenous Continuous Feliciano Ospina PA-C Last Rate: 50 mL/hr (12/03/18 3362) naloxone 0 04 mg Intravenous Q1MIN PRN Maggie Maria MD    ondansetron 4 mg Intravenous Q4H PRN Maggie Maria MD    oxyCODONE 2 5 mg Oral Q4H PRN Maggie Maria MD    oxyCODONE 5 mg Oral Q4H PRN Maggie Maria MD    pantoprazole 20 mg Oral Early Morning Maggie Maria MD    pravastatin 40 mg Oral Daily With Olivier Tena MD    senna-docusate sodium 1 tablet Oral BID Maggie Maria MD      Continuous Infusions:    multi-electrolyte 50 mL/hr Last Rate: 50 mL/hr (12/03/18 6257)     PRN Meds:     diazepam 2 mg Q8H PRN   HYDROmorphone 0 2 mg Q4H PRN   metoprolol 5 mg Q6H PRN   naloxone 0 04 mg Q1MIN PRN   ondansetron 4 mg Q4H PRN   oxyCODONE 2 5 mg Q4H PRN   oxyCODONE 5 mg Q4H PRN       VTE Pharmacologic Prophylaxis: held pre-op  VTE Mechanical Prophylaxis: sequential compression device    Invasive  Devices  Invasive Devices     Peripheral Intravenous Line            Peripheral IV 12/03/18 Right Antecubital 1 day          Drain            Urethral Catheter Straight-tip 18 Fr  1 day              NPP provider rounds completed with Key Harvey PA-C

## 2018-12-04 NOTE — OP NOTE
OPERATIVE REPORT  PATIENT NAME: Leticia Gasca    :  1940  MRN: [id number]  Pt Location: [facility] OR ROOM 18    SURGERY DATE: 2018    Surgeon(s) and Role:     * Nitin Ventura MD - Primary     * James Whitley MD - Assisting     * Reyna Carreno PA-C - Assisting    Preop Diagnosis:  Closed intertrochanteric fracture of hip, right, initial encounter [facility]) Ava Jj  Displaced right femoral neck fracture  Post-Op Diagnosis Codes:     * Closed intertrochanteric fracture of hip, right, [facility] ) [S72 141A]  Displaced right femoral neck fracture  Procedure(s) (LRB):  ARTHROPLASTY HIP TOTAL (Right)  Total hip arthroplasty for femoral neck fracture right side  Specimen(s):  * No specimens in log *    Estimated Blood Loss:   200 mL    Drains:  Urethral Catheter Straight-tip 18 Fr  (Active)   Amt returned on insertion(mL) 250 mL 12/3/2018  6:31 AM   Reasons to continue Urinary Catheter  Post-operative urological requirements 12/3/2018 10:12 AM   Goal for Removal Remove POD#1 12/3/2018 10:12 AM   Site Assessment Clean;Skin intact 2018 10:01 AM   Collection Container Standard drainage bag 2018 10:01 AM   Securement Method Securing device (Describe) 2018 10:01 AM   Output (mL) 700 mL 12/3/2018  7:35 PM   Number of days: 1       Anesthesia Type:   Choice    Operative Indications:  Closed intertrochanteric fracture of hip, right, initial encounter ([facility] ) [S72 141A]  Displaced right femoral neck fracture      Operative Findings:  depuy   Cup-48mm metal    Liner-10 degree lipped poly   Head/neck-323 + 1 5mm metal   [id number]    Complications:   None    Procedure and Technique: Following induction of adequate level of anesthesia, this patient was then placed in the left lateral cubitus, right-side-up position  An axillary roll was placed underneath the left axilla  The right hip and lateral thigh were then prepped draped sterilely  Antibiotics were administered    A posterior-lateral approach was created or gain access to the hip  Full-thickness flaps raised get the tensor fascia  This was split, expose the deep layer the hip  With the hip in internal rotation, the piriformis tendon was identified, transected, retracted posterior fashion  The remainder of the short external rotators were sectioned as well  A T-type capsulotomy was used open the hip  The proximal femur was then delivered in the wound  A freshen up cut was performed along the femoral neck utilizing the femoral neck osteotomy guide  The femoral head segment was then removed the acetabulum  The acetabulum was then prepared for an implant  Reaming started at 46, extent of 48 mm at which point time hemisphere of bleeding cancellous bone countered  Forty trial was inserted and noted to fit well, therefore the 48 mm insert was then hammered into place  Two screws were placed within the posterior superior quadrant for additional fixation  The 10 degree lip liner was snapped into position  The proximal femur was then prepared for insertion of Press-Fit component  The box osteotome was used of the proximal femur  Patient's canal was prepared  Utilizing a 10  Stem, a 32+ 1 5 femoral head and neck, the hip was located, taken through range of motion, found to be quite stable  The trial components removed if the hip was carefully dislocated  The insert components were assembled and introduced the patient's right hip in standard fashion  The hip was once again located, taken through range of motion, found to be quite stable  Satisfied with the extent of surgery, the wounds then flushed with saline and closed  A Betadine soak was initiated  The capsule was then closed number Vicryl suture  The piriformis tendon was reapproximated to the greater trochanter number Vicryl suture  The tensor fascia was then closed number Vicryl suture  The subcu tissue closed 2 Vicryl suture  The skin was then closed staples    A sterile dressings applied  Abduction pillow placed    She was then awakened from general anesthesia, taken recovery room stable condition with plans to include physical therapy weight-bearing to tolerance, she will require DVT prophylaxis with resumption of her pre-surgical anticoagulation   I was present for the entire procedure    Patient Disposition:  PACU     SIGNATURE: Nicolette Colon MD  DATE: December 4, 2018  TIME: 12:47 PM

## 2018-12-05 PROBLEM — D62 ACUTE BLOOD LOSS ANEMIA: Status: ACTIVE | Noted: 2018-12-05

## 2018-12-05 PROBLEM — I21.4 NSTEMI (NON-ST ELEVATED MYOCARDIAL INFARCTION) (HCC): Status: ACTIVE | Noted: 2018-12-05

## 2018-12-05 PROBLEM — I48.91 ATRIAL FIBRILLATION WITH RAPID VENTRICULAR RESPONSE (HCC): Status: ACTIVE | Noted: 2018-12-05

## 2018-12-05 PROBLEM — M81.0 OSTEOPOROSIS: Status: ACTIVE | Noted: 2018-12-05

## 2018-12-05 LAB
ABO GROUP BLD BPU: NORMAL
ALBUMIN SERPL ELPH-MCNC: 3.2 G/DL (ref 3.5–5)
ALBUMIN SERPL ELPH-MCNC: 59.3 % (ref 52–65)
ALBUMIN UR ELPH-MCNC: 100 %
ALPHA1 GLOB MFR UR ELPH: 0 %
ALPHA1 GLOB SERPL ELPH-MCNC: 0.26 G/DL (ref 0.1–0.4)
ALPHA1 GLOB SERPL ELPH-MCNC: 4.9 % (ref 2.5–5)
ALPHA2 GLOB MFR UR ELPH: 0 %
ALPHA2 GLOB SERPL ELPH-MCNC: 0.8 G/DL (ref 0.4–1.2)
ALPHA2 GLOB SERPL ELPH-MCNC: 14.9 % (ref 7–13)
ANION GAP SERPL CALCULATED.3IONS-SCNC: 11 MMOL/L (ref 4–13)
ATRIAL RATE: 92 BPM
B-GLOBULIN MFR UR ELPH: 0 %
BETA GLOB ABNORMAL SERPL ELPH-MCNC: 0.3 G/DL (ref 0.4–0.8)
BETA1 GLOB SERPL ELPH-MCNC: 5.5 % (ref 5–13)
BETA2 GLOB SERPL ELPH-MCNC: 4.6 % (ref 2–8)
BETA2+GAMMA GLOB SERPL ELPH-MCNC: 0.25 G/DL (ref 0.2–0.5)
BPU ID: NORMAL
BUN SERPL-MCNC: 26 MG/DL (ref 5–25)
CALCIUM SERPL-MCNC: 7.8 MG/DL (ref 8.3–10.1)
CHLORIDE SERPL-SCNC: 104 MMOL/L (ref 100–108)
CO2 SERPL-SCNC: 24 MMOL/L (ref 21–32)
CREAT SERPL-MCNC: 1.12 MG/DL (ref 0.6–1.3)
CROSSMATCH: NORMAL
ERYTHROCYTE [DISTWIDTH] IN BLOOD BY AUTOMATED COUNT: 12.7 % (ref 11.6–15.1)
ERYTHROCYTE [DISTWIDTH] IN BLOOD BY AUTOMATED COUNT: 12.7 % (ref 11.6–15.1)
ERYTHROCYTE [DISTWIDTH] IN BLOOD BY AUTOMATED COUNT: 12.8 % (ref 11.6–15.1)
GAMMA GLOB ABNORMAL SERPL ELPH-MCNC: 0.58 G/DL (ref 0.5–1.6)
GAMMA GLOB MFR UR ELPH: 0 %
GAMMA GLOB SERPL ELPH-MCNC: 10.8 % (ref 12–22)
GFR SERPL CREATININE-BSD FRML MDRD: 47 ML/MIN/1.73SQ M
GLUCOSE SERPL-MCNC: 110 MG/DL (ref 65–140)
HCT VFR BLD AUTO: 23.8 % (ref 34.8–46.1)
HCT VFR BLD AUTO: 28.3 % (ref 34.8–46.1)
HCT VFR BLD AUTO: 30.9 % (ref 34.8–46.1)
HGB BLD-MCNC: 10.2 G/DL (ref 11.5–15.4)
HGB BLD-MCNC: 7.9 G/DL (ref 11.5–15.4)
HGB BLD-MCNC: 9.4 G/DL (ref 11.5–15.4)
IGG/ALB SER: 1.46 {RATIO} (ref 1.1–1.8)
MAGNESIUM SERPL-MCNC: 2.3 MG/DL (ref 1.6–2.6)
MCH RBC QN AUTO: 31.1 PG (ref 26.8–34.3)
MCH RBC QN AUTO: 31.1 PG (ref 26.8–34.3)
MCH RBC QN AUTO: 31.8 PG (ref 26.8–34.3)
MCHC RBC AUTO-ENTMCNC: 33 G/DL (ref 31.4–37.4)
MCHC RBC AUTO-ENTMCNC: 33.2 G/DL (ref 31.4–37.4)
MCHC RBC AUTO-ENTMCNC: 33.2 G/DL (ref 31.4–37.4)
MCV RBC AUTO: 94 FL (ref 82–98)
MCV RBC AUTO: 94 FL (ref 82–98)
MCV RBC AUTO: 96 FL (ref 82–98)
PLATELET # BLD AUTO: 100 THOUSANDS/UL (ref 149–390)
PLATELET # BLD AUTO: 103 THOUSANDS/UL (ref 149–390)
PLATELET # BLD AUTO: 77 THOUSANDS/UL (ref 149–390)
PMV BLD AUTO: 11.9 FL (ref 8.9–12.7)
PMV BLD AUTO: 12.8 FL (ref 8.9–12.7)
PMV BLD AUTO: 12.9 FL (ref 8.9–12.7)
POTASSIUM SERPL-SCNC: 3.9 MMOL/L (ref 3.5–5.3)
PROT PATTERN SERPL ELPH-IMP: ABNORMAL
PROT PATTERN UR ELPH-IMP: ABNORMAL
PROT SERPL-MCNC: 5.4 G/DL (ref 6.4–8.2)
PROT UR-MCNC: 45 MG/DL
QRS AXIS: 2 DEGREES
QRSD INTERVAL: 88 MS
QT INTERVAL: 392 MS
QTC INTERVAL: 484 MS
RBC # BLD AUTO: 2.54 MILLION/UL (ref 3.81–5.12)
RBC # BLD AUTO: 2.96 MILLION/UL (ref 3.81–5.12)
RBC # BLD AUTO: 3.28 MILLION/UL (ref 3.81–5.12)
SODIUM SERPL-SCNC: 139 MMOL/L (ref 136–145)
T WAVE AXIS: 22 DEGREES
TROPONIN I SERPL-MCNC: 0.33 NG/ML
TROPONIN I SERPL-MCNC: 0.36 NG/ML
TROPONIN I SERPL-MCNC: 0.36 NG/ML
UNIT DISPENSE STATUS: NORMAL
UNIT PRODUCT CODE: NORMAL
UNIT RH: NORMAL
VENTRICULAR RATE: 92 BPM
WBC # BLD AUTO: 11.9 THOUSAND/UL (ref 4.31–10.16)
WBC # BLD AUTO: 6.3 THOUSAND/UL (ref 4.31–10.16)
WBC # BLD AUTO: 8.88 THOUSAND/UL (ref 4.31–10.16)

## 2018-12-05 PROCEDURE — 85027 COMPLETE CBC AUTOMATED: CPT | Performed by: PHYSICIAN ASSISTANT

## 2018-12-05 PROCEDURE — 99232 SBSQ HOSP IP/OBS MODERATE 35: CPT | Performed by: INTERNAL MEDICINE

## 2018-12-05 PROCEDURE — 93005 ELECTROCARDIOGRAM TRACING: CPT

## 2018-12-05 PROCEDURE — 93010 ELECTROCARDIOGRAM REPORT: CPT | Performed by: INTERNAL MEDICINE

## 2018-12-05 PROCEDURE — 83735 ASSAY OF MAGNESIUM: CPT | Performed by: EMERGENCY MEDICINE

## 2018-12-05 PROCEDURE — G8979 MOBILITY GOAL STATUS: HCPCS

## 2018-12-05 PROCEDURE — 85027 COMPLETE CBC AUTOMATED: CPT | Performed by: EMERGENCY MEDICINE

## 2018-12-05 PROCEDURE — 80048 BASIC METABOLIC PNL TOTAL CA: CPT | Performed by: ORTHOPAEDIC SURGERY

## 2018-12-05 PROCEDURE — 84484 ASSAY OF TROPONIN QUANT: CPT | Performed by: EMERGENCY MEDICINE

## 2018-12-05 PROCEDURE — 85027 COMPLETE CBC AUTOMATED: CPT | Performed by: ORTHOPAEDIC SURGERY

## 2018-12-05 PROCEDURE — G8978 MOBILITY CURRENT STATUS: HCPCS

## 2018-12-05 PROCEDURE — 97163 PT EVAL HIGH COMPLEX 45 MIN: CPT

## 2018-12-05 PROCEDURE — 99232 SBSQ HOSP IP/OBS MODERATE 35: CPT | Performed by: EMERGENCY MEDICINE

## 2018-12-05 RX ORDER — DILTIAZEM HYDROCHLORIDE 5 MG/ML
10 INJECTION INTRAVENOUS ONCE
Status: DISCONTINUED | OUTPATIENT
Start: 2018-12-05 | End: 2018-12-05

## 2018-12-05 RX ORDER — SODIUM CHLORIDE 9 MG/ML
75 INJECTION, SOLUTION INTRAVENOUS CONTINUOUS
Status: DISCONTINUED | OUTPATIENT
Start: 2018-12-05 | End: 2018-12-07

## 2018-12-05 RX ORDER — DILTIAZEM HYDROCHLORIDE 5 MG/ML
5 INJECTION INTRAVENOUS ONCE
Status: COMPLETED | OUTPATIENT
Start: 2018-12-05 | End: 2018-12-05

## 2018-12-05 RX ORDER — AMIODARONE HYDROCHLORIDE 200 MG/1
200 TABLET ORAL 2 TIMES DAILY WITH MEALS
Status: DISCONTINUED | OUTPATIENT
Start: 2018-12-05 | End: 2018-12-06

## 2018-12-05 RX ADMIN — ENOXAPARIN SODIUM 30 MG: 30 INJECTION SUBCUTANEOUS at 09:17

## 2018-12-05 RX ADMIN — SENNOSIDES AND DOCUSATE SODIUM 1 TABLET: 8.6; 5 TABLET ORAL at 18:02

## 2018-12-05 RX ADMIN — PRAVASTATIN SODIUM 40 MG: 40 TABLET ORAL at 18:02

## 2018-12-05 RX ADMIN — CEFAZOLIN SODIUM 2000 MG: 2 SOLUTION INTRAVENOUS at 02:59

## 2018-12-05 RX ADMIN — APIXABAN 5 MG: 5 TABLET, FILM COATED ORAL at 18:02

## 2018-12-05 RX ADMIN — ACETAMINOPHEN 975 MG: 325 TABLET, FILM COATED ORAL at 14:54

## 2018-12-05 RX ADMIN — LIDOCAINE 1 PATCH: 50 PATCH TOPICAL at 10:26

## 2018-12-05 RX ADMIN — CALCIUM CARBONATE (ANTACID) CHEW TAB 500 MG 500 MG: 500 CHEW TAB at 18:02

## 2018-12-05 RX ADMIN — SODIUM CHLORIDE 50 ML/HR: 0.9 INJECTION, SOLUTION INTRAVENOUS at 21:15

## 2018-12-05 RX ADMIN — VITAMIN D, TAB 1000IU (100/BT) 5000 UNITS: 25 TAB at 09:17

## 2018-12-05 RX ADMIN — CALCIUM CARBONATE (ANTACID) CHEW TAB 500 MG 500 MG: 500 CHEW TAB at 09:17

## 2018-12-05 RX ADMIN — SENNOSIDES AND DOCUSATE SODIUM 1 TABLET: 8.6; 5 TABLET ORAL at 09:17

## 2018-12-05 RX ADMIN — OXYCODONE HYDROCHLORIDE 2.5 MG: 5 TABLET ORAL at 20:32

## 2018-12-05 RX ADMIN — HYDROMORPHONE HYDROCHLORIDE 0.2 MG: 1 INJECTION, SOLUTION INTRAMUSCULAR; INTRAVENOUS; SUBCUTANEOUS at 05:25

## 2018-12-05 RX ADMIN — OXYCODONE HYDROCHLORIDE 5 MG: 5 TABLET ORAL at 02:08

## 2018-12-05 RX ADMIN — DILTIAZEM HYDROCHLORIDE 5 MG: 5 INJECTION INTRAVENOUS at 05:50

## 2018-12-05 RX ADMIN — METOPROLOL TARTRATE 25 MG: 25 TABLET, FILM COATED ORAL at 09:17

## 2018-12-05 RX ADMIN — SODIUM CHLORIDE, SODIUM GLUCONATE, SODIUM ACETATE, POTASSIUM CHLORIDE, MAGNESIUM CHLORIDE, SODIUM PHOSPHATE, DIBASIC, AND POTASSIUM PHOSPHATE 75 ML/HR: .53; .5; .37; .037; .03; .012; .00082 INJECTION, SOLUTION INTRAVENOUS at 06:18

## 2018-12-05 RX ADMIN — HYDROCHLOROTHIAZIDE 12.5 MG: 12.5 TABLET ORAL at 09:17

## 2018-12-05 RX ADMIN — AMIODARONE HYDROCHLORIDE 200 MG: 200 TABLET ORAL at 15:53

## 2018-12-05 RX ADMIN — LEVOTHYROXINE SODIUM 50 MCG: 50 TABLET ORAL at 09:17

## 2018-12-05 NOTE — ASSESSMENT & PLAN NOTE
Severe based on prior DEXA scan  -endocrinology following - recommended outpatient follow-up in 6 weeks, once discharged  -vitamin-D supplementation  -F/U SPEP/UPEP  -continue Synthroid for treatment of hypothyroidism

## 2018-12-05 NOTE — PHYSICAL THERAPY NOTE
PHYSICAL THERAPY EVALUATION  NAME:  Prakash Duncan  DATE: 12/05/18    AGE:   66 y o  Mrn:   [id number]  ADMIT DX:  Severe aortic stenosis [I35 0]  Closed fracture of neck of right femur, initial encounter ([facility] 75 ) [S72 001A]  Closed intertrochanteric fracture of hip, right, initial encounter ([facility] ) [S72 141A]  Unspecified multiple injuries, initial encounter [T07  XXXA]    Past Medical History:   Diagnosis Date    Atrial fibrillation ([facility] )     Disease of thyroid gland     Hyperlipidemia     Hypertension     Psychiatric disorder     dementia       Past Surgical History:   Procedure Laterality Date    HIP ARTHROPLASTY Right 12/4/2018    Procedure: ARTHROPLASTY HIP TOTAL;  Surgeon: Kamaljit Jones MD;  Location: [facility] MAIN OR;  Service: Orthopedics    HYSTERECTOMY         Length Of Stay: 2    PHYSICAL THERAPY EVALUATION:      12/05/18 9297   Note Type   Note type Eval only   Pain Assessment   Pain Assessment No/denies pain   Home Living   Type of [address] level;Stairs to enter with rails  (3+1 ILYA )   Home Equipment Cane  (pt denies the use PTA )   Additional Comments Pt reports living with  and son who are able to assist pt if needed    Prior Function   Level of [place] Independent with ADLs and functional mobility   Lives With Spouse; Son   Rene Help From Family;Friend(s)   ADL Assistance Independent   Falls in the last 6 months 1 to 4   Vocational Retired   Comments Pt denies the use of an AD for ambulation PTA    Restrictions/Precautions   Weight Bearing Precautions Per Order Yes   RLE Weight Bearing Per Order WBAT   Other Precautions THR;WBS;Cognitive; Chair Alarm; Bed Alarm;Multiple lines; Fall Risk;Pain  (posterolateral hip precautions )   General   Additional Pertinent History Pts BP seated EOB: [birth year], post PT session supine in bed: 88/51   Danni Lipoma made aware post PT session    Family/Caregiver Present Yes   Cognition   Overall Cognitive Status Impaired Arousal/Participation Responsive   Orientation Level Oriented to person;Oriented to place   Memory Within functional limits   Following Commands Follows one step commands with increased time or repetition   RUE Assessment   RUE Assessment WFL   LUE Assessment   LUE Assessment WFL   RLE Assessment   RLE Assessment X  (decreased AROM throughout )   Strength RLE   RLE Overall Strength 3-/5   LLE Assessment   LLE Assessment WFL   Strength LLE   LLE Overall Strength 4-/5   Bed Mobility   Supine to Sit 3  Moderate assistance   Additional items Assist x 2; Increased time required;Verbal cues   Sit to Supine 3  Moderate assistance   Additional items Assist x 2; Increased time required;Verbal cues   Transfers   Sit to Stand 3  Moderate assistance   Additional items Assist x 2; Increased time required;Verbal cues   Stand to Sit 3  Moderate assistance   Additional items Assist x 2; Increased time required;Verbal cues   Additional Comments VC needed for hand placement and safety    Ambulation/Elevation   Gait pattern Excessively slow; Short stride; Foward flexed; Inconsistent jad   Gait Assistance 3  Moderate assist   Additional items Assist x 2   Assistive Device Rolling walker   Distance 2 lateral steps toward HOB   (limited by pain and fatigue )   Balance   Static Sitting Fair -   Static Standing Poor +   Ambulatory Poor   Endurance Deficit   Endurance Deficit Yes   Endurance Deficit Description fatigue and pain    Activity Tolerance   Activity Tolerance Patient limited by fatigue;Patient limited by pain   Nurse Made Aware Pt appropriate to be seen and mobilize per Dennie Ka, RN   Assessment   Prognosis Fair   Problem List Decreased strength;Decreased range of motion;Decreased endurance; Impaired balance;Decreased mobility; Decreased cognition;Decreased safety awareness;Pain;Orthopedic restrictions   Assessment Pt is 66 y o  female seen for PT evaluation s/p admit to [facility] on 12/3/2018   Two pt identifiers were used to confirm  Pt presented s/p fall at home  Pt was admitted with a primary dx of: closed intertrochanteric fx of R hip  Pt underwent R THR posterolateral approach which was performed on 12/4/2018  PT now consulted for assessment of mobility and d/c needs  Pt with Activity as tolerated orders  Pts current co morbidities effecting treatment include: a fib, HLD, HTN, psychiatric disorder, and personal factors including ILYA home and being alone at times  Pts current clinical presentation is Unstable/ Unpredictable (high complexity) due to Ongoing medical management for primary dx, Increased reliance on more restrictive AD compared to baseline, Decreased activity tolerance compared to baseline, Fall risk, Increased assistance needed from caregiver at current time, Cog status, Hip precautions, Continuous pulse oximetry monitoring  , telemetry monitoring   Prior to admission, pt was I with ambulation without the use of an AD as per pt  Upon evaluation, pt currently is requiring mod A x2 for bed mobility; mod A x2 for transfers and mod Ax2 for ambulation w/ RW   Pt denies any lightheadedness or dizziness with ambulation  Pt presents at PT eval functioning below baseline and currently w/ overall mobility deficits 2* to: BLE weakness, decreased ROM, impaired balance, decreased endurance, gait deviations, pain, decreased activity tolerance compared to baseline, fall risk, orthopedic restrictions  Pt currently at a fall risk 2* to impairments listed above  Based on the aforementioned PT evaluation, pt will continue to benefit from skilled Acute PT interventions to address stated impairments; to maximize functional mobility; for ongoing pt/ family training; and DME needs  At conclusion of PT session pt returned BTB and bed alarm engaged with phone and call bell within reach  Pt denies any further questions at this time  PT is currently recommending rehab due to decreased functional mobility compared to baseline and increased A needed from caregiver at current time  Pt/ family agreeable to plan and goals as stated on evaluation  PT will continue to follow during hospital stay  Barriers to Discharge Inaccessible home environment;Decreased caregiver support   Barriers to Discharge Comments ILYA home    Goals   Patient Goals " to get better"   [facility] Expiration Date 12/15/18   Short Term Goal #1 In 10 days pt will complete: 1) Bed mobility skills with min A x 1 to increase safety and independence as well as decrease caregiver burden  2) Functional transfers with min A x 1 to promote increased independence, safety, and QOL in the home environment  3) Ambulate 76' using least restrictive AD with min A x 1  without LOB and stable vitals so that pt can negotiate home environment safely and promote independence with functional mobility and return to PLOF  4) Stair training up/ down 3 step/s using rail/s with min A x 1  so that pt can enter/negotiate home environment safely and decrease fall risk  5) Improve balance grades to Good to increase safety with all mobility and decrease fall risk  6) Improve BLE strength by 1/2 grade to help increase overall functional mobility and decrease fall risk  7) PT for ongoing pt and family education; DME needs and D/C planning to promote highest level of function in least restrictive environment  Plan   Treatment/Interventions Functional transfer training;LE strengthening/ROM; Elevations; Endurance training; Therapeutic exercise;Patient/family training;Equipment eval/education; Bed mobility;Gait training;Spoke to nursing;Family   PT Frequency 5-7x/wk   Recommendation   Recommendation Other (Comment)  (rehab )   Equipment Recommended Rodgers Cranker  (AMISH)   PT - OK to Discharge Yes  (to rehab when medically cleared )   Modified [place] Scale   Modified [place] Scale 4   Barthel Index   Feeding 10   Bathing 0   Grooming Score 5   Dressing Score 0   Bladder Score 10   Bowels Score 10   Toilet Use Score 5   Transfers (Bed/Chair) Score 5   Mobility (Level Surface) Score 0   Stairs Score 0   Barthel Index Score 45   Gunnar Mejia PT

## 2018-12-05 NOTE — PROGRESS NOTES
Progress Note - Bethel Olvera 1940, 66 y o  female MRN: [id number]    Unit/Bed#: [facility] 905-01 Encounter: [id number]    Primary Care Provider: Angelina Shah DO   Date and time admitted to hospital: 12/3/2018  3:59 AM        Atrial fibrillation with rapid ventricular response ([address] )   Assessment & Plan    Multiple episodes overnight of rapid ventricular response in 110s-140s, as well as episodes of what appears to be SVT with a regular rate of 150-160, without any obvious atrial activity  Possibly exacerbated by dehydration/intraoperative blood loss, responsive to IV fluids, as well as Cardizem  -cardiology consulted  -continue Cardizem drip, titrate to heart rate <110  Has required careful titration due to transient episodes of hypotension  -IV fluids increased to 100/hour due to pre low dependence in the setting of severe aortic stenosis  -Eliquis to be restarted today  -continue home BP medications     * Closed intertrochanteric fracture of hip, right, initial encounter [facility])   Assessment & Plan    POD #1 s/p right MIKE  - dressing saturated this morning  Orthopedic service aware  Will take down dressing today, with further recommendations at that point  - NWB RLE  - Pain controlled with tylenol and prn dilaudid/oxy, geriatrics dosing  - will restart Eliquis this morning  - fall precautions  - PT/OT evaluation pending     Acute blood loss anemia   Assessment & Plan    I stat hemoglobin was 7 1, decreased from 11 3 preoperatively, and 1 unit packed red blood cells was transfused based on this value  CBC pre transfusion eventually demonstrated hemoglobin of 8 3   -repeat hemoglobin was 10 3  Will continue to trend  A m  CBC pending  -transfuse as needed     NSTEMI (non-ST elevated myocardial infarction) [facility])   Assessment & Plan    type 2 NSTEMI, likely secondary to episodes of arrhythmia    Troponin 0 36  -EKG did not display ST/T-wave changes concerning for ischemia  -cardiology consulted  -troponin peaked at 0 36, currently trending down     Osteoporosis   Assessment & Plan    Severe based on prior DEXA scan  -endocrinology following - recommended outpatient follow-up in 6 weeks, once discharged  -vitamin-D supplementation  -F/U SPEP/UPEP  -continue Synthroid for treatment of hypothyroidism     Ambulatory dysfunction   Assessment & Plan    Pt currently NWB RLE  Fall precautions  PT/OT consult post-operatively     Fall   Assessment & Plan    Gerontology consult appreciated:  Fall precautions  Continue with vitamin-D supplement as patient takes at home  PT, OT once appropriate     Severe aortic stenosis   Assessment & Plan    - Cardiology consulted for inpatient management and perioperative evaluation  - continue home blood pressure medications     Cognitive impairment   Assessment & Plan    -appreciated geriatrics recommendations  -Continue delirium precautions- avoid deliriogenic medications and regulate sleep-wake cycle   -may require HS Seroquel if encephalopathy worsens during the daytime     Atrial fibrillation [facility])   Assessment & Plan    Cardiology consulted for inpatient management and perioperative evaluation  Pt had episode of rapid afib 12/3 PM, which resolved with cardizem  No episodes since then  Hold AC/AP until safe from surgical standpoint post-op  Continue home BP medications               Subjective/Objective   Chief Complaint:  I feel better    Subjective:  Patient had several episodes of SVT, as well as intermittent episodes atrial fibrillation last night  Currently on Cardizem, 2 5/hour, but with transient episodes of hypotension  No complaints currently, and overall feels better than during the night   did not sleep for much more than an hour to last night      Objective:     Meds/Allergies   Prescriptions Prior to Admission   Medication Sig Dispense Refill Last Dose    apixaban (ELIQUIS) 5 mg Take 1 tablet (5 mg total) by mouth 2 (two) times a day 180 tablet 3 12/2/2018 at Unknown time    Cholecalciferol ([employer] VITAMIN D3) 1000 units capsule Take by mouth   12/2/2018 at Unknown time    hydrochlorothiazide (HYDRODIURIL) 12 5 mg tablet Take 1 tablet (12 5 mg total) by mouth daily 30 tablet 11 12/2/2018 at Unknown time    levothyroxine 50 mcg tablet Take 50 mcg by mouth daily  3 12/2/2018 at Unknown time    metoprolol tartrate (LOPRESSOR) 25 mg tablet Take 1 tablet (25 mg total) by mouth every 12 (twelve) hours 60 tablet 6 12/2/2018 at Unknown time    omeprazole (PriLOSEC) 20 mg delayed release capsule Take 20 mg by mouth daily  3 12/2/2018 at Unknown time    simvastatin (ZOCOR) 40 mg tablet Take 1 tablet (40 mg total) by mouth daily 30 tablet 10 12/2/2018 at Unknown time    metoprolol tartrate (LOPRESSOR) 25 mg tablet Take 1 tablet (25 mg total) by mouth every 12 (twelve) hours 180 tablet 2        Vitals: Blood pressure 120/56, pulse 87, temperature 98 7 °F (37 1 °C), temperature source Oral, resp  rate 18, height 5' 3" (1 6 m), weight 61 4 kg (135 lb 5 8 oz), SpO2 96 %  Body mass index is 23 98 kg/m²  SpO2: SpO2: 96 %    ABG: No results found for: PHART, YBA2PJK, PO2ART, TTS7LUT, E7UPJWCM, BEART, SOURCE      Intake/Output Summary (Last 24 hours) at 12/05/18 1541  Last data filed at 12/05/18 1321   Gross per 24 hour   Intake          1666 67 ml   Output             1075 ml   Net           591 67 ml       Invasive Devices     Peripheral Intravenous Line            Peripheral IV 12/04/18 Right Forearm less than 1 day    Peripheral IV 12/04/18 Right Wrist less than 1 day          Drain            Urethral Catheter Straight-tip 18 Fr  2 days                Nutrition/GI Proph/Bowel Reg:  Senokot    Physical Exam:   GENERAL APPEARANCE:  Listless, arousable to voice  Awake and alert, oriented x3  Mental status overall appears appropriate  HEENT:  Atraumatic, normocephalic, trachea midline  CV:  Regular rate and rhythm, no murmurs appreciated    Distal pulses grossly intact, and equal   2+ DP, PT pulse of the right lower extremity  LUNGS:  Clear to auscultation bilaterally, no respiratory distress  ABD:  Abdomen is soft, nontender, nondistended  EXT:  Posterior Surgical incision is present on the right hip  There is a moderate amount of blood saturating the dressing, with a small amount of active venous oozing  Incision site is appropriately tender to palpation  NEURO:  Awake, alert  GCS is 15  SKIN:  Warm, dry    Lab Results: Results: I have personally reviewed pertinent reports  Imaging/EKG Studies: Results: I have personally reviewed pertinent reports      Other Studies:  None  VTE Prophylaxis:  Eliquis, SCDs

## 2018-12-05 NOTE — ASSESSMENT & PLAN NOTE
- patient on Amio and Diltiazem gtt's   - Amio gtt was restarted overnight by trauma resident  - await Cardiology evaluation today for additional medication adjustment  - Eliquis has been restarted

## 2018-12-05 NOTE — ASSESSMENT & PLAN NOTE
- Hgb 7 5 today - will transfuse one unit given patient's AS  - RLE dressing with some bloody drainage - dressing to be changed by Orthopedics today   - right thigh soft  - repeat Hgb 12/7/18

## 2018-12-05 NOTE — ASSESSMENT & PLAN NOTE
Multiple episodes overnight of rapid ventricular response in 110s-140s, as well as episodes of what appears to be SVT with a regular rate of 150-160, without any obvious atrial activity  Possibly exacerbated by dehydration/intraoperative blood loss, responsive to IV fluids, as well as Cardizem  -cardiology consulted  -continue Cardizem drip, titrate to heart rate <110    Has required careful titration due to transient episodes of hypotension  -IV fluids increased to 100/hour due to pre low dependence in the setting of severe aortic stenosis  -Eliquis to be restarted today  -continue home BP medications

## 2018-12-05 NOTE — PROGRESS NOTES
Trauma notified of current vital signs  Cardizem gtt rate decreased to 2 5mg  Will repeat EKG  Notify trauma if BP less than 298 systolic

## 2018-12-05 NOTE — ASSESSMENT & PLAN NOTE
-appreciated geriatrics recommendations  -Continue delirium precautions- avoid deliriogenic medications and regulate sleep-wake cycle   -may require HS Seroquel if encephalopathy worsens during the daytime

## 2018-12-05 NOTE — PROGRESS NOTES
Evaluated patient for concerns for left sided facial droop  On exam, left sided facial droop is appreciated at rest only  Per family, it has been like that since the OR, at least 24 hours  They have not noticed any other behavioral or symptomatic changes though  On exam she has equal strength bilaterally, facial symmetry with all movements and tongue is midline, speech is cleared without any word finding difficulties and her exam is non-lateralizing and non-focal  She is on eliquis for her elevated risk of stroke with her afib  I discussed my findings with the patient and her family  As her symptom onset is > 24 hours at this time and exam is grossly benign besides mild left lip droop I will hold off any any further imagining at this time  We will continue anticoagulation and follow-up cardiology recommendations regarding her arrhythmias

## 2018-12-05 NOTE — PROGRESS NOTES
Cardizem restarted at 0500 for hr 140  Per trauma continue at 5mg; do not titrate up at this time    Aware heart rate fluctuating between 116-128

## 2018-12-05 NOTE — ASSESSMENT & PLAN NOTE
I stat hemoglobin was 7 1, decreased from 11 3 preoperatively, and 1 unit packed red blood cells was transfused based on this value  CBC pre transfusion eventually demonstrated hemoglobin of 8 3   -repeat hemoglobin was 10 3  Will continue to trend    A m  CBC pending  -transfuse as needed

## 2018-12-05 NOTE — PROGRESS NOTES
Pt's hr 74; BP 84/52  Trauma aware; Cardizem gtt stopped  Will continue to monitor vitals  2nd troponin level reported to trauma  Will monitor

## 2018-12-05 NOTE — PROGRESS NOTES
Called to bedside urgently per nursing staff for re-evaluation  Patient has been having heart rates as high as 150, and appears to have decreased mental status from her initial presentation  On chart review, the patient did have EBL of 200 during her MIKE this afternoon  On review of vital signs, the patient's most recent heart rate was 120, most recent blood pressure was in the 20Q systolic, with a saturation of 99% on room air  On review of telemetry, the patient has had several episodes of what appears to be SVT with a regular rate of 150-156, with intermittent episodes of rapid atrial fibrillation with heart rates in the 110s-140s  At bedside, the patient does have depressed mental status, though is arousable to verbal stimuli, and protecting her airway  I witnessed her heart rate deteriorate to the 150s, and a regular rhythm consistent with SVT, during which her blood pressure was unreadable  Manual blood pressure in the 60s/palpation  EKG displayed supraventricular tachycardia, without obvious P-waves were atrial activity, without ST/T-wave deviations  On my evaluation, the patient is pale, mucous membranes are dry, and she is listless, though still alert and oriented x2 after verbal stimulation  She has a nonfocal neuro exam, no cranial nerve deficits, and has no signs of volume overload, with a soft, nontender abdomen  Her incision site is clean, dry, intact, and there does not appear to be any bloody discharge   -Due to her change in mental status, arrhythmia, and hypertension, rapid response was called due to the patient likely requiring electric cardioversion    However, by the time the rapid response team arrived to the patient's room, her heart rate had again converted to atrial fibrillation, with a rate in the 110s-120s, though she continued to remain with borderline low blood pressures    -the patient received a 1 L bolus of isolyte, which which did not significantly improve her blood pressure or heart rate  She went into an additional episode of SVT, again with a heart rate of 150  For this reason, the patient was given a single bolus of Cardizem, and started on infusion at 5 mgs/hr  After the initial bolus, her heart rate improved to the 90s, intermittently between sinus rhythm and atrial fibrillation, and her blood pressure improved to the 527J systolic  His initial EKG was obtained, which again revealed atrial fibrillation, without any ST/T-wave deviations  Specifically, there is no ST elevation  -recheck of CBC, BMP, troponin, VBG were sent, and currently pending  -i-STAT revealed hemoglobin of 7 1    For this reason, the patient was crossed for a unit of packed red blood cells, and transfused    -post transfusion CBC pending  -continue with Cardizem infusion, with goal heart rate <110   -upgrade level of care to step-down 2  -will continue with frequent re-evaluations throughout the night

## 2018-12-05 NOTE — ASSESSMENT & PLAN NOTE
POD #1 s/p right MIKE  - dressing saturated this morning  Orthopedic service aware    Will take down dressing today, with further recommendations at that point  - NWB RLE  - Pain controlled with tylenol and prn dilaudid/oxy, geriatrics dosing  - will restart Eliquis this morning  - fall precautions  - PT/OT evaluation pending

## 2018-12-05 NOTE — ASSESSMENT & PLAN NOTE
- Cardiology consulted for inpatient management and perioperative evaluation  - continue home blood pressure medications

## 2018-12-05 NOTE — RAPID RESPONSE
Progress Note - Rapid Response   Ricky Ramires 66 y o  female MRN: [id number]    Time Called ( Time): 23:10  Date Called: 12/4/18  Level of Care: SD2  Room#: 346  XWEMM Time ( Time): 23;12  Event End Time ( Time): 23:30    Primary reason for call: Acute change in HR  Interventions:  Airway/Breathing:  No Intervention  Circulation: IV Fluid Bolus, Blood Products and EKG  Other Treatments: N/A       Assessment:   1  SVT in background of Afib    2  Patient is s/p total right hip arthroplasty with 200ml blood loss during operation, hemoglobin on admission on 12/03/18 was 12 4, post op hemoglobin down to 8 4  Current hemoglobin of 7 1 during rapid istat  Plan:   · Patient was started on cardizem drip which broke her SVT, putting her into NSR, hemodynamically stable  · She was given 2 liter fluid bolus isolyte  · Given 1 unit of pRBC       HPI/Chief Complaint (Background/Situation):   Ricky Ramires is a 66y o  year old female who presents with s/p unwitnessed fall  She is s/p total arthroplasty today with reported blood loss of 200cc  Hemoglobin was 12 4 on day of admission yesterday, and post op noted to be 8 4  Rapid istat during rapid response showed hemoglobin of 7 1  She was given 2 liters of isolyte bolus  Started on cardizem drip  She converted to NSR and hemodynamically stabilized  Historical Information   Past Medical History:   Diagnosis Date    Atrial fibrillation ([address] )     Disease of thyroid gland     Hyperlipidemia     Hypertension     Psychiatric disorder     dementia     Past Surgical History:   Procedure Laterality Date    HYSTERECTOMY       Social History   History   Alcohol Use No     History   Drug Use No     History   Smoking Status    Former Smoker    Quit date: 1960   Smokeless Tobacco    Never Used     Family History: History reviewed  No pertinent family history      Meds/Allergies     Current Facility-Administered Medications:  acetaminophen Subjective:       Patient ID: Andree Quiroz is a 68 y.o. female.    Chief Complaint: No chief complaint on file.    Lung Cancer   Associated symptoms include fatigue. Pertinent negatives include no abdominal pain, chest pain, coughing, fever, headaches, nausea, rash or weakness.      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  She had some nausea and dizziness at [employer], went to the ER, chest x-ray was ordered that showed a left suprahilar lesion.  CT of the chest with contrast 9/20/15 Showed 6.5 by 4.4 centimeter left upper lobe mass abutting the mediastinum.  CT-guided biopsy showed squamous cell carcinoma. PET scan did not show any extrathoracic disease. EBUS revealed - Station 4R (contralateral mediastinal lymph node) was negative. Station 7 (subcarinal) was positive for squamous cell carcinoma. Was felt not to be a surgical candidate.    Nov-Dec 2015 - Received concurrent ChemoRT with weekly carbo/taxol.  PET scan July 2016 showed progressive disease.    August 2016 - 2nd line chemotherapy with carboplatin and gemcitabine was started.    Here for follow-up  Denies any chest pain, difficulty breathing or hemoptysis.  No new pains.  No cough.  Continues to stay active.    CT chest 1/11/17 - Continued progression of disease with interval increase in size of bilateral pulmonary nodules.  PDL 1 testing shows 10% (low expression) TPS - tumor proportion score - scheduled to start KEYTRUDA.    CT chest 3/31 - Metastatic disease to the lung appears to have slightly progressed compared to the prior exam.    Started KEYTRUDA 4/3.    Got 1 dose Injectafer 6/5/17.    She f/u with  for CAD. No active issues. Doing well.    Recently completed a course of Levaquin and steroids for a respiratory infection and she feels better.    Review of Systems   Constitutional: Positive for fatigue. Negative for activity change, fever and unexpected weight change.   HENT:  975 mg Oral [facility] Marina Roach MD    apixaban 5 mg Oral BID Yisel Capps MD    calcium carbonate 500 mg Oral BID Najma Sosa DO    cefazolin 2,000 mg Intravenous Q8H Yisel Capps MD Last Rate: 2,000 mg (12/04/18 1954)   cholecalciferol 5,000 Units Oral Daily Jackienakia Haile DO    diltiazem 1-15 mg/hr Intravenous Titrated Marina Roach MD Last Rate: 5 mg/hr (12/04/18 2335)   enoxaparin 30 mg Subcutaneous Once Marina Raoch MD    hydrochlorothiazide 12 5 mg Oral Daily Marina Roach MD    HYDROmorphone 0 2 mg Intravenous Q4H PRN Marina Roach MD    levothyroxine 50 mcg Oral Early Morning Marina Roach MD    lidocaine 1 patch Topical Daily Kailey Allen PA-C    metoprolol 5 mg Intravenous Q6H PRN Feliciano Ospina PA-C    metoprolol tartrate 25 mg Oral Q12H [address] Marina Roach MD    multi-electrolyte 50 mL/hr Intravenous Continuous Feliciano Ospina PA-C Last Rate: 50 mL/hr (12/04/18 1357)   naloxone 0 04 mg Intravenous Q1MIN PRN Marina Roach MD    ondansetron 4 mg Intravenous Q4H PRN Marina Roach MD    ondansetron 4 mg Intravenous Once Marina Roach MD    oxyCODONE 2 5 mg Oral Q4H PRN Marina Roach MD    oxyCODONE 5 mg Oral Q4H PRN Marina Roach MD    pantoprazole 20 mg Oral Early Morning Marina Roach MD    pravastatin 40 mg Oral Daily With Carlos Lombardi MD    senna-docusate sodium 1 tablet Oral BID Marina Roach MD          diltiazem 1-15 mg/hr Last Rate: 5 mg/hr (12/04/18 2335)   multi-electrolyte 50 mL/hr Last Rate: 50 mL/hr (12/04/18 1357)       Allergies   Allergen Reactions    Oxycodone-Acetaminophen        ROS: Negative except for right hip pain, nausea      Vitals: 71 HR, 20rr, 101/53 BP, 100% O2 saturation    Physical Exam:  Gen: no acute distress, pale appearance  HEENT: normocephalic and atraumatic  Neck: supple  Chest: CTA bilaterally, holosystolic murmur  Abd: soft, non tender to palpation  Neuro: grossly intact  Skin: pale, no rashes      Intake/Output Summary (Last 24 hours) at 12/05/18 0029  Last data filed at 12/05/18 Negative for facial swelling and nosebleeds.    Eyes: Negative for photophobia and pain.   Respiratory: Negative for cough and shortness of breath.    Cardiovascular: Negative for chest pain and leg swelling.   Gastrointestinal: Negative for abdominal pain, blood in stool, constipation and nausea.   Genitourinary: Negative for dysuria and hematuria.   Skin: Negative for color change and rash.   Neurological: Negative for seizures, weakness and headaches.   Hematological: Negative for adenopathy. Does not bruise/bleed easily.         Objective:      Physical Exam   Constitutional: She is oriented to person, place, and time. She appears well-developed and well-nourished. No distress.   HENT:   Mouth/Throat: Oropharynx is clear and moist and mucous membranes are normal. Mucous membranes are not pale. No oropharyngeal exudate.   Eyes: Conjunctivae are normal. No scleral icterus.   Neck: Normal range of motion. Neck supple. No thyroid mass (Thyroid area is non-tender) and no thyromegaly present.   Cardiovascular: Normal rate and regular rhythm.  Exam reveals no friction rub.    Pulmonary/Chest: Effort normal and breath sounds normal. No accessory muscle usage or stridor. No respiratory distress. She has no wheezes.   Abdominal: She exhibits no ascites and no mass. There is no hepatosplenomegaly. There is no tenderness.   Musculoskeletal: She exhibits no edema.   No varicosities noted.   Lymphadenopathy:     She has no cervical adenopathy.        Right: No supraclavicular adenopathy present.        Left: No supraclavicular adenopathy present.   Neurological: She is alert and oriented to person, place, and time.   Psychiatric: She has a normal mood and affect. Judgment and thought content normal. Cognition and memory are normal. She exhibits normal recent memory and normal remote memory.       Assessment:     1. 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 Malignant neoplasm metastatic to left lung    3. Encounter  0021   Gross per 24 hour   Intake          1941 67 ml   Output              725 ml   Net          1216 67 ml       Respiratory    Lab Data (Last 4 hours)    None         O2/Vent Data (Last 4 hours)    None              Invasive Devices     Peripheral Intravenous Line            Peripheral IV 12/04/18 Right Forearm less than 1 day    Peripheral IV 12/04/18 Right Wrist less than 1 day          Drain            Urethral Catheter Straight-tip 18 Fr  1 day                DIAGNOSTIC DATA:    Lab: I have personally reviewed pertinent lab results  CBC:     Results from last 7 days  Lab Units 12/04/18  2318 12/04/18  2258   WBC Thousand/uL  --  10 00   HEMOGLOBIN g/dL  --  8 4*   I STAT HEMOGLOBIN g/dl 7 1*  --    HEMATOCRIT %  --  25 6*   HEMATOCRIT, ISTAT % 21*  --    PLATELETS Thousands/uL  --  128*     CMP:     Results from last 7 days  Lab Units 12/04/18  2318 12/04/18  0441 12/03/18  0943 12/03/18  0434  12/03/18  0424   POTASSIUM mmol/L  --  3 8 4 8 3 7  --   --    CHLORIDE mmol/L  --  106 104 107  --   --    CO2 mmol/L  --  24 18* 27  < >  --    CO2, I-STAT mmol/L 22  --   --   --   --  29   BUN mg/dL  --  28* 22 30*  --   --    CREATININE mg/dL  --  1 19 0 71 1 17  --   --    CALCIUM mg/dL  --  8 7 8 9 9 2  --   --    ALK PHOS U/L  --   --  43* 56  --   --    ALT U/L  --   --  11* 15  --   --    AST U/L  --   --  34 12  --   --    GLUCOSE, ISTAT mg/dl 136  --   --   --   --  128   < > = values in this interval not displayed    PT/INR:   Lab Results   Component Value Date    INR 1 29 (H) 12/04/2018   ,   Magnesium: No components found for: MAG,   Phosphorous: No results found for: PHOS    Microbiology:  Lab Results   Component Value Date    URINECX 20,000-29,000 cfu/ml  03/20/2018         OUTCOME:   Stayed in room   Family notified of transfer: no  Family member contacted:   Code Status: Level 1 - Full Code  Critical Care Time: Total Critical Care time spent 20 minutes excluding procedures, teaching and family for antineoplastic chemotherapy    4. Coronary artery disease involving native coronary artery of native heart without angina pectoris    5. Hypokalemia       Plan:   Restaging CT scans (10/27/17) - reviewed independently and with the patient in detail.  There is non-clinical insignificant increase in size of existing pulmonary nodules and questionable new nodules which are not clinically significant.  There are no new areas of disease activity in the bones or liver.    Based on this I recommended continuing KEYTRUDA.    Labs reviewed. Proceed with cycle 13 of KEYTRUDA as scheduled for today.    Continue Remeron for insomnia.      CAD stable. F/u with .    RTC in 3 wks for next dose of Keytruda.    Plan restaging scans in February/March 2017.    Prescribed potassium chloride for hypokalemia.                   updates

## 2018-12-05 NOTE — ASSESSMENT & PLAN NOTE
- POD #2 s/p right MIKE  - dressing to be changed by Ortho  - WBAT  - Pain controlled with tylenol and prn dilaudid/oxy, geriatrics dosing  - PT/OT

## 2018-12-05 NOTE — PROGRESS NOTES
Progress Note - Cardiology   Tatyana Estrada 66 y o  female MRN: [id number]  Unit/Bed#: [facility] 905-01 Encounter: [id number]    Assessment/Plan:    Pre-operative evaluation for anterior total hip arthroplasty  -pt day 2 s/p hip total arthroplasty  -Patient was noted to be in SVT last night around 11 to 12 PM   Patient noted to have altered mental status and was pale on exam and rapid response was called  Patient was started on Cardizem drip which converted her to sinus rhythm  Patient given 2 L fluid bolus of isolyte  Rapid i-STAT hemoglobin was noted to be 7 1 and patient was given 1 unit of packed red blood cells  -Pt started on diltiazem drip  Will start antiarrythmic therapy with amiodarone    -patient denies any current chest pain, dizziness, lightheadedness, shortness of breath  -continue to hold anticoagulation   Can continue patient's beta-blocker and other medications preoperatively     Severe aortic stenosis  -last echo from 06/25/2018 - EF 55%, normal LV wall thickness, LV diastolic parameters normal, normal right ventricle, mild MR, severe aortic stenosis, no aortic regurgitation  -patient does not appear to endorse any symptoms related to aortic stenosis  -patient being monitored as an outpatient by Dr Stu Olguin     Hx of atrial fibrillation  -diagnosed in May of 2018  -patient takes Lopressor 25 mg b i d , Eliquis 5 mg b i d  At home  -continue to hold anticoagulation, continue Lopressor  -continue to monitor on telemetry    Subjective/Objective   Chief Complaint:     Subjective:  Patient was noted to be in SVT last night around 11 to 12 PM   Patient noted to have altered mental status and was pale on exam and rapid response was called  Patient was started on Cardizem drip which converted her to sinus rhythm  Patient given 2 L fluid bolus of isolyte    Rapid i-STAT hemoglobin was noted to be 7 1 and patient was given 1 unit of packed red blood cells    Objective:     Vitals: /56 (BP Location: Right arm)   Pulse 87   Temp 98 7 °F (37 1 °C) (Oral)   Resp 18   Ht 5' 3" (1 6 m)   Wt 61 4 kg (135 lb 5 8 oz)   SpO2 96%   BMI 23 98 kg/m²   Vitals:    12/03/18 0807 12/04/18 0600   Weight: 62 6 kg (138 lb 0 1 oz) 61 4 kg (135 lb 5 8 oz)     Orthostatic Blood Pressures      Most Recent Value   Blood Pressure  120/56 filed at 12/05/2018 2382   Patient Position - Orthostatic VS  Lying filed at 12/05/2018 0749            Intake/Output Summary (Last 24 hours) at 12/05/18 1053  Last data filed at 12/05/18 0900   Gross per 24 hour   Intake          1666 67 ml   Output             1075 ml   Net           591 67 ml       Invasive Devices     Peripheral Intravenous Line            Peripheral IV 12/04/18 Right Forearm less than 1 day    Peripheral IV 12/04/18 Right Wrist less than 1 day          Drain            Urethral Catheter Straight-tip 18 Fr  2 days              Physical Exam   Constitutional: She is oriented to person, place, and time  She appears well-developed and well-nourished  No distress  HENT:   Head: Normocephalic and atraumatic  Eyes: Right eye exhibits no discharge  Left eye exhibits no discharge  No scleral icterus  Cardiovascular: Normal rate, regular rhythm and normal heart sounds  No murmur heard  Pulmonary/Chest: Effort normal and breath sounds normal  No respiratory distress  She has no wheezes  Abdominal: Soft  Bowel sounds are normal  She exhibits no distension  There is no tenderness  Musculoskeletal: She exhibits tenderness  She exhibits no edema  Neurological: She is alert and oriented to person, place, and time  Skin: Skin is warm and dry  Capillary refill takes less than 2 seconds  No rash noted  No erythema  Psychiatric: She has a normal mood and affect   Her behavior is normal      Lab Results:   CBC with diff:   Results from last 7 days  Lab Units 12/05/18  0458   WBC Thousand/uL 8 88   RBC Million/uL 2 96*   HEMOGLOBIN g/dL 9 4*   HEMATOCRIT % 28 3*   MCV fL 96 MCH pg 31 8   MCHC g/dL 33 2   RDW % 12 7   MPV fL 12 9*   PLATELETS Thousands/uL 103*     CMP:   Results from last 7 days  Lab Units 12/05/18 0458 12/04/18 2318  12/03/18  0943   SODIUM mmol/L 139  --   < > 135*   POTASSIUM mmol/L 3 9  --   < > 4 8   CHLORIDE mmol/L 104  --   < > 104   CO2 mmol/L 24  --   < > 18*   CO2, I-STAT mmol/L  --  22  --   --    BUN mg/dL 26*  --   < > 22   CREATININE mg/dL 1 12  --   < > 0 71   GLUCOSE, ISTAT mg/dl  --  136  --   --    CALCIUM mg/dL 7 8*  --   < > 8 9   AST U/L  --   --   --  34   ALT U/L  --   --   --  11*   ALK PHOS U/L  --   --   --  43*   EGFR ml/min/1 73sq m 47  --   < > 82   < > = values in this interval not displayed  Troponin:   0  Lab Value Date/Time   TROPONINI 0 33 (H) 12/05/2018 0458   TROPONINI 0 36 (H) 12/05/2018 0202   TROPONINI 0 36 (H) 12/04/2018 2259   TROPONINI <0 02 12/03/2018 0434   TROPONINI <0 02 05/26/2018 1352     BNP:   Results from last 7 days  Lab Units 12/05/18 0458 12/04/18  2318   POTASSIUM mmol/L 3 9  --    CHLORIDE mmol/L 104  --    CO2 mmol/L 24  --    CO2, I-STAT mmol/L  --  22   BUN mg/dL 26*  --    CREATININE mg/dL 1 12  --    GLUCOSE, ISTAT mg/dl  --  136   CALCIUM mg/dL 7 8*  --    EGFR ml/min/1 73sq m 47  --      Coags:   Results from last 7 days  Lab Units 12/04/18  0441 12/03/18  0650   PTT seconds  --  36   INR  1 29* 1 41*     TSH:   Results from last 7 days  Lab Units 12/03/18  0943   TSH 3RD GENERATON uIU/mL 2 950     Magnesium:   Results from last 7 days  Lab Units 12/05/18  0458   MAGNESIUM mg/dL 2 3     Lipid Profile:     Imaging: I have personally reviewed pertinent reports      VTE Pharmacologic Prophylaxis: Reason for no pharmacologic prophylaxis post op  VTE Mechanical Prophylaxis: reason for no mechanical VTE prophylaxis s/p hip arthroplasty    Ariel Lezama  PGY-1 Internal Medicine

## 2018-12-05 NOTE — PROGRESS NOTES
Subjective: SVT overnight   On cardizem    Objective:  Lab Results   Component Value Date/Time    WBC 8 88 12/05/2018 04:58 AM    HGB 9 4 (L) 12/05/2018 04:58 AM       Vitals:    12/05/18 0600   BP: 113/62   Pulse: (!) 116   Resp:    Temp:    SpO2:      Right lower extremity  Dressing c/d/i  Motor & sensation grossly intact  Foot warm and well perfused    Assessment: Post op from right MIKE    Plan:  WBAT RLE  Posterior hip precautions  Pain control  DVT ppx  PT  Dispo

## 2018-12-05 NOTE — PROGRESS NOTES
Discussed with trauma the order for orthopedic hypotension protocol  Instructed not to do at this time  Pt previously had a fluid bolus, We will continue to observe and monitor blood pressure

## 2018-12-05 NOTE — ASSESSMENT & PLAN NOTE
type 2 NSTEMI, likely secondary to episodes of arrhythmia    Troponin 0 36  -EKG did not display ST/T-wave changes concerning for ischemia  -cardiology consulted  -troponin peaked at 0 36, currently trending down

## 2018-12-05 NOTE — PHYSICAL THERAPY NOTE
PT Screen  PT order noted, eval deferred per nsg request; pt is s/p rapid response and pend TR clarification on activity OOB; PT will f/u as appropriate

## 2018-12-05 NOTE — CODE DOCUMENTATION
Patient out of SVT upon rapid response team arrival, 1L isolyte bolus was already infusing upon arrival

## 2018-12-05 NOTE — PROGRESS NOTES
Progress Note - Herson Donato 66 y o  female MRN: [id number]    Unit/Bed#: [facility] 905-01 Encounter: [id number]      Assessment/Plan  1  Fall  Recommendations remain the same  Fall precautions  Home meds unremarkable  Continue with vitamin-D supplement as patient takes at home  PT, OT once appropriate     2  Cognitive impairment  Patient at baseline today, was delirious yesterday  Previously diagnosed  Follows at [facility] for positive aging as outpatient  14/30 on [place] in June  Will defer B12, folate assessment as patient has been worked up in the past  Continue supportive care     3  Delirium   Post operatively  Has since resolved  Continue with Tylenol 975 mg Q 8  Continue with Lidoderm patch  Continue with p r n  Oxycodone as ordered  If patient needs muscle relaxant would recommend Robaxin 250 mg p r n  Q 8  Continue with bowel regimen as ordered  Patient at risk for developing delirium, delirium preventing tactics advised  Redirect unwanted patient behaviors as first line tx  Reorient patient frequently  Avoid deliriogenic meds including tramadol, benzodiazepines, benadryl  Good sleep hygiene important, limit night time interruptions  Encourage patient to stay awake during the day  Ensure adequate hydration/nutrition  Mobilize often      4  Ambulatory dysfunction  Recommendations remain the same  Diagnosed in June  Patient was not using assistive device at home but did own cane  PT, OT once appropriate  Patient will benefit from rehab     5  Deconditioning  Recommendations remain the same  Secondary to injuries, hospital stay  Mobilize frequently to prevent further decline  PT, OT     6  Displaced femoral neck fracture  Orthopedics following  Went for surgery 2 days ago     7   Vitamin D deficiency  Continue with home supplementation     8  L facial droop  Family noticed yesterday, it has been at least 24 hr  Trauma performed neuro assessment, no other deficits  Trauma is monitoring      Subjective:   Patient went into SVT overnight  Was placed on Cardizem  Received 1 unit of packed red blood cells, and fluid bolus  Patient is doing better today cognitively, was delirious yesterday, though she is somewhat lethargic and pale appearing  Reports pain in right hip  Patient denies headaches, vision changes, chest pain, sob, N/V/D, constipation, difficulty with urination  Objective:     Vitals: Blood pressure 105/54, pulse 78, temperature 98 5 °F (36 9 °C), temperature source Oral, resp  rate 18, height 5' 3" (1 6 m), weight 61 4 kg (135 lb 5 8 oz), SpO2 96 %  ,Body mass index is 23 98 kg/m²  Intake/Output Summary (Last 24 hours) at 12/05/18 1153  Last data filed at 12/05/18 0900   Gross per 24 hour   Intake          1666 67 ml   Output             1075 ml   Net           591 67 ml       Physical Exam: General : WD in NAD, pale, lethargic  HEENT : MMM no erythema or exudates  EOMI, sclera anicteric  Heart : Normal rate  Lungs : CTA  Abdomen : Soft, NT/ND, BS auscultated in all 4 quads  No organomegaly  Ext :  Pulses +2/4 B/L  Neg edema B/L  Neg calf swelling B/L  Skin : Pink, warm, dry, age appropriate turgor and mobility  Neuro : Nonfocal  Psych : baseline cognition  A*O x 2       Invasive Devices     Peripheral Intravenous Line            Peripheral IV 12/04/18 Right Wrist 1 day    Peripheral IV 12/04/18 Right Forearm less than 1 day          Drain            Urethral Catheter Straight-tip 18 Fr  2 days                Lab, Imaging and other studies: I have personally reviewed pertinent reports      VTE Pharmacologic Prophylaxis: Sequential compression device (Venodyne)   VTE Mechanical Prophylaxis: sequential compression device

## 2018-12-05 NOTE — ASSESSMENT & PLAN NOTE
- Gerontology consult appreciated:  - Fall precautions  - Continue with vitamin-D supplement as patient takes at home

## 2018-12-06 LAB
ANION GAP SERPL CALCULATED.3IONS-SCNC: 6 MMOL/L (ref 4–13)
ATRIAL RATE: 115 BPM
BASE EX.OXY STD BLDV CALC-SCNC: 58.6 % (ref 60–80)
BASE EXCESS BLDV CALC-SCNC: 1.8 MMOL/L
BASOPHILS # BLD AUTO: 0.01 THOUSANDS/ΜL (ref 0–0.1)
BASOPHILS NFR BLD AUTO: 0 % (ref 0–1)
BUN SERPL-MCNC: 23 MG/DL (ref 5–25)
CALCIUM SERPL-MCNC: 7.6 MG/DL (ref 8.3–10.1)
CHLORIDE SERPL-SCNC: 105 MMOL/L (ref 100–108)
CO2 SERPL-SCNC: 27 MMOL/L (ref 21–32)
CREAT SERPL-MCNC: 0.96 MG/DL (ref 0.6–1.3)
EOSINOPHIL # BLD AUTO: 0.03 THOUSAND/ΜL (ref 0–0.61)
EOSINOPHIL NFR BLD AUTO: 1 % (ref 0–6)
ERYTHROCYTE [DISTWIDTH] IN BLOOD BY AUTOMATED COUNT: 12.8 % (ref 11.6–15.1)
GFR SERPL CREATININE-BSD FRML MDRD: 57 ML/MIN/1.73SQ M
GLUCOSE SERPL-MCNC: 114 MG/DL (ref 65–140)
HCO3 BLDV-SCNC: 26.7 MMOL/L (ref 24–30)
HCT VFR BLD AUTO: 22.5 % (ref 34.8–46.1)
HCT VFR BLD AUTO: 25.2 % (ref 34.8–46.1)
HGB BLD-MCNC: 7.5 G/DL (ref 11.5–15.4)
HGB BLD-MCNC: 8.4 G/DL (ref 11.5–15.4)
IMM GRANULOCYTES # BLD AUTO: 0.03 THOUSAND/UL (ref 0–0.2)
IMM GRANULOCYTES NFR BLD AUTO: 1 % (ref 0–2)
LACTATE SERPL-SCNC: 1.2 MMOL/L (ref 0.5–2)
LYMPHOCYTES # BLD AUTO: 0.65 THOUSANDS/ΜL (ref 0.6–4.47)
LYMPHOCYTES NFR BLD AUTO: 10 % (ref 14–44)
MCH RBC QN AUTO: 31.5 PG (ref 26.8–34.3)
MCHC RBC AUTO-ENTMCNC: 33.3 G/DL (ref 31.4–37.4)
MCV RBC AUTO: 95 FL (ref 82–98)
MONOCYTES # BLD AUTO: 0.63 THOUSAND/ΜL (ref 0.17–1.22)
MONOCYTES NFR BLD AUTO: 10 % (ref 4–12)
NEUTROPHILS # BLD AUTO: 5.14 THOUSANDS/ΜL (ref 1.85–7.62)
NEUTS SEG NFR BLD AUTO: 78 % (ref 43–75)
NRBC BLD AUTO-RTO: 0 /100 WBCS
O2 CT BLDV-SCNC: 6.7 ML/DL
PCO2 BLDV: 43.3 MM HG (ref 42–50)
PH BLDV: 7.41 [PH] (ref 7.3–7.4)
PLATELET # BLD AUTO: 72 THOUSANDS/UL (ref 149–390)
PMV BLD AUTO: 12.4 FL (ref 8.9–12.7)
PO2 BLDV: 30.9 MM HG (ref 35–45)
POTASSIUM SERPL-SCNC: 3.5 MMOL/L (ref 3.5–5.3)
QRS AXIS: 15 DEGREES
QRSD INTERVAL: 80 MS
QT INTERVAL: 374 MS
QTC INTERVAL: 472 MS
RBC # BLD AUTO: 2.38 MILLION/UL (ref 3.81–5.12)
SODIUM SERPL-SCNC: 138 MMOL/L (ref 136–145)
T WAVE AXIS: 42 DEGREES
VENTRICULAR RATE: 96 BPM
WBC # BLD AUTO: 6.49 THOUSAND/UL (ref 4.31–10.16)

## 2018-12-06 PROCEDURE — 82805 BLOOD GASES W/O2 SATURATION: CPT | Performed by: EMERGENCY MEDICINE

## 2018-12-06 PROCEDURE — 80048 BASIC METABOLIC PNL TOTAL CA: CPT | Performed by: EMERGENCY MEDICINE

## 2018-12-06 PROCEDURE — 97530 THERAPEUTIC ACTIVITIES: CPT

## 2018-12-06 PROCEDURE — 83605 ASSAY OF LACTIC ACID: CPT | Performed by: EMERGENCY MEDICINE

## 2018-12-06 PROCEDURE — P9016 RBC LEUKOCYTES REDUCED: HCPCS

## 2018-12-06 PROCEDURE — 97116 GAIT TRAINING THERAPY: CPT

## 2018-12-06 PROCEDURE — 85018 HEMOGLOBIN: CPT | Performed by: NURSE PRACTITIONER

## 2018-12-06 PROCEDURE — 93010 ELECTROCARDIOGRAM REPORT: CPT | Performed by: INTERNAL MEDICINE

## 2018-12-06 PROCEDURE — 99233 SBSQ HOSP IP/OBS HIGH 50: CPT | Performed by: PHYSICIAN ASSISTANT

## 2018-12-06 PROCEDURE — 85014 HEMATOCRIT: CPT | Performed by: NURSE PRACTITIONER

## 2018-12-06 PROCEDURE — 99232 SBSQ HOSP IP/OBS MODERATE 35: CPT | Performed by: INTERNAL MEDICINE

## 2018-12-06 PROCEDURE — 93005 ELECTROCARDIOGRAM TRACING: CPT

## 2018-12-06 PROCEDURE — 30233N1 TRANSFUSION OF NONAUTOLOGOUS RED BLOOD CELLS INTO PERIPHERAL VEIN, PERCUTANEOUS APPROACH: ICD-10-PCS | Performed by: EMERGENCY MEDICINE

## 2018-12-06 PROCEDURE — 99232 SBSQ HOSP IP/OBS MODERATE 35: CPT | Performed by: FAMILY MEDICINE

## 2018-12-06 PROCEDURE — 85025 COMPLETE CBC W/AUTO DIFF WBC: CPT | Performed by: EMERGENCY MEDICINE

## 2018-12-06 RX ORDER — LORAZEPAM 2 MG/ML
0.25 INJECTION INTRAMUSCULAR EVERY 6 HOURS PRN
Status: DISCONTINUED | OUTPATIENT
Start: 2018-12-06 | End: 2018-12-07

## 2018-12-06 RX ORDER — POTASSIUM CHLORIDE 20 MEQ/1
40 TABLET, EXTENDED RELEASE ORAL ONCE
Status: COMPLETED | OUTPATIENT
Start: 2018-12-06 | End: 2018-12-06

## 2018-12-06 RX ADMIN — METOPROLOL TARTRATE 25 MG: 25 TABLET, FILM COATED ORAL at 09:40

## 2018-12-06 RX ADMIN — SODIUM CHLORIDE 250 ML: 0.9 INJECTION, SOLUTION INTRAVENOUS at 00:38

## 2018-12-06 RX ADMIN — VITAMIN D, TAB 1000IU (100/BT) 5000 UNITS: 25 TAB at 09:30

## 2018-12-06 RX ADMIN — ACETAMINOPHEN 975 MG: 325 TABLET, FILM COATED ORAL at 21:54

## 2018-12-06 RX ADMIN — ACETAMINOPHEN 975 MG: 325 TABLET, FILM COATED ORAL at 05:18

## 2018-12-06 RX ADMIN — LEVOTHYROXINE SODIUM 50 MCG: 50 TABLET ORAL at 05:18

## 2018-12-06 RX ADMIN — SENNOSIDES AND DOCUSATE SODIUM 1 TABLET: 8.6; 5 TABLET ORAL at 09:31

## 2018-12-06 RX ADMIN — PANTOPRAZOLE SODIUM 20 MG: 20 TABLET, DELAYED RELEASE ORAL at 05:18

## 2018-12-06 RX ADMIN — APIXABAN 5 MG: 5 TABLET, FILM COATED ORAL at 18:05

## 2018-12-06 RX ADMIN — APIXABAN 5 MG: 5 TABLET, FILM COATED ORAL at 09:31

## 2018-12-06 RX ADMIN — LORAZEPAM 0.25 MG: 2 INJECTION INTRAMUSCULAR; INTRAVENOUS at 09:34

## 2018-12-06 RX ADMIN — CALCIUM CARBONATE (ANTACID) CHEW TAB 500 MG 500 MG: 500 CHEW TAB at 09:31

## 2018-12-06 RX ADMIN — DEXTROSE 150 MG: 50 INJECTION, SOLUTION INTRAVENOUS at 00:18

## 2018-12-06 RX ADMIN — SODIUM CHLORIDE 500 ML: 0.9 INJECTION, SOLUTION INTRAVENOUS at 02:21

## 2018-12-06 RX ADMIN — PRAVASTATIN SODIUM 40 MG: 40 TABLET ORAL at 18:05

## 2018-12-06 RX ADMIN — AMIODARONE HYDROCHLORIDE 0.5 MG/MIN: 50 INJECTION, SOLUTION INTRAVENOUS at 06:23

## 2018-12-06 RX ADMIN — POTASSIUM CHLORIDE 40 MEQ: 1500 TABLET, EXTENDED RELEASE ORAL at 09:30

## 2018-12-06 RX ADMIN — AMIODARONE HYDROCHLORIDE 1 MG/MIN: 50 INJECTION, SOLUTION INTRAVENOUS at 00:42

## 2018-12-06 RX ADMIN — SODIUM CHLORIDE 75 ML/HR: 0.9 INJECTION, SOLUTION INTRAVENOUS at 14:25

## 2018-12-06 RX ADMIN — CALCIUM CARBONATE (ANTACID) CHEW TAB 500 MG 500 MG: 500 CHEW TAB at 18:05

## 2018-12-06 RX ADMIN — LIDOCAINE 1 PATCH: 50 PATCH TOPICAL at 09:31

## 2018-12-06 NOTE — PROGRESS NOTES
Progress Note - Carmelo Dyson 66 y o  female MRN: [id number]    Unit/Bed#: [facility] 905-01 Encounter: [id number]      Assessment/Plan  1  Fall  Recommendations remain the same  Fall precautions  Home meds unremarkable  Continue with vitamin-D supplement as patient takes at home  PT, OT once appropriate     2  Cognitive impairment  Baseline per family at bedside, patient lethargic  Previously diagnosed  Follows at [facility] for positive aging as outpatient  14/30 on [place] in June  Will defer B12, folate assessment as patient has been worked up in the past  Continue supportive care     3  Delirium   Post operatively  Has since resolved, patient lethargic but at baseline per family  Continue with Tylenol 975 mg Q 8  Continue with Lidoderm patch  Continue with p r n  Oxycodone as ordered  If patient needs muscle relaxant would recommend Robaxin 250 mg p r n  Q 8  Continue with bowel regimen as ordered  Patient at risk for developing delirium, delirium preventing tactics advised  Redirect unwanted patient behaviors as first line tx  Reorient patient frequently  Avoid deliriogenic meds including tramadol, benzodiazepines, benadryl  Good sleep hygiene important, limit night time interruptions  Encourage patient to stay awake during the day  Ensure adequate hydration/nutrition  Mobilize often      4  Ambulatory dysfunction  Recommendations remain the same  Diagnosed in June  Patient was not using assistive device at home but did own cane  PT, OT once appropriate  Patient will benefit from rehab     5  Deconditioning  Recommendations remain the same  Secondary to injuries, hospital stay  Mobilize frequently to prevent further decline  PT, OT     6  Displaced femoral neck fracture  Orthopedics following  Went for surgery 2 days ago     7   Vitamin D deficiency  Continue with home supplementation     8  L facial droop  Family noticed yesterday, it has been at least 24 hr  Trauma performed neuro assessment, no other deficits  Trauma is monitoring  Appears better today    9  Hypotension  Patient continues to have issues with HR, hypotension  Receiving PRBC currently      Subjective:   Issues with heart rate, hypotension again last night  Patient given bolus of amiodarone, was started on drip, was also given blood transfusion  Patient appears pale, lethargic  She is sleeping upon exam   Family reports she is cognitively at baseline  Pertinent review of systems cannot be gathered secondary to patient lethargic, cognitive impairment at baseline  Objective:     Vitals: Blood pressure 118/60, pulse 86, temperature 98 3 °F (36 8 °C), temperature source Oral, resp  rate 16, height 5' 3" (1 6 m), weight 61 4 kg (135 lb 5 8 oz), SpO2 96 %  ,Body mass index is 23 98 kg/m²  Intake/Output Summary (Last 24 hours) at 12/06/18 1125  Last data filed at 12/06/18 0940   Gross per 24 hour   Intake          2131 34 ml   Output             1325 ml   Net           806 34 ml       Physical Exam: General : WD in NAD, pale, lethargic  HEENT : MMM no erythema or exudates  EOMI, sclera anicteric  Heart : Normal rate  Lungs : CTA  Abdomen : Soft, NT/ND, BS auscultated in all 4 quads  No organomegaly  Ext :  Pulses +2/4 B/L  Neg edema B/L  Neg calf swelling B/L  Skin : Pink, warm, dry, age appropriate turgor and mobility  Neuro : Nonfocal  Psych : baseline cognition, though lethargic       Invasive Devices     Peripheral Intravenous Line            Peripheral IV 12/04/18 Right Forearm 1 day    Peripheral IV 12/04/18 Right Wrist 1 day                Lab, Imaging and other studies: I have personally reviewed pertinent reports      VTE Pharmacologic Prophylaxis: Sequential compression device (Venodyne)   VTE Mechanical Prophylaxis: sequential compression device

## 2018-12-06 NOTE — PROGRESS NOTES
Progress Note - Emre Edi 1940, 66 y o  female MRN: [id number]    [facility] Encounter: [id number]    Primary Care Provider: Armando Carter DO   Date and time admitted to hospital: 12/3/2018  3:59 AM        Acute blood loss anemia   Assessment & Plan    - Hgb 7 5 today - will transfuse one unit given patient's AS  - RLE dressing with some bloody drainage - dressing to be changed by Orthopedics today   - right thigh soft  - repeat Hgb 12/7/18     NSTEMI (non-ST elevated myocardial infarction) [facility])   Assessment & Plan    type 2 NSTEMI, likely secondary to episodes of arrhythmia  Troponin 0 36  -EKG did not display ST/T-wave changes concerning for ischemia  -cardiology consulted  -troponin peaked at 0 36, currently trending down     Atrial fibrillation with rapid ventricular response (HCC)   Assessment & Plan    Multiple episodes overnight of rapid ventricular response in 110s-140s, as well as episodes of what appears to be SVT with a regular rate of 150-160, without any obvious atrial activity  Possibly exacerbated by dehydration/intraoperative blood loss, responsive to IV fluids, as well as Cardizem  -cardiology consulted  -continue Cardizem drip, titrate to heart rate <110    Has required careful titration due to transient episodes of hypotension  -IV fluids increased to 100/hour due to pre low dependence in the setting of severe aortic stenosis  -Eliquis to be restarted today  -continue home BP medications     Osteoporosis   Assessment & Plan    Severe based on prior DEXA scan  -endocrinology following - recommended outpatient follow-up in 6 weeks, once discharged  -vitamin-D supplementation  -F/U SPEP/UPEP  -continue Synthroid for treatment of hypothyroidism     Ambulatory dysfunction   Assessment & Plan    - WBAT  - PT/OT     Fall   Assessment & Plan    - Gerontology consult appreciated:  - Fall precautions  - Continue with vitamin-D supplement as patient takes at home Severe aortic stenosis   Assessment & Plan    - Cardiology following  - transfuse 1 unit PRBC as Hgb 7 5 this morning     Cognitive impairment   Assessment & Plan    -appreciated geriatrics recommendations  -Continue delirium precautions- avoid deliriogenic medications and regulate sleep-wake cycle   -may require HS Seroquel if encephalopathy worsens during the daytime     Atrial fibrillation (HCC)   Assessment & Plan    - patient on Amio and Diltiazem gtt's   - Amio gtt was restarted overnight by trauma resident  - await Cardiology evaluation today for additional medication adjustment  - Eliquis has been restarted     * Closed intertrochanteric fracture of hip, right, initial encounter ([facility] [address] )   Assessment & Plan    - POD #2 s/p right MIKE  - dressing to be changed by Ortho  - WBAT  - Pain controlled with tylenol and prn dilaudid/oxy, geriatrics dosing  - PT/OT       DISPOSITION/ PLAN:  PT/OT  Transfuse one unit PRBC's  Cardiology eval for additional medication adjustment  Low dose Ativan for anxiety  OOB to chair TID    Chief Complaint: Anxious    Subjective: Feeling anxious this morning  Poor appetite but no difficulty swallowing    Denies CP or SOB    Objective:     Meds/Allergies     Current Facility-Administered Medications:     acetaminophen (TYLENOL) tablet 975 mg, 975 mg, Oral, Q8H [address], Marina Roach MD, 975 mg at 12/06/18 0518    amiodarone (CORDARONE) 900 mg in dextrose 5 % 500 mL infusion, 0 5 mg/min, Intravenous, Continuous, Joselito R Walter, DO, Last Rate: 16 7 mL/hr at 12/06/18 4920, 0 5 mg/min at 12/06/18 1285    apixaban (ELIQUIS) tablet 5 mg, 5 mg, Oral, BID, Yisel Capps MD, 5 mg at 12/06/18 0931    calcium carbonate (TUMS) chewable tablet 500 mg, 500 mg, Oral, BID, Jackie Dama Verdeepali DO, 500 mg at 12/06/18 5931    cholecalciferol (VITAMIN D3) tablet 5,000 Units, 5,000 Units, Oral, Daily, Jackie Guerline Veres DO, 5,000 Units at 12/06/18 0930    hydrochlorothiazide (HYDRODIURIL) tablet 12 5 mg, 12 5 mg, Oral, Daily, Marj Quispe MD, 12 5 mg at 12/05/18 1796    HYDROmorphone (DILAUDID) injection 0 2 mg, 0 2 mg, Intravenous, Q4H PRN, Marj Quispe MD, 0 2 mg at 12/05/18 2486    levothyroxine tablet 50 mcg, 50 mcg, Oral, Early Morning, Marj Quispe MD, 50 mcg at 12/06/18 0518    lidocaine (LIDODERM) 5 % patch 1 patch, 1 patch, Topical, Daily, Hitesh Jack PA-C, 1 patch at 12/06/18 0931    LORazepam (ATIVAN) 2 mg/mL injection 0 25 mg, 0 25 mg, Intravenous, Q6H PRN, Shara Barroso PA-C    metoprolol (LOPRESSOR) injection 5 mg, 5 mg, Intravenous, Q6H PRN, Tavia Acosta PA-C, 5 mg at 12/04/18 2347    metoprolol tartrate (LOPRESSOR) tablet 25 mg, 25 mg, Oral, Q12H [facility], Marj Quispe MD, 25 mg at 12/05/18 0917    naloxone (NARCAN) 0 04 mg/mL syringe 0 04 mg, 0 04 mg, Intravenous, Q1MIN PRN, Marj Quispe MD    ondansetron [facility]) injection 4 mg, 4 mg, Intravenous, Q4H PRN, Marj Quispe MD, 4 mg at 12/04/18 2304    ondansetron [facility]) injection 4 mg, 4 mg, Intravenous, Once, Marj Quispe MD    oxyCODONE (ROXICODONE) IR tablet 2 5 mg, 2 5 mg, Oral, Q4H PRN, Marj Quispe MD, 2 5 mg at 12/05/18 2032    oxyCODONE (ROXICODONE) IR tablet 5 mg, 5 mg, Oral, Q4H PRN, Marj Quispe MD, 5 mg at 12/05/18 0208    pantoprazole (PROTONIX) EC tablet 20 mg, 20 mg, Oral, Early Morning, Marj Quispe MD, 20 mg at 12/06/18 0518    pravastatin (PRAVACHOL) tablet 40 mg, 40 mg, Oral, Daily With Dinner, Marj Quispe MD, 40 mg at 12/05/18 1802    senna-docusate sodium (SENOKOT S) 8 6-50 mg per tablet 1 tablet, 1 tablet, Oral, BID, Marj Quispe MD, 1 tablet at 12/06/18 0931    sodium chloride 0 9 % bolus 500 mL, 500 mL, Intravenous, Once, Ramirez Davis DO, Last Rate: 250 mL/hr at 12/05/18 2156, 500 mL at 12/05/18 2156    sodium chloride 0 9 % infusion, 75 mL/hr, Intravenous, Continuous, Joselito Watson DO, Last Rate: 75 mL/hr at 12/06/18 0439, 75 mL/hr at 12/06/18 0439    Vitals: Blood pressure 111/59, pulse 77, temperature 98 °F (36 7 °C), temperature source Oral, resp  rate 16, height 5' 3" (1 6 m), weight 61 4 kg (135 lb 5 8 oz), SpO2 97 %  Body mass index is 23 98 kg/m²  SpO2: SpO2: 97 %    ABG: No results found for: PHART, LHG3KFH, PO2ART, KSM1UJA, C4QEAFPY, BEART, SOURCE      Intake/Output Summary (Last 24 hours) at 12/06/18 0934  Last data filed at 12/06/18 0800   Gross per 24 hour   Intake          2131 34 ml   Output             1025 ml   Net          1106 34 ml       Invasive Devices     Peripheral Intravenous Line            Peripheral IV 12/04/18 Right Forearm 1 day    Peripheral IV 12/04/18 Right Wrist 1 day                        Nutrition/GI Proph/Bowel Reg: Senokot S    Pain management regimen: Tylenol, Lidoderm, Oxycodone 2 5/5    VTE Prophylaxis: Eliquis    Physical Exam:   Constitution: patient appears comfortable, no acute distress  HEENT: normocephalic, atraumatic, PERRLA, clear speech  CV: regular rate and rhythm, no edema, cap refill intact b/l LE's  Pulm: CTA, no wheezes, rhonchi or crackles, unlabored, equal bilaterally  Abd: soft, nontender, nondistended, active bowel sounds  Musc: moves all extremities, equal strength, no calf tenderness; right thigh full on the posterior lateral aspect but soft; dressing with bloody drainage  Neuro: A&O, no focal deficits  Skin: no rash or breakdown, warm      Lab Results:   BMP/CMP:   Lab Results   Component Value Date    SODIUM 138 12/06/2018    K 3 5 12/06/2018     12/06/2018    CO2 27 12/06/2018    BUN 23 12/06/2018    CREATININE 0 96 12/06/2018    CALCIUM 7 6 (L) 12/06/2018    EGFR 57 12/06/2018    and CBC:   Lab Results   Component Value Date    WBC 6 49 12/06/2018    HGB 7 5 (L) 12/06/2018    HCT 22 5 (L) 12/06/2018    MCV 95 12/06/2018    PLT 72 (L) 12/06/2018    MCH 31 5 12/06/2018    MCHC 33 3 12/06/2018    RDW 12 8 12/06/2018    MPV 12 4 12/06/2018    NRBC 0 12/06/2018     Imaging/EKG Studies: Results: I have personally reviewed pertinent reports          Nurse provider rounds completed with Shawna President  Patient and daughter updated on plan of care

## 2018-12-06 NOTE — PLAN OF CARE
Problem: PHYSICAL THERAPY ADULT  Goal: Performs mobility at highest level of function for planned discharge setting  See evaluation for individualized goals  Treatment/Interventions: Functional transfer training, LE strengthening/ROM, Elevations, Endurance training, Therapeutic exercise, Patient/family training, Equipment eval/education, Bed mobility, Gait training, Spoke to nursing, Family  Equipment Recommended: Merlin Sovereign (RW)       See flowsheet documentation for full assessment, interventions and recommendations  Outcome: Progressing  Prognosis: Fair  Problem List: Decreased strength, Decreased range of motion, Decreased endurance, Impaired balance, Decreased mobility, Decreased coordination, Decreased cognition, Impaired judgement, Decreased safety awareness, Decreased skin integrity, Pain  Assessment: Pt seen for physical therapy treatment consisting of completion of bed mobility activities, review of hip precautions with demonstration provided , and bed to chair transfer  Pt generally weak today and receiving a blood transfusion upon PT's arrival in pt's room  Pt had some confusion ( disoriented to place) but was willing to work with PT on mobility activities  Pt needed significant verbal cueing to adhere correctly to hip precautions  Pt needed moderate assist of 2 for completion of bed mobility activities and mod-max assist of 2 to complete a bed to chair transfer  Pt's endurance was limited today  Pt remains at a level that is far below her baseline level of mobility  Recommend d/c to inpatient rehab setting once pt is medically ready for d/c    Barriers to Discharge: Inaccessible home environment, Decreased caregiver support  Barriers to Discharge Comments: (S) Has steps at home  Recommendation: Other (Comment) (inpatient rehab  )     PT - OK to Discharge: Yes    See flowsheet documentation for full assessment

## 2018-12-06 NOTE — PHYSICAL THERAPY NOTE
Physical Therapy Treatment Note:  Mainor Johnson, PT     12/06/18 1030   Pain Assessment   Pain Assessment No/denies pain   Pain Score (" I have more anxiety than pain " per pt )   Precautions   Total Hip Replacement Hip ABductor brace;ADduction; Internal rotation; Flexion  (posterior total hip replacement   )   Restrictions/Precautions   Weight Bearing Precautions Per Order Yes   RLE Weight Bearing Per Order WBAT   Other Precautions THR;WBS;Cognitive; Chair Alarm; Bed Alarm;Multiple lines;Telemetry; Fall Risk  (Getting blood  )   General   Chart Reviewed Yes   Response to Previous Treatment Patient with no complaints from previous session  Family/Caregiver Present Yes   Cognition   Overall Cognitive Status Impaired   Arousal/Participation Alert; Responsive; Cooperative  (Pt stated her anxiety is her most limiting factor  )   Attention Attends with cues to redirect   Orientation Level Oriented to person;Oriented to situation;Disoriented to place   Memory Decreased recall of precautions;Decreased recall of recent events;Decreased short term memory   Following Commands Follows one step commands with increased time or repetition   Comments RN gave med's for anxiety approx 20 min's prior to rx as per RN  Bed Mobility   Supine to Sit 3  Moderate assistance   Additional items Assist x 2; Increased time required;Verbal cues;LE management   Additional Comments Pt remained seated in recliner upon completion of PT rx  (Chair alarm engaged  Pt with instructions to use call bell )   Transfers   Sit to Stand 3  Moderate assistance   Additional items Assist x 2; Increased time required;Verbal cues  (Demo given prior to   )   Stand to Sit 3  Moderate assistance   Additional items Assist x 2; Increased time required;Verbal cues   Stand pivot 2  Maximal assistance   Additional items Assist x 2; Increased time required;Verbal cues  (Mod-max assist of 2 with RW  )   Additional Comments RW used      Ambulation/Elevation   Gait pattern Excessively slow; Short stride;Decreased foot clearance;Narrow MINNIE; Improper Weight shift   Gait Assistance 2  Maximal assist   Additional items Assist x 2  (mod-max assist of 2  )   Assistive Device Rolling walker   Distance 2' for completion of transfer  (Pt with difficulty following commands for precautions  )   Stair Management Assistance Not tested   Balance   Static Sitting Fair -   Dynamic Sitting Poor +   Static Standing Poor +   Dynamic Standing Poor   Ambulatory Poor -   Endurance Deficit   Endurance Deficit Yes   Endurance Deficit Description fatigue     (" I just feel awful "  Pt getting blood transfusion  )   Activity Tolerance   Activity Tolerance Patient limited by fatigue;Treatment limited secondary to medical complications (Comment)   Nurse Made Aware RN consented to allow pt to participate in PT rx  Exercises   Ankle Pumps 10 reps; Sitting;AROM; Bilateral  (Pt instructed to perform hourly ankle pumps during waking hr)   Assessment   Prognosis Fair   Problem List Decreased strength;Decreased range of motion;Decreased endurance; Impaired balance;Decreased mobility; Decreased coordination;Decreased cognition; Impaired judgement;Decreased safety awareness;Decreased skin integrity;Pain   Assessment Pt seen for physical therapy treatment consisting of completion of bed mobility activities, review of hip precautions with demonstration provided , and bed to chair transfer  Pt generally weak today and receiving a blood transfusion upon PT's arrival in pt's room  Pt had some confusion ( disoriented to place) but was willing to work with PT on mobility activities  Pt needed significant verbal cueing to adhere correctly to hip precautions  Pt needed moderate assist of 2 for completion of bed mobility activities and mod-max assist of 2 to complete a bed to chair transfer  Pt's endurance was limited today  Pt remains at a level that is far below her baseline level of mobility    Recommend d/c to inpatient rehab setting once pt is medically ready for d/c     Barriers to Discharge Inaccessible home environment;Decreased caregiver support   Barriers to Discharge Comments Has steps at home  Goals   Patient Goals " To feel better " per pt report  STG Expiration Date 12/16/18   Short Term Goal #2 Goals still appropriate  Treatment Day 1   Plan   Treatment/Interventions Functional transfer training;LE strengthening/ROM; Patient/family training;Equipment eval/education; Bed mobility;Gait training;Spoke to nursing   Progress Slow progress, medical status limitations   PT Frequency Other (Comment)  (5-7x/wk)   Recommendation   Recommendation Other (Comment)  (inpatient rehab  )   Equipment Recommended Arden Hemphill   PT - OK to Discharge Yes   Additional Comments yes if medically ready for d/c to inpatient rehab

## 2018-12-06 NOTE — PROGRESS NOTES
Subjective: Hypotensive with SVT overnight, seen by cards and started on amiodarone drip  Dressing reinforced overnight       Objective:  Lab Results   Component Value Date/Time    WBC 6 49 12/06/2018 02:38 AM    HGB 7 5 (L) 12/06/2018 02:38 AM       Vitals:    12/06/18 0604   BP: 105/54   Pulse: 92   Resp:    Temp:    SpO2:      RLE extremity  ABDS with mild bloody drainage, mepilex saturated with blood, no active drainage  Able to weakly dorsifelx ankle, +ehl/fhl  Toes WWP    Assessment: POD 2 R MIKE for right fem neck fx     Plan:  WBAT RLE   Pain control  DVT ppx  PT  Will apply new mepilex dressing today   Dispo

## 2018-12-06 NOTE — QUICK NOTE
Called by RN found to hypotensive, HR in 120's past few hrs despite fluid challenge given earlier  Similar episode last night  Apparently BP decreased to 80/40 when I arrive around 726 systolic however patient less interactive and appears pale  CBC checked few hrs ago and HGB decreased, but still 7 9  Some oozing at right leg op site, but soft  On NOAC at baseline  Started on amiodarone by cardiology earlier in day  Given borderline Hypotension that appears to correlate with afib when becomes RVR, will bolus amiodarone as had only received one dose  Then will start gtt @ 1 mg/min x 6 hrs followed by 0 5 mg/min x 18 hrs  Will give additional bolus and increased hourly rate as is net negative per notes this admission, appears dry and has AS and is preload dependent        Most recent EF 55%, no DDfxn    Has been on NOAC for > 6 months and has been started back on already this admission, therefore amiodarone ok for this patient with borderline BP because afib has been paroxysmal and has been on NOAC for 6 months, therefore risk of embolic stroke from  being chemically converted negligible, given that patients underlying rhythm is paroxysmal (confirmed from OP cardiology note )

## 2018-12-06 NOTE — PROGRESS NOTES
Progress Note - Cardiology   Mary Beth Cherokee 66 y o  female MRN: [id number]  Unit/Bed#: [facility] 905-01 Encounter: [id number]    Assessment/Plan:    Atrial fibrillation  -pt day 2 s/p hip total arthroplasty  -Pt previously on diltiazem drip  Given p o  Amiodarone  Patient appeared to be in AFib with rapid RVR overnight with hypotension  Bolused with amiodarone and transitioned to amiodarone drip   -patient denies any current chest pain, dizziness, lightheadedness, shortness of breath  -continue anticoagulation with Eliquis 5 mg b i d   -continue Lopressor 25 mg b i d   -continue amiodarone drip for now  -patient currently appears to be in normal sinus rhythm  Continue to monitor on telemetry     Severe aortic stenosis  -last echo from 06/25/2018 - EF 55%, normal LV wall thickness, LV diastolic parameters normal, normal right ventricle, mild MR, severe aortic stenosis, no aortic regurgitation  -patient being monitored as an outpatient by Dr Sharif Cody  -patient is preload dependent given aortic stenosis  Continue to monitor patient's volume status and give IV hydration     Subjective/Objective   Chief Complaint:     Subjective:  Patient appears to have gone back into AFib with RVR overnight with heart rate in 120s  Noted to be hypotensive with blood pressure in the 80s over 40s  Patient bolused with amiodarone and normal saline    Started on amiodarone drip    Objective:     Vitals: /60 (BP Location: Left arm)   Pulse 86   Temp 98 3 °F (36 8 °C) (Oral)   Resp 16   Ht 5' 3" (1 6 m)   Wt 61 4 kg (135 lb 5 8 oz)   SpO2 96%   BMI 23 98 kg/m²   Vitals:    12/03/18 0807 12/04/18 0600   Weight: 62 6 kg (138 lb 0 1 oz) 61 4 kg (135 lb 5 8 oz)     Orthostatic Blood Pressures      Most Recent Value   Blood Pressure  118/60 filed at 12/06/2018 1005   Patient Position - Orthostatic VS  Sitting filed at 12/06/2018 1005            Intake/Output Summary (Last 24 hours) at 12/06/18 1122  Last data filed at 12/06/18 0940   Gross per 24 hour   Intake          2131 34 ml   Output             1325 ml   Net           806 34 ml       Invasive Devices     Peripheral Intravenous Line            Peripheral IV 12/04/18 Right Forearm 1 day    Peripheral IV 12/04/18 Right Wrist 1 day              Physical Exam   Constitutional: She is oriented to person, place, and time  She appears well-developed and well-nourished  No distress  HENT:   Head: Normocephalic and atraumatic  Eyes: Right eye exhibits no discharge  Left eye exhibits no discharge  No scleral icterus  Cardiovascular: Normal rate and regular rhythm  Murmur heard  Pulmonary/Chest: Effort normal and breath sounds normal  No respiratory distress  She has no wheezes  She has no rales  Abdominal: Soft  Bowel sounds are normal  She exhibits no distension  There is no tenderness  Musculoskeletal: She exhibits no edema or tenderness  Neurological: She is alert and oriented to person, place, and time  She exhibits normal muscle tone  Skin: Skin is warm and dry  Capillary refill takes less than 2 seconds  No rash noted  No erythema  Psychiatric: She has a normal mood and affect   Her behavior is normal      Lab Results:   CBC with diff:     Results from last 7 days  Lab Units 12/06/18  0238   WBC Thousand/uL 6 49   RBC Million/uL 2 38*   HEMOGLOBIN g/dL 7 5*   HEMATOCRIT % 22 5*   MCV fL 95   MCH pg 31 5   MCHC g/dL 33 3   RDW % 12 8   MPV fL 12 4   PLATELETS Thousands/uL 72*     CMP:   Results from last 7 days  Lab Units 12/06/18  0238  12/04/18  2318  12/03/18  0943   SODIUM mmol/L 138  < >  --   < > 135*   POTASSIUM mmol/L 3 5  < >  --   < > 4 8   CHLORIDE mmol/L 105  < >  --   < > 104   CO2 mmol/L 27  < >  --   < > 18*   CO2, I-STAT mmol/L  --   --  22  --   --    BUN mg/dL 23  < >  --   < > 22   CREATININE mg/dL 0 96  < >  --   < > 0 71   GLUCOSE, ISTAT mg/dl  --   --  136  --   --    CALCIUM mg/dL 7 6*  < >  --   < > 8 9   AST U/L  --   --   --   --  34   ALT U/L --   --   --   --  11*   ALK PHOS U/L  --   --   --   --  43*   EGFR ml/min/1 73sq m 57  < >  --   < > 82   < > = values in this interval not displayed  Troponin:     0  Lab Value Date/Time   TROPONINI 0 33 (H) 12/05/2018 0458   TROPONINI 0 36 (H) 12/05/2018 0202   TROPONINI 0 36 (H) 12/04/2018 2259   TROPONINI <0 02 12/03/2018 0434   TROPONINI <0 02 05/26/2018 1352     BNP:   Results from last 7 days  Lab Units 12/06/18  0238  12/04/18  2318   POTASSIUM mmol/L 3 5  < >  --    CHLORIDE mmol/L 105  < >  --    CO2 mmol/L 27  < >  --    CO2, I-STAT mmol/L  --   --  22   BUN mg/dL 23  < >  --    CREATININE mg/dL 0 96  < >  --    GLUCOSE, ISTAT mg/dl  --   --  136   CALCIUM mg/dL 7 6*  < >  --    EGFR ml/min/1 73sq m 57  < >  --    < > = values in this interval not displayed  Coags:     Results from last 7 days  Lab Units 12/04/18  0441 12/03/18  0650   PTT seconds  --  36   INR  1 29* 1 41*     TSH:     Results from last 7 days  Lab Units 12/03/18  0943   TSH 3RD GENERATON uIU/mL 2 950     Magnesium:     Results from last 7 days  Lab Units 12/05/18  0458   MAGNESIUM mg/dL 2 3     Lipid Profile:     Imaging: I have personally reviewed pertinent reports      VTE Pharmacologic Prophylaxis: Heparin  VTE Mechanical Prophylaxis: sequential compression device    [facility]  PGY-1 Internal Medicine

## 2018-12-06 NOTE — PLAN OF CARE
DISCHARGE PLANNING - CARE MANAGEMENT     Discharge to post-acute care or home with appropriate resources Progressing        GENITOURINARY - ADULT     Maintains or returns to baseline urinary function Progressing     Absence of urinary retention Progressing     Urinary catheter remains patent Progressing        HEMATOLOGIC - ADULT     Maintains hematologic stability Progressing        MUSCULOSKELETAL - ADULT     Maintain or return mobility to safest level of function Progressing     Maintain proper alignment of affected body part Progressing        Nutrition/Hydration-ADULT     Nutrient/Hydration intake appropriate for improving, restoring or maintaining nutritional needs Progressing        Potential for Falls     Patient will remain free of falls Progressing        Prexisting or High Potential for Compromised Skin Integrity     Skin integrity is maintained or improved Progressing        SKIN/TISSUE INTEGRITY - ADULT     Skin integrity remains intact Progressing     Incision(s), wounds(s) or drain site(s) healing without S/S of infection Progressing     Oral mucous membranes remain intact Progressing

## 2018-12-06 NOTE — SOCIAL WORK
CM met with pt to discuss d/c plan  Pt was asleep but discussed in detail with pt's   They're first choice for rehab is [facility]  CM will place referral  Family unsure of other acute rehabs or SNFs but will discuss amongst themselves

## 2018-12-07 LAB
ABO GROUP BLD BPU: NORMAL
ANION GAP SERPL CALCULATED.3IONS-SCNC: 7 MMOL/L (ref 4–13)
BASOPHILS # BLD AUTO: 0.02 THOUSANDS/ΜL (ref 0–0.1)
BASOPHILS NFR BLD AUTO: 0 % (ref 0–1)
BPU ID: NORMAL
BUN SERPL-MCNC: 17 MG/DL (ref 5–25)
CALCIUM SERPL-MCNC: 8 MG/DL (ref 8.3–10.1)
CHLORIDE SERPL-SCNC: 112 MMOL/L (ref 100–108)
CO2 SERPL-SCNC: 22 MMOL/L (ref 21–32)
CREAT SERPL-MCNC: 0.84 MG/DL (ref 0.6–1.3)
CROSSMATCH: NORMAL
EOSINOPHIL # BLD AUTO: 0.15 THOUSAND/ΜL (ref 0–0.61)
EOSINOPHIL NFR BLD AUTO: 2 % (ref 0–6)
ERYTHROCYTE [DISTWIDTH] IN BLOOD BY AUTOMATED COUNT: 13.2 % (ref 11.6–15.1)
GFR SERPL CREATININE-BSD FRML MDRD: 67 ML/MIN/1.73SQ M
GLUCOSE SERPL-MCNC: 94 MG/DL (ref 65–140)
HCT VFR BLD AUTO: 26.3 % (ref 34.8–46.1)
HGB BLD-MCNC: 8.7 G/DL (ref 11.5–15.4)
IMM GRANULOCYTES # BLD AUTO: 0.08 THOUSAND/UL (ref 0–0.2)
IMM GRANULOCYTES NFR BLD AUTO: 1 % (ref 0–2)
LYMPHOCYTES # BLD AUTO: 0.84 THOUSANDS/ΜL (ref 0.6–4.47)
LYMPHOCYTES NFR BLD AUTO: 13 % (ref 14–44)
MCH RBC QN AUTO: 32 PG (ref 26.8–34.3)
MCHC RBC AUTO-ENTMCNC: 33.1 G/DL (ref 31.4–37.4)
MCV RBC AUTO: 97 FL (ref 82–98)
MONOCYTES # BLD AUTO: 0.35 THOUSAND/ΜL (ref 0.17–1.22)
MONOCYTES NFR BLD AUTO: 6 % (ref 4–12)
NEUTROPHILS # BLD AUTO: 4.89 THOUSANDS/ΜL (ref 1.85–7.62)
NEUTS SEG NFR BLD AUTO: 78 % (ref 43–75)
NRBC BLD AUTO-RTO: 0 /100 WBCS
PLATELET # BLD AUTO: 83 THOUSANDS/UL (ref 149–390)
PMV BLD AUTO: 12.8 FL (ref 8.9–12.7)
POTASSIUM SERPL-SCNC: 3.9 MMOL/L (ref 3.5–5.3)
RBC # BLD AUTO: 2.72 MILLION/UL (ref 3.81–5.12)
SODIUM SERPL-SCNC: 141 MMOL/L (ref 136–145)
UNIT DISPENSE STATUS: NORMAL
UNIT PRODUCT CODE: NORMAL
UNIT RH: NORMAL
WBC # BLD AUTO: 6.33 THOUSAND/UL (ref 4.31–10.16)

## 2018-12-07 PROCEDURE — 99232 SBSQ HOSP IP/OBS MODERATE 35: CPT | Performed by: INTERNAL MEDICINE

## 2018-12-07 PROCEDURE — 97167 OT EVAL HIGH COMPLEX 60 MIN: CPT

## 2018-12-07 PROCEDURE — 85025 COMPLETE CBC W/AUTO DIFF WBC: CPT | Performed by: PHYSICIAN ASSISTANT

## 2018-12-07 PROCEDURE — 97530 THERAPEUTIC ACTIVITIES: CPT

## 2018-12-07 PROCEDURE — G8988 SELF CARE GOAL STATUS: HCPCS

## 2018-12-07 PROCEDURE — 80048 BASIC METABOLIC PNL TOTAL CA: CPT | Performed by: PHYSICIAN ASSISTANT

## 2018-12-07 PROCEDURE — G8987 SELF CARE CURRENT STATUS: HCPCS

## 2018-12-07 PROCEDURE — 99232 SBSQ HOSP IP/OBS MODERATE 35: CPT | Performed by: PHYSICIAN ASSISTANT

## 2018-12-07 RX ORDER — AMIODARONE HYDROCHLORIDE 200 MG/1
200 TABLET ORAL 2 TIMES DAILY WITH MEALS
Status: DISCONTINUED | OUTPATIENT
Start: 2018-12-07 | End: 2018-12-08 | Stop reason: HOSPADM

## 2018-12-07 RX ORDER — POTASSIUM CHLORIDE 20 MEQ/1
20 TABLET, EXTENDED RELEASE ORAL ONCE
Status: COMPLETED | OUTPATIENT
Start: 2018-12-07 | End: 2018-12-07

## 2018-12-07 RX ADMIN — PRAVASTATIN SODIUM 40 MG: 40 TABLET ORAL at 16:21

## 2018-12-07 RX ADMIN — APIXABAN 5 MG: 5 TABLET, FILM COATED ORAL at 18:18

## 2018-12-07 RX ADMIN — AMIODARONE HYDROCHLORIDE 200 MG: 200 TABLET ORAL at 16:21

## 2018-12-07 RX ADMIN — ACETAMINOPHEN 975 MG: 325 TABLET, FILM COATED ORAL at 21:37

## 2018-12-07 RX ADMIN — PANTOPRAZOLE SODIUM 20 MG: 20 TABLET, DELAYED RELEASE ORAL at 05:06

## 2018-12-07 RX ADMIN — SENNOSIDES AND DOCUSATE SODIUM 1 TABLET: 8.6; 5 TABLET ORAL at 18:18

## 2018-12-07 RX ADMIN — HYDROCHLOROTHIAZIDE 12.5 MG: 12.5 TABLET ORAL at 10:27

## 2018-12-07 RX ADMIN — ACETAMINOPHEN 975 MG: 325 TABLET, FILM COATED ORAL at 05:06

## 2018-12-07 RX ADMIN — SODIUM CHLORIDE 75 ML/HR: 0.9 INJECTION, SOLUTION INTRAVENOUS at 01:10

## 2018-12-07 RX ADMIN — LIDOCAINE 1 PATCH: 50 PATCH TOPICAL at 10:27

## 2018-12-07 RX ADMIN — VITAMIN D, TAB 1000IU (100/BT) 5000 UNITS: 25 TAB at 10:26

## 2018-12-07 RX ADMIN — POTASSIUM CHLORIDE 20 MEQ: 1500 TABLET, EXTENDED RELEASE ORAL at 13:43

## 2018-12-07 RX ADMIN — CALCIUM CARBONATE (ANTACID) CHEW TAB 500 MG 500 MG: 500 CHEW TAB at 10:26

## 2018-12-07 RX ADMIN — METOPROLOL TARTRATE 25 MG: 25 TABLET, FILM COATED ORAL at 10:26

## 2018-12-07 RX ADMIN — CALCIUM CARBONATE (ANTACID) CHEW TAB 500 MG 500 MG: 500 CHEW TAB at 18:18

## 2018-12-07 RX ADMIN — ACETAMINOPHEN 975 MG: 325 TABLET, FILM COATED ORAL at 13:43

## 2018-12-07 RX ADMIN — SENNOSIDES AND DOCUSATE SODIUM 1 TABLET: 8.6; 5 TABLET ORAL at 10:26

## 2018-12-07 RX ADMIN — METOPROLOL TARTRATE 25 MG: 25 TABLET, FILM COATED ORAL at 21:43

## 2018-12-07 RX ADMIN — APIXABAN 5 MG: 5 TABLET, FILM COATED ORAL at 10:26

## 2018-12-07 RX ADMIN — LEVOTHYROXINE SODIUM 50 MCG: 50 TABLET ORAL at 05:06

## 2018-12-07 NOTE — ASSESSMENT & PLAN NOTE
- Amiodarone gtt d/c'd overnight  - await Cardiology evaluation today for additional medication adjustment  - Eliquis has been restarted

## 2018-12-07 NOTE — PLAN OF CARE
Problem: PHYSICAL THERAPY ADULT  Goal: Performs mobility at highest level of function for planned discharge setting  See evaluation for individualized goals  Treatment/Interventions: Functional transfer training, LE strengthening/ROM, Elevations, Endurance training, Therapeutic exercise, Patient/family training, Equipment eval/education, Bed mobility, Gait training, Spoke to nursing, Family  Equipment Recommended: Nicol Galvez (AMISH)       See flowsheet documentation for full assessment, interventions and recommendations  Prognosis: Fair  Problem List: Decreased strength, Decreased range of motion, Decreased endurance, Impaired balance, Decreased mobility, Decreased coordination, Impaired judgement, Decreased safety awareness, Pain, Orthopedic restrictions  Assessment: pt progressed w mob and funct activity tolerance; requires mod assist w transf and amb 5 ft using RW + chait follow for safety; gait pattern antalgic, decreased R stance, shuffle step walker reliant + falls risk; dist limited by snaguinous drainage from incisional site; pt seated in chair nsg made aware and f/u w dressing change; rec cont PT IP rehab when med cleared; spouse present and in agreement w goals for funct recovery to baseline  Barriers to Discharge: Inaccessible home environment, Decreased caregiver support  Barriers to Discharge Comments: (S) Has steps at home  Recommendation: Post acute IP rehab     PT - OK to Discharge: Yes    See flowsheet documentation for full assessment

## 2018-12-07 NOTE — PLAN OF CARE

## 2018-12-07 NOTE — ASSESSMENT & PLAN NOTE
- POD #3 s/p right MIKE  - WBAT  - Pain controlled with tylenol and prn dilaudid/oxy, geriatrics dosing  - PT/OT

## 2018-12-07 NOTE — PHYSICAL THERAPY NOTE
PT Progress Note     12/07/18 5725   Pain Assessment   Pain Assessment 0-10   Pain Score 7   Pain Type Acute pain   Pain Location Hip   Hospital Pain Intervention(s) Ambulation/increased activity   Response to Interventions increased pain w WB   Restrictions/Precautions   RLE Weight Bearing Per Order WBAT   Other Precautions THR;WBS;Pain; Fall Risk   General   Chart Reviewed Yes   Family/Caregiver Present Yes   Cognition   Arousal/Participation Alert   Attention Attends with cues to redirect   Orientation Level Oriented to person;Oriented to place   Following Commands Follows one step commands with increased time or repetition   Subjective   Subjective pt in chair, nsg cleared, pt agreeable for PT amb   Bed Mobility   Additional Comments bed mob NT- pt in chair on PT arrival   Transfers   Sit to Stand 4  Minimal assistance   Additional items Increased time required;Verbal cues   Stand to Sit 4  Minimal assistance   Additional items Increased time required;Verbal cues   Stand pivot 4  Minimal assistance   Additional items Increased time required;Verbal cues  (RW)   Ambulation/Elevation   Gait pattern Improper Weight shift; Antalgic;Decreased R stance;Shuffling; Step to;Excessively slow   Gait Assistance 4  Minimal assist   Additional items Assist x 1  (+ additional chair follow for safety)   Assistive Device Rolling walker   Distance 5 ft   Balance   Static Standing Fair -  (RW)   Dynamic Standing Poor +  (RW)   Endurance Deficit   Endurance Deficit Yes   Activity Tolerance   Activity Tolerance Patient limited by fatigue;Patient limited by pain;Treatment limited secondary to medical complications (Comment)  (Incisional site drainage)   Nurse Made Aware yes   Assessment   Problem List Decreased strength;Decreased range of motion;Decreased endurance; Impaired balance;Decreased mobility; Decreased coordination; Impaired judgement;Decreased safety awareness;Pain;Orthopedic restrictions   Assessment pt progressed w mob and funct activity tolerance; requires mod assist w transf and amb 5 ft using RW + chait follow for safety; gait pattern antalgic, decreased R stance, shuffle step walker reliant + falls risk; dist limited by snaguinous drainage from incisional site; pt seated in chair nsg made aware and f/u w dressing change; rec cont PT IP rehab when med cleared; spouse present and in agreement w goals for funct recovery to baseline   Goals   Patient Goals I want to walk   STG Expiration Date 12/16/18   Short Term Goal #1 per PT assessment 12/05/18 In 10 days pt will complete: 1) Bed mobility skills with min A x 1 to increase safety and independence as well as decrease caregiver burden  2) Functional transfers with min A x 1 to promote increased independence, safety, and QOL in the home environment  3) Ambulate 76' using least restrictive AD with min A x 1 without LOB and stable vitals so that pt can negotiate home environment safely and promote independence with functional mobility and return to PLOF  4) Stair training up/ down 3 step/s using rail/s with min A x 1 so that pt can enter/negotiate home environment safely and decrease fall risk  5) Improve balance grades to Good to increase safety with all mobility and decrease fall risk  6) Improve BLE strength by 1/2 grade to help increase overall functional mobility and decrease fall risk  7) PT for ongoing pt and family education; DME needs and D/C planning to promote highest level of function in least restrictive environment  Plan   Treatment/Interventions Functional transfer training;LE strengthening/ROM; Therapeutic exercise;Elevations; Endurance training;Cognitive reorientation;Patient/family training;Equipment eval/education; Bed mobility;Gait training; Compensatory technique education;Continued evaluation;Spoke to nursing   Progress Progressing toward goals   PT Frequency (5-7x/wk)   Recommendation   Recommendation Post acute IP rehab   Equipment Recommended Pricila Love   PT - OK to Discharge Yes

## 2018-12-07 NOTE — OCCUPATIONAL THERAPY NOTE
[address]  Evaluation     Patient Name: Maria Eugenia Torres  SXTWI'L Date: 12/7/2018  Problem List  Patient Active Problem List   Diagnosis    Microscopic hematuria    Aortic stenosis, moderate    Dyslipidemia    Hypertension    Palpitations    Atrial fibrillation (HCC)    Hypothyroidism    Cognitive impairment    MCI (mild cognitive impairment)    Gait disturbance    Closed intertrochanteric fracture of hip, right, initial encounter ([facility] [address] )    Severe aortic stenosis    Dehydration    Closed fracture of neck of right femur ([facility] [address] )    Fall    Ambulatory dysfunction    Physical deconditioning    Osteoporosis    Atrial fibrillation with rapid ventricular response (HCC)    NSTEMI (non-ST elevated myocardial infarction) ([facility] [address] )    Acute blood loss anemia     Past Medical History  Past Medical History:   Diagnosis Date    Atrial fibrillation (HCC)     Disease of thyroid gland     Hyperlipidemia     Hypertension     Psychiatric disorder     dementia     Past Surgical History  Past Surgical History:   Procedure Laterality Date    HIP ARTHROPLASTY Right 12/4/2018    Procedure: ARTHROPLASTY HIP TOTAL;  Surgeon: Sami Joiner MD;  [facility]: [facility];  Service: Orthopedics    HYSTERECTOMY           12/07/18 1130   Note Type   Note type Eval only   Restrictions/Precautions   Weight Bearing Precautions Per Order Yes   RUE Weight Bearing Per Order WBAT   LUE Weight Bearing Per Order WBAT   RLE Weight Bearing Per Order WBAT   LLE Weight Bearing Per Order WBAT   Other Precautions THR; Cognitive; Chair Alarm; Fall Risk;Pain   Pain Assessment   Pain Assessment 0-10   Pain Score 7   Pain Type Acute pain;Surgical pain   Pain Location Hip   Pain Orientation Right   Hospital Pain Intervention(s) Repositioned; Ambulation/increased activity; Emotional support   Home Living   Type of [address] One level;Stairs to enter with rails   Prior Function   Level of [place] Independent with ADLs and functional mobility   Lives With Spouse; Son   Rene Help From Family   ADL Assistance Independent   IADLs Needs assistance   Falls in the last 6 months 1 to 4   Vocational Retired   [address] [place] [address] - 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   Reciprocal Relationships SUPPORTIVE FAMILY   Service to Others RETIRED   Intrinsic Gratification MOSTLY SEDENTARY   Subjective   Subjective OFFERS NO C/O    ADL   Eating Assistance 5  Supervision/Setup   Grooming Assistance 4  Minimal Assistance   UB Bathing Assistance 4  Minimal Assistance   LB Bathing Assistance 2  Maximal Assistance   UB Dressing Assistance 4  Minimal Assistance   LB Dressing Assistance 2  Maximal Assistance   Toileting Assistance  2  Maximal Assistance   Bed Mobility   Supine to Sit 3  Moderate assistance   Additional items Assist x 2   Transfers   Sit to Stand 3  Moderate assistance   Additional items Assist x 2   Stand to Sit 3  Moderate assistance   Additional items Assist x 2   Stand pivot 3  Moderate assistance   Additional items Assist x 2   Functional Mobility   Functional Mobility 3  Moderate assistance   Additional Comments X2   Additional items Rolling walker   Balance   Static Sitting Fair   Dynamic Sitting Fair -   Static Standing Poor +   Dynamic Standing Poor   Ambulatory Poor   Activity Tolerance   Activity Tolerance Patient limited by fatigue;Patient limited by pain;Treatment limited secondary to medical complications (Comment)   RUE Assessment   RUE Assessment WFL   LUE Assessment   LUE Assessment WFL   Cognition   Overall Cognitive Status Impaired   Arousal/Participation Arousable; Cooperative   Attention Attends with cues to redirect   Orientation Level Oriented to person   Memory Decreased short term memory;Decreased recall of recent events;Decreased recall of precautions   Following Commands Follows one step commands with increased time or repetition   Assessment   Limitation Decreased ADL status; Decreased Safe judgement during ADL;Decreased endurance;Decreased cognition;Decreased self-care trans;Decreased high-level ADLs   Prognosis Good   Assessment Pt is a 66 y o  female who was admitted to [facility] on 12/3/2018 with Closed intertrochanteric fracture of hip, right, initial encounter ([facility] [address] ) s/p R THR   Pt's problem list also includes PMH of HTN, previous surgery, limited cognition and afib, thyroid disease, hld, dementia  At baseline pt was completing adls independently and ambulating with RW - family manages majority of iadls  Pt lives with spouse and son in 1 story home  Currently pt requires max assist for overall ADLS and mod a x 2  for functional mobility/transfers  Pt currently presents with impairments in the following categories -difficulty performing ADLS, difficulty performing IADLS , limited insight into deficits, compliance, decreased initiation and engagement  and health management  activity tolerance, endurance, standing balance/tolerance, memory, insight, safety  and judgement   These impairments, as well as pt's fatigue, pain, hip precautions, orthopedic restricitions , WBS  and risk for falls  limit pt's ability to safely engage in all baseline areas of occupation, includingbathing, dressing, toileting, functional mobility/transfers, community mobility, house maintenance, social participation  and leisure activities  From OT standpoint, recommend inpt rehab  upon D/C  OT will continue to follow to address the below stated goals  Goals   Patient Goals none stated 2* cognitive deficits   LTG Time Frame 10-14   Long Term Goal #1 refer to established goals below   Plan   Treatment Interventions ADL retraining;Functional transfer training; Endurance training;Cognitive reorientation;Patient/family training;Equipment evaluation/education; Compensatory technique education; Activityengagement   Goal Expiration Date 12/21/18   OT Frequency 3-5x/wk   Recommendation   OT Discharge Recommendation Short Term Rehab   Barthel Index   Feeding 5   Bathing 0   Grooming Score 0   Dressing Score 0   Bladder Score 10   Bowels Score 10   Toilet Use Score 5   Transfers (Bed/Chair) Score 5   Mobility (Level Surface) Score 0   Stairs Score 0   Barthel Index Score 35       OCCUPATIONAL THERAPY GOALS:    *SBA ADLS AFTER SETUP WITH USE OF ADAPTIVE DEVICES    *SBA TOILETING AND CLOTHING MANAGEMENT   *SBA FUNCTIONAL MOB AND TRANSFERS TO ALL SURFACES WITH FAIR TO FAIR+ BALANCE/SAFETY FOR PARTICIPATION IN DYNAMIC ADLS   *DEMONSTRATE FAIR+ CARRYOVER WITH SAFE USE OF RW, CARRYOVER WITH THR PREC AND USE OF LHAE DURING FUNCTIONAL TASKS  *ASSESS DME NEEDS  *INCREASE ACTIVITY TOLERANCE TO 35-40MIN FOR PARTICIPATION IN ADLS AND ENJOYABLE ACTIVITIES     *PT TO PARTICIPATE IN ONGOING FUNCTIONAL COGNITIVE ASSESSMENT WITH GOOD ATTENTION/PARTICIPATION TO ASSIST WITH SAFE D/C RECOMMENDATIONS     Thom Prajapati, OT

## 2018-12-07 NOTE — PLAN OF CARE
Problem: OCCUPATIONAL THERAPY ADULT  Goal: Performs self-care activities at highest level of function for planned discharge setting  See evaluation for individualized goals  Treatment Interventions: ADL retraining, Functional transfer training, Endurance training, Cognitive reorientation, Patient/family training, Equipment evaluation/education, Compensatory technique education, Activityengagement          See flowsheet documentation for full assessment, interventions and recommendations  Limitation: Decreased ADL status, Decreased Safe judgement during ADL, Decreased endurance, Decreased cognition, Decreased self-care trans, Decreased high-level ADLs  Prognosis: Good  Assessment: Pt is a 66 y o  female who was admitted to [facility] on 12/3/2018 with Closed intertrochanteric fracture of hip, right, initial encounter ([facility] [address] ) s/p R THR   Pt's problem list also includes PMH of HTN, previous surgery, limited cognition and afib, thyroid disease, hld, dementia  At baseline pt was completing adls independently and ambulating with RW - family manages majority of iadls  Pt lives with spouse and son in 1 story home  Currently pt requires max assist for overall ADLS and mod a x 2  for functional mobility/transfers  Pt currently presents with impairments in the following categories -difficulty performing ADLS, difficulty performing IADLS , limited insight into deficits, compliance, decreased initiation and engagement  and health management  activity tolerance, endurance, standing balance/tolerance, memory, insight, safety  and judgement    These impairments, as well as pt's fatigue, pain, hip precautions, orthopedic restricitions , WBS  and risk for falls  limit pt's ability to safely engage in all baseline areas of occupation, includingbathing, dressing, toileting, functional mobility/transfers, community mobility, house maintenance, social participation  and leisure activities  From OT standpoint, recommend inpt rehab  upon D/C  OT will continue to follow to address the below stated goals        OT Discharge Recommendation: Short Term Rehab

## 2018-12-07 NOTE — SOCIAL WORK
Pt accepted to [facility]  Pt can d/c there tomorrow when a bed is available

## 2018-12-07 NOTE — UTILIZATION REVIEW
Continued Stay Review    Date: 12/7    Vital Signs: /62 (BP Location: Left arm)   Pulse 99   Temp 98 4 °F (36 9 °C) (Oral)   Resp 16   Ht 5' 3" (1 6 m)   Wt 61 4 kg (135 lb 5 8 oz)   SpO2 96%   BMI 23 98 kg/m²     Medications:   Scheduled Meds:   Current Facility-Administered Medications:  acetaminophen 975 mg Oral Q8H [address]   amiodarone 200 mg Oral BID With Meals   apixaban 5 mg Oral BID   calcium carbonate 500 mg Oral BID   cholecalciferol 5,000 Units Oral Daily   hydrochlorothiazide 12 5 mg Oral Daily   levothyroxine 50 mcg Oral Early Morning   lidocaine 1 patch Topical Daily   metoprolol tartrate 25 mg Oral Q12H AMY   ondansetron 4 mg Intravenous Once   pantoprazole 20 mg Oral Early Morning   potassium chloride 20 mEq Oral Once   pravastatin 40 mg Oral Daily With Dinner   senna-docusate sodium 1 tablet Oral BID   sodium chloride 500 mL Intravenous Once     Continuous Infusions:     sodium chloride 0 9 % infusion  Rate: 75 mL/hr Dose: 75 mL/hr  Freq: Continuous Route: IV    PRN Meds: metoprolol    naloxone    ondansetron    oxyCODONE    Abnormal Labs/Diagnostic Results:   H/H = 8 7/26 3    Age/Sex: 66 y o  female     Assessment/Plan:   12/7 Progress notes:  Severe Aortic Stenosis/ Acute Blood Loss Anemia/ Atrial Fibrillation/ Closed Intertrochanteric Fracture of hip, Right  Trend Hgb  Amiodarone gtt d/c overnight  Awaiting Cardiology eval for additional medication adjustment  Eliquis restarted  POD #3 S/P Right MIKE  WBAT  Pain control   PT/OT    Discharge Plan: Referral to [facility]

## 2018-12-07 NOTE — PROGRESS NOTES
Progress Note - Cardiology   Russ Landaverde 66 y o  female MRN: [id number]  Unit/Bed#: [facility] 905-01 Encounter: [id number]    Assessment/Plan:    Atrial fibrillation  -pt day 3 s/p right hip total arthroplasty  -Pt previously on diltiazem drip  Given p o  Amiodarone  Patient later developed AFib with rapid RVR on the night of 12/6/18 with hypotension  Bolused with amiodarone and transitioned to amiodarone drip  Amio drip was discontinued this morning by the trauma team  -patient was adequately rate controlled throughout the day yesterday  currently appears to be atrial fibrillation on telemetry  Per the pt and the family patient has been very weepy and emotional this morning  -continue to monitor telemetry  -continue anticoagulation with Eliquis 5 mg b i d   -continue Lopressor 25 mg b i d  Severe aortic stenosis  -last echo from 06/25/2018 - EF 55%, normal LV wall thickness, LV diastolic parameters normal, normal right ventricle, mild MR, severe aortic stenosis, no aortic regurgitation  -patient being monitored as an outpatient by Dr Soniya Cheung  -patient is preload dependent given aortic stenosis  Continue to monitor patient's volume status and give IV hydration as needed     Subjective/Objective   Chief Complaint:     Subjective:  Patient seen and examined  States that she is feeling better this morning although she is feeling somewhat weepy and upset  She denies chest pain, shortness of breath, palpitations, dizziness, lightheadedness      Objective:     Vitals: /67 (BP Location: Left arm)   Pulse (!) 111   Temp 98 4 °F (36 9 °C) (Oral)   Resp 16   Ht 5' 3" (1 6 m)   Wt 61 4 kg (135 lb 5 8 oz)   SpO2 96%   BMI 23 98 kg/m²   Vitals:    12/03/18 0807 12/04/18 0600   Weight: 62 6 kg (138 lb 0 1 oz) 61 4 kg (135 lb 5 8 oz)     Orthostatic Blood Pressures      Most Recent Value   Blood Pressure  146/67 filed at 12/07/2018 0759   Patient Position - Orthostatic VS  Lying filed at 12/07/2018 7530 Intake/Output Summary (Last 24 hours) at 12/07/18 1044  Last data filed at 12/07/18 0759   Gross per 24 hour   Intake             1735 ml   Output              700 ml   Net             1035 ml       Invasive Devices     Peripheral Intravenous Line            Peripheral IV 12/04/18 Right Forearm 2 days    Peripheral IV 12/04/18 Right Wrist 2 days              Physical Exam   Constitutional: She is oriented to person, place, and time  She appears well-developed and well-nourished  No distress  HENT:   Head: Normocephalic and atraumatic  Eyes: Right eye exhibits no discharge  Left eye exhibits no discharge  No scleral icterus  Cardiovascular:   Murmur heard  Tachycardic  Irregularly irregular rhythm   Pulmonary/Chest: Effort normal and breath sounds normal  No respiratory distress  She has no wheezes  Abdominal: Soft  Bowel sounds are normal  She exhibits no distension  There is no tenderness  Musculoskeletal: She exhibits no edema or tenderness  Neurological: She is alert and oriented to person, place, and time  She exhibits normal muscle tone  Skin: Skin is warm and dry  Capillary refill takes less than 2 seconds  No rash noted  No erythema  Psychiatric: She has a normal mood and affect   Her behavior is normal      Lab Results:   CBC with diff:     Results from last 7 days  Lab Units 12/07/18  0445   WBC Thousand/uL 6 33   RBC Million/uL 2 72*   HEMOGLOBIN g/dL 8 7*   HEMATOCRIT % 26 3*   MCV fL 97   MCH pg 32 0   MCHC g/dL 33 1   RDW % 13 2   MPV fL 12 8*   PLATELETS Thousands/uL 83*     CMP:   Results from last 7 days  Lab Units 12/07/18  0445  12/04/18  2318  12/03/18  0943   SODIUM mmol/L 141  < >  --   < > 135*   POTASSIUM mmol/L 3 9  < >  --   < > 4 8   CHLORIDE mmol/L 112*  < >  --   < > 104   CO2 mmol/L 22  < >  --   < > 18*   CO2, I-STAT mmol/L  --   --  22  --   --    BUN mg/dL 17  < >  --   < > 22   CREATININE mg/dL 0 84  < >  --   < > 0 71   GLUCOSE, ISTAT mg/dl  --   --  136  -- --    CALCIUM mg/dL 8 0*  < >  --   < > 8 9   AST U/L  --   --   --   --  34   ALT U/L  --   --   --   --  11*   ALK PHOS U/L  --   --   --   --  43*   EGFR ml/min/1 73sq m 67  < >  --   < > 82   < > = values in this interval not displayed  Troponin:     0  Lab Value Date/Time   TROPONINI 0 33 (H) 12/05/2018 0458   TROPONINI 0 36 (H) 12/05/2018 0202   TROPONINI 0 36 (H) 12/04/2018 2259   TROPONINI <0 02 12/03/2018 0434   TROPONINI <0 02 05/26/2018 1352     BNP:   Results from last 7 days  Lab Units 12/07/18  0445  12/04/18  2318   POTASSIUM mmol/L 3 9  < >  --    CHLORIDE mmol/L 112*  < >  --    CO2 mmol/L 22  < >  --    CO2, I-STAT mmol/L  --   --  22   BUN mg/dL 17  < >  --    CREATININE mg/dL 0 84  < >  --    GLUCOSE, ISTAT mg/dl  --   --  136   CALCIUM mg/dL 8 0*  < >  --    EGFR ml/min/1 73sq m 67  < >  --    < > = values in this interval not displayed  Coags:     Results from last 7 days  Lab Units 12/04/18  0441 12/03/18  0650   PTT seconds  --  36   INR  1 29* 1 41*     TSH:     Results from last 7 days  Lab Units 12/03/18  0943   TSH 3RD GENERATON uIU/mL 2 950     Magnesium:     Results from last 7 days  Lab Units 12/05/18  0458   MAGNESIUM mg/dL 2 3     Lipid Profile:     Imaging: I have personally reviewed pertinent reports      VTE Pharmacologic Prophylaxis: Heparin  VTE Mechanical Prophylaxis: sequential compression device    Formerly [facility]  PGY-1 Internal Medicine

## 2018-12-07 NOTE — PROGRESS NOTES
Progress Note - Jesus Levine 1940, 66 y o  female MRN: [id number]    Unit/Bed#: [facility] 905-01 Encounter: [id number]    Primary Care Provider: Serge Rodriguez DO   Date and time admitted to hospital: 12/3/2018  3:59 AM        Acute blood loss anemia   Assessment & Plan    - Hgb stable - 8 7 this morning  - right thigh soft  - trend Hgb      Ambulatory dysfunction   Assessment & Plan    - WBAT  - PT/OT     Severe aortic stenosis   Assessment & Plan    - Cardiology following  - Hgb stable     Atrial fibrillation (HCC)   Assessment & Plan    - Amiodarone gtt d/c'd overnight  - await Cardiology evaluation today for additional medication adjustment  - Eliquis has been restarted     * Closed intertrochanteric fracture of hip, right, initial encounter ([facility] [address] )   Assessment & Plan    - POD #3 s/p right MIKE  - WBAT  - Pain controlled with tylenol and prn dilaudid/oxy, geriatrics dosing  - PT/OT       DISPOSITION/ PLAN:  Await Cardiology recommendations today  PT/OT  Dispo planning    Chief Complaint: Weepy    Subjective: Feeling a bit weepy today  Not sure why  Denies CP, SOB    Right leg pain well controlled     Objective:     Meds/Allergies     Current Facility-Administered Medications:     acetaminophen (TYLENOL) tablet 975 mg, 975 mg, Oral, Q8H [address], Eyal Olmedo MD, 975 mg at 12/07/18 0506    apixaban (ELIQUIS) tablet 5 mg, 5 mg, Oral, BID, Maribell Sands MD, 5 mg at 12/06/18 1805    calcium carbonate (TUMS) chewable tablet 500 mg, 500 mg, Oral, BID, Rivera Haile DO, 500 mg at 12/06/18 1805    cholecalciferol (VITAMIN D3) tablet 5,000 Units, 5,000 Units, Oral, Daily, Rivera Haile DO, 5,000 Units at 12/06/18 0930    hydrochlorothiazide (HYDRODIURIL) tablet 12 5 mg, 12 5 mg, Oral, Daily, Eyal Olmedo MD, 12 5 mg at 12/05/18 2481    levothyroxine tablet 50 mcg, 50 mcg, Oral, Early Morning, Eyal Olmedo MD, 50 mcg at 12/07/18 0506    lidocaine (LIDODERM) 5 % patch 1 patch, 1 patch, Topical, Daily, Piyush Johnson PA-C, 1 patch at 12/06/18 0931    metoprolol (LOPRESSOR) injection 5 mg, 5 mg, Intravenous, Q6H PRN, Shen Carlisle PA-C, 5 mg at 12/04/18 2347    metoprolol tartrate (LOPRESSOR) tablet 25 mg, 25 mg, Oral, Q12H [address], Pamela Toth MD, 25 mg at 12/06/18 0940    naloxone (NARCAN) 0 04 mg/mL syringe 0 04 mg, 0 04 mg, Intravenous, Q1MIN PRN, Pamela Toth MD    ondansetron [facility]) injection 4 mg, 4 mg, Intravenous, Q4H PRN, Pamela Toth MD, 4 mg at 12/04/18 2304    ondansetron [facility]) injection 4 mg, 4 mg, Intravenous, Once, Pamela Toth MD    oxyCODONE (ROXICODONE) IR tablet 2 5 mg, 2 5 mg, Oral, Q4H PRN, Pamela Toth MD, 2 5 mg at 12/05/18 2032    oxyCODONE (ROXICODONE) IR tablet 5 mg, 5 mg, Oral, Q4H PRN, Pamela Toth MD, 5 mg at 12/05/18 0208    pantoprazole (PROTONIX) EC tablet 20 mg, 20 mg, Oral, Early Morning, Pamela Toth MD, 20 mg at 12/07/18 0506    pravastatin (PRAVACHOL) tablet 40 mg, 40 mg, Oral, Daily With Dinner, Pamela Toth MD, 40 mg at 12/06/18 1805    senna-docusate sodium (SENOKOT S) 8 6-50 mg per tablet 1 tablet, 1 tablet, Oral, BID, Pamela Toth MD, 1 tablet at 12/06/18 0931    sodium chloride 0 9 % bolus 500 mL, 500 mL, Intravenous, Once, Aric Gomez DO, Last Rate: 250 mL/hr at 12/05/18 2156, 500 mL at 12/05/18 2156    sodium chloride 0 9 % infusion, 75 mL/hr, Intravenous, Continuous, Joselito Watson DO, Last Rate: 75 mL/hr at 12/07/18 0110, 75 mL/hr at 12/07/18 0110    Vitals: Blood pressure 110/55, pulse 82, temperature 98 3 °F (36 8 °C), temperature source Oral, resp  rate 17, height 5' 3" (1 6 m), weight 61 4 kg (135 lb 5 8 oz), SpO2 96 %  Body mass index is 23 98 kg/m²   SpO2: SpO2: 96 %    ABG: No results found for: PHART, QSN3XOJ, PO2ART, LRM6TFS, P5URTTOU, BEART, SOURCE      Intake/Output Summary (Last 24 hours) at 12/07/18 0757  Last data filed at 12/07/18 0534   Gross per 24 hour   Intake          2256 25 ml   Output             1000 ml   Net          1256 25 ml Invasive Devices     Peripheral Intravenous Line            Peripheral IV 12/04/18 Right Forearm 2 days    Peripheral IV 12/04/18 Right Wrist 2 days                        Nutrition/GI Proph/Bowel Reg: Senokot S    Pain management regimen: Tylenol, Lidoderm, Oxycodone    VTE Prophylaxis: Eliquis    Physical Exam:   Constitution: patient appears comfortable, no acute distress  HEENT: normocephalic, atraumatic, PERRLA, clear speech  CV: regular rate and rhythm, no edema, cap refill intact b/l LE's  Pulm: CTA, no wheezes, rhonchi or crackles, unlabored, equal bilaterally  Abd: soft, nontender, nondistended, active bowel sounds  Musc: moves all extremities, equal strength, no calf tenderness; RLE dressing intact, thigh soft  Neuro: A&O, no focal deficits  Skin: no rash or breakdown, warm      Lab Results:   BMP/CMP:   Lab Results   Component Value Date    SODIUM 141 12/07/2018    K 3 9 12/07/2018     (H) 12/07/2018    CO2 22 12/07/2018    BUN 17 12/07/2018    CREATININE 0 84 12/07/2018    CALCIUM 8 0 (L) 12/07/2018    EGFR 67 12/07/2018    and CBC:   Lab Results   Component Value Date    WBC 6 33 12/07/2018    HGB 8 7 (L) 12/07/2018    HCT 26 3 (L) 12/07/2018    MCV 97 12/07/2018    PLT 83 (L) 12/07/2018    MCH 32 0 12/07/2018    MCHC 33 1 12/07/2018    RDW 13 2 12/07/2018    MPV 12 8 (H) 12/07/2018    NRBC 0 12/07/2018     Imaging/EKG Studies: Results: I have personally reviewed pertinent reports          Nurse provider rounds completed with [facility]  Patient updated on plan of care

## 2018-12-07 NOTE — PROGRESS NOTES
Subjective: Pain somewhat improved  Denies numbness or tingling  Compressive dressing applied yesterday to right hip       Objective:  Lab Results   Component Value Date/Time    WBC 6 49 12/06/2018 02:38 AM    HGB 8 4 (L) 12/06/2018 04:38 PM       Vitals:    12/07/18 0326   BP: 110/55   Pulse: 82   Resp: 17   Temp: 98 3 °F (36 8 °C)   SpO2: 96%     RLE extremity  Dressing c/d/i  Motor & sensation grossly intact distally  Toes WWP    Assessment: Post op day 3 R MIKE for displaced R fem neck fx     Plan:  WBAT RLE   Pain control  DVT ppx- eliquis   PT  Monitor hgb   Dispo- OK for DC from orthopedic perspective

## 2018-12-07 NOTE — RESTORATIVE TECHNICIAN NOTE
Restorative Specialist Mobility Note       Activity: Ambulate in room, Chair (Assisted PT, please refer to PT notes for all information )     Assistive Device: Front wheel walker     Ambulation Response: Tolerated fairly well  Repositioned: Sitting, Up in chair           Range of Motion: Active, All extremities  Anti-Embolism Device On: Bilateral, Antiembolism stockings, knee     Patient left resting comfortably in bedside recliner, with chair alarm activated and call bell/table within reach

## 2018-12-08 ENCOUNTER — HOSPITAL ENCOUNTER (INPATIENT)
Facility: HOSPITAL | Age: 78
LOS: 17 days | Discharge: HOME HEALTH | DRG: 559 | End: 2018-12-25
Attending: PHYSICAL MEDICINE & REHABILITATION | Admitting: PHYSICAL MEDICINE & REHABILITATION
Payer: MEDICARE

## 2018-12-08 VITALS
TEMPERATURE: 98.4 F | DIASTOLIC BLOOD PRESSURE: 63 MMHG | HEIGHT: 63 IN | RESPIRATION RATE: 18 BRPM | WEIGHT: 139.5 LBS | BODY MASS INDEX: 24.72 KG/M2 | SYSTOLIC BLOOD PRESSURE: 112 MMHG | HEART RATE: 73 BPM | OXYGEN SATURATION: 98 %

## 2018-12-08 DIAGNOSIS — I48.0 PAROXYSMAL ATRIAL FIBRILLATION (HCC): ICD-10-CM

## 2018-12-08 DIAGNOSIS — K59.00 CONSTIPATION: ICD-10-CM

## 2018-12-08 DIAGNOSIS — E78.5 DYSLIPIDEMIA: ICD-10-CM

## 2018-12-08 DIAGNOSIS — S72.001D CLOSED FRACTURE OF NECK OF RIGHT FEMUR WITH ROUTINE HEALING, SUBSEQUENT ENCOUNTER: ICD-10-CM

## 2018-12-08 DIAGNOSIS — I35.0 AORTIC STENOSIS, MODERATE: Primary | ICD-10-CM

## 2018-12-08 DIAGNOSIS — I10 HYPERTENSION: ICD-10-CM

## 2018-12-08 DIAGNOSIS — R52 ACUTE PAIN: ICD-10-CM

## 2018-12-08 DIAGNOSIS — E55.9 VITAMIN D INSUFFICIENCY: ICD-10-CM

## 2018-12-08 DIAGNOSIS — B35.1 ONYCHOMYCOSIS: ICD-10-CM

## 2018-12-08 DIAGNOSIS — E03.9 HYPOTHYROIDISM: ICD-10-CM

## 2018-12-08 DIAGNOSIS — I48.91 ATRIAL FIBRILLATION (HCC): ICD-10-CM

## 2018-12-08 DIAGNOSIS — R11.0 CHRONIC NAUSEA: ICD-10-CM

## 2018-12-08 DIAGNOSIS — G47.00 INSOMNIA: ICD-10-CM

## 2018-12-08 DIAGNOSIS — F41.9 ANXIETY: ICD-10-CM

## 2018-12-08 PROBLEM — S72.001A FRACTURE OF FEMORAL NECK, RIGHT (HCC): Status: ACTIVE | Noted: 2018-12-08

## 2018-12-08 LAB
ANION GAP SERPL CALCULATED.3IONS-SCNC: 7 MMOL/L (ref 4–13)
BASOPHILS # BLD AUTO: 0.01 THOUSANDS/ΜL (ref 0–0.1)
BASOPHILS NFR BLD AUTO: 0 % (ref 0–1)
BUN SERPL-MCNC: 19 MG/DL (ref 5–25)
CALCIUM SERPL-MCNC: 8 MG/DL (ref 8.3–10.1)
CHLORIDE SERPL-SCNC: 109 MMOL/L (ref 100–108)
CO2 SERPL-SCNC: 26 MMOL/L (ref 21–32)
CREAT SERPL-MCNC: 0.8 MG/DL (ref 0.6–1.3)
EOSINOPHIL # BLD AUTO: 0.16 THOUSAND/ΜL (ref 0–0.61)
EOSINOPHIL NFR BLD AUTO: 3 % (ref 0–6)
ERYTHROCYTE [DISTWIDTH] IN BLOOD BY AUTOMATED COUNT: 13.2 % (ref 11.6–15.1)
GFR SERPL CREATININE-BSD FRML MDRD: 71 ML/MIN/1.73SQ M
GLUCOSE SERPL-MCNC: 103 MG/DL (ref 65–140)
HCT VFR BLD AUTO: 22.4 % (ref 34.8–46.1)
HGB BLD-MCNC: 7.4 G/DL (ref 11.5–15.4)
IMM GRANULOCYTES # BLD AUTO: 0.08 THOUSAND/UL (ref 0–0.2)
IMM GRANULOCYTES NFR BLD AUTO: 1 % (ref 0–2)
LYMPHOCYTES # BLD AUTO: 0.85 THOUSANDS/ΜL (ref 0.6–4.47)
LYMPHOCYTES NFR BLD AUTO: 14 % (ref 14–44)
MCH RBC QN AUTO: 32.3 PG (ref 26.8–34.3)
MCHC RBC AUTO-ENTMCNC: 33 G/DL (ref 31.4–37.4)
MCV RBC AUTO: 98 FL (ref 82–98)
MONOCYTES # BLD AUTO: 0.38 THOUSAND/ΜL (ref 0.17–1.22)
MONOCYTES NFR BLD AUTO: 6 % (ref 4–12)
NEUTROPHILS # BLD AUTO: 4.55 THOUSANDS/ΜL (ref 1.85–7.62)
NEUTS SEG NFR BLD AUTO: 76 % (ref 43–75)
NRBC BLD AUTO-RTO: 0 /100 WBCS
PLATELET # BLD AUTO: 103 THOUSANDS/UL (ref 149–390)
PMV BLD AUTO: 12.8 FL (ref 8.9–12.7)
POTASSIUM SERPL-SCNC: 4.1 MMOL/L (ref 3.5–5.3)
RBC # BLD AUTO: 2.29 MILLION/UL (ref 3.81–5.12)
SODIUM SERPL-SCNC: 142 MMOL/L (ref 136–145)
WBC # BLD AUTO: 6.03 THOUSAND/UL (ref 4.31–10.16)

## 2018-12-08 PROCEDURE — 30233N1 TRANSFUSION OF NONAUTOLOGOUS RED BLOOD CELLS INTO PERIPHERAL VEIN, PERCUTANEOUS APPROACH: ICD-10-PCS | Performed by: PHYSICAL MEDICINE & REHABILITATION

## 2018-12-08 PROCEDURE — 99238 HOSP IP/OBS DSCHRG MGMT 30/<: CPT | Performed by: PHYSICIAN ASSISTANT

## 2018-12-08 PROCEDURE — 85025 COMPLETE CBC W/AUTO DIFF WBC: CPT | Performed by: PHYSICIAN ASSISTANT

## 2018-12-08 PROCEDURE — 80048 BASIC METABOLIC PNL TOTAL CA: CPT | Performed by: PHYSICIAN ASSISTANT

## 2018-12-08 PROCEDURE — 1124F ACP DISCUSS-NO DSCNMKR DOCD: CPT

## 2018-12-08 PROCEDURE — 99232 SBSQ HOSP IP/OBS MODERATE 35: CPT | Performed by: PHYSICIAN ASSISTANT

## 2018-12-08 PROCEDURE — 99223 1ST HOSP IP/OBS HIGH 75: CPT | Performed by: PHYSICAL MEDICINE & REHABILITATION

## 2018-12-08 RX ORDER — HYDROCHLOROTHIAZIDE 12.5 MG/1
12.5 TABLET ORAL DAILY
Status: DISCONTINUED | OUTPATIENT
Start: 2018-12-09 | End: 2018-12-10

## 2018-12-08 RX ORDER — CALCIUM CARBONATE 200(500)MG
500 TABLET,CHEWABLE ORAL 2 TIMES DAILY WITH MEALS
Status: DISCONTINUED | OUTPATIENT
Start: 2018-12-08 | End: 2018-12-25 | Stop reason: HOSPADM

## 2018-12-08 RX ORDER — AMOXICILLIN 250 MG
1 CAPSULE ORAL 2 TIMES DAILY
Status: DISCONTINUED | OUTPATIENT
Start: 2018-12-08 | End: 2018-12-13

## 2018-12-08 RX ORDER — OXYCODONE HYDROCHLORIDE 5 MG/1
5 TABLET ORAL EVERY 4 HOURS PRN
Status: CANCELLED | OUTPATIENT
Start: 2018-12-08

## 2018-12-08 RX ORDER — HYDROCHLOROTHIAZIDE 12.5 MG/1
12.5 TABLET ORAL DAILY
Status: CANCELLED | OUTPATIENT
Start: 2018-12-09

## 2018-12-08 RX ORDER — CALCIUM CARBONATE 200(500)MG
500 TABLET,CHEWABLE ORAL 2 TIMES DAILY
Status: CANCELLED | OUTPATIENT
Start: 2018-12-08

## 2018-12-08 RX ORDER — AMIODARONE HYDROCHLORIDE 200 MG/1
200 TABLET ORAL 2 TIMES DAILY WITH MEALS
Status: CANCELLED | OUTPATIENT
Start: 2018-12-08

## 2018-12-08 RX ORDER — MELATONIN
5000 DAILY
Status: DISCONTINUED | OUTPATIENT
Start: 2018-12-09 | End: 2018-12-25 | Stop reason: HOSPADM

## 2018-12-08 RX ORDER — PRAVASTATIN SODIUM 40 MG
40 TABLET ORAL
Status: CANCELLED | OUTPATIENT
Start: 2018-12-08

## 2018-12-08 RX ORDER — MELATONIN
5000 DAILY
Status: CANCELLED | OUTPATIENT
Start: 2018-12-09

## 2018-12-08 RX ORDER — FERROUS SULFATE 325(65) MG
325 TABLET ORAL
Status: DISCONTINUED | OUTPATIENT
Start: 2018-12-09 | End: 2018-12-11

## 2018-12-08 RX ORDER — PANTOPRAZOLE SODIUM 20 MG/1
20 TABLET, DELAYED RELEASE ORAL
Status: DISCONTINUED | OUTPATIENT
Start: 2018-12-09 | End: 2018-12-25 | Stop reason: HOSPADM

## 2018-12-08 RX ORDER — LIDOCAINE 50 MG/G
2 PATCH TOPICAL DAILY
Status: DISCONTINUED | OUTPATIENT
Start: 2018-12-09 | End: 2018-12-25 | Stop reason: HOSPADM

## 2018-12-08 RX ORDER — PANTOPRAZOLE SODIUM 20 MG/1
20 TABLET, DELAYED RELEASE ORAL
Status: CANCELLED | OUTPATIENT
Start: 2018-12-09

## 2018-12-08 RX ORDER — POLYETHYLENE GLYCOL 3350 17 G/17G
17 POWDER, FOR SOLUTION ORAL DAILY PRN
Status: DISCONTINUED | OUTPATIENT
Start: 2018-12-08 | End: 2018-12-25 | Stop reason: HOSPADM

## 2018-12-08 RX ORDER — OXYCODONE HYDROCHLORIDE 5 MG/1
2.5 TABLET ORAL EVERY 4 HOURS PRN
Status: DISCONTINUED | OUTPATIENT
Start: 2018-12-08 | End: 2018-12-11

## 2018-12-08 RX ORDER — ACETAMINOPHEN 325 MG/1
975 TABLET ORAL EVERY 8 HOURS SCHEDULED
Status: DISCONTINUED | OUTPATIENT
Start: 2018-12-08 | End: 2018-12-25 | Stop reason: HOSPADM

## 2018-12-08 RX ORDER — ACETAMINOPHEN 325 MG/1
975 TABLET ORAL EVERY 8 HOURS SCHEDULED
Status: CANCELLED | OUTPATIENT
Start: 2018-12-08

## 2018-12-08 RX ORDER — LEVOTHYROXINE SODIUM 0.05 MG/1
50 TABLET ORAL
Status: CANCELLED | OUTPATIENT
Start: 2018-12-09

## 2018-12-08 RX ORDER — LIDOCAINE 50 MG/G
1 PATCH TOPICAL DAILY
Status: CANCELLED | OUTPATIENT
Start: 2018-12-09

## 2018-12-08 RX ORDER — OXYCODONE HYDROCHLORIDE 5 MG/1
2.5 TABLET ORAL EVERY 4 HOURS PRN
Status: CANCELLED | OUTPATIENT
Start: 2018-12-08

## 2018-12-08 RX ORDER — LIDOCAINE 50 MG/G
1 PATCH TOPICAL DAILY
Status: DISCONTINUED | OUTPATIENT
Start: 2018-12-09 | End: 2018-12-08

## 2018-12-08 RX ORDER — PRAVASTATIN SODIUM 40 MG
40 TABLET ORAL
Status: DISCONTINUED | OUTPATIENT
Start: 2018-12-08 | End: 2018-12-25 | Stop reason: HOSPADM

## 2018-12-08 RX ORDER — AMOXICILLIN 250 MG
1 CAPSULE ORAL 2 TIMES DAILY
Status: CANCELLED | OUTPATIENT
Start: 2018-12-08

## 2018-12-08 RX ORDER — AMIODARONE HYDROCHLORIDE 200 MG/1
200 TABLET ORAL 2 TIMES DAILY WITH MEALS
Status: DISCONTINUED | OUTPATIENT
Start: 2018-12-08 | End: 2018-12-10

## 2018-12-08 RX ORDER — BISACODYL 10 MG
10 SUPPOSITORY, RECTAL RECTAL DAILY PRN
Status: DISCONTINUED | OUTPATIENT
Start: 2018-12-08 | End: 2018-12-25 | Stop reason: HOSPADM

## 2018-12-08 RX ORDER — LEVOTHYROXINE SODIUM 0.05 MG/1
50 TABLET ORAL
Status: DISCONTINUED | OUTPATIENT
Start: 2018-12-09 | End: 2018-12-25 | Stop reason: HOSPADM

## 2018-12-08 RX ORDER — OXYCODONE HYDROCHLORIDE 5 MG/1
5 TABLET ORAL EVERY 4 HOURS PRN
Status: DISCONTINUED | OUTPATIENT
Start: 2018-12-08 | End: 2018-12-11

## 2018-12-08 RX ADMIN — ACETAMINOPHEN 975 MG: 325 TABLET, FILM COATED ORAL at 14:42

## 2018-12-08 RX ADMIN — VITAMIN D, TAB 1000IU (100/BT) 5000 UNITS: 25 TAB at 08:11

## 2018-12-08 RX ADMIN — AMIODARONE HYDROCHLORIDE 200 MG: 200 TABLET ORAL at 17:34

## 2018-12-08 RX ADMIN — ACETAMINOPHEN 975 MG: 325 TABLET, FILM COATED ORAL at 06:04

## 2018-12-08 RX ADMIN — ONDANSETRON 4 MG: 2 INJECTION INTRAMUSCULAR; INTRAVENOUS at 00:17

## 2018-12-08 RX ADMIN — AMIODARONE HYDROCHLORIDE 200 MG: 200 TABLET ORAL at 08:12

## 2018-12-08 RX ADMIN — SENNOSIDES AND DOCUSATE SODIUM 1 TABLET: 8.6; 5 TABLET ORAL at 17:34

## 2018-12-08 RX ADMIN — SENNOSIDES AND DOCUSATE SODIUM 1 TABLET: 8.6; 5 TABLET ORAL at 08:11

## 2018-12-08 RX ADMIN — PANTOPRAZOLE SODIUM 20 MG: 20 TABLET, DELAYED RELEASE ORAL at 06:04

## 2018-12-08 RX ADMIN — METOPROLOL TARTRATE 25 MG: 25 TABLET, FILM COATED ORAL at 08:11

## 2018-12-08 RX ADMIN — PRAVASTATIN SODIUM 40 MG: 40 TABLET ORAL at 17:35

## 2018-12-08 RX ADMIN — POLYETHYLENE GLYCOL 3350 17 G: 17 POWDER, FOR SOLUTION ORAL at 14:44

## 2018-12-08 RX ADMIN — LIDOCAINE 1 PATCH: 50 PATCH TOPICAL at 08:12

## 2018-12-08 RX ADMIN — APIXABAN 5 MG: 5 TABLET, FILM COATED ORAL at 08:11

## 2018-12-08 RX ADMIN — CALCIUM CARBONATE (ANTACID) CHEW TAB 500 MG 500 MG: 500 CHEW TAB at 08:11

## 2018-12-08 RX ADMIN — APIXABAN 5 MG: 5 TABLET, FILM COATED ORAL at 22:00

## 2018-12-08 RX ADMIN — ACETAMINOPHEN 975 MG: 325 TABLET, FILM COATED ORAL at 21:59

## 2018-12-08 RX ADMIN — HYDROCHLOROTHIAZIDE 12.5 MG: 12.5 TABLET ORAL at 08:11

## 2018-12-08 RX ADMIN — LEVOTHYROXINE SODIUM 50 MCG: 50 TABLET ORAL at 06:04

## 2018-12-08 NOTE — PROGRESS NOTES
PHYSICAL MEDICINE AND REHABILITATION   PREADMISSION ASSESSMENT     Projected [facility] Diagnoses:  Impairment of mobility, safety and Activities of Daily Living (ADLs) due to Orthopedic Disorders:  08 11  Unilateral Hip Fracture   Etiology: Right Femoral Neck Fracture  Date of Onset: 12/3/18   Date of surgery: 12/4/18: Right Total Hip Arthroplasty     PATIENT INFORMATION  Name: Emre Daley Phone #: [phone] (home)   Address: [address] [place] [address]  YOB: 1940 Age: 66 y o  SS#   Marital Status: /Civil Union  Ethnicity:   Employment Status: retired  Extended Emergency Contact Information  Primary Emergency Contact: RebelCarmenMichael  Address: [address] [place] [address] Phone: [phone]  Relation: Spouse  Secondary Emergency Contact: [address] Phone: [phone]  Relation: Daughter  Advance Directive: Level 1 Full Code, AD Unknown    INSURANCE/COVERAGE:     Primary Payor: MEDICARE / Plan: MEDICARE A AND B / Product Type: Medicare A & B Fee for Service /   Secondary Payer: [facility]   Payer Contact:  Payer Contact:   Contact Phone:  Contact Phone:   Authorization #:   Coverage Dates:  LCD:   Medicare ID: [id number]  Medicare Days:60/30/60  Medical Record #: [id number]    REFERRAL SOURCE:   Referring provider: Susana Collazo MD  Referring facility: [address] [place] [address]   [facility]  PCP: Armando Carter DO PCP phone number: [phone]    MEDICAL INFORMATION  HPI: Chayo Gomez is a 66year old female with PMH of dementia, aortic stenosis, A-fib on eliquis, thyroid disease, HTN, and Vitamin D deficiency who [facility] on 12/3/18 with complaints of right hip pain and inability to bear weight on right lower extremity  There was visible external rotation    Patient had a mechanical fall at home prior to presentation  CT was negative for any organ injury  Right femur x-ray showed slightly impacted and displaced subcapital femoral neck fracture  Right ankle x-ray was negative  She was admitted to trauma who made her non weight bearing and placed eliquis on hold in preporation for likely surgical intervention  She was also started on IV fluids due to dehydration  Orthopedics was consulted and recommended right MIKE after cardiology clearance  Cardiology was consulted for clearance due to aortic stenosis  Cardiology cleared her for surgical intervention but recommended avoiding hypotension due to patient being pre load dependent  Endocrine was consulted and recommended that the patient be started on calcium BID, and possible anabolic agents when fracture is healed  She is to follow up as an outpatient in 6 weeks  Geriatrics was consulted and made recommendations on medication changes to avoid delirium  [email or web address] and scored a 14/30 on the [address] [place] [address] with a history of dementia  Surgery was scheduled for 12/3 but was cancelled as patient was in a-fib with RVR  She was started on a cardizem drip and the OR was cancelled  Patient was stable and cardizem drip was discontinued and went to the OR on 12/4 with Dr Kong Savage with orthopedics for a right total hip arthroplasty without complication with estimated blood loss of 200mL  Later that evening patient was pale and had a change in mental status  She was a rapid response  She was in SVT on EKG and troponins were elevated to  and was re-started on cardizem drip  Hemoglobin at that time was 7 1 from 12 4 on admission  2L bolus of isolyte was given due to SBP in the 60's manually  She was transfused with 1 unit of PRBCs  Cardiology felt that symptoms were due to stress of the surgery    She continued with a-fib with RVR and was started on and amiodarone drop per cardiology recommendations and has been transitioned to PO amiodarone  Ortho cleared the patient to be weight bearing as tolerated  She also was with a drop of hemoglobin on 12/5 to 7 1 with oozing from her surgical site  Pressure bandage was applied and she was transfused with an additional unit of packed red blood cells  She has remained stable after 2 units of PRBCs and was cleared to restart eliquis  Patient was evaluated by PT and OT who are recommending rehab  She is medically cleared for transfer at this time  Past Medical History:   Past Surgical History:    Allergies:     Past Medical History:   Diagnosis Date    Atrial fibrillation ([address] )     Disease of thyroid gland     Hyperlipidemia     Hypertension     Psychiatric disorder     dementia    Past Surgical History:   Procedure Laterality Date    HIP ARTHROPLASTY Right 12/4/2018    Procedure: ARTHROPLASTY HIP TOTAL;  Surgeon: Pat Hannon MD;  Location:  MAIN OR;  Service: Orthopedics    HYSTERECTOMY       Allergies   Allergen Reactions    Oxycodone-Acetaminophen          Comorbidities: S/p fall, a-fib with RVR, urinary incontinence, hypotension, aortic stenosis, ABLA, osteoporosis, and NSTEMI 2    CURRENT VITAL SIGNS:   Temp:  [97 8 °F (36 6 °C)-98 4 °F (36 9 °C)] 98 4 °F (36 9 °C)  HR:  [71-99] 73  Resp:  [16-18] 18  BP: (103-157)/(52-72) 112/63   Intake/Output Summary (Last 24 hours) at 12/08/18 1019  Last data filed at 12/08/18 0927   Gross per 24 hour   Intake              470 ml   Output             1525 ml   Net            -1055 ml        LABORATORY RESULTS:      Lab Results   Component Value Date    HGB 7 4 (L) 12/08/2018    HCT 22 4 (L) 12/08/2018    WBC 6 03 12/08/2018     Lab Results   Component Value Date    BUN 19 12/08/2018    K 4 1 12/08/2018     (H) 12/08/2018    GLUCOSE 136 12/04/2018    CREATININE 0 80 12/08/2018     Lab Results   Component Value Date    PROTIME 16 2 (H) 12/04/2018    INR 1 29 (H) 12/04/2018        DIAGNOSTIC STUDIES:  Xr Chest Portable    Result Date: 12/5/2018  Impression: No acute cardiopulmonary disease  Workstation performed: [id number]     Xr Femur 2 Vw Right    Result Date: 12/3/2018  Impression: Slightly impacted and displaced subcapital femoral neck fracture on the right  Workstation performed: [id number]     Xr Ankle 2 Vw Right    Result Date: 12/3/2018  Impression: No acute osseous abnormality  Workstation performed: [id number]     Ct Head Without Contrast    Result Date: 12/3/2018  Impression: No acute intracranial abnormality  Mild age-appropriate atrophy and chronic appearing small vessel white matter disease appears similar to March 7, 2018  There is partial opacification of the inferior mastoid air cells bilaterally, similar to the prior study  I personally discussed this study with Yamel Quiroga on 12/3/2018 at 5:47 AM  Workstation performed: [id number]     Ct Spine Cervical Without Contrast    Result Date: 12/3/2018  Impression: No acute cervical spine fracture or traumatic malalignment  Multilevel degenerative changes are present  The thyroid appears somewhat small  There appear to be tiny subcentimeter thyroid nodules bilaterally  These do not require further evaluation  Please see above  There is mild biapical pleural-parenchymal scarring  I personally discussed this study with Yamel Quiroga on 12/3/2018 at 5:47 AM   Workstation performed: [id number]     Xr Chest 1 View    Result Date: 12/4/2018  Impression: No acute pulmonary disease on limited supine imaging  Displaced right subcapital femoral neck fracture  Workstation performed: [id number]     Xr Pelvis Ap Only 1 Or 2 Vw    Result Date: 12/4/2018  Impression: No acute pulmonary disease on limited supine imaging  Displaced right subcapital femoral neck fracture  Workstation performed: [id number]     Ct Chest Abdomen Pelvis W Contrast    Result Date: 12/3/2018  Impression: There is an acute impacted fracture of the right femoral neck    This results in mild varus angulation  There is mild deformity of the anterior aspect of the left inferior pubic ramus  The appearance suggests old healed fracture  Clinical correlation regarding the possibility of pain in this area is recommended  No evidence of acute intrathoracic or intra-abdominal parenchymal organ injury  No pneumothorax  There is minimal atelectasis at the lung bases  Atherosclerosis  Other findings as described above, please see discussion  I personally discussed this study with Chicho Gan on 12/3/2018 at 5:43 AM  Workstation performed: [id number]     Xr Trauma Multiple    Result Date: 12/3/2018  Impression: No acute pulmonary disease on limited supine imaging  Displaced right subcapital femoral neck fracture   Workstation performed: [id number]       PRECAUTIONS/SPECIAL NEEDS:  Tobacco:   History   Smoking Status    Former Smoker    Quit date: 1960   Smokeless Tobacco    Never Used   , Alcohol:    History   Alcohol Use No   , Total Hip Precautions, Weight Bearing Precautions: Weight Bearing as Tolerated to right lower extremity, Anticoagulation:  eliquis 5mg BID, Edema Management, Safety Concerns, Pain Management, Bladder Incontinence: # of accidents 3 times in 3 days, Cognition, and Fall PRecautions    MEDICATIONS:     Current Facility-Administered Medications:     acetaminophen (TYLENOL) tablet 975 mg, 975 mg, Oral, Q8H [facility], Basim Leary MD, 975 mg at 12/08/18 0604    amiodarone tablet 200 mg, 200 mg, Oral, BID With Meals, Kyung Melton MD, 200 mg at 12/1940    apixaban (ELIQUIS) tablet 5 mg, 5 mg, Oral, BID, Emery Mcqueen MD, 5 mg at 12/08/18 4440    calcium carbonate (TUMS) chewable tablet 500 mg, 500 mg, Oral, BID, Tita Haile DO, 500 mg at 12/08/18 5004    cholecalciferol (VITAMIN D3) tablet 5,000 Units, 5,000 Units, Oral, Daily, Gloria Joiner DO, 5,000 Units at 12/08/18 7394    hydrochlorothiazide (HYDRODIURIL) tablet 12 5 mg, 12 5 mg, Oral, Daily, Basim Leary MD, 12 5 mg at 12/08/18 5879    levothyroxine tablet 50 mcg, 50 mcg, Oral, Early Morning, Liset Thompson MD, 50 mcg at 12/08/18 0604    lidocaine (LIDODERM) 5 % patch 1 patch, 1 patch, Topical, Daily, Gab Nevarez PA-C, 1 patch at 12/08/18 1537    metoprolol (LOPRESSOR) injection 5 mg, 5 mg, Intravenous, Q6H PRN, Obdulio Merida PA-C, 5 mg at 12/04/18 2347    metoprolol tartrate (LOPRESSOR) tablet 25 mg, 25 mg, Oral, Q12H [facility], Liset Thompson MD, 25 mg at 12/08/18 0811    naloxone (NARCAN) 0 04 mg/mL syringe 0 04 mg, 0 04 mg, Intravenous, Q1MIN PRN, Liset Thompson MD    ondansetron [facility]) injection 4 mg, 4 mg, Intravenous, Q4H PRN, Liset Thompson MD, 4 mg at 12/08/18 0017    ondansetron [facility]) injection 4 mg, 4 mg, Intravenous, Once, Liset Thompson MD    oxyCODONE (ROXICODONE) IR tablet 2 5 mg, 2 5 mg, Oral, Q4H PRN, Liset Thompson MD, 2 5 mg at 12/05/18 2032    oxyCODONE (ROXICODONE) IR tablet 5 mg, 5 mg, Oral, Q4H PRN, Liset Thompson MD, 5 mg at 12/05/18 0208    pantoprazole (PROTONIX) EC tablet 20 mg, 20 mg, Oral, Early Morning, Liset Thompson MD, 20 mg at 12/08/18 0604    pravastatin (PRAVACHOL) tablet 40 mg, 40 mg, Oral, Daily With Dinner, Liset Thompson MD, 40 mg at 12/07/18 1621    senna-docusate sodium (SENOKOT S) 8 6-50 mg per tablet 1 tablet, 1 tablet, Oral, BID, Liset Thompson MD, 1 tablet at 12/08/18 9261    sodium chloride 0 9 % bolus 500 mL, 500 mL, Intravenous, Once, Ama Altamirano DO, Last Rate: 250 mL/hr at 12/05/18 2156, 500 mL at 12/05/18 2156    SKIN INTEGRITY:   Dry skin to forehead and right hip incision with pressure dressing with bloody drainage  PRIOR LEVEL OF FUNCTION:  She lives in a(n) single family home  Cheng Waterman is  and lives with her  and son  Self Care: Independent, Indoor Mobility: Independent, Stairs (in/outdoor): Independent and Cognition: Needed some help    HOME ENVIRONMENT:  The living area: can live on one level  There are 4 steps to enter the home      The patient will have 24 hour supervision/physical assistance available upon discharge  Spoke to patient's  Petra Bateman who stated that he is retired and can be home with the patient as well as assist as needed  PREVIOUS DME:  Equipment in home (previous DME): Shower Chair and Single Point Cane    FUNCTIONAL STATUS:  Physical Therapy Occupational Therapy Speech Therapy   As per PT:         12/07/18 1255   Pain Assessment   Pain Assessment 0-10   Pain Score 7   Pain Type Acute pain   Pain Location Hip   Hospital Pain Intervention(s) Ambulation/increased activity   Response to Interventions increased pain w WB   Restrictions/Precautions   RLE Weight Bearing Per Order WBAT   Other Precautions THR;WBS;Pain; Fall Risk   General   Chart Reviewed Yes   Family/Caregiver Present Yes   Cognition   Arousal/Participation Alert   Attention Attends with cues to redirect   Orientation Level Oriented to person;Oriented to place   Following Commands Follows one step commands with increased time or repetition   Subjective   Subjective pt in chair, nsg cleared, pt agreeable for PT amb   Bed Mobility   Additional Comments bed mob NT- pt in chair on PT arrival   Transfers   Sit to Stand 4  Minimal assistance   Additional items Increased time required;Verbal cues   Stand to Sit 4  Minimal assistance   Additional items Increased time required;Verbal cues   Stand pivot 4  Minimal assistance   Additional items Increased time required;Verbal cues  (RW)   Ambulation/Elevation   Gait pattern Improper Weight shift; Antalgic;Decreased R stance;Shuffling; Step to;Excessively slow   Gait Assistance 4  Minimal assist   Additional items Assist x 1  (+ additional chair follow for safety)   Assistive Device Rolling walker   Distance 5 ft   Balance   Static Standing Fair -  (RW)   Dynamic Standing Poor +  (RW)   Endurance Deficit   Endurance Deficit Yes   Activity Tolerance   Activity Tolerance Patient limited by fatigue;Patient limited by pain;Treatment limited secondary to medical complications (Comment)  (Incisional site drainage)   Nurse Made Aware yes   Assessment   Problem List Decreased strength;Decreased range of motion;Decreased endurance; Impaired balance;Decreased mobility; Decreased coordination; Impaired judgement;Decreased safety awareness;Pain;Orthopedic restrictions   Assessment pt progressed w mob and funct activity tolerance; requires mod assist w transf and amb 5 ft using RW + chait follow for safety; gait pattern antalgic, decreased R stance, shuffle step walker reliant + falls risk; dist limited by snaguinous drainage from incisional site; pt seated in chair nsg made aware and f/u w dressing change; rec cont PT IP rehab when med cleared; spouse present and in agreement w goals for funct recovery to baseline      As per OT:         12/07/18 1130   Note Type   Note type Eval only   Restrictions/Precautions   Weight Bearing Precautions Per Order Yes   RUE Weight Bearing Per Order WBAT   LUE Weight Bearing Per Order WBAT   RLE Weight Bearing Per Order WBAT   LLE Weight Bearing Per Order WBAT   Other Precautions THR; Cognitive; Chair Alarm; Fall Risk;Pain   Pain Assessment   Pain Assessment 0-10   Pain Score 7   Pain Type Acute pain;Surgical pain   Pain Location Hip   Pain Orientation Right   Hospital Pain Intervention(s) Repositioned; Ambulation/increased activity; Emotional support   Home Living   Type of [address] One level;Stairs to enter with rails   Prior Function   Level of [place] Independent with ADLs and functional mobility   Lives With Spouse; Son   Rene Help From Family   ADL Assistance Independent   IADLs Needs assistance   Falls in the last 6 months 1 to 4   Vocational Retired   [address] [place] [address] - 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   Reciprocal Relationships SUPPORTIVE FAMILY   Service to Others RETIRED   Intrinsic Gratification MOSTLY SEDENTARY   Subjective   Subjective OFFERS NO C/O    ADL   Eating Assistance 5  Supervision/Setup   Grooming Assistance 4  Minimal Assistance   UB Bathing Assistance 4  Minimal Assistance   LB Bathing Assistance 2  Maximal Assistance   UB Dressing Assistance 4  Minimal Assistance   LB Dressing Assistance 2  Maximal Assistance   Toileting Assistance  2  Maximal Assistance   Bed Mobility   Supine to Sit 3  Moderate assistance   Additional items Assist x 2   Transfers   Sit to Stand 3  Moderate assistance   Additional items Assist x 2   Stand to Sit 3  Moderate assistance   Additional items Assist x 2   Stand pivot 3  Moderate assistance   Additional items Assist x 2   Functional Mobility   Functional Mobility 3  Moderate assistance   Additional Comments X2   Additional items Rolling walker   Balance   Static Sitting Fair   Dynamic Sitting Fair -   Static Standing Poor +   Dynamic Standing Poor   Ambulatory Poor   Activity Tolerance   Activity Tolerance Patient limited by fatigue;Patient limited by pain;Treatment limited secondary to medical complications (Comment)   RUE Assessment   RUE Assessment WFL   LUE Assessment   LUE Assessment WFL   Cognition   Overall Cognitive Status Impaired   Arousal/Participation Arousable; Cooperative   Attention Attends with cues to redirect   Orientation Level Oriented to person   Memory Decreased short term memory;Decreased recall of recent events;Decreased recall of precautions   Following Commands Follows one step commands with increased time or repetition   Assessment   Limitation Decreased ADL status; Decreased Safe judgement during ADL;Decreased endurance;Decreased cognition;Decreased self-care trans;Decreased high-level ADLs   Prognosis Good   Assessment Pt is a 66 y o  female who was [facility] on 12/[facility]/2018 with Closed intertrochanteric fracture of hip, right, initial encounter ([address] ) s/p R THR   Pt's problem list also includes PMH of HTN, previous surgery, limited cognition and afib, thyroid disease, hld, dementia   At baseline pt was completing adls independently and ambulating with RW - family manages majority of iadls  Pt lives with spouse and son in 1 story home  Currently pt requires max assist for overall ADLS and mod a x 2  for functional mobility/transfers  Pt currently presents with impairments in the following categories -difficulty performing ADLS, difficulty performing IADLS , limited insight into deficits, compliance, decreased initiation and engagement  and health management  activity tolerance, endurance, standing balance/tolerance, memory, insight, safety  and judgement   These impairments, as well as pt's fatigue, pain, hip precautions, orthopedic restricitions , WBS  and risk for falls  limit pt's ability to safely engage in all baseline areas of occupation, includingbathing, dressing, toileting, functional mobility/transfers, community mobility, house maintenance, social participation  and leisure activities  From OT standpoint, recommend inpt rehab  upon D/C  OT will continue to follow to address the below stated goals  N/A     CURRENT GAP IN FUNCTION  Prior to admission the patient was completely independent with functional mobility with no assistive device, ADLs, and shared IADLs with her family  Estimated length of stay: 10 to 14 days    Anticipated Post-Discharge Disposition/Treatment  Disposition: Return to previous home/apartment    Outpatient Services: Physical Therapy (PT) and Occupational Therapy (OT)    BARRIERS TO DISCHARGE  Weakness, Pain, Balance Difficulty, Fatigue, Home Accessibility, Caregiver Accessibility, Financial Resources, Equipment Needs and Resource Availability    INTERVENTIONS FOR DISCHARGE  Adaptive equipment, Patient/Family/Caregiver Education, Community Resources, Financial Assistance, Arrange DME needs, Medication Changes as per MD, Therapy exercises and Center of balance support     REQUIRED THERAPY:  Patient will require PT and OT 90 minutes each per day, five days per week to achieve rehab goals  REQUIRED FUNCTIONAL AND MEDICAL MANAGEMENT FOR INPATIENT REHABILITATION:  Skin:  Right hip incision with pressure dressing and bloody drainage,, Pain Management: Overall pain is moderately controlled, Deep Vein Thrombosis (DVT) Prophylaxis:  SCD's while in bed and eliquis, nursing management, internal medicine to follow, PM&R management, PT/OT intervention, patient and family education and training, and any consults as needed  RECOMMENDED LEVEL OF CARE:  Patient [facility] to [facility] on [facility]18 with complaints of right hip pain and inability to bear weight on right lower extremity  There was visible external rotation  Patient had a mechanical fall at home prior to presentation  Right femur x-ray showed slightly impacted and displaced subcapital femoral neck fracture  Cardiology was consulted for clearance due to aortic stenosis  Cardiology cleared her for surgical intervention but recommended avoiding hypotension due to patient being pre load dependent  She went to the OR on 12/4 with Dr Dedrick Aaron with orthopedics for a right total hip arthroplasty without complication with estimated blood loss of 200mL  Patient was found to be in a-fib with RVR that was treated with a cardizem drip, then amiodarone drip and has since been transitioned to PO amiodarone  She was given IV fluids for dehydration and hypotension  She also required 2 units of PRBCs due to ABLA  Prior to admission the patient was completely independent with functional mobility with no assistive device, ADLs, and shared IADLs with her family  At this time she is a min assist with tranfers and ambulation with rolling walker as well as a min assist with upper body ADLs and max assist with lower body ADLs    Nursing management is being recommended for education and bladder management to prevent skin break down, internal medicine to monitor and manage medical conditions, PM&R to maximized function and provide medical oversight, and inpatient rehab to maximize self care and mobility to supervision level upon discharge to home with the assistance of her

## 2018-12-08 NOTE — ASSESSMENT & PLAN NOTE
-appreciated geriatrics recommendations  -Continue delirium precautions- avoid deliriogenic medications and regulate sleep-wake cycle

## 2018-12-08 NOTE — CONSULTS
Consultation - Mary Beth Manzanares 66 y o  female MRN: [id number]    Unit/Bed#: -01 Encounter: [id number]        History of Present Illness     HPI: Mary Beth Manzanares is a 66 y o  female, with past medical history atrial fibrillation, hypothyroidism, hyperlipidemia, severe aortic stenosis, dementia, and hypertension, who presented after a fall at home  She complained of right hip pain and was found to have a right femoral neck fracture  Patient was seen by Endocrinology due to severe osteoporosis and will need outpatient follow-up  Patient went to the OR 12/04/2018 for a right total hip arthroplasty by Dr Mary Richardson  Patient did have a 200 mL blood loss intraoperatively  A rapid response was called 12/05/2018 due to SVT  Her hemoglobin was found to be 7 1  She was started on a Cardizem drip, given IV fluids, and given 1 unit of packed red blood cells  Patient was hypotensive and was in AFib with RVR on 12/06/2018  She was started on amiodarone  Patient was found to have a non ST elevated MI type 2  ROS:  Constitutional: Negative  HENT: Negative  Respiratory: Negative  Cardiovascular: Negative  Gastrointestinal: Negative  Musculoskeletal: Rt hip pain  Neurological: Negative  Psychiatric/Behavioral: Negative  Historical Information   Past Medical History:   Diagnosis Date    Atrial fibrillation ([facility]     Disease of thyroid gland     Hyperlipidemia     Hypertension     Psychiatric disorder     dementia     Past Surgical History:   Procedure Laterality Date    HIP ARTHROPLASTY Right 12/4/2018    Procedure: ARTHROPLASTY HIP TOTAL;  Surgeon: Kelsie Colindres MD;  [facility];  Service: Orthopedics    HYSTERECTOMY       Social History   History   Alcohol Use No     History   Drug Use No     History   Smoking Status    Former Smoker    Quit date: 1960   Smokeless Tobacco    Never Used     No family history on file      Meds/Allergies   current meds:  Current Facility-Administered Medications   Medication Dose Route Frequency    acetaminophen (TYLENOL) tablet 975 mg  975 mg Oral Q8H [facility]    amiodarone tablet 200 mg  200 mg Oral BID With Meals    apixaban (ELIQUIS) tablet 5 mg  5 mg Oral BID    bisacodyl (DULCOLAX) rectal suppository 10 mg  10 mg Rectal Daily PRN    calcium carbonate (TUMS) chewable tablet 500 mg  500 mg Oral BID    [START ON 12/9/2018] cholecalciferol (VITAMIN D3) tablet 5,000 Units  5,000 Units Oral Daily    [START ON 12/9/2018] hydrochlorothiazide (HYDRODIURIL) tablet 12 5 mg  12 5 mg Oral Daily    [START ON 12/9/2018] levothyroxine tablet 50 mcg  50 mcg Oral Early Morning    [START ON 12/9/2018] lidocaine (LIDODERM) 5 % patch 1 patch  1 patch Topical Daily    metoprolol tartrate (LOPRESSOR) tablet 25 mg  25 mg Oral Q12H [facility]    oxyCODONE (ROXICODONE) IR tablet 2 5 mg  2 5 mg Oral Q4H PRN    oxyCODONE (ROXICODONE) IR tablet 5 mg  5 mg Oral Q4H PRN    [START ON 12/9/2018] pantoprazole (PROTONIX) EC tablet 20 mg  20 mg Oral Early Morning    polyethylene glycol (MIRALAX) packet 17 g  17 g Oral Daily PRN    pravastatin (PRAVACHOL) tablet 40 mg  40 mg Oral Daily With Dinner    senna-docusate sodium (SENOKOT S) 8 6-50 mg per tablet 1 tablet  1 tablet Oral BID       PTA meds:   Prescriptions Prior to Admission   Medication    apixaban (ELIQUIS) 5 mg    Cholecalciferol ([employer] VITAMIN D3) 1000 units capsule    hydrochlorothiazide (HYDRODIURIL) 12 5 mg tablet    levothyroxine 50 mcg tablet    metoprolol tartrate (LOPRESSOR) 25 mg tablet    metoprolol tartrate (LOPRESSOR) 25 mg tablet    omeprazole (PriLOSEC) 20 mg delayed release capsule    oxyCODONE (ROXICODONE) 5 mg immediate release tablet    simvastatin (ZOCOR) 40 mg tablet     Allergies   Allergen Reactions    Oxycodone-Acetaminophen        Objective   Vitals: Blood pressure 116/61, pulse 90, temperature 98 1 °F (36 7 °C), temperature source Oral, resp   rate 20, height 5' 3" (1 6 m), weight 66 5 kg (146 lb 9 7 oz), SpO2 98 %  Physical Exam   Constitutional: Pt is oriented to person, place, and time  HENT:  Normocephalic  EOM are normal  PERRLA  Neck supple  Cardiovascular: Irregularly, irregular  ROBERT  Pulmonary: Breath sounds normal  No respiratory distress  Pt has no wheezes nor rales  Abdominal: Soft  Bowel sounds are normal  Pt exhibits no distension  There is no tenderness  There is no rebound and no guarding  Neurological: Pt is alert and oriented to person, place, and time  Lt rest tremor  Psychiatric: Pt has a normal mood and affect  Lab Results:   Results from last 7 days  Lab Units 12/08/18  0448 12/07/18  0445   WBC Thousand/uL 6 03 6 33   HEMOGLOBIN g/dL 7 4* 8 7*   HEMATOCRIT % 22 4* 26 3*   PLATELETS Thousands/uL 103* 83*       Results from last 7 days  Lab Units 12/08/18  0448 12/07/18  0445  12/04/18  2318   POTASSIUM mmol/L 4 1 3 9  < >  --    CHLORIDE mmol/L 109* 112*  < >  --    CO2 mmol/L 26 22  < >  --    CO2, I-STAT mmol/L  --   --   --  22   BUN mg/dL 19 17  < >  --    CREATININE mg/dL 0 80 0 84  < >  --    GLUCOSE, ISTAT mg/dl  --   --   --  136   CALCIUM mg/dL 8 0* 8 0*  < >  --    < > = values in this interval not displayed  Results from last 7 days  Lab Units 12/04/18  0441 12/03/18  0650   INR  1 29* 1 41*       Glucose, i-STAT (mg/dl)   Date Value   12/04/2018 136   12/03/2018 128       Labs reviewed    Imaging: reviewed  EKG, Pathology, and Other Studies: I have personally reviewed pertinent reports  VTE Prophylaxis: Sequential compression device (Venodyne)     Code Status: Level 1 - Full Code   Advance Directive and Living Will:      Power of :    POLST:      Assessment/Plan     # Right hip fracture status post right MIKE:  Pain control in therapy per primary service  Monitor incision for any signs of infection  # Atrial fibrillation:  Continue amiodarone 200 mg 2 times daily and Lopressor 25 mg every 12 hours    Continue Eliquis  # Hypertension:  Continue hydrochlorothiazide 12 5 mg daily and Lopressor 25 mg every 12 hours  Monitor blood pressure every shift and adjust medications as needed  # Acute blood loss anemia:  Hemoglobin 7 4  Recommend transfusion due to recent MI and aortic stenosis  Repeat Hgb in AM     Counseling / Coordination of Care  Total floor / unit time spent today 60 minutes  Greater than 50% of total time was spent with the patient  and / or family counseling and / or coordination of care        Devon Castillo PA-C

## 2018-12-08 NOTE — ASSESSMENT & PLAN NOTE
Hb slightly down to 10 3 on 11/24 but overall stable  Symptomatic anemia early in course remains resolved  Hb 7 9 on 12/13 > improved to 10 8 after 2 U PRBCs

## 2018-12-08 NOTE — ASSESSMENT & PLAN NOTE
-Patient with baseline dementia and mild cognitive deficits  -Placed on fall precautions and bed alarms - freq checks by staff   -[facility] - consider Aricept in future

## 2018-12-08 NOTE — PROGRESS NOTES
Subjective: No acute events overnight  No acute distress  Likely to [facility] today      Objective:  Lab Results   Component Value Date/Time    WBC 6 03 12/08/2018 04:48 AM    HGB 7 4 (L) 12/08/2018 04:48 AM       Vitals:    12/08/18 0330   BP: 103/61   Pulse: 71   Resp: 18   Temp: 98 °F (36 7 °C)   SpO2: 98%     Right lower extremity  Dressing in place, compressive  Motor & sensation grossly intact  Limb wwp    Assessment: Post op from R MIKE for fem neck fx    Plan:  WBAT RLE  Pain control  DVT ppx eliquis  PT  Dispo ok from ortho perspective

## 2018-12-08 NOTE — ASSESSMENT & PLAN NOTE
-S/p Right MIKE by Dr Kelsi Leija on 12/4/18  Teresa Gonzalez RLE  -Daily PT/OT to improve mobility and self care   -Posterior hip precautions  -Hip/Pelvic x-rays 12/25 prior to d/c: MIKE in good position without acute findings  -Follow-up with Dr Kelsi Leija after d/c

## 2018-12-08 NOTE — H&P
PHYSICAL MEDICINE AND REHABILITATION H&P/ADMISSION NOTE  Brian Gutiérrez 66 y o  female MRN: [id number]  Unit/Bed#: -01 Encounter: [id number]     Rehab Diagnosis: Impairment of mobility, safety and Activities of Daily Living (ADLs) due to Orthopedic Disorders:  08 11  Unilateral Hip Fracture    History of Present Illness:   Brian Gutiérrez is a 66 y o  female with PMH significant for A fib chronically on Eliquis, HTN, HLD, Hypothyroidism, dementia, severe osteoporosis, severe aortic stenosis, ?prev CVA with a mild left facial droop (per family chronic), who presented to the [address] on 12/3/18 after surrfering a mechanical fall while at home  Patient diagnosed with a right intertrochanteric femur fracture  Patient evaluated by Orthopedics  After cardiac clearance, patient was taken to the OR by   [facility] on 12/4/18 for a Right total hip arthroplasty  Patient deemed WBAT RLE post op  Patient had rapid response 12/5/18 for SVT  Patient transfused 1PRBC as hemoglobin was 7 1, started on cardizem gtt initially  Patient was closely followed by Cardiology service  Transitioned to IV amiodarone, then PO amiodarone  Patient transfused another 1 unit PRBC on 12/6/18  Eliquis was eventually restarted  After medical stabilization and clearance for participation by ortho service in an intensive rehabiliation program, patient was admitted to [facility] on 12/8/18  Subjective: Patient reports her hip hurts   in the room  States her delirium post surgery is improving and that patient has baseline cognitive deficits due to her dementia     Review of Systems: A 10-point review of systems was performed  Negative except as listed above      Plan:     * Fracture of femoral neck, right [facility])   Assessment & Plan    -S/p Right MIKE by   [facility] on 12/4/18  -WBAT RLE  -Daily PT/OT to improve mobility and self care   -Posterior hip precautions  -Maintain hip abduction foam when supine due to patient's cognitive deficits  -Please maintain compressive dressing for 48 hours, then replace if needed      Acute pain   Assessment & Plan    -managed on scheduled Tylenol 975mg TID  -ICE Q5hhequ while awake  -PRN oxycodone 2 5-5mg for moderate to severe pain - use judiciously given her dementia     Acute blood loss anemia   Assessment & Plan    -H/H = 7 4/22 4 today  - Type & Screen andTransfuse 1 unit today (consent reobtained)  -Maintain IV     Severe aortic stenosis   Assessment & Plan    -preload dependent   -encourage PO hydration  -Patient cardiologist is Dr Dash Gavin    -Patient with baseline dementia and mild cognitive deficits  -Placed on fall precautions and bed alarms - freq checks by staff      Hypothyroidism   Assessment & Plan    -managed on Synthroid     Atrial fibrillation (HCC)   Assessment & Plan    -rate controlled on lopressor 25mg Q12h and new amiodarone 200mg BID  -anticoagulated with Eliquis 5mg BID  -? Unclear how long patient to be on Amiodarone 200mg BID - to be clarified with Cardiology during the week  -Patient's home cardiologist is Dr Malena Kaba     Hypertension   Martha Lorrie    -managed on HCTZ, lopressor  -IM to adjust dosing if needed      Dyslipidemia   Assessment & Plan    -managed on pravastatin        Drug regimen reviewed, all potential adverse effects identified and addressed:    Scheduled Meds:    Current Facility-Administered Medications:  acetaminophen 975 mg Oral Q8H Luis Charles MD   amiodarone 200 mg Oral BID With Meals Angela Nettles MD   apixaban 5 mg Oral Q12H Luis Charles MD   bisacodyl 10 mg Rectal Daily PRN Angela Nettles MD   calcium carbonate 500 mg Oral BID With Meals Angela Nettles MD   [START ON 12/9/2018] cholecalciferol 5,000 Units Oral Daily Angela Nettles MD   [START ON 12/9/2018] hydrochlorothiazide 12 5 mg Oral Daily Angela Nettles MD   [START ON 12/9/2018] levothyroxine 50 mcg Oral Early Morning MD Bogdan Hayes ON 12/9/2018] lidocaine 2 patch Topical Daily Leonardo Terrazas MD   metoprolol tartrate 25 mg Oral Q12H [facility] Leonardo Terrazas MD   oxyCODONE 2 5 mg Oral Q4H PRN Leonardo Terrazas MD   oxyCODONE 5 mg Oral Q4H PRN Leonardo Terrazas MD   [START ON 12/9/2018] pantoprazole 20 mg Oral Early Morning Leonardo Terrazas MD   polyethylene glycol 17 g Oral Daily PRN Leonardo Terrazas MD   pravastatin 40 mg Oral Daily With Aleshia Johns MD   senna-docusate sodium 1 tablet Oral BID Leonardo Terrazas MD        Restrictions include:  right lower extremity weight bearing as tolerated Fall precautions      Functional History - Prior to Admission:    Patient was at an independent level with transfers and ambulation without assistive device  Independent with ADLs  Needed assist with IADLs     Functional Status Upon Admission to [facility]C:  Mobility: Min-Mod A   Transfers: Min-Mod A  ADLs: Min-Max A     HOME ENVIRONMENT:  She lives in [facility] single family home  Luma Gallegos is  and lives with her  and son  The living area: can live on one level  There are 4 steps to enter the home  The patient will have 24 hour supervision/physical assistance available upon discharge  Spoke to patient's  Dashawn Blackmon who stated that he is retired and can be home with the patient as well as assist as needed      Physical Exam:  Temp:  [97 8 °F (36 6 °C)-98 4 °F (36 9 °C)] 98 1 °F (36 7 °C)  HR:  [71-90] 90  Resp:  [18-20] 20  BP: (103-157)/(52-72) 116/61  SpO2:  [98 %-100 %] 98 %    Physical Exam   Constitutional: She is oriented to person, place, and time  She appears well-developed and well-nourished  HENT:   Head: Normocephalic and atraumatic  Eyes: Pupils are equal, round, and reactive to light  EOM are normal    Cardiovascular: Normal rate and regular rhythm  Murmur heard  Pulmonary/Chest: She has no wheezes  She has no rales  Clear, only mildly diminished at bases    Abdominal: Soft   Bowel sounds are normal  She exhibits no distension  There is no tenderness  Musculoskeletal:   Right hip incision - covered with compressive dressing  Moderate edema  Hip abduction foam in place   Neurological: She is alert and oriented to person, place, and time  Sensation to light touch intact x 4  Mild resting tremor   Motor:   BUE: 4-/5  LLE 3/5 proximally, 4+/5 DF, EHL, PF  RLE: 1/5 proxiimally due to pain, 4+/5 DF, EHL, PF  Negative Babinksi  *Chronic left mild facial droop   Skin: Skin is warm  Psychiatric: She has a normal mood and affect  Nursing note and vitals reviewed  Laboratory:      Results from last 7 days  Lab Units 12/08/18 0448 12/07/18  0445 12/06/18  1638 12/06/18  0238   HEMOGLOBIN g/dL 7 4* 8 7* 8 4* 7 5*   HEMATOCRIT % 22 4* 26 3* 25 2* 22 5*   WBC Thousand/uL 6 03 6 33  --  6 49       Results from last 7 days  Lab Units 12/08/18  0448 12/07/18  0445 12/06/18  0238  12/04/18  2318  12/03/18  0943 12/03/18  0434  12/03/18  0424   BUN mg/dL 19 17 23  < >  --   < > 22 30*  < >  --    SODIUM mmol/L 142 141 138  < >  --   < > 135* 142  < >  --    POTASSIUM mmol/L 4 1 3 9 3 5  < >  --   < > 4 8 3 7  < >  --    CHLORIDE mmol/L 109* 112* 105  < >  --   < > 104 107  < >  --    GLUCOSE, ISTAT mg/dl  --   --   --   --  136  --   --   --   --  128   CREATININE mg/dL 0 80 0 84 0 96  < >  --   < > 0 71 1 17  < >  --    AST U/L  --   --   --   --   --   --  34 12  --   --    ALT U/L  --   --   --   --   --   --  11* 15  --   --    < > = values in this interval not displayed  Results from last 7 days  Lab Units 12/04/18  0441 12/03/18  0650   PROTIME seconds 16 2* 17 4*   INR  1 29* 1 41*        Wt Readings from Last 1 Encounters:   12/08/18 66 5 kg (146 lb 9 7 oz)     Estimated body mass index is 25 97 kg/m² as calculated from the following:    Height as of this encounter: 5' 3" (1 6 m)  Weight as of this encounter: 66 5 kg (146 lb 9 7 oz)      Imaging: reviewed     Rehabilitation Prognosis: good    Tolerance for three hours of therapy a day: good     Family/Patient Goals:  Patient/family's goals: Return to previous home/apartment  Patient will receive PT and OT 90 minutes each per day, five days per week to achieve rehab goals or participate in 900 minutes of therapy within a 7 day week period  Mobility Goals: Supervision / Standby Assist  Transfer Goals: Supervision / Standby Assist  Activities of Daily Living (ADLs) Goals: Supervision / Standby Assist    Discharge Planning:  Rehabilitation and discharge goals discussed with the patient and/or family  Case Managment and Social Work to review patient/family resources and to coordinate Discharge Planning  Estimated length of stay: 10 to 14 days    Patient and Family Education and Training:  Rehabilitation and discharge goals discussed with the patient and/or family  Patient/family education/training needs to be discussed in weekly team meeting  Equipment/DME needs: Therapy teams to assess and evaluate for additional equipment/DME needs throughout rehabilitation stay    Past Medical History:   Past Surgical History:   Family History:   Social history:   Past Medical History:   Diagnosis Date    Atrial fibrillation ([address] )     Disease of thyroid gland     Hyperlipidemia     Hypertension     Psychiatric disorder     dementia    Past Surgical History:   Procedure Laterality Date    HIP ARTHROPLASTY Right 12/4/2018    Procedure: ARTHROPLASTY HIP TOTAL;  Surgeon: James Luna MD;  [facility];  Service: Orthopedics    HYSTERECTOMY       History reviewed  No pertinent family history     Social History     Social History    Marital status: /Civil Union     Spouse name: N/A    Number of children: N/A    Years of education: N/A     Social History Main Topics    Smoking status: Former Smoker     Quit date: 1960    Smokeless tobacco: Never Used    Alcohol use No    Drug use: No    Sexual activity: Not Asked     Other Topics Concern    None     Social History Narrative    None          Current Medical Diagnosis Allergies   Patient Active Problem List   Diagnosis    Microscopic hematuria    Dyslipidemia    Hypertension    Palpitations    [facility]    Hypothyroidism    Cognitive impairment    MCI (mild cognitive impairment)    Gait disturbance    Closed intertrochanteric fracture of hip, right, initial [facility]    Severe aortic stenosis    Dehydration    Closed fracture of neck of right femur [facility]    Fall    Ambulatory dysfunction    Physical deconditioning    Osteoporosis    [facility]rial fibrillation with rapid ventricular response [facility]    NSTEMI (non-ST elevated myocardial infarction) ([facility] )    Acute blood loss anemia    Fracture of femoral neck, right (HCC)    Acute pain    No Known Allergies        Medical Necessity Criteria for [facility] Admission: Anemia, Bowel/Bladder Management, Incision/Wound care and Delirium   In addition, the preadmission screen, post-admission physical evaluation, overall plan of care and admissions order demonstrate a reasonable expectation that the following criteria were met at the time of admission to the [facility]  1  The patient requires active and ongoing therapeutic intervention of multiple therapy disciplines (physical therapy, occupational therapy, speech-language pathology, or prosthetics/orthotics), one of which is physical or occupational therapy  2  Patient requires an intensive rehabilitation therapy program, as defined in Chapter 1, section 110 2 2 of the CMS Medicare Policy Manual  This intensive rehabilitation therapy program will consist of at least 3 hours of therapy per day at least 5 days per week or at least 15 hours of intensive rehabilitation therapy within a 7 consecutive day period, beginning with the date of admission to the [facility]      3  The patient is reasonably expected to actively participate in, and benefit significantly from, the intensive rehabilitation therapy program as defined in Chapter 1, section 110 2 2 of the CMS Medicare Policy Manual at this time of admission to the [facility]  She can reasonably be expected to make measurable improvement (that will be of practical value to improve the patients functional capacity or adaptation to impairments) as a result of the rehabilitation treatment, as defined in section 110 3, and such improvement can be expected to be made within the prescribed period of time  As noted in the CMS Medicare Policy Manual, the patient need not be expected to achieve complete independence in the domain of self-care nor be expected to return to his or her prior level of functioning in order to meet this standard  4  The patient must require physician supervision by a rehabilitation physician  As such, a rehabilitation physician will conduct face-to-face visits with the patient at least 3 days per week throughout the patients stay in the [facility] to assess the patient both medically and functionally, as well as to modify the course of treatment as needed to maximize the patients capacity to benefit from the rehabilitation process  5  The patient requires an intensive and coordinated interdisciplinary approach to providing rehabilitation, as defined in Chapter 1, section 110 2 5 of the CMS Medicare Policy Manual  This will be achieved through periodic team conferences, conducted at least once in a 7-day period, and comprising of an interdisciplinary team of medical professionals consisting of: a rehabilitation physician, registered nurse,  and/or , and a licensed/certified therapist from each therapy discipline involved in treating the patient  Changes Since Pre-admission Assessment: None -This patient's participation in rehab continues to be reasonable, necessary and appropriate      CMS Required Post-Admission Physician Evaluation Elements  History and Physical, including medical history, functional history and active comorbidities as in above text      PostAdmission Physician Evaluation:  The patient has the potential to make improvement and is in need of physical, occupational, and/or therapy services  The patient may also need nutritional services  Given the patient's complex medical condition and risk of further medical complications, rehabilitative services cannot be safely provided at a lower level of care, such as a skilled nursing facility  I have reviewed the patient's functional and medical status at the time of the preadmission screening and they are the same as on the day of this admission  I acknowledge that I have personally performed a full physical examination on this patient within 24 hours of admission  The patient and/or family demonstrated understanding the rehabilitation program and the discharge process after we discussed them      Agree in entirety: yes  Minor adaptions: none    Major changes: none     Alfonzo Bell MD  Physical Medicine and Rehabilitation    ** Please Note: Fluency Direct voice to text software may have been used in the creation of this document   **

## 2018-12-08 NOTE — ASSESSMENT & PLAN NOTE
Adequate control overall; some increase day or 2 prior to d/c  >X-rays stable; incision stable   -APAP PRN  -ICE [id number] PRN while awake  Counseled on and continue to encourage deep breathing/relaxation/behavioral pain management techniques:     Deep breathin seconds in, 5 seconds out, 5 times per hour when awake and PRN when in pain or anticipate pain; avoid holding breath and tightening muscles and instead breathe slowly and deeply

## 2018-12-08 NOTE — PROGRESS NOTES
Progress Note - Ness Castañeda 1940, 66 y o  female MRN: [id number]    [facility] Encounter: [id number]    Primary Care Provider: Lauren Delatorre DO   Date and time admitted to hospital: 12/3/2018  3:59 AM        Ambulatory dysfunction   Assessment & Plan    - WBAT  - Transfer to [facility] today     [address] [place] [address] [place]    - Gerontology consult appreciated:  - Fall precautions  - Continue with vitamin-D supplement as patient takes at home       Severe aortic stenosis   Assessment & Plan    - Cardiology following       Cognitive impairment   Assessment & Plan    -appreciated geriatrics recommendations  -Continue delirium precautions- avoid deliriogenic medications and regulate sleep-wake cycle  Atrial fibrillation ([facility] [address] )   Assessment & Plan    - on PO meds  - Eliquis has been restarted     * Closed intertrochanteric fracture of hip, right, initial encounter [facility])   Assessment & Plan    - POD #4 s/p right MIKE  - WBAT           DISPOSITION/ PLAN:  ARC today    Chief Complaint: Not hungry    Subjective: Doesn't have much appetite for breakfast   Some pain in her right hip    Denies CP or SOB    Objective:     Meds/Allergies     Current Facility-Administered Medications:     acetaminophen (TYLENOL) tablet 975 mg, 975 mg, Oral, Q8H [address], Estela Rebollar MD, 975 mg at 12/08/18 0604    amiodarone tablet 200 mg, 200 mg, Oral, BID With Meals, Andres Potts MD, 200 mg at 12/08/18 3992    apixaban (ELIQUIS) tablet 5 mg, 5 mg, Oral, BID, Makenzie Davis MD, 5 mg at 12/08/18 2209    calcium carbonate (TUMS) chewable tablet 500 mg, 500 mg, Oral, BID, Scotty Haile DO, 500 mg at 12/08/18 6924    cholecalciferol (VITAMIN D3) tablet 5,000 Units, 5,000 Units, Oral, Daily, Catrachito Tobar DO, 5,000 Units at 12/08/18 1560    hydrochlorothiazide (HYDRODIURIL) tablet 12 5 mg, 12 5 mg, Oral, Daily, Estela Rebollar MD, 12 5 mg at 12/08/18 1314    levothyroxine tablet 50 mcg, 50 mcg, Oral, Early Morning, Víctor Franc Forest Galdamez MD, 50 mcg at 12/08/18 0604    lidocaine (LIDODERM) 5 % patch 1 patch, 1 patch, Topical, Daily, Drucilla Cogan, PA-C, 1 patch at 12/08/18 7169    metoprolol (LOPRESSOR) injection 5 mg, 5 mg, Intravenous, Q6H PRN, Kathy Cruz PA-C, 5 mg at 12/04/18 2347    metoprolol tartrate (LOPRESSOR) tablet 25 mg, 25 mg, Oral, Q12H [facility], Jenna Gallegos MD, 25 mg at 12/08/18 0811    naloxone (NARCAN) 0 04 mg/mL syringe 0 04 mg, 0 04 mg, Intravenous, Q1MIN PRN, Jenna Gallegos MD    ondansetron [facility]) injection 4 mg, 4 mg, Intravenous, Q4H PRN, Jenna Gallegos MD, 4 mg at 12/08/18 0017    ondansetron [facility]) injection 4 mg, 4 mg, Intravenous, Once, Jenna Gallegos MD    oxyCODONE (ROXICODONE) IR tablet 2 5 mg, 2 5 mg, Oral, Q4H PRN, Jenna Gallegos MD, 2 5 mg at 12/05/18 2032    oxyCODONE (ROXICODONE) IR tablet 5 mg, 5 mg, Oral, Q4H PRN, Jenna Gallegos MD, 5 mg at 12/05/18 0208    pantoprazole (PROTONIX) EC tablet 20 mg, 20 mg, Oral, Early Morning, Jenna Gallegos MD, 20 mg at 12/08/18 0604    pravastatin (PRAVACHOL) tablet 40 mg, 40 mg, Oral, Daily With Dinner, Jenna Gallegos MD, 40 mg at 12/07/18 1621    senna-docusate sodium (SENOKOT S) 8 6-50 mg per tablet 1 tablet, 1 tablet, Oral, BID, Jenna Gallegos MD, 1 tablet at 12/08/18 1628    sodium chloride 0 9 % bolus 500 mL, 500 mL, Intravenous, Once, Bhanu Graft, DO, Last Rate: 250 mL/hr at 12/05/18 2156, 500 mL at 12/05/18 4376    Vitals: Blood pressure 112/63, pulse 73, temperature 98 4 °F (36 9 °C), temperature source Oral, resp  rate 18, height 5' 3" (1 6 m), weight 63 3 kg (139 lb 8 oz), SpO2 98 %  Body mass index is 24 71 kg/m²   SpO2: SpO2: 98 %    ABG: No results found for: PHART, AQE8LEX, PO2ART, TPM8STE, O5IQKRLE, BEART, SOURCE      Intake/Output Summary (Last 24 hours) at 12/08/18 0941  Last data filed at 12/08/18 0927   Gross per 24 hour   Intake              470 ml   Output             1525 ml   Net            -1055 ml       Invasive Devices     Peripheral Intravenous Line Peripheral IV 12/04/18 Right Wrist 3 days                        Nutrition/GI Proph/Bowel Reg: Senokot S    Pain management regimen: Tylenol, Oxycodone    VTE Prophylaxis: Eliquis    Physical Exam:   Constitution: patient appears comfortable, no acute distress  HEENT: normocephalic, atraumatic, PERRLA, clear speech  CV: regular rate and rhythm, no edema, cap refill intact b/l LE's  Pulm: CTA, no wheezes, rhonchi or crackles, unlabored, equal bilaterally  Abd: soft, nontender, nondistended, active bowel sounds  Musc: moves all extremities, equal strength, no calf tenderness; right hip soft, dressing c/d/i  Neuro: A&O, no focal deficits  Skin: no rash or breakdown, warm      Lab Results:   BMP/CMP:   Lab Results   Component Value Date    SODIUM 142 12/08/2018    K 4 1 12/08/2018     (H) 12/08/2018    CO2 26 12/08/2018    BUN 19 12/08/2018    CREATININE 0 80 12/08/2018    CALCIUM 8 0 (L) 12/08/2018    EGFR 71 12/08/2018    and CBC:   Lab Results   Component Value Date    WBC 6 03 12/08/2018    HGB 7 4 (L) 12/08/2018    HCT 22 4 (L) 12/08/2018    MCV 98 12/08/2018     (L) 12/08/2018    MCH 32 3 12/08/2018    MCHC 33 0 12/08/2018    RDW 13 2 12/08/2018    MPV 12 8 (H) 12/08/2018    NRBC 0 12/08/2018     Imaging/EKG Studies: Results: I have personally reviewed pertinent reports          Nurse provider rounds completed with Sandy Frances  Patient and  updated on plan of care

## 2018-12-08 NOTE — ASSESSMENT & PLAN NOTE
-rate controlled on lopressor 12 5mg Q12h and amiodarone 200mg qday  -anticoagulated with Eliquis 5mg BID  -Follow-up with cardiology after d/c

## 2018-12-08 NOTE — SOCIAL WORK
JOSEPH received a call from Laila of [facility], reporting the facility can accept the Pt today for admission to room 962 at 12:30pm  CM notified Pt, spouse and RN of d/c arrangements

## 2018-12-08 NOTE — DISCHARGE SUMMARY
Discharge Summary - Esa Bartlett 66 y o  female MRN: [id number]    Unit/Bed#: [facility] 905-01 Encounter: [id number]    Admission Date:   Admission Orders     Ordered        12/03/18 0628  Inpatient Admission  Once               Admitting Diagnosis: Severe aortic stenosis [I35 0]  Closed fracture of neck of right femur, initial encounter ([facility] ) [S72 001A]  Closed intertrochanteric fracture of hip, right, initial encounter ([facility] ) [S72 141A]  Unspecified multiple injuries, initial encounter [T07  XXXA]    HPI: Per resident Malva Leventhal, MD, "Esa Bartlett is a 66 y o  female who presents status post an unwitnessed fall  The patient is in severe distress secondary to pain, is minimally redirectable, and is difficult to obtain history from secondary to pain  She has a history of atrial fibrillation for which she takes chronic Eliquis, aortic stenosis, hypertension, hyperlipidemia, dementia  She lives at home with her family  The patient was noted to be in her otherwise stated health, when her family was awoken by her screaming out in pain, and she was found near the bathroom, unable to ambulate  Family wrapped her and multiple sheets around her pelvis, and called 911, who brought the patient to the emergency department for evaluation  The patient is currently complaining of severe right hip pain, but also states everything hurts "    Procedures Performed: No orders of the defined types were placed in this encounter  Summary of Hospital Course:   Patient was admitted to the trauma service on 12/3/18 with intertrochanteric right hip fracture  Orthopedics was consulted and patient underwent total hip arthroplasty 12/4/18  Patient had acute blood loss anemia and required transfusions of PRBC's  Cardiology was consulted due to her aortic stenosis and atrial fibrillation  Patient developed atrial fib with RVR postoperatively and required amiodarone and diltiazem drips for a short time    She was able to be taken off of these and placed back on PO meds  Physical and occupational therapy recommended acute rehab and patient was transferred to [facility] on 12/8/18    Significant Findings, Care, Treatment and Services Provided: Total hip arthroplasty for femoral neck fracture right side 12/4/18  Transfusion two units PRBCs      Complications:   Acute blood loss anemia    Discharge Diagnosis:   Intertrochanteric right hip fracture  Atrial fibrillation  Severe aortic stenosis  Acute blood loss anemia    Resolved Problems  Date Reviewed: 12/8/2018    None          Condition at Discharge: good         Discharge instructions/Information to patient and family:   See after visit summary for information provided to patient and family  Provisions for Follow-Up Care:  See after visit summary for information related to follow-up care and any pertinent home health orders  PCP: Noam Ordoñez DO    Disposition: ARC    Planned Readmission: Yes ARC    Discharge Statement   I spent 22 minutes discharging the patient  This time was spent on the day of discharge  I had direct contact with the patient on the day of discharge  Additional documentation is required if more than 30 minutes were spent on discharge  Discharge Medications:  See after visit summary for reconciled discharge medications provided to patient and family

## 2018-12-09 LAB
ABO GROUP BLD: NORMAL
BASOPHILS # BLD AUTO: 0.01 THOUSANDS/ΜL (ref 0–0.1)
BASOPHILS NFR BLD AUTO: 0 % (ref 0–1)
BLD GP AB SCN SERPL QL: NEGATIVE
EOSINOPHIL # BLD AUTO: 0.15 THOUSAND/ΜL (ref 0–0.61)
EOSINOPHIL NFR BLD AUTO: 2 % (ref 0–6)
ERYTHROCYTE [DISTWIDTH] IN BLOOD BY AUTOMATED COUNT: 13.4 % (ref 11.6–15.1)
HCT VFR BLD AUTO: 23.3 % (ref 34.8–46.1)
HGB BLD-MCNC: 7.6 G/DL (ref 11.5–15.4)
IMM GRANULOCYTES # BLD AUTO: 0.15 THOUSAND/UL (ref 0–0.2)
IMM GRANULOCYTES NFR BLD AUTO: 2 % (ref 0–2)
LYMPHOCYTES # BLD AUTO: 0.83 THOUSANDS/ΜL (ref 0.6–4.47)
LYMPHOCYTES NFR BLD AUTO: 11 % (ref 14–44)
MCH RBC QN AUTO: 31.8 PG (ref 26.8–34.3)
MCHC RBC AUTO-ENTMCNC: 32.6 G/DL (ref 31.4–37.4)
MCV RBC AUTO: 98 FL (ref 82–98)
MONOCYTES # BLD AUTO: 0.5 THOUSAND/ΜL (ref 0.17–1.22)
MONOCYTES NFR BLD AUTO: 7 % (ref 4–12)
NEUTROPHILS # BLD AUTO: 5.65 THOUSANDS/ΜL (ref 1.85–7.62)
NEUTS SEG NFR BLD AUTO: 78 % (ref 43–75)
NRBC BLD AUTO-RTO: 0 /100 WBCS
PLATELET # BLD AUTO: 135 THOUSANDS/UL (ref 149–390)
PMV BLD AUTO: 11.9 FL (ref 8.9–12.7)
RBC # BLD AUTO: 2.39 MILLION/UL (ref 3.81–5.12)
RH BLD: POSITIVE
SPECIMEN EXPIRATION DATE: NORMAL
WBC # BLD AUTO: 7.29 THOUSAND/UL (ref 4.31–10.16)

## 2018-12-09 PROCEDURE — P9016 RBC LEUKOCYTES REDUCED: HCPCS

## 2018-12-09 PROCEDURE — 0HBRXZZ EXCISION OF TOE NAIL, EXTERNAL APPROACH: ICD-10-PCS | Performed by: STUDENT IN AN ORGANIZED HEALTH CARE EDUCATION/TRAINING PROGRAM

## 2018-12-09 PROCEDURE — 86923 COMPATIBILITY TEST ELECTRIC: CPT

## 2018-12-09 PROCEDURE — 85025 COMPLETE CBC W/AUTO DIFF WBC: CPT | Performed by: PHYSICAL MEDICINE & REHABILITATION

## 2018-12-09 PROCEDURE — 86900 BLOOD TYPING SEROLOGIC ABO: CPT | Performed by: PHYSICAL MEDICINE & REHABILITATION

## 2018-12-09 PROCEDURE — 86901 BLOOD TYPING SEROLOGIC RH(D): CPT | Performed by: PHYSICAL MEDICINE & REHABILITATION

## 2018-12-09 PROCEDURE — 97167 OT EVAL HIGH COMPLEX 60 MIN: CPT

## 2018-12-09 PROCEDURE — 97535 SELF CARE MNGMENT TRAINING: CPT

## 2018-12-09 PROCEDURE — 86850 RBC ANTIBODY SCREEN: CPT | Performed by: PHYSICAL MEDICINE & REHABILITATION

## 2018-12-09 RX ADMIN — OXYCODONE HYDROCHLORIDE 5 MG: 5 TABLET ORAL at 02:35

## 2018-12-09 RX ADMIN — LEVOTHYROXINE SODIUM 50 MCG: 50 TABLET ORAL at 05:13

## 2018-12-09 RX ADMIN — METOPROLOL TARTRATE 25 MG: 25 TABLET, FILM COATED ORAL at 08:32

## 2018-12-09 RX ADMIN — PRAVASTATIN SODIUM 40 MG: 40 TABLET ORAL at 16:29

## 2018-12-09 RX ADMIN — APIXABAN 5 MG: 5 TABLET, FILM COATED ORAL at 21:13

## 2018-12-09 RX ADMIN — APIXABAN 5 MG: 5 TABLET, FILM COATED ORAL at 08:31

## 2018-12-09 RX ADMIN — AMIODARONE HYDROCHLORIDE 200 MG: 200 TABLET ORAL at 16:28

## 2018-12-09 RX ADMIN — FERROUS SULFATE TAB 325 MG (65 MG ELEMENTAL FE) 325 MG: 325 (65 FE) TAB at 08:31

## 2018-12-09 RX ADMIN — ACETAMINOPHEN 975 MG: 325 TABLET, FILM COATED ORAL at 21:12

## 2018-12-09 RX ADMIN — METOPROLOL TARTRATE 25 MG: 25 TABLET, FILM COATED ORAL at 21:12

## 2018-12-09 RX ADMIN — ACETAMINOPHEN 975 MG: 325 TABLET, FILM COATED ORAL at 13:47

## 2018-12-09 RX ADMIN — SENNOSIDES AND DOCUSATE SODIUM 1 TABLET: 8.6; 5 TABLET ORAL at 16:29

## 2018-12-09 RX ADMIN — AMIODARONE HYDROCHLORIDE 200 MG: 200 TABLET ORAL at 08:31

## 2018-12-09 RX ADMIN — PANTOPRAZOLE SODIUM 20 MG: 20 TABLET, DELAYED RELEASE ORAL at 05:13

## 2018-12-09 RX ADMIN — CALCIUM CARBONATE (ANTACID) CHEW TAB 500 MG 500 MG: 500 CHEW TAB at 16:28

## 2018-12-09 RX ADMIN — VITAMIN D, TAB 1000IU (100/BT) 5000 UNITS: 25 TAB at 08:30

## 2018-12-09 RX ADMIN — OXYCODONE HYDROCHLORIDE 5 MG: 5 TABLET ORAL at 09:30

## 2018-12-09 RX ADMIN — HYDROCHLOROTHIAZIDE 12.5 MG: 12.5 TABLET ORAL at 08:31

## 2018-12-09 RX ADMIN — CALCIUM CARBONATE (ANTACID) CHEW TAB 500 MG 500 MG: 500 CHEW TAB at 08:30

## 2018-12-09 RX ADMIN — SENNOSIDES AND DOCUSATE SODIUM 1 TABLET: 8.6; 5 TABLET ORAL at 08:30

## 2018-12-09 RX ADMIN — ACETAMINOPHEN 975 MG: 325 TABLET, FILM COATED ORAL at 05:13

## 2018-12-09 NOTE — PROGRESS NOTES
Internal Medicine Progress Note  Patient: Carmelo Dyson  Age/sex: 66 y o  female  Medical Record #: [id number]      ASSESSMENT/PLAN:  Carmelo Dyson is seen and examined and mangement for following issues:    # Right hip fracture status post right MIKE:  Pain control in therapy per primary service  Monitor incision for any signs of infection      # Atrial fibrillation:  Continue amiodarone 200 mg 2 times daily and Lopressor 25 mg every 12 hours  Continue Eliquis      # Hypertension:  Continue hydrochlorothiazide 12 5 mg daily and Lopressor 25 mg every 12 hours  Monitor blood pressure every shift and adjust medications as needed      # Acute blood loss anemia:  Hemoglobin 7 6  Patient to get a blood transfusion today  Subjective: Patient seen and examined  Patient reports that she is fatigued  She had a difficult time and occupational therapy today due to fatigue and dizziness  She denies any other complaints      ROS:   GI: denies abdominal pain, change bowel habits or reflux symptoms  Neuro: No new neurologic changes  Respiratory: No Cough, SOB  Cardiovascular: No CP, palpitations     Scheduled Meds:    Current Facility-Administered Medications:  acetaminophen 975 mg Oral Q8H [address] Kin Ramsey MD   amiodarone 200 mg Oral BID With Meals Kin Ramsey MD   apixaban 5 mg Oral Q12H [address] Kin Ramsey MD   bisacodyl 10 mg Rectal Daily PRN Kin Ramsey MD   calcium carbonate 500 mg Oral BID With Meals Kin Ramsey MD   cholecalciferol 5,000 Units Oral Daily Kin Ramsey MD   ferrous sulfate 325 mg Oral Daily With Breakfast Kin Ramsey MD   hydrochlorothiazide 12 5 mg Oral Daily Kin Ramsey MD   levothyroxine 50 mcg Oral Early Morning Kin Ramsey MD   lidocaine 2 patch Topical Daily Kin Ramsey MD   metoprolol tartrate 25 mg Oral Q12H [address] Kin Ramsey MD   oxyCODONE 2 5 mg Oral Q4H PRN Kin Ramsey MD   oxyCODONE 5 mg Oral Q4H PRN Kin Ramsey MD   pantoprazole 20 mg Oral Early Morning Rafi Gómez MD   polyethylene glycol 17 g Oral Daily PRN Rafi Gómez MD   pravastatin 40 mg Oral Daily With My Wolfe MD   senna-docusate sodium 1 tablet Oral BID Rafi Gómez MD       Labs:       Results from last 7 days  Lab Units 12/09/18  0512 12/08/18  0448   WBC Thousand/uL 7 29 6 03   HEMOGLOBIN g/dL 7 6* 7 4*   HEMATOCRIT % 23 3* 22 4*   PLATELETS Thousands/uL 135* 103*       Results from last 7 days  Lab Units 12/08/18  0448 12/07/18  0445  12/04/18  2318   POTASSIUM mmol/L 4 1 3 9  < >  --    CHLORIDE mmol/L 109* 112*  < >  --    CO2 mmol/L 26 22  < >  --    CO2, I-STAT mmol/L  --   --   --  22   BUN mg/dL 19 17  < >  --    CREATININE mg/dL 0 80 0 84  < >  --    GLUCOSE, ISTAT mg/dl  --   --   --  136   CALCIUM mg/dL 8 0* 8 0*  < >  --    < > = values in this interval not displayed  Results from last 7 days  Lab Units 12/04/18  0441 12/03/18  0650   INR  1 29* 1 41*        Glucose, i-STAT (mg/dl)   Date Value   12/04/2018 136   12/03/2018 128       Labs reviewed    Physical Examination:  Vitals:   Vitals:    12/08/18 1251 12/08/18 2025 12/09/18 0507   BP: 116/61 108/57 134/78   BP Location: Left arm Left arm Right arm   Pulse: 90 80 97   Resp: 20 18 18   Temp: 98 1 °F (36 7 °C) 98 5 °F (36 9 °C) 98 4 °F (36 9 °C)   TempSrc: Oral Oral Oral   SpO2: 98% 96% 96%   Weight: 66 5 kg (146 lb 9 7 oz)     Height: 5' 3" (1 6 m)         PERRLA EOMI no JVD  RESP: CTAB, no R/R/W  CV: +S1 S2, regular rate, no rubs  ABD: soft, NT, ND, normal BS   EXT:  No edema  Neuro:  Appears fatigued  Alert and oriented x3  Resting tremor right hand  [ X ] Total time spent: 30 Mins and greater than 50% of this time was spent counseling/coordinating care  ** Please Note: Dragon 360 Dictation voice to text software may have been used in the creation of this document   **

## 2018-12-09 NOTE — PROGRESS NOTES
OT EVALUATION     12/09/18 8716   Patient Data   Rehab Impairment unilateral hip fx   Etiologic Diagnosis R femoral neck fracture   Date of Onset 12/03/18   Home Setup   Type of Home Single Level  (attached to son's house)   Method of Entry Stairs   Number of Stairs 4   Number of Stairs in Home 0   First Floor Bathroom Full;Tub;Combo   First Floor Setup Available (in place)   Baseline Information   Transportation Family/friends drive  ( drives)   Prior Device(s) Used Shower Chair   Prior IADL Participation   Money Management ( managed at baseline)   Meal Preparation (son managed)   Laundry (son completes)   Home Cleaning (assist for family)   Prior Level of Function   Self-Care 2  Needed Some Help - Patient needed a partial assistance from another person to complete activities  Indoor-Mobility (Ambulation) 3  Independent - Patient completed the activities by him/herself, with or without an assistive device, with no assistance from a helper  Functional Cognition 2  Needed Some Help - Patient needed a partial assistance from another person to complete activities     Prior Assistance Needed for Household Chores/Cleaning;Meal Preparation;Medication Management;Money Management   Psychosocial   Psychosocial (WDL) WDL   Restrictions/Precautions   Precautions Fall Risk;Cognitive;Bed/chair alarms;Pain, THPs, WBAT RLE  (low hemoglobin pt dizzy and nauesous)   Pain Assessment   Pain Assessment 0-10   Pain Score 8   Pain Type Acute pain   Pain Location Leg   Pain Orientation Right   Hospital Pain Intervention(s) (RN administered medication)   QI: Eating   Assistance Needed Set-up / clean-up   Assistance Provided by [place] No physical assistance   Eating CARE Score 5   Eating Assessment   Food To Mouth Yes   Positioning Upright;Out of Bed   Meal Assessed Breakfast   Intake Mode PO   Finishes Timely Yes   Opens Packages No   Eating (FIM) 5 - Patient needs help to open contianers or set up tray   QI: Oral Hygiene Assistance Needed Supervision   Assistance Provided by [place] No physical assistance   Comment Pt completed grooming tasks while seated however limited by signficant fatigue and required several rest breaks   Oral Hygiene CARE Score 4   Grooming   Able To Comb/Brush Hair;Wash/Dry Face;Brush/Clean Teeth;Wash/Dry Hands   Limitation Noted In Strength;Timeliness   Grooming (FIM) 5 - [place] sets up supplies or applies device   QI: Shower/Bathe Self   Assistance Needed Physical assistance   Assistance Provided by [place] Total assistance   Comment Pt wtih dizziness throughout session and thus required freuqent rest breaks and BP checks  Pt requried assist of two for safety wihile in stance while therapist assist with washing jhon and buttock  Pt unable tow delia below knee on b/l LE's due to THP's  Pt able to wash UB while seated   Shower/Bathe Self CARE Score 1   Bathing   Assessed Bath Style Sponge Bath   Anticipated D/C Bath Style Tub   Able to Gather/Transport No   Able to Wash/Rinse/Dry (body part) Left Arm;L Upper Leg;Right Arm;R Upper Leg;Chest   Limitations Noted in Balance; Coordination; Endurance;Problem Solving;ROM;Safety;Strength;Timeliness; Sequencing   Positioning Seated;Standing   Bathing (FIM) 1 - Patient requires two helpers   QI: Upper Body Dressing   Assistance Needed Physical assistance   Assistance Provided by [place] 50%-74%   Comment Pt requires assist to thread LUE into shirt and to pull shirt down in back   Upper Body Dressing CARE Score 2   QI: Lower Body Dressing   Assistance Needed Physical assistance   Assistance Provided by [place] Total assistance   Comment Pt requires assist to thread b/l LE's into pants and underwear Pt requires AX2 due to dizziness to pull pants up over hips   Lower Body Dressing CARE Score 1   QI: Putting On/Taking Off Footwear   Assistance Needed Physical assistance   Assistance Provided by [place] Total assistance   Putting On/Taking Off Footwear CARE Score 1   QI: Picking Up Object   Reason if not Attempted Safety concerns   Picking Up Object CARE Score 88   Dressing/Undressing Clothing   Remove LB [address] UB Clothes Pullover Shirt   Don LB Clothes Pants;Socks; Undergarment   Limitations Noted In Balance; Coordination; Endurance;Problem Solving; Safety   Positioning Supported Sit;Standing   UB Dressing (FIM) 3 - Patient completes  50-74% of all tasks   LB Dressing (FIM) 1 - Patient requires two helpers   QI: [address] Needed Physical assistance   Assistance Provided by [place] Total assistance   Comment Pt requires Ax2 due to unsteadiness and dec balance  Toileting Hygiene CARE Score 1   Toileting   Able to Pull Clothing down no, up no  Able to Manage Clothing Closures No   Limitations Noted In Balance;Problem Solving;Coordination; Safety;LE Strength   Toileting (FIM) 1 - Patient requires two helpers   QI: Roll Left and Right   Assistance Needed Physical assistance   Assistance Provided by [place] 25%-49%   Roll Left and Right CARE Score 3   QI: Sit to Lying   Assistance Needed Physical assistance   Assistance Provided by [place] 75% or more   Sit to Lying CARE Score 2   QI: Lying to Sitting on Side of Bed   Assistance Needed Physical assistance   Assistance Provided by [place] 50%-74%   Comment Pt requires assist with LLE   and trunk out of bed   Lying to Sitting on Side of Bed CARE Score 2   QI: Sit to Stand   Assistance Needed Physical assistance   Assistance Provided by [place] Total assistance   Comment Pt reuqires AX2 for sit to stand transfers due to safety and extreme fatigue   Sit to Stand CARE Score 1   QI: Chair/Bed-to-Chair Transfer   Assistance Needed Physical assistance   Assistance Provided by [place] Total assistance   Comment Pt requires AX2 due to dizziness and extreme fatigue  Pt stand pivot with RW    Chair/Bed-to-Chair Transfer CARE Score 1   Transfer Bed/Chair/Wheelchair   Limitations Noted In Balance; Coordination; Endurance;Problem Solving;LE Strength   Adaptive Equipment Roller Walker   Stand Pivot Assist x 2   Sit to Stand Moderate Assist   Stand to Sit Moderate Assist   Supine to Sit Moderate Assist   Sit to Supine Maximum Assist   Bed, Chair, Wheelchair Transfer (FIM) 1 - Patient requires assist of two people   QI: Toilet Transfer   Assistance Needed Physical assistance   Assistance Provided by [place] Total assistance   Toilet Transfer CARE Score 1   Toilet Transfer   Surface Assessed Standard Commode   Transfer Technique Stand Pivot   Limitations Noted In Balance; Endurance;Problem Solving; Safety; Sequencing;LE Strength   Adaptive Equipment Grab Bar   Toilet Transfer (FIM) 1 - Patient requires assist of two people   Tub/Shower Transfer   Not Assessed Sponge Bath;Safety   Comprehension   Assist Devices Glasses   QI: Comprehension 3  Usually Understands: Understands most conversations, but misses some part/intent of message  Requires cues at times to understand  Comprehension (FIM) 3 - Understands basic info/conversation 50-74% of time   Expression   Verbal Complex   Intelligibility Sentence   QI: Expression 3   Exhibits some difficulty with expressing needs and ideas (e g , some words or finishing thoughts) or speech is not clear   Expression (FIM) 4 - Expresses basic info/needs 75-90% of time   Social Interaction   Social Interaction (FIM) 5 - Interacts appropriately with others 90% of time   Problem Solving   Routine [place] precautions;Manages ADL   Problem solving (FIM) 2 - Needs direction more than ½ time to initiate, plan or complete simple tasks   Memory   Initiates Tasks Yes   Short-Term Impaired   Long Term Impaired   Recalls Precaution No   Memory (FIM) 2 - Recognizes, recalls/performs 25-49%   RUE Assessment   RUE Assessment WFL   LUE Assessment   LUE Assessment WFL   Coordination   Movements are Fluid and Coordinated 0   Coordination and Movement Description antalgic gait pattern   Sensation   Light Touch No apparent deficits Cognition   Overall Cognitive Status Impaired   Arousal/Participation Alert; Cooperative   Attention Attends with cues to redirect   Orientation Level Oriented X4   Memory Decreased short term memory;Decreased recall of recent events;Decreased recall of precautions   Following Commands Follows multistep commands with increased time or repetition   Discharge Information   Patient's Discharge Plan return home with support from  and and son   Patient's Rehab Expectations I want to feel ok when I stand   Barriers to Discharge Home Decreased Strength;Decreased Endurance;Pain   Impressions Pt presents s/p fall sustaining R hip fx and is now s/p MIKE  Pt hospital stay complicated by rapid response  Pt has h/o dementia  Pt lives with son and  in attached house to her daugther  Pt  is retired adn can assist as needed  For the past month pt was sponge bathing at home and had occasional min A for dressing  Pt son managed all IADLs  Pt used no AD for fxnl mobility at baseline  Pt is currently AX2 for fxnl transfers with RW due to safety and dizziness  Pt was AX2 for toileting and all LB ADLs  Pt had low hemoglobin during evaluation and progressively got more dizzy and weak  RN notified throughout session  BPs were as followed 118/62 setated, 99/57 stance, and 101/54 seated  Pt returned to bed  RN notified WALTER WATSON saw paitient later in AM and pt was placed on med hold for afternoon  Pt is limited by dizziness, BP's, decreased endurance, THP's,d ec dynamic reach, dec standing balance, generalized weakness, dec strength in RLE  Pt is overall good rehab candidate and would benefit from inpatient rehab for 2 weeks to improve independence and safety      OT Therapy Minutes   OT Time In 0830   OT Time Out 1000   OT Total Time (minutes) 90   OT Mode of treatment - Individual (minutes) 90   OT Mode of treatment - Concurrent (minutes) 0   OT Mode of treatment - Group (minutes) 0   OT Mode of treatment - Co-treat (minutes) 0   OT Mode of Teatment - Total time(minutes) 90 minutes

## 2018-12-09 NOTE — CONSULTS
Consult Routine [address]  66 y o  female MRN: [id number]  Unit/Bed#: -01 Encounter: [id number]    Assessment/Plan     Assessment:  1  Onychomycosis x 10         Plan:  - Hallucal mycotic nails x2 debrided decreasing thickness by 1 mm  Mycotic toe nails 2-5 b/l were trimmed, decreasing length, without incidence utilizing a sharp nail nipper  - All questions and concerns addressed  - Will discuss this plan with my attending    - Podiatry signing off, thank you for the consult  History of Present Illness     HPI:  Cheng Waterman is a 66 y o  female who presents with painful, elongated toenail  They have difficulty applying their socks and shoes due to the elongation of the nails  The pressure within their shoe gear is painful and they have been unable to cut their nails adequately  Consults  Review of Systems   Constitutional: Negative  HENT: Negative  Eyes: Negative  Respiratory: Negative  Cardiovascular: Negative  Gastrointestinal: Negative  Musculoskeletal: Negative   Skin: elongated thickened toenails   Neurological: Negative  Historical Information   Past Medical History:   Diagnosis Date    Atrial fibrillation ([address] )     Disease of thyroid gland     Hyperlipidemia     Hypertension     Psychiatric disorder     dementia     Past Surgical History:   Procedure Laterality Date    HIP ARTHROPLASTY Right 12/4/2018    Procedure: ARTHROPLASTY HIP TOTAL;  Surgeon: Dk Grant MD;  Location: [facility];  Service: Orthopedics    HYSTERECTOMY       Social History   History   Alcohol Use No     History   Drug Use No     History   Smoking Status    Former Smoker    Quit date: 1960   Smokeless Tobacco    Never Used     Family History: History reviewed  No pertinent family history      Meds/Allergies   Prescriptions Prior to Admission   Medication    apixaban (ELIQUIS) 5 mg    Cholecalciferol ([employer] VITAMIN D3) 1000 units capsule    hydrochlorothiazide (HYDRODIURIL) 12 5 mg tablet    levothyroxine 50 mcg tablet    metoprolol tartrate (LOPRESSOR) 25 mg tablet    metoprolol tartrate (LOPRESSOR) 25 mg tablet    omeprazole (PriLOSEC) 20 mg delayed release capsule    oxyCODONE (ROXICODONE) 5 mg immediate release tablet    simvastatin (ZOCOR) 40 mg tablet     No Known Allergies    Objective   First Vitals:   Blood Pressure: 116/61 (12/08/18 1251)  Pulse: 90 (12/08/18 1251)  Temperature: 98 1 °F (36 7 °C) (12/08/18 1251)  Temp Source: Oral (12/08/18 1251)  Respirations: 20 (12/08/18 1251)  Height: 5' 3" (160 cm) (12/08/18 1251)  Weight - Scale: 66 5 kg (146 lb 9 7 oz) (12/08/18 1251)  SpO2: 98 % (12/08/18 1251)    Current Vitals:   Blood Pressure: 134/78 (12/09/18 0507)  Pulse: 97 (12/09/18 0507)  Temperature: 98 4 °F (36 9 °C) (12/09/18 0507)  Temp Source: Oral (12/09/18 0507)  Respirations: 18 (12/09/18 0507)  Height: 5' 3" (160 cm) (12/08/18 1251)  Weight - Scale: 66 5 kg (146 lb 9 7 oz) (12/08/18 1251)  SpO2: 96 % (12/09/18 0507)    /78 (BP Location: Right arm)   Pulse 97   Temp 98 4 °F (36 9 °C) (Oral)   Resp 18   Ht 5' 3" (1 6 m)   Wt 66 5 kg (146 lb 9 7 oz)   SpO2 96%   BMI 25 97 kg/m²     General Appearance:    Alert, cooperative, no distress   Head:    Normocephalic, without obvious abnormality, atraumatic   Eyes:    PERRL, conjunctiva/corneas clear, EOM's intact            Nose:   Moist mucous membranes, no drainage or sinus tenderness   Throat:   No tenderness, no exudates   Neck:   Supple, symmetrical, trachea midline, no JVD   Back:     Symmetric, no CVA tenderness   Lungs:     Clear to auscultation bilaterally, respirations unlabored   Chest wall:    No tenderness or deformity   Heart:    Regular rate and rhythm, S1 and S2 normal, no murmur, rub   or gallop   Abdomen:     Soft, non-tender, bowel sounds active all four quadrants,     no masses, no organomegaly     Extremities:   MMT is 4/5 to all compartments of the LE, +0/4 edema B/L, Digital ROM is intact,    Pulses:   R DP is +1/4, R PT is +1/4, L DP is +1/4, L PT is +1/4, CFT< 3sec to all digits  Skin:   Nails are thickened, elongated, with notable subungual debris  Yellow discoloration noted to toenails 1-5 B/L  No open Lesions  Thin/shiny skin noted to the B/L LE, pigmentary changes to B/L LE  Neurologic:   CNII-XII intact  Normal strength, sensation and reflexes       Throughout  Gross sensation is intact  Protective sensation is intact       Lab Results:   Admission on 12/08/2018   Component Date Value    WBC 12/09/2018 7 29     RBC 12/09/2018 2 39*    Hemoglobin 12/09/2018 7 6*    Hematocrit 12/09/2018 23 3*    MCV 12/09/2018 98     MCH 12/09/2018 31 8     MCHC 12/09/2018 32 6     RDW 12/09/2018 13 4     MPV 12/09/2018 11 9     Platelets 68/81/3856 135*    nRBC 12/09/2018 0     Neutrophils Relative 12/09/2018 78*    Immat GRANS % 12/09/2018 2     Lymphocytes Relative 12/09/2018 11*    Monocytes Relative 12/09/2018 7     Eosinophils Relative 12/09/2018 2     Basophils Relative 12/09/2018 0     Neutrophils Absolute 12/09/2018 5 65     Immature Grans Absolute 12/09/2018 0 15     Lymphocytes Absolute 12/09/2018 0 83     Monocytes Absolute 12/09/2018 0 50     Eosinophils Absolute 12/09/2018 0 15     Basophils Absolute 12/09/2018 0 01      Imaging: I have personally reviewed pertinent films in PACS  EKG, Pathology, and Other Studies: I have personally reviewed pertinent reports        Code Status: Level 1 - Full Code  Advance Directive and Living Will:      Power of :    POLST:

## 2018-12-09 NOTE — PLAN OF CARE
Problem: INFECTION - ADULT  Goal: Absence of fever/infection during neutropenic period  INTERVENTIONS:  - Monitor WBC  - Implement neutropenic guidelines   Outcome: Completed Date Met: 12/09/18

## 2018-12-09 NOTE — PHYSICAL THERAPY NOTE
PT eval not performed today due to medical status and pt needing to receive blood  Pt was on medical hold this afternoon  To perform PT eval tomorrow provided pt is medically stable

## 2018-12-10 PROBLEM — R11.0 CHRONIC NAUSEA: Status: ACTIVE | Noted: 2018-12-10

## 2018-12-10 PROBLEM — R41.0 DELIRIUM: Status: ACTIVE | Noted: 2018-12-10

## 2018-12-10 PROBLEM — R42 POSITIONAL LIGHTHEADEDNESS: Status: ACTIVE | Noted: 2018-12-10

## 2018-12-10 PROBLEM — B35.1 ONYCHOMYCOSIS: Status: ACTIVE | Noted: 2018-12-10

## 2018-12-10 LAB
ABO GROUP BLD BPU: NORMAL
ANION GAP SERPL CALCULATED.3IONS-SCNC: 7 MMOL/L (ref 4–13)
ANISOCYTOSIS BLD QL SMEAR: PRESENT
BASOPHILS # BLD MANUAL: 0 THOUSAND/UL (ref 0–0.1)
BASOPHILS NFR MAR MANUAL: 0 % (ref 0–1)
BPU ID: NORMAL
BUN SERPL-MCNC: 16 MG/DL (ref 5–25)
CALCIUM SERPL-MCNC: 8.9 MG/DL (ref 8.3–10.1)
CHLORIDE SERPL-SCNC: 105 MMOL/L (ref 100–108)
CO2 SERPL-SCNC: 24 MMOL/L (ref 21–32)
CREAT SERPL-MCNC: 0.86 MG/DL (ref 0.6–1.3)
EOSINOPHIL # BLD MANUAL: 0.24 THOUSAND/UL (ref 0–0.4)
EOSINOPHIL NFR BLD MANUAL: 3 % (ref 0–6)
ERYTHROCYTE [DISTWIDTH] IN BLOOD BY AUTOMATED COUNT: 14.4 % (ref 11.6–15.1)
GFR SERPL CREATININE-BSD FRML MDRD: 65 ML/MIN/1.73SQ M
GLUCOSE P FAST SERPL-MCNC: 89 MG/DL (ref 65–99)
GLUCOSE SERPL-MCNC: 89 MG/DL (ref 65–140)
HCT VFR BLD AUTO: 27.1 % (ref 34.8–46.1)
HGB BLD-MCNC: 8.8 G/DL (ref 11.5–15.4)
LYMPHOCYTES # BLD AUTO: 0.63 THOUSAND/UL (ref 0.6–4.47)
LYMPHOCYTES # BLD AUTO: 8 % (ref 14–44)
MCH RBC QN AUTO: 31.5 PG (ref 26.8–34.3)
MCHC RBC AUTO-ENTMCNC: 32.5 G/DL (ref 31.4–37.4)
MCV RBC AUTO: 97 FL (ref 82–98)
MONOCYTES # BLD AUTO: 0.31 THOUSAND/UL (ref 0–1.22)
MONOCYTES NFR BLD: 4 % (ref 4–12)
NEUTROPHILS # BLD MANUAL: 6.69 THOUSAND/UL (ref 1.85–7.62)
NEUTS SEG NFR BLD AUTO: 85 % (ref 43–75)
NRBC BLD AUTO-RTO: 0 /100 WBCS
PLATELET # BLD AUTO: 152 THOUSANDS/UL (ref 149–390)
PLATELET BLD QL SMEAR: ADEQUATE
PMV BLD AUTO: 11.1 FL (ref 8.9–12.7)
POLYCHROMASIA BLD QL SMEAR: PRESENT
POTASSIUM SERPL-SCNC: 4.3 MMOL/L (ref 3.5–5.3)
RBC # BLD AUTO: 2.79 MILLION/UL (ref 3.81–5.12)
RBC MORPH BLD: PRESENT
SODIUM SERPL-SCNC: 136 MMOL/L (ref 136–145)
UNIT DISPENSE STATUS: NORMAL
UNIT PRODUCT CODE: NORMAL
UNIT RH: NORMAL
WBC # BLD AUTO: 7.87 THOUSAND/UL (ref 4.31–10.16)

## 2018-12-10 PROCEDURE — 85027 COMPLETE CBC AUTOMATED: CPT | Performed by: PHYSICIAN ASSISTANT

## 2018-12-10 PROCEDURE — 97530 THERAPEUTIC ACTIVITIES: CPT

## 2018-12-10 PROCEDURE — 97110 THERAPEUTIC EXERCISES: CPT

## 2018-12-10 PROCEDURE — 97162 PT EVAL MOD COMPLEX 30 MIN: CPT

## 2018-12-10 PROCEDURE — 80048 BASIC METABOLIC PNL TOTAL CA: CPT | Performed by: PHYSICIAN ASSISTANT

## 2018-12-10 PROCEDURE — 97535 SELF CARE MNGMENT TRAINING: CPT

## 2018-12-10 PROCEDURE — 99233 SBSQ HOSP IP/OBS HIGH 50: CPT

## 2018-12-10 PROCEDURE — 85007 BL SMEAR W/DIFF WBC COUNT: CPT | Performed by: PHYSICIAN ASSISTANT

## 2018-12-10 RX ORDER — ASCORBIC ACID 500 MG
500 TABLET ORAL
Status: DISCONTINUED | OUTPATIENT
Start: 2018-12-11 | End: 2018-12-11

## 2018-12-10 RX ORDER — ONDANSETRON 4 MG/1
4 TABLET, ORALLY DISINTEGRATING ORAL EVERY 6 HOURS PRN
Status: DISCONTINUED | OUTPATIENT
Start: 2018-12-10 | End: 2018-12-25 | Stop reason: HOSPADM

## 2018-12-10 RX ORDER — AMIODARONE HYDROCHLORIDE 200 MG/1
200 TABLET ORAL 2 TIMES DAILY WITH MEALS
Status: COMPLETED | OUTPATIENT
Start: 2018-12-10 | End: 2018-12-15

## 2018-12-10 RX ORDER — ONDANSETRON 4 MG/1
4 TABLET, ORALLY DISINTEGRATING ORAL ONCE
Status: COMPLETED | OUTPATIENT
Start: 2018-12-10 | End: 2018-12-10

## 2018-12-10 RX ORDER — DOCUSATE SODIUM 100 MG/1
100 CAPSULE, LIQUID FILLED ORAL 2 TIMES DAILY
Status: DISCONTINUED | OUTPATIENT
Start: 2018-12-10 | End: 2018-12-13

## 2018-12-10 RX ORDER — AMIODARONE HYDROCHLORIDE 200 MG/1
200 TABLET ORAL
Status: DISCONTINUED | OUTPATIENT
Start: 2018-12-16 | End: 2018-12-25 | Stop reason: HOSPADM

## 2018-12-10 RX ORDER — BISACODYL 10 MG
10 SUPPOSITORY, RECTAL RECTAL ONCE
Status: DISCONTINUED | OUTPATIENT
Start: 2018-12-10 | End: 2018-12-19

## 2018-12-10 RX ADMIN — OXYCODONE HYDROCHLORIDE 5 MG: 5 TABLET ORAL at 08:50

## 2018-12-10 RX ADMIN — LEVOTHYROXINE SODIUM 50 MCG: 50 TABLET ORAL at 05:35

## 2018-12-10 RX ADMIN — APIXABAN 5 MG: 5 TABLET, FILM COATED ORAL at 08:50

## 2018-12-10 RX ADMIN — OXYCODONE HYDROCHLORIDE 5 MG: 5 TABLET ORAL at 03:03

## 2018-12-10 RX ADMIN — SENNOSIDES AND DOCUSATE SODIUM 1 TABLET: 8.6; 5 TABLET ORAL at 16:26

## 2018-12-10 RX ADMIN — POLYETHYLENE GLYCOL 3350 17 G: 17 POWDER, FOR SOLUTION ORAL at 16:21

## 2018-12-10 RX ADMIN — OXYCODONE HYDROCHLORIDE 5 MG: 5 TABLET ORAL at 12:55

## 2018-12-10 RX ADMIN — FERROUS SULFATE TAB 325 MG (65 MG ELEMENTAL FE) 325 MG: 325 (65 FE) TAB at 08:49

## 2018-12-10 RX ADMIN — ACETAMINOPHEN 975 MG: 325 TABLET, FILM COATED ORAL at 05:35

## 2018-12-10 RX ADMIN — ACETAMINOPHEN 975 MG: 325 TABLET, FILM COATED ORAL at 14:07

## 2018-12-10 RX ADMIN — CALCIUM CARBONATE (ANTACID) CHEW TAB 500 MG 500 MG: 500 CHEW TAB at 16:21

## 2018-12-10 RX ADMIN — HYDROCHLOROTHIAZIDE 12.5 MG: 12.5 TABLET ORAL at 08:48

## 2018-12-10 RX ADMIN — PRAVASTATIN SODIUM 40 MG: 40 TABLET ORAL at 16:25

## 2018-12-10 RX ADMIN — DOCUSATE SODIUM 100 MG: 100 CAPSULE, LIQUID FILLED ORAL at 21:46

## 2018-12-10 RX ADMIN — AMIODARONE HYDROCHLORIDE 200 MG: 200 TABLET ORAL at 16:25

## 2018-12-10 RX ADMIN — ONDANSETRON 4 MG: 4 TABLET, ORALLY DISINTEGRATING ORAL at 14:12

## 2018-12-10 RX ADMIN — AMIODARONE HYDROCHLORIDE 200 MG: 200 TABLET ORAL at 08:49

## 2018-12-10 RX ADMIN — LIDOCAINE 2 PATCH: 50 PATCH CUTANEOUS at 08:48

## 2018-12-10 RX ADMIN — PANTOPRAZOLE SODIUM 20 MG: 20 TABLET, DELAYED RELEASE ORAL at 05:35

## 2018-12-10 RX ADMIN — VITAMIN D, TAB 1000IU (100/BT) 5000 UNITS: 25 TAB at 08:48

## 2018-12-10 RX ADMIN — METOPROLOL TARTRATE 25 MG: 25 TABLET, FILM COATED ORAL at 08:49

## 2018-12-10 RX ADMIN — ACETAMINOPHEN 975 MG: 325 TABLET, FILM COATED ORAL at 21:45

## 2018-12-10 RX ADMIN — METOPROLOL TARTRATE 25 MG: 25 TABLET, FILM COATED ORAL at 21:45

## 2018-12-10 RX ADMIN — APIXABAN 5 MG: 5 TABLET, FILM COATED ORAL at 21:46

## 2018-12-10 RX ADMIN — SENNOSIDES AND DOCUSATE SODIUM 1 TABLET: 8.6; 5 TABLET ORAL at 08:50

## 2018-12-10 NOTE — TREATMENT PLAN
Individualized Plan of Alem Shelby 66 y o  female MRN: [id number]  Unit/Bed#: -01 Encounter: [id number]     PATIENT INFORMATION  ADMISSION DATE: 12/8/2018 12:23 PM RAIMUNDO CATEGORY:Orthopedic Disorders:  08 11  Unilateral Hip Fracture   ADMISSION DIAGNOSIS: R intertrochanteric hip fracture EXPECTED LOS: 14 days     MEDICAL/FUNCTIONAL PROGNOSIS  Pre-admit screens and post-admit evaluations reviewed and are consistent  Based on my assessment of the patient's medical conditions and current functional status, the prognosis for attaining medical and functional goals or the IR[facility] stay is:  Fair  Patient is appropriate for acute rehabilitation  Medical Goals:   Patient will be medically stable for discharge back to community setting upon completion or acute rehab program and patient/family will be able to manage medical conditions and comorbid conditions with medications and appropriate follow up upon completion of rehab program   Cardiopulmonary function/Anemia: Ensure cardiopulmonary stability and optimize cardiopulmonary function not only at rest but with activity as patient's activity level significantly increases in acute rehab compared with prior to transfer in preparation for safe discharge from [facility]  Must closely and frequently monitor blood pressure, HR, anemia to ensure adequate cardiac output during ADLs and ambulation as patient is at increased risk for orthostatic hypotension/syncope and potential injury if not monitored for and managed adequately  Blood pressure management:    Frequent monitoring of blood pressure with appropriate adjustments in blood pressure medication management to optimize blood pressure control and prevent/limit renal complications  Monitoring impact of blood pressure and side-effects of blood pressure medications at rest and with activity    Hypoxia prevention: Ensure appropriate level of oxygenation at rest and with activity to avoid symptomatic hypoxia, maximize functional performance, and decrease risk of atelectasis/pneumonia through close and frequent monitoring, providing appropriate respiratory treatments (such as incentive spirometry), and when necessary provide/adjust respiratory medications  Pain management:  Pain will improve with frequent evaluation of pain, careful adjustments in medications, frequent re-evaluation of patient's pain and medical/neurologic status to ensure optimal pain control, avoidance of potential serious and even life-threatening side-effects and drug interactions, as well as weaning pain medications as soon as possible to decrease risk of short and long-term use  Cognitive impairment:  Patient's cognitive status is significantly impaired and neurologic status can fluctuate particularly early in rehabilitation process  Patient requires frequent rehab physician and rehab nursing neurologic monitoring for subtle and more significant changes in mentation and neurologic function  Labs must be monitored closely along with hydration and nutritional status  Medications must be carefully monitored and adjusted to optimize functional recovery while limit cognitive impairments  Goal to improve cognitive and neurologic function, provide appropriate patient (and/or family education), and to ensure appropriate optimal discharge plan  Inpatient rehabilitation education/teaching: To be provided to patient and typically family/caregiver (if able to be identified) by all skilled therapists, rehab nursing, case management, and rehab specialized physician to ensure optimal recovery and decrease risks of complications in both acute rehabilitation setting as well as after discharge     Bowel dysfunction: Appropriate bowel management with appropriate toileting program from rehab nursing and staff with oversight management by rehabilitation physicians which include adjustments in medications to optimize appropriate bowel function and prevent/decrease risk of constipation and bowel obstruction  Skin wounds: Appropriate skin checks for wound/skin evaluation including evaluation of healing, worsening of wounds, or signs of infection  Wound care management from rehab nursing, wound care nursing, physicians  Ensure frequent appropriate turning, positioning in bed, in chair, when mobilizing, and when appropriate with use of appropriate devices to optimize healing and decrease risk of worsening or new skin breakdown  ANTICIPATED DISCHARGE DISPOSITION AND SERVICES  COMMUNITY SETTING: Home - with supervision    ANTICIPATED FOLLOW-UP SERVICE:   Home Health Services: PT, OT      DISCIPLINE SPECIFIC PLANS:  Required Disciplines & Services: PT, OT, Rehabilitation Physician, Rehabillitation Nursing, Case Management, Dietary, Respiratory    REQUIRED THERAPY (expected): Therapy Hours per Day Days per Week Tx Days (Days in [facility])   Physical Therapy 1 5 5 14   Occupational Therapy 1 5 5 14   NOTE: Additional therapy time(s) may be added as appropriate to meet patient needs and to achieve functional goals  ANTICIPATED FUNCTIONAL OUTCOMES:  ADL: Patient will be min-assist to supervision with ADLs upon completion of rehab program   Bladder/Bowel: Patient will be supervision with bladder/bowel management upon completion of rehab program   Transfers: Patient will be supervision with transfers upon completion of rehab program   Locomotion: Patient will be min-assist to supervision  with locomotion upon completion of rehab program   Cognitive: Patient will be at prior level of cognitive function upon completion of rehab program     DISCHARGE PLANNING NEEDS  Equipment needs: To be determined at team conference  REHAB ANTICIPATED PARTICIPATION RESTRICTIONS:  Uncertain at this time  To be determined closer to discharge

## 2018-12-10 NOTE — ASSESSMENT & PLAN NOTE
Improved s/p transfusion 12/13  Monitor vitals; orthostatics  Recently started on Amio; also on MTP  HCTZ holding per IM  Abdominal binder PRN

## 2018-12-10 NOTE — PROGRESS NOTES
Physical Medicine and Rehabilitation Progress Note  Chester Atwood 66 y o  female MRN: [id number]  Unit/Bed#: -01 Encounter: [id number]    Chief Complaints:  Hip pain    Subjective/Interval Events:   Hb improved s/p 1 unit PRBC yesterday  Patient has some lightheadedness with activity earlier today with + mild orthostatics  Patient reports having some acute on chronic anxiety and fear about getting better  She notes sleeping alright overnight but not great  Patient reports mild hip pain at rest and moderately severe hip pain with activity and is not sure if pain meds have been helpful  She reports last BM was several days ago and may be mildly constipated but is having some gas without abdominal pain  She reports some nausea without vomiting which she states is chronic for years  Patient denies fever, chills, sweats, calf pain, or other complaints,    ROS: A 10-point ROS was performed  Negative except as listed above  Extended discussion today held with patient and  regarding current condition, medical history, medical/rehabilitation management, and disposition  Overall Assessment/relevant history:  22-year-old female with a past medical history of atrial fibrillation on chronic anticoagulation with apixaban, hypertension, hyperlipidemia, hypothyroidism, chronic left facial droop, severe aortic stenosis, possible dementia, severe osteoporosis with recent fall at home found to have right intertrochanteric femur fracture who underwent right total hip arthroplasty by Dr Leslie Morales on 12/04/2018  Postoperative  Most notable for transient SVT requiring rapid response and temporary Cardizem drip  Cardiology consulted and assisted with management and patient has been switched since transition to p o  Amiodarone  Postoperative course also notable for acute blood loss anemia requiring 1 unit PRBC transfusion    Patient was evaluated by skilled therapies and was found to have significant decline in ADLs and ambulation appropriate for admission to [facility]      Functional status (recent):    Pending    Functional status on admission to [facility]:  OT:  Total assist General in toilet transfers, lower body dressing, toileting, and bathing; upper body dressing mod assist, grooming and eating supervision      * Fracture of femoral neck, right ([address] )   Assessment & Plan    -S/p Right MIKE by Dr Randolph Talavera on 18  -WBAT RLE  -Daily PT/OT to improve mobility and self care   -Posterior hip precautions  -Maintain hip abduction foam when supine due to patient's cognitive deficits  -Please maintain compressive dressing for 48 hours, then replace if needed      Positional lightheadedness   Assessment & Plan    Some improvement after PRBC transfusion   Monitor vitals; orthostatics  Recently started on Amio; also on MTP  HCTZ holding per IM  Abdominal binder PRN     Delirium   Assessment & Plan    Post-op delirium on possible baseline dementia  Medications reviewed; limit sedating medications but adequately treat pain  Frequent redirection, re-orientation, reassurance  Monitor for signs and symptoms of infection  Overstimulation precautions, optimize sleep-wake cycle, agitation behavioral scale, sleep log  If necessary provided 1-1     Acute pain   Assessment & Plan    -managed on scheduled Tylenol 975mg TID  -ICE [id number] while awake  -PRN oxycodone 2 5-5mg for moderate to severe pain - use with caution given her delirium on dementia  Counseled on and continue to encourage deep breathing/relaxation/behavioral pain management techniques:     Deep breathin seconds in, 5 seconds out, 5 times per hour when awake and PRN when in pain or anticipate pain; avoid holding breath and tightening muscles and instead breathe slowly and deeply       Acute blood loss anemia   Assessment & Plan    -hemoglobin improved to 8 8 after 1 unit transfusion yesterday   -iron and vitamin-C supplementation  -monitor intermittently     Cognitive impairment   Assessment & Plan    -Patient with baseline dementia and mild cognitive deficits  -Placed on fall precautions and bed alarms - freq checks by staff      Severe aortic stenosis   Assessment & Plan    -preload dependent   -encourage PO hydration  -Patient cardiologist is Dr Cherise Barnhart who she will follow up with as an outpatient     Atrial fibrillation [facility])   Assessment & Plan    -rate controlled on lopressor 25mg Q12h and new amiodarone 200mg BID  -anticoagulated with Eliquis 5mg BID  -? Unclear how long patient to be on Amiodarone 200mg BID - to be clarified with Cardiology during the week  -Patient's home cardiologist is Dr Cherise Barnhart     Hypertension   Assessment & Plan    -MTP  -HCTZ help with symptomatic orthostasis  -IM to adjust dosing if needed      Chronic nausea   Assessment & Plan    Apparently been worked up by GI in past  At approximate baseline; worsened with anxiety  PRN Zofran  Ensure optimal stooling     Onychomycosis   Assessment & Plan    Toenails; s/p podiatry consult and debridement      Hypothyroidism   Assessment & Plan    -managed on Synthroid     Dyslipidemia   Assessment & Plan    -managed on pravastatin         # Bowel function:  Constipation  Monitor for constipation and diarrhea     >Dulcolax supp x1 now  Continue colace (+/- senna) with holding paremeters and PRN bowel regimen    # Bladder function: Intact  Monitor for urinary retention, incontinence, and signs of urinary infection        # Skin  Encourage regular turning and turn patient every 2 hours if not adequately ambulatory; - Rehabilitation team to perform skin checks regularly to monitor for erythema, wounds, rashes (if applicable incisions)    # Other  - Diet: general    - DVT prophy: Eliquis and SCDs    Disposition:   Home with supervision with AIME 2 weeks from admission    Follow-up:   PCP, Ortho, Cards, PMR    ---------------------------------------------------------------------------------------------------------------------    Objective: Allergies and Medications per EMR    Physical Exam:  Temp:  [97 5 °F (36 4 °C)-98 3 °F (36 8 °C)] 98 3 °F (36 8 °C)  HR:  [77-83] 83  Resp:  [16-20] 20  BP: (108-139)/(59-88) 139/88  General: Awake, alert in NAD  HENT:  MMM  Respiratory: Unlabored breathing, breath sounds equal, Lungs CTA, no wheezes, rales, or rhonchi  Cardiovascular: Regular rate and rhythm, no murmurs, rubs, or gallops  Gastrointestinal: Soft, non-tender, mildly-distended, normoactive bowel sounds  Genitourinary: No zamarripa  SkiN/MSK/Extremities:  No calf edema or calf tenderness to palpation; surgical incision healing appropriately   Neurologic/Psych:   MENTAL STATUS: oriented to self, 2016, month with prompting, hospital, not day of week; impaired memory  Affect: Euthymic       Diagnostic Studies: reviewed, no new imaging  No results found  Laboratory:      Results from last 7 days  Lab Units 12/10/18  0525 12/09/18  0512 12/08/18  0448   HEMOGLOBIN g/dL 8 8* 7 6* 7 4*   HEMATOCRIT % 27 1* 23 3* 22 4*   WBC Thousand/uL 7 87 7 29 6 03       Results from last 7 days  Lab Units 12/10/18  0525 12/08/18  0448 12/07/18  0445  12/04/18  2318   BUN mg/dL 16 19 17  < >  --    POTASSIUM mmol/L 4 3 4 1 3 9  < >  --    CHLORIDE mmol/L 105 109* 112*  < >  --    GLUCOSE, ISTAT mg/dl  --   --   --   --  136   CREATININE mg/dL 0 86 0 80 0 84  < >  --    < > = values in this interval not displayed      Results from last 7 days  Lab Units 12/04/18  0441   PROTIME seconds 16 2*   INR  1 29*        Patient Active Problem List   Diagnosis    Microscopic hematuria    Dyslipidemia    Hypertension    Palpitations    Atrial fibrillation (HCC)    Hypothyroidism    Cognitive impairment    MCI (mild cognitive impairment)    Gait disturbance    Closed intertrochanteric fracture of hip, right, initial encounter [address] [address] )    Severe aortic stenosis    Dehydration    Closed fracture of neck of right femur ([facility])    Fall    Ambulatory dysfunction    Physical deconditioning    Osteoporosis    Atrial fibrillation with rapid ventricular response ([facility])    NSTEMI (non-ST elevated myocardial infarction) ([facility])    Acute blood loss anemia    Fracture of femoral neck, right ([facility])    Acute pain       ** Please Note: Fluency Direct voice to text software may have been used in the creation of this document  **    Total visit time:  At least 25 minutes, with more than 50% spent counseling/coordinating care    Amanda Escalante MD, [facility] [facility]  Physical Medicine and Rehabilitation  Brain Injury Medicine

## 2018-12-10 NOTE — ASSESSMENT & PLAN NOTE
Improved   Post-op delirium on possible baseline dementia > fairly significant earlier in [facility] course

## 2018-12-10 NOTE — PROGRESS NOTES
12/10/18 1415   Patient Data   Rehab Impairment Impairment of mobility, safety and Activities of Daily Living (ADLs) due to Orthopedic Disorders:  08 11  Unilateral Hip Fracture    Etiologic Diagnosis Right Femoral Neck Fracture   Date of Onset 12/03/18   Support System   Name Dominique Johnson   Relationship     Home Setup   Type of Home Single Level   Method of Entry Hand Rail Right   Number of Stairs 3   Number of Stairs in Home 0   First Floor Setup Available Yes   Prior Level of Function   Self-Care 3  Independent - Patient completed the activities by him/herself, with or without an assistive device, with no assistance from a helper  Indoor-Mobility (Ambulation) 3  Independent - Patient completed the activities by him/herself, with or without an assistive device, with no assistance from a helper  Stairs 3  Independent - Patient completed the activities by him/herself, with or without an assistive device, with no assistance from a helper  Functional Cognition 3  Independent - Patient completed the activities by him/herself, with or without an assistive device, with no assistance from a helper  Restrictions/Precautions   Precautions Bed/chair alarms; Fall Risk;Cognitive;Pain;Supervision on toilet/commode   RLE Weight Bearing Per Order WBAT   ROM Restrictions (THPs )   Pain Assessment   Pain Score 8   Pain Location Hip   Pain Orientation Right   Hospital Pain Intervention(s) Repositioned; Ambulation/increased activity   Response to Interventions pt reported inc pain when standing and moving and less pain when at rest    QI: Roll Left and Right   Assistance Needed Physical assistance   Assistance Provided by [place] 75% or more   Roll Left and Right CARE Score 2   QI: Sit to Lying   Assistance Needed Physical assistance   Assistance Provided by [place] 75% or more   Sit to Lying CARE Score 2   QI: Lying to Sitting on Side of Bed   Assistance Needed Physical assistance   Assistance Provided by [place] 75% or more Lying to Sitting on Side of Bed CARE Score 2   QI: Sit to Stand   Assistance Needed Physical assistance   Assistance Provided by [place] 50%-74%   Sit to Stand CARE Score 2   QI: Chair/Bed-to-Chair Transfer   Assistance Needed Physical assistance   Assistance Provided by [place] 50%-74%   Chair/Bed-to-Chair Transfer CARE Score 2   QI: Car Transfer   Reason if not Attempted Safety concerns   Car Transfer CARE Score 88   Transfer Bed/Chair/Wheelchair   Positioning Concerns Cognition   Limitations Noted In Balance; Endurance;UE Strength;LE Strength   Adaptive Equipment Roller Walker   Stand Pivot Moderate Assist   Sit to Stand Moderate Assist   Stand to Sit Moderate Assist   Supine to Sit Maximum Assist   Sit to Supine Maximum Assist   Bed, Chair, Wheelchair Transfer (FIM) 2 - [place] needs to lift or boost to rise AND assist to sit   QI: Walk 10 Feet   Assistance Needed Physical assistance; Adaptive equipment   Assistance Provided by [place] 25%-49%   Walk 10 Feet CARE Score 3   QI: Walk 50 Feet with Two Turns   Reason if not Attempted Activity not applicable   Walk 50 Feet with Two Turns CARE Score 9   QI: Walk 150 Feet   Reason if not Attempted Activity not applicable   Walk 836 Feet CARE Score 9   QI: Walking 10 Feet on Uneven Surfaces   Reason if not Attempted Activity not applicable   Walking 10 Feet on Uneven Surfaces CARE Score 9   Ambulation   Does the patient walk? 2  Yes   Primary Discharge Mode of Locomotion Walk   Walk Assist Level Minimum Assist;Moderate Assist   Gait Pattern Inconsistant Yamilka;Decreased foot clearance; Slow Yamilka;Decreased R stance;Decreased L stance; Improper weight shift;Narrow MINNIE   Assist Device Napoleon Block Walked (feet) 10 ft  (x2)   Limitations Noted In Balance; Endurance; Heel Strike;Speed;Strength;Swing   Findings CFA   Walking (FIM) 1 - Patient requires assist of two people   Wheelchair mobility   QI: Does the patient use a wheelchair? 0   No   QI: 1 Step (Curb)   Reason if not Attempted Safety concerns   1 Step (Curb) CARE Score 88   QI: 4 Steps   Reason if not Attempted Safety concerns   4 Steps CARE Score 88   QI: 12 Steps   Reason if not Attempted Safety concerns   12 Steps CARE Score 88   Comprehension   QI: Comprehension 2  Sometimes Understands: Understands only basic conversations or simple, direct phrases  Frequently requires cues to understand   Comprehension (FIM) 3 - Understands basic info/conversation 50-74% of time   Expression   QI: Expression 2  Frequently exhibits difficulty with expressing needs and ideas   Expression (FIM) 2 - Uses only simple expressions or gestures (waves, hello)   Social Interaction   Social Interaction (FIM) 2 - Needs frequent redirection   Problem Solving   Problem solving (FIM) 2 - Needs direction more than ½ time to initiate, plan or complete simple tasks   Memory   Memory (FIM) 2 - Recognizes, recalls/performs 25-49%   RLE Assessment   RLE Assessment (gross MMT 2-/5)   LLE Assessment   LLE Assessment (gross MMT 4/5)   Cognition   Arousal/Participation Cooperative   Objective Measure   PT Measure(s) session focused on bed mobility, transfers, BP supine was 110/72 and sitting was 95/62 but no complaints of dizziness during session  pt complained of nausea, and nursing had given zofran  seated exercises of RLE passive calf stretching 2x30 sec, RLE AAROM LAQ, seated marching RLE through small range to maintain THPs with AAROM  sitting rest breaks as needed   present entire session; confirmed home setup and waht she was doing before, which was light cleaning and watching TV primarily  AAROM RLE support in long sitting ER   Discharge Information   Patient's Discharge Plan to go home with    Patient's Rehab Expectations to get better   Impressions Pt presents for PT eval s/p fall at home and subsequent R THR  Pt presents with R knee valgus and hip IR, weakness and dec AROM RLE along wtih swelling   Pt presents with dec activity tolerance, inc pain, dec balance and dec righitng reactions  Pt also presents with dec cognitiion, with not fully remembering home setup and PLOF; she was confusing verbal commands of where to put her hands on the chair vs RW (when asked to put hands on WC she put them on the RW); she has difficulty following simple commands and difficulty following directions  Pt did receive blood transfusion yesterday and Hb is slightly higher today than yesterday, but she still presents with fatigue  Currently planning on need for S for mobility at home, however will cont to assess pt progress      PT Therapy Minutes   PT Time In 1415   PT Time Out 1545   PT Total Time (minutes) 90   PT Mode of treatment - Individual (minutes) 90   PT Mode of treatment - Concurrent (minutes) 0   PT Mode of treatment - Group (minutes) 0   PT Mode of treatment - Co-treat (minutes) 0   PT Mode of Teatment - Total time(minutes) 90 minutes

## 2018-12-10 NOTE — PROGRESS NOTES
Internal Medicine Progress Note  Patient: Tatyana Estrada  Age/sex: 66 y o  female  Medical Record #: [id number]      ASSESSMENT/PLAN:  Tatyana Estrada is seen and examined and management for following issues:    Right hip fracture; s/p right MIKE 12/4:  Pain control per primary service        PAF:  Continue Amiodarone 200 mg BID/Lopressor 25 mg q 12 hours  Continue Eliquis  Amio is new for her since hospitalization = d/w with cards today; Amio is 200 mg BID x 1 week then to 200 mg qd      HTN/orthostasis:  on HCTZ 12 5 mg daily/Lopressor 25 mg every 12 hours  Stop HCTZ for now since mild orthostasis; she feels dry (SBP sit was 110 and stand 97 with mild dizziness)     ABLA:  did receive blood transfusion yesterday for hemoglobin 7 6 = now 8 8; no sx      Severe AS/LVEF 55%: no volume overload at this time    Nausea: will add prn Zofran      Subjective: +nausea w/o vomiting    Scheduled Meds:    Current Facility-Administered Medications:  acetaminophen 975 mg Oral Q8H [facility] Catracho Jacobs MD   amiodarone 200 mg Oral BID With Meals Catracho Jacobs MD   apixaban 5 mg Oral Q12H [facility] Catracho Jacobs MD   bisacodyl 10 mg Rectal Daily PRN Catracho Jacobs MD   calcium carbonate 500 mg Oral BID With Meals Catracho Jacobs MD   cholecalciferol 5,000 Units Oral Daily Catracho Jacobs MD   ferrous sulfate 325 mg Oral Daily With Breakfast Catracho Jacobs MD   hydrochlorothiazide 12 5 mg Oral Daily Catracho Jacobs MD   levothyroxine 50 mcg Oral Early Morning Catracho Jacobs MD   lidocaine 2 patch Topical Daily Catracho Jacobs MD   metoprolol tartrate 25 mg Oral Q12H Alex Little MD   oxyCODONE 2 5 mg Oral Q4H PRN Catracho Jacobs MD   oxyCODONE 5 mg Oral Q4H PRN Catracho Jacobs MD   pantoprazole 20 mg Oral Early Morning Catracho Jacobs MD   polyethylene glycol 17 g Oral Daily PRN Catracho Jacobs MD   pravastatin 40 mg Oral Daily With Cyndi Villafuerte MD   senna-docusate sodium 1 tablet Oral BID Catracho Jacobs MD       Labs: Results from last 7 days  Lab Units 12/10/18  0525 12/09/18  0512   WBC Thousand/uL 7 87 7 29   HEMOGLOBIN g/dL 8 8* 7 6*   HEMATOCRIT % 27 1* 23 3*   PLATELETS Thousands/uL 152 135*       Results from last 7 days  Lab Units 12/10/18  0525 12/08/18  0448  12/04/18  2318   POTASSIUM mmol/L 4 3 4 1  < >  --    CHLORIDE mmol/L 105 109*  < >  --    CO2 mmol/L 24 26  < >  --    CO2, I-STAT mmol/L  --   --   --  22   BUN mg/dL 16 19  < >  --    CREATININE mg/dL 0 86 0 80  < >  --    GLUCOSE, ISTAT mg/dl  --   --   --  136   CALCIUM mg/dL 8 9 8 0*  < >  --    < > = values in this interval not displayed  Results from last 7 days  Lab Units 12/04/18  0441   INR  1 29*          Glucose, i-STAT (mg/dl)   Date Value   12/04/2018 136   12/03/2018 128       Labs reviewed    Physical Examination:  Vitals:   Vitals:    12/09/18 1345 12/09/18 1654 12/09/18 2037 12/10/18 0500   BP: 108/62 134/65 117/59 139/88   BP Location:   Left arm    Pulse: 77 80 77 83   Resp: 16 18 18 20   Temp: 98 1 °F (36 7 °C) 98 2 °F (36 8 °C) 97 6 °F (36 4 °C) 98 3 °F (36 8 °C)   TempSrc: Oral  Oral Oral   SpO2:   95% 94%   Weight:       Height:         Constitutional:  NAD; pleasant; nontoxic  HEENT:  AT/NC; oropharynx negative for thrush on tongue   Neck: negative for JVD  CV:  +S1, S2;  RRR; no rub/murmur  Pulmonary:  BBS without crackles/wheeze/rhonci; resp are unlabored  Abdominal:  soft, +BS, ND/NT  Skin:  no rashes  Musculoskeletal:  no edema  :  no zamarripa  Neurological/Psych:  AAO;  JENKINS 5/5; no depression/anxiety        [ X ] Total time spent: 30 Mins and greater than 50% of this time was spent counseling/coordinating care  ** Please Note: Dragon 360 Dictation voice to text software may have been used in the creation of this document   **

## 2018-12-10 NOTE — SOCIAL WORK
CM Evaluation  CM met with patient,  and daughter to review rehab routine and CM role  Patient lives with  and 52+ yo son in single level home with 3 ILYA  Per , he is retired and will be home all the time  Their son does all the physical work around the house   had a stroke in the past but is functioning well and driving  [address] daughter lives next door and daughter down the street  Very supportive family to assist   She has no DME and no affiliation to [facility]  She uses [facility] and [address]; made aware of [address]  Informed of weekly team meeting and potential dc needs  Discussed insurance coverage and LOS  IMM explained and signed with copy to patient and chart  CM will follow to assist with dc needs

## 2018-12-10 NOTE — PROGRESS NOTES
12/10/18 1000   Pain Assessment   Pain Assessment 0-10   Pain Score 8   Pain Type Surgical pain   Pain Location Hip   Pain Orientation Right   Pain Radiating Towards R buttock/ RLE   Pain Descriptors Other (Comment); [place]  ("nasty")   Pain Frequency Constant/continuous   Pain Onset Ongoing   Clinical Progression Gradually worsening   Effect of Pain on Daily Activities impaired mobility   Patient's Stated Pain Goal No pain   Hospital Pain Intervention(s) Cold applied;Repositioned; Other (Comment); Distraction; Emotional support; Rest  (RN notified)   Response to Interventions pain 6 seated, inc to 8 w/ standing, pt tolerance to Tx limited   Restrictions/Precautions   Precautions Fall Risk;Cognitive;Bed/chair alarms;Pain;THR  (dizzy/nauseous)   RLE Weight Bearing Per Order WBAT   ROM Restrictions Yes   RLE ROM Restriction Other  (posterior THPs)   QI: Sit to Stand   Assistance Needed Physical assistance   Assistance Provided by [place] Total assistance   Comment Pt Niharika-modA x2 w/ RW for safety 2* c/o dizziness and fatigue  Sit to Stand CARE Score 1   QI: Chair/Bed-to-Chair Transfer   Assistance Needed Physical assistance   Assistance Provided by [place] Total assistance   Comment Pt modAx2 w/ RW for SPT   Chair/Bed-to-Chair Transfer CARE Score 1   Transfer Bed/Chair/Wheelchair   Positioning Concerns Cognition   Limitations Noted In Balance; Coordination;Confidence; Endurance;Pain Management; Sequencing;LE Strength;UE Strength; Other  (dizziness)   Adaptive Equipment Roller Walker   Stand Pivot Moderate Assist;Assist x 2   Sit to Stand Assist x 2;Minimal;Moderate Assist   Stand to Sit Moderate Assist   Findings Pt modAx2 w/ RW for SPT and VC for step by step sequencing of transfer  Pt presents anxious  Pt minAx2 for sit>stand w/ repetition and noted w/ improvement t/o session for technique  Pt c/o dizziness, orthostatic BP taken, see below for detail      Bed, Chair, Wheelchair Transfer (FIM) 2 - [place] needs to lift or boost to rise AND assist to sit   QI: [address] Needed Physical assistance   Assistance Provided by [place] Total assistance   Comment Ax2 2* fatigue/dizziness  Toileting Hygiene CARE Score 1   Toileting   Able to Pull Clothing down no, up no  Able to Manage Clothing Closures No   Manage Hygiene Bladder   Limitations Noted In Balance;Problem Solving; Safety;UE Strength;LE Strength;ROM   Findings Pt modA in stance w/ RW and 2nd person to complete CM over hips, pt able to A incidentally w/ unilateral release to doff/don pants in front  Toileting (FIM) 1 - Patient requires two helpers   QI: Toilet Transfer   Assistance Needed Physical assistance   Assistance Provided by [email or web address] CARE Score 1   Toilet Transfer   Surface Assessed Standard Commode   Transfer Technique (swap out)   Limitations Noted In Balance; Endurance; Safety;UE Strength;LE Strength   Positioning Concerns Safety   Findings OT/PCA completed swap out for w/c>commode w/ pt modAx1 in stance w/ RW, swap out completed 2* pt c/o dizziness and pt fatigue this session  Toilet Transfer (FIM) 1 - Patient requires assist of two people   Therapeutic Excerise-Strength   UE Strength Yes   Right Upper Extremity- Strength   R Position Seated   Equipment Dowel   R Weight/Reps/Sets 3x10 1 5# rowing, bicep curl, fwd chest press, overhead press   RUE Strength Comment Pt engaged in b/l UE strengthening w/ focus on endurance and overall strength to maximize pt indep w/ ADLs and all fxnl transfers  Pt noted w/ fatigue and required rest breaks, however toleated exercise w/ no c/o pain  Left Upper Extremity-Strength   LUE Strength Comment See RUE for detail      Cognition   Overall Cognitive Status Impaired   Arousal/Participation Cooperative   Attention Attends with cues to redirect   Orientation Level Oriented to person;Oriented to situation   Memory Decreased short term memory;Decreased recall of recent events;Decreased recall of precautions   Following Commands Follows multistep commands with increased time or repetition   Comments Pt tearful at start of session and reminiscing on the days when she could run  OT provided emotional support/encouragment w/ pt receptive and able to re-focus on session  Pt 's  present in room at end of session  Pt has difficulty implementing THPs and requries VC from OT to maintain  Other Comments   Assessment Pt c/o dizziness in stance  Orthostatic BP taken 110/57 seated and 97/50 in stance w/ 2nd person present for safety  CRNP Melly aware and order placed for TEDs, OT applied TEDs at end of session w/ pt tolerating compression  Assessment   Treatment Assessment Pt participated in skilled OT Tx session w/ focus on toileting, UE strengthening, endurance, orthostatic BP monitoring, and stand tolerance  See above note for activity detail  Pt limited this session by fatigue, medical status, and pain  OT applied cold pack to R hip during UE strengthening w/ pt stating "it feels good" however reports "the pain did not go down"  Cont OT POC w/ focus on stand tolerance, pain mgmt, cognitive reorientation, THP edu, orthostatic BP monitoring, UE/LE strengthening, and activity tolerance to maximize pt indep w/ ADLs and all fxnl transfers  Prognosis Fair   Problem List Decreased strength;Decreased range of motion;Decreased endurance; Impaired balance;Decreased mobility; Decreased cognition; Impaired judgement;Decreased safety awareness;Orthopedic restrictions;Pain   Barriers to Discharge Inaccessible home environment;Decreased caregiver support   Plan   Treatment/Interventions ADL retraining;Functional transfer training;LE strengthening/ROM; Therapeutic exercise; Endurance training;Cognitive reorientation;Patient/family training; Compensatory technique education   Progress Slow progress, decreased activity tolerance   OT Therapy Minutes   OT Time In 1000   OT Time Out 1130   OT Total Time (minutes) 90   OT Mode of treatment - Individual (minutes) 90   OT Mode of treatment - Concurrent (minutes) 0   OT Mode of treatment - Group (minutes) 0   OT Mode of treatment - Co-treat (minutes) 0   OT Mode of Teatment - Total time(minutes) 90 minutes   Therapy Time missed   Time missed?  No

## 2018-12-11 PROCEDURE — 97110 THERAPEUTIC EXERCISES: CPT

## 2018-12-11 PROCEDURE — 97530 THERAPEUTIC ACTIVITIES: CPT

## 2018-12-11 PROCEDURE — 99232 SBSQ HOSP IP/OBS MODERATE 35: CPT

## 2018-12-11 PROCEDURE — 97535 SELF CARE MNGMENT TRAINING: CPT

## 2018-12-11 RX ORDER — SODIUM CHLORIDE 9 MG/ML
70 INJECTION, SOLUTION INTRAVENOUS ONCE
Status: DISCONTINUED | OUTPATIENT
Start: 2018-12-11 | End: 2018-12-11

## 2018-12-11 RX ORDER — POLYETHYLENE GLYCOL 3350 17 G/17G
17 POWDER, FOR SOLUTION ORAL DAILY
Status: DISCONTINUED | OUTPATIENT
Start: 2018-12-12 | End: 2018-12-13

## 2018-12-11 RX ORDER — OXYCODONE HYDROCHLORIDE 5 MG/1
2.5 TABLET ORAL EVERY 6 HOURS PRN
Status: DISCONTINUED | OUTPATIENT
Start: 2018-12-11 | End: 2018-12-25 | Stop reason: HOSPADM

## 2018-12-11 RX ORDER — SODIUM CHLORIDE 9 MG/ML
60 INJECTION, SOLUTION INTRAVENOUS ONCE
Status: COMPLETED | OUTPATIENT
Start: 2018-12-11 | End: 2018-12-12

## 2018-12-11 RX ORDER — BISACODYL 10 MG
10 SUPPOSITORY, RECTAL RECTAL ONCE
Status: COMPLETED | OUTPATIENT
Start: 2018-12-11 | End: 2018-12-11

## 2018-12-11 RX ADMIN — CALCIUM CARBONATE (ANTACID) CHEW TAB 500 MG 500 MG: 500 CHEW TAB at 07:53

## 2018-12-11 RX ADMIN — AMIODARONE HYDROCHLORIDE 200 MG: 200 TABLET ORAL at 07:54

## 2018-12-11 RX ADMIN — OXYCODONE HYDROCHLORIDE AND ACETAMINOPHEN 500 MG: 500 TABLET ORAL at 07:54

## 2018-12-11 RX ADMIN — APIXABAN 5 MG: 5 TABLET, FILM COATED ORAL at 07:54

## 2018-12-11 RX ADMIN — METOPROLOL TARTRATE 25 MG: 25 TABLET, FILM COATED ORAL at 21:44

## 2018-12-11 RX ADMIN — OXYCODONE HYDROCHLORIDE 5 MG: 5 TABLET ORAL at 07:53

## 2018-12-11 RX ADMIN — POLYETHYLENE GLYCOL 3350 17 G: 17 POWDER, FOR SOLUTION ORAL at 07:53

## 2018-12-11 RX ADMIN — PRAVASTATIN SODIUM 40 MG: 40 TABLET ORAL at 16:23

## 2018-12-11 RX ADMIN — DOCUSATE SODIUM 100 MG: 100 CAPSULE, LIQUID FILLED ORAL at 07:54

## 2018-12-11 RX ADMIN — PANTOPRAZOLE SODIUM 20 MG: 20 TABLET, DELAYED RELEASE ORAL at 05:59

## 2018-12-11 RX ADMIN — ONDANSETRON 4 MG: 4 TABLET, ORALLY DISINTEGRATING ORAL at 08:05

## 2018-12-11 RX ADMIN — BISACODYL 10 MG: 10 SUPPOSITORY RECTAL at 13:14

## 2018-12-11 RX ADMIN — AMIODARONE HYDROCHLORIDE 200 MG: 200 TABLET ORAL at 16:24

## 2018-12-11 RX ADMIN — METOPROLOL TARTRATE 25 MG: 25 TABLET, FILM COATED ORAL at 07:54

## 2018-12-11 RX ADMIN — ACETAMINOPHEN 975 MG: 325 TABLET, FILM COATED ORAL at 05:58

## 2018-12-11 RX ADMIN — ACETAMINOPHEN 975 MG: 325 TABLET, FILM COATED ORAL at 21:43

## 2018-12-11 RX ADMIN — VITAMIN D, TAB 1000IU (100/BT) 5000 UNITS: 25 TAB at 07:53

## 2018-12-11 RX ADMIN — LEVOTHYROXINE SODIUM 50 MCG: 50 TABLET ORAL at 05:58

## 2018-12-11 RX ADMIN — ACETAMINOPHEN 975 MG: 325 TABLET, FILM COATED ORAL at 13:14

## 2018-12-11 RX ADMIN — SODIUM CHLORIDE 60 ML/HR: 0.9 INJECTION, SOLUTION INTRAVENOUS at 15:46

## 2018-12-11 RX ADMIN — LIDOCAINE 2 PATCH: 50 PATCH CUTANEOUS at 07:47

## 2018-12-11 RX ADMIN — ONDANSETRON 4 MG: 4 TABLET, ORALLY DISINTEGRATING ORAL at 16:23

## 2018-12-11 RX ADMIN — FERROUS SULFATE TAB 325 MG (65 MG ELEMENTAL FE) 325 MG: 325 (65 FE) TAB at 07:55

## 2018-12-11 RX ADMIN — CALCIUM CARBONATE (ANTACID) CHEW TAB 500 MG 500 MG: 500 CHEW TAB at 16:23

## 2018-12-11 RX ADMIN — OXYCODONE HYDROCHLORIDE 5 MG: 5 TABLET ORAL at 13:14

## 2018-12-11 RX ADMIN — SENNOSIDES AND DOCUSATE SODIUM 1 TABLET: 8.6; 5 TABLET ORAL at 07:53

## 2018-12-11 NOTE — PROGRESS NOTES
12/11/18 1000   Pain Assessment   Pain Assessment 0-10   Pain Score 8   Pain Type Acute pain;Surgical pain   Pain Location Hip;Leg   Pain Orientation Left   Effect of Pain on Daily Activities impaired tolerance to any mobility; AAROM required during activity    Restrictions/Precautions   Precautions Bed/chair alarms;Cognitive; Fall Risk;Pain;THR;Supervision on toilet/commode   RLE Weight Bearing Per Order WBAT   RLE ROM Restriction Other  (THP)   Braces or Orthoses Other (Comment)  (ABDuction pillow )   General   Change In Medical/Functional Status BP low this AM (see OT note); transfer performed by OT from w/c to chair prior to PT session with request of RN to keep patient OOB   Cognition   Overall Cognitive Status Impaired   Arousal/Participation Arousable; Cooperative   Attention Attends with cues to redirect   Orientation Level Oriented to person;Oriented to situation   Memory Decreased short term memory;Decreased recall of recent events;Decreased recall of precautions   Following Commands Follows one step commands with increased time or repetition   Comments patient able to recall 1/3 THP at the beginning and end of session despite review; continue to reinforce   Subjective   Subjective Patient uncomfortable in chair but agreeable to particiapte in PT session    QI: Sit to Stand   Assistance Needed Physical assistance   Assistance Provided by [place] 50%-74%   Comment STS performed for repositioning in chair and adjustment of undergarments    Sit to Stand CARE Score 2   Therapeutic Interventions   Strengthening Seated therex 10x3: ankle pumps, quad sets, qlute sets, AAROM RLE heel slides, AAROM RLE external rotation, AAROM RLE ABD   Flexibility RLE heel cord stretch 60 seconds x2    Assessment   Treatment Assessment Session limited to LE terex in chair and repositoning 2* low BPs with OT  Patient limited by decreased strength and ROM RLE and poor overall tolerance to activity   Patient requires cues to stay on task and instruction 2* impaired cognition in which she was talking about "bugs in her home" during session  Patient requires continued skilled PT intervention to maximize function and reduce caregiver burden  PT Barriers   Physical Impairment Decreased strength;Decreased range of motion;Decreased endurance; Impaired balance;Decreased mobility; Decreased coordination;Decreased cognition;Decreased skin integrity;Orthopedic restrictions;Pain   Functional Limitation Transfers;Standing   Plan   Treatment/Interventions Functional transfer training;LE strengthening/ROM; Therapeutic exercise; Endurance training;Cognitive reorientation;Equipment eval/education; Bed mobility; Compensatory technique education   Progress Slow progress, decreased activity tolerance   Recommendation   Recommendation Other (Comment)  (TBD)   Equipment Recommended Walker   PT Therapy Minutes   PT Time In 1000   PT Time Out 1030   PT Total Time (minutes) 30   PT Mode of treatment - Individual (minutes) 30   PT Mode of treatment - Concurrent (minutes) 0   PT Mode of treatment - Group (minutes) 0   PT Mode of treatment - Co-treat (minutes) 0   PT Mode of Teatment - Total time(minutes) 30 minutes   Therapy Time missed   Time missed?  No

## 2018-12-11 NOTE — PROGRESS NOTES
Internal Medicine Progress Note  Patient: Susan Santoyo  Age/sex: 66 y o  female  Medical Record #: [id number]      ASSESSMENT/PLAN:  Susan Santoyo is seen and examined and management for following issues:    Right hip fracture; s/p right MIKE 12/4:  Pain control per primary service        PAF:  Continue Amiodarone/Lopressor 25 mg q 12 hours/Eliquis  Amio is new for her since hospitalization = d/w with cards and Amio is 200 mg BID x 1 week then to 200 mg qd      HTN/orthostasis:  on HCTZ 12 5 mg daily/Lopressor 25 mg every 12 hours  Stopped HCTZ yesterday (yesterday SBP sit was 110 and stand 97 with mild dizziness and this  supine, 98 sit, 84 stand with abd binder and TEDS on)  To give 1 liter NSS now     ABLA:  did receive blood transfusion sunday for hemoglobin 7 6 = now 8 8; no sx      Severe AS/LVEF 55%: no volume overload at this time    Nausea: continue prn Zofran      Subjective: +nausea w/o vomiting and has orthostasis/dizziness     ROS:   GI: denies abdominal pain, change bowel habits or reflux symptoms; +nausea  Neuro: No new neurologic changes  Respiratory: No Cough, SOB  Cardiovascular: No CP, palpitations     Scheduled Meds:    Current Facility-Administered Medications:  acetaminophen 975 mg Oral Q8H [address] Nicole Alaniz MD   amiodarone 200 mg Oral BID With Meals IRMA Samuels   Followed by       Savannah Lay ON 12/16/2018] amiodarone 200 mg Oral Daily With Breakfast IRMA Samuels   apixaban 5 mg Oral Q12H [address] Nicole Alaniz MD   ascorbic acid 500 mg Oral Daily With Breakfast Lucila Quinonez MD   bisacodyl 10 mg Rectal Daily PRN Nicole Alaniz MD   bisacodyl 10 mg Rectal Once Lucila Quinonez MD   calcium carbonate 500 mg Oral BID With Meals Nicole Alaniz MD   cholecalciferol 5,000 Units Oral Daily Nicole Alaniz MD   docusate sodium 100 mg Oral BID Lucila Quinonez MD   ferrous sulfate 325 mg Oral Daily With Breakfast Nicole Alaniz MD   levothyroxine 50 mcg Oral Early Morning Kim Crespo MD   lidocaine 2 patch Topical Daily Kim Crespo MD   metoprolol tartrate 25 mg Oral Q12H [address] Kim Crespo MD   ondansetron 4 mg Oral Q6H PRN IRMA Overton   oxyCODONE 2 5 mg Oral Q4H PRN Kim Crespo MD   oxyCODONE 5 mg Oral Q4H PRN Kim Crespo MD   pantoprazole 20 mg Oral Early Morning Kim Crespo MD   polyethylene glycol 17 g Oral Daily PRN Kim Crespo MD   pravastatin 40 mg Oral Daily With Shaka Gagnon MD   senna-docusate sodium 1 tablet Oral BID Kim Crespo MD       Labs:       Results from last 7 days  Lab Units 12/10/18  0525 12/09/18  0512   WBC Thousand/uL 7 87 7 29   HEMOGLOBIN g/dL 8 8* 7 6*   HEMATOCRIT % 27 1* 23 3*   PLATELETS Thousands/uL 152 135*       Results from last 7 days  Lab Units 12/10/18  0525 12/08/18  0448  12/04/18  2318   POTASSIUM mmol/L 4 3 4 1  < >  --    CHLORIDE mmol/L 105 109*  < >  --    CO2 mmol/L 24 26  < >  --    CO2, I-STAT mmol/L  --   --   --  22   BUN mg/dL 16 19  < >  --    CREATININE mg/dL 0 86 0 80  < >  --    GLUCOSE, ISTAT mg/dl  --   --   --  136   CALCIUM mg/dL 8 9 8 0*  < >  --    < > = values in this interval not displayed                 Glucose, i-STAT (mg/dl)   Date Value   12/04/2018 136   12/03/2018 128       Labs reviewed    Physical Examination:  Vitals:   Vitals:    12/10/18 0500 12/10/18 1317 12/10/18 2028 12/11/18 0552   BP: 139/88 92/52 120/55 127/72   BP Location:  Left arm Right arm Right arm   Pulse: 83 85 71 75   Resp: 20 18 16 20   Temp: 98 3 °F (36 8 °C) 97 7 °F (36 5 °C) 98 3 °F (36 8 °C) 98 3 °F (36 8 °C)   TempSrc: Oral Oral Oral Oral   SpO2: 94% 97% 95% 92%   Weight:       Height:         Constitutional:  NAD; pleasant; nontoxic  HEENT:  AT/NC; oropharynx negative for thrush on tongue   Neck: negative for JVD  CV:  +S1, S2;  irreg; no rub/+murmur  Pulmonary:  BBS without crackles/wheeze/rhonci; resp are unlabored  Abdominal:  soft, +BS, ND/NT  Skin:  no rashes  Musculoskeletal:  no edema  :  no zamarripa  Neurological/Psych:  AAO;  GREGORY 5/5; no depression/anxiety        [ X ] Total time spent: 30 Mins and greater than 50% of this time was spent counseling/coordinating care  ** Please Note: Dragon 360 Dictation voice to text software may have been used in the creation of this document   **

## 2018-12-11 NOTE — PROGRESS NOTES
12/11/18 0838   Pain Assessment   Pain Assessment 0-10   Pain Score 8   Pain Type Acute pain   Pain Location Leg   Pain Orientation Right   Pain Radiating Towards RLE   Pain Descriptors Aching   Pain Frequency Constant/continuous   Pain Onset Gradual   Clinical Progression Gradually worsening   Effect of Pain on Daily Activities impaired tolerance for all mobility   Hospital Pain Intervention(s) Repositioned;Rest;Other (Comment)  (RN notified)   Response to Interventions Pt noted w/ limited tolerance for Tx session  Pt inconsistent w/ reports of pain level 2* cog deficits   Restrictions/Precautions   Precautions Bed/chair alarms;Cognitive; Fall Risk;THR;Pain;Supervision on toilet/commode   ROM Restrictions Yes  (THPs RLE)   Braces or Orthoses Other (Comment)  (abduction pillow)   QI: Roll Left and Right   Assistance Needed Physical assistance   Assistance Provided by Rainier 75% or more   Roll Left and Right CARE Score 2   QI: Sit to Lying   Assistance Needed Physical assistance   Assistance Provided by Rainier 75% or more   Comment Pt sit>supine on mat requiring maxAx1 for trunk support and b/l LE mgmt  Sit to Lying CARE Score 2   QI: Lying to Sitting on Side of Bed   Assistance Needed Physical assistance   Assistance Provided by Rainier 75% or more   Comment maxAx1 for trunk support and b/l LE mgmt from supine>sit from bed and from mat to inc practice/carryover for technique  Lying to Sitting on Side of Bed CARE Score 2   QI: Sit to Stand   Assistance Needed Physical assistance   Assistance Provided by Rainier 50%-74%   Sit to Stand CARE Score 2   QI: Chair/Bed-to-Chair Transfer   Assistance Needed Physical assistance   Assistance Provided by Rainier 50%-74%   Chair/Bed-to-Chair Transfer CARE Score 2   Transfer Bed/Chair/Wheelchair   Positioning Concerns Cognition   Limitations Noted In Balance;Confidence; Coordination; Endurance;Pain Management;Problem Solving;UE Strength;LE Strength   Adaptive Equipment Roller Walker   Sit Pivot Minimal Assist;Assist x 2   Stand Pivot Moderate Assist;Assist x 2   Sit to Stand Minimal;Moderate Assist   Stand to Sit Minimal;Moderate Assist   Supine to Sit Maximum Assist   Sit to Supine Maximum Assist   Findings Pt varrying Niharika-modAx1 for sit<>stand w/ RW depending on pt level of fatigue  Pt noted w/ c/o headache and dizziness, pt required modAx1 w/ RW and 2nd person SBA, pt requires step by step VC for all fxnl transfers  Pt cont to require maxA for bed mobility 2* pain in R hip  Bed, Chair, Wheelchair Transfer (FIM) 2 - [place] needs to lift or boost to rise AND assist to sit   Cognition   Overall Cognitive Status Impaired   Arousal/Participation Cooperative;Arousable   Attention Attends with cues to redirect   Orientation Level Oriented to person;Oriented to place   Memory Decreased short term memory;Decreased recall of recent events;Decreased recall of precautions   Following Commands Follows one step commands with increased time or repetition   Comments Pt severely limited by naseua, headache, and dizziness this session  RN Penny Johns and IRMA Pickard aware  Other Comments   Assessment Pt c/o dizziness/naseua/headache t/o session despite multiple rest breaks, TEDs donned, abdominal binder donned, and water/ginger ale provided  Orthostatic BP taken: supine 118/56, seated (abdominal binder donned) 98/58, and standing (abd binder donned) 84/50  Pt returned to recliner at end of session w/ b/l LE elevated and SCDs applied w/ pillows placed for pt 's optimal comfort and call bell within reach  Assessment   Treatment Assessment OT session limited by pt 's low BP and frequent c/o naseua  Pt presents confused at times and demo difficulty recalling why she is here  Pt stating L leg hurts while pointing to the R leg when prompted to point to pain  Pt demo poor carryover for safe hand placement for all fxnl transfers   OT completed sit pivot back into recliner at end of session 2* pt 's fatigue/low BP  All staff aware of pt 's orthostatic symptoms  Cont OT POC w/ focus on activity letty, endurance, bed mobility, UE/LE strengthening to maximize pt indep w/ ADLs and all fxnl transfers  Prognosis Fair   Problem List Decreased strength;Decreased range of motion;Decreased endurance; Impaired balance;Decreased coordination;Decreased mobility; Decreased cognition; Impaired judgement;Decreased safety awareness;Orthopedic restrictions;Pain   Barriers to Discharge Inaccessible home environment;Decreased caregiver support   Plan   Treatment/Interventions ADL retraining;Functional transfer training;LE strengthening/ROM; Bed mobility   Progress Slow progress, decreased activity tolerance   Recommendation   OT Discharge Recommendation 24 hour supervision/assist   OT Therapy Minutes   OT Time In 0830   OT Time Out 1000   OT Total Time (minutes) 90   OT Mode of treatment - Individual (minutes) 90   OT Mode of treatment - Concurrent (minutes) 0   OT Mode of treatment - Group (minutes) 0   OT Mode of treatment - Co-treat (minutes) 0   OT Mode of Teatment - Total time(minutes) 90 minutes   Therapy Time missed   Time missed?  No

## 2018-12-11 NOTE — PROGRESS NOTES
Physical Medicine and Rehabilitation Progress Note  Esa Flow 66 y o  female MRN: [id number]  Unit/Bed#: [facility] 962-01 Encounter: [id number]    Chief Complaints:  Decline in function     Subjective/Interval Events:   Patient reports being uncomfortable but hard to determine why  She reports mild hip pain at times  She denies current lightheadedness  Patient notes stable chronic nausea without vomiting  She reports having some gas without abdominal pain  Patient denies fever, chills, sweats, calf pain, SOB or other complaints  ROS: A 10-point ROS was performed  Negative except as listed above  Decrease oxy to 2 5mg Q6H PRN with impaired mentation; continue to monitor for other causes of AMS on chronic cognitive impairments  Overall Assessment/relevant history:  51-year-old female with a past medical history of atrial fibrillation on chronic anticoagulation with apixaban, hypertension, hyperlipidemia, hypothyroidism, chronic left facial droop, severe aortic stenosis, possible dementia, severe osteoporosis with recent fall at home found to have right intertrochanteric femur fracture who underwent right total hip arthroplasty by Dr Leatha Gonzalez on 12/04/2018  Postoperative  Most notable for transient SVT requiring rapid response and temporary Cardizem drip  Cardiology consulted and assisted with management and patient has been switched since transition to p o  Amiodarone  Postoperative course also notable for acute blood loss anemia requiring 1 unit PRBC transfusion  Patient was evaluated by skilled therapies and was found to have significant decline in ADLs and ambulation [facility] [address]      Functional status (recent):    Low level     Functional status on admission to [facility]:  OT:  Total assist General in toilet transfers, lower body dressing, toileting, and bathing; upper body dressing mod assist, grooming and eating supervision      * Fracture of femoral neck, right [facility])   Assessment & Plan    -S/p Right MIKE by Dr Yoel Soto on 18  -WBAT RLE  -Daily PT/OT to improve mobility and self care   -Posterior hip precautions  -Maintain hip abduction foam when supine due to patient's cognitive deficits  -Please maintain compressive dressing for 48 hours, then replace if needed      Positional lightheadedness   Assessment & Plan    Stable  Some improvement after PRBC transfusion   Monitor vitals; orthostatics  Recently started on Amio; also on MTP  HCTZ holding per IM  Abdominal binder PRN     Delirium   Assessment & Plan    Post-op delirium on possible baseline dementia > seems fairly significant at times  >Decrease oxy to 2 5mg Q6H PRN   Medications reviewed; limit sedating medications but adequately treat pain  Frequent redirection, re-orientation, reassurance  Monitor for signs and symptoms of infection  Overstimulation precautions, optimize sleep-wake cycle, agitation behavioral scale, sleep log  If necessary provided 1-1     Acute pain   Assessment & Plan    -managed on scheduled Tylenol 975mg TID  -ICE U4mmpfa while awake  -PRN oxycodone decrease to 2 5 for moderate to severe pain - use with caution given her delirium on dementia   Counseled on and continue to encourage deep breathing/relaxation/behavioral pain management techniques:     Deep breathin seconds in, 5 seconds out, 5 times per hour when awake and PRN when in pain or anticipate pain; avoid holding breath and tightening muscles and instead breathe slowly and deeply       Acute blood loss anemia   Assessment & Plan    -hemoglobin improved to 8 8 after 1 unit transfusion   -iron and vitamin-C supplementation  -monitor intermittently     Cognitive impairment   Assessment & Plan    -Patient with baseline dementia and mild cognitive deficits  -Placed on fall precautions and bed alarms - freq checks by staff      Severe aortic stenosis   Assessment & Plan    -preload dependent   -encourage PO hydration  -Patient cardiologist is Dr Deborah Butler who she will follow up with as an outpatient     Atrial fibrillation [facility])   Assessment & Plan    -rate controlled on lopressor 25mg Q12h and new amiodarone 200mg BID  -anticoagulated with Eliquis 5mg BID  - Amiodarone 200mg BID - taper per cards   -Patient's home cardiologist is Dr Deborah Butler     Hypertension   Assessment & Plan    -MTP  -HCTZ held with symptomatic orthostasis  -IM to adjust dosing if needed      Chronic nausea   Assessment & Plan    Apparently been worked up by GI in past  At approximate baseline; worsened with anxiety  PRN Zofran  Ensure optimal stooling     Onychomycosis   Assessment & Plan    Toenails; s/p podiatry consult and debridement      Hypothyroidism   Assessment & Plan    -managed on Synthroid     Dyslipidemia   Assessment & Plan    -managed on pravastatin         # Bowel function:  Constipation  Monitor for constipation and diarrhea     >Dulcolax supp refused last night; try again today  >Colace/senna/Miralax daily  >If not BM soon would recommend enema     # Bladder function: Intact  Monitor for urinary retention, incontinence, and signs of urinary infection  # Skin  Encourage regular turning and turn patient every 2 hours if not adequately ambulatory; - Rehabilitation team to perform skin checks regularly to monitor for erythema, wounds, rashes (if applicable incisions)    # Other  - Diet: general    - DVT prophy: Eliquis and SCDs    Disposition:   Home with supervision with [facility]IME 2 weeks from admission    Follow-up:   PCP, Ortho, Cristi, PMR    ---------------------------------------------------------------------------------------------------------------------    Objective:     Allergies and Medications per EMR    Physical Exam:  Temp:  [97 9 °F (36 6 °C)-98 3 °F (36 8 °C)] 97 9 °F (36 6 °C)  HR:  [71-82] 82  Resp:  [16-20] 18  BP: (113-127)/(55-82) 113/82  General: Awake, alert in NAD  HENT:  MMM  Respiratory: Unlabored breathing, breath sounds equal, Lungs CTA, no wheezes, rales, or rhonchi  Cardiovascular: Regular rate and rhythm, no murmurs, rubs, or gallops  Gastrointestinal: Soft, non-tender, mildly-distended, normoactive bowel sounds  Genitourinary: No zamarripa  SkiN/MSK/Extremities:  No calf edema or calf tenderness to palpation  Neurologic/Psych:   MENTAL STATUS: oriented to self, not year, but to month  Affect: Euthymic       Diagnostic Studies: reviewed, no new imaging  No results found  Laboratory:      Results from last 7 days  Lab Units 12/10/18  0525 12/09/18  0512 12/08/18  0448   HEMOGLOBIN g/dL 8 8* 7 6* 7 4*   HEMATOCRIT % 27 1* 23 3* 22 4*   WBC Thousand/uL 7 87 7 29 6 03       Results from last 7 days  Lab Units 12/10/18  0525 12/08/18 0448 12/07/18  0445  12/04/18  2318   BUN mg/dL 16 19 17  < >  --    POTASSIUM mmol/L 4 3 4 1 3 9  < >  --    CHLORIDE mmol/L 105 109* 112*  < >  --    GLUCOSE, ISTAT mg/dl  --   --   --   --  136   CREATININE mg/dL 0 86 0 80 0 84  < >  --    < > = values in this interval not displayed  Patient Active Problem List   Diagnosis    Microscopic hematuria    Dyslipidemia    Hypertension    Palpitations    [facility]    Hypothyroidism    Cognitive impairment    MCI (mild cognitive impairment)    Gait disturbance    Closed intertrochanteric fracture of hip, right, initial encounter ([facility] [address] [facility]    Severe aortic stenosis    Dehydration    Closed fracture of neck of right femur ([facility] [address] )    Fall    Ambulatory dysfunction    Physical deconditioning    Osteoporosis    [facility] with [facility]    NSTEMI (non-ST elevated myocardial infarction) ([facility])    Acute blood loss anemia    Fracture of femoral neck, right ([facility])    Acute pain    Delirium    Onychomycosis    Positional lightheadedness    Chronic nausea       ** Please Note: Fluency Direct voice to text software may have been used in the creation of this document   **    Total visit time: At least 25 minutes, with more than 50% spent counseling/coordinating care    Amanda Escalante MD, [address] [facility]  Physical Medicine and Rehabilitation  Brain Injury Medicine

## 2018-12-11 NOTE — PROGRESS NOTES
12/11/18 1230   Pain Assessment   Pain Assessment 0-10   Pain Score 9   Pain Type Acute pain;Surgical pain   Pain Location Hip;Leg   Pain Orientation Right   Pain Radiating Towards RLE   Pain Descriptors Aching   Pain Frequency Constant/continuous   Clinical Progression Not changed   Effect of Pain on Daily Activities impaired tolerance to mobility and ROM    Patient's Stated Pain Goal No pain   Hospital Pain Intervention(s) Repositioned; Ambulation/increased activity   Response to Interventions increased with activity; RN administering medications during session    Restrictions/Precautions   Precautions Bed/chair alarms;Cardiac/sternal;Fall Risk;Pain;Supervision on toilet/commode;THR   Weight Bearing Restrictions Yes   RLE Weight Bearing Per Order WBAT   ROM Restrictions Yes   RUE ROM Restriction Other  (THP )   Braces or Orthoses Other (Comment)  (ABD pillow )   General   Change In Medical/Functional Status RUE resting hand tremor noted- reports at baseline    Cognition   Overall Cognitive Status Impaired   Arousal/Participation Cooperative   Attention Attends with cues to redirect   Orientation Level Oriented to person;Oriented to place;Oriented to situation   Memory Decreased recall of precautions;Decreased recall of recent events;Decreased short term memory   Following Commands Follows one step commands inconsistently   Comments patient unable to recall 3/3 THP during session; will require reinforcement    Subjective   Subjective patient agreeable to particiapte in PT session    QI: Sit to Lying   Assistance Needed Physical assistance   Assistance Provided by [place] Total assistance   Comment mod Ax1 + Niharika x1; patient with difficulty sequening, pillow placed between LE to ensure THP    Sit to Lying CARE Score 1   QI: Lying to Sitting on Side of Bed   Comment OOB on BSC upon arrival    QI: Sit to Stand   Assistance Needed Physical assistance   Assistance Provided by [place] Total assistance   Comment modAx1 + SBA of 2nd person    Sit to Stand CARE Score 1   QI: Chair/Bed-to-Chair Transfer   Assistance Needed Physical assistance   Assistance Provided by [place] Total assistance   Comment modAx1 + SBA of 2nd person    Chair/Bed-to-Chair Transfer CARE Score 1   Transfer Bed/Chair/Wheelchair   Limitations Noted In Balance;Confidence; Coordination; Endurance;Pain Management;Problem Solving; Sequencing;UE Strength;LE Strength   Adaptive Equipment Roller Walker   Stand Pivot Moderate Assist;Assist x 1   Sit to Stand Moderate Assist;Assist x 1   Stand to Sit Moderate Assist;Assist x 1   Sit to Supine Maximum Assist;Assist x 1   All Transfer Maximum Assist   Findings Patient requires continuous verbal instruction for proper hand placement  Little carryover noted during session    Bed, Chair, Wheelchair Transfer (FIM) 3 - [place] needs to lift, boost or assist to stand OR sit   QI: Car Transfer   Reason if not Attempted Safety concerns   Car Transfer CARE Score 88   QI: Walk 10 Feet   Assistance Needed Physical assistance   Assistance Provided by [place] 25%-49%   Comment 5' in room with RW    Walk 10 Feet CARE Score 3   QI: Walk 50 Feet with Two Turns   Reason if not Attempted Activity not applicable   Walk 50 Feet with Two Turns CARE Score 9   QI: Walk 150 Feet   Reason if not Attempted Activity not applicable   Walk 205 Feet CARE Score 9   QI: Walking 10 Feet on Uneven Surfaces   Reason if not Attempted Activity not applicable   Walking 10 Feet on Uneven Surfaces CARE Score 9   Ambulation   Does the patient walk? 2  Yes   Primary Discharge Mode of Locomotion Walk   Walk Assist Level Minimum Assist  (in room, would need chair follow )   Gait Pattern Inconsistant Yamilka;Decreased foot clearance; Forward Flexion;Decreased R stance; Step to   Assist Device Napoleon Block Walked (feet) 5 ft   Limitations Noted In Balance; Coordination;Device Management; Endurance; Sequencing;Speed;Strength;Swing   Walking (FIM) 1 - Patient ambulates less than 49 feet regardless of assist/device/set up   QI: Wheel 50 Feet with Two Turns   Reason if not Attempted Activity not applicable   Wheel 50 Feet with Two Turns CARE Score 9   QI: Wheel 150 Feet   Reason if not Attempted Activity not applicable   Wheel 068 Feet CARE Score 9   Wheelchair mobility   QI: Does the patient use a wheelchair? 0  No   QI: 1 Step (Curb)   Reason if not Attempted Safety concerns   1 Step (Curb) CARE Score 88   QI: 4 Steps   Reason if not Attempted Safety concerns   4 Steps CARE Score 88   QI: 12 Steps   Reason if not Attempted Safety concerns   12 Steps CARE Score 88   QI: Picking Up Object   Reason if not Attempted Safety concerns   Picking Up Object CARE Score 88   QI: Toilet Transfer   Assistance Needed Physical assistance   Assistance Provided by [place] Total assistance   Comment modAx1 + SBA of 2nd person    Toilet Transfer CARE Score 1   Toilet Transfer   Surface Assessed Bedside Commode   Limitations Noted In Balance;Confidence; Endurance; Sequencing;UE Strength;LE Strength   Positioning Concerns LE Support   Toilet Transfer (FIM) 1 - Patient requires assist of two people   Therapeutic Interventions   Strengthening 10x repeated STS; 5x SPT; 10x2: LAQ, SAQ, bridging 10x    Other BP seated OOB on BSC reading 130/59 with 82/81 recorded in standing- patient with binder and TEDS donned    Other Comments   Comments Reviewed incentive spirometer with patient; will require continued reinforcement    Assessment   Treatment Assessment PT treatment session limited by overall fatigue, nausea and low BP (patient only reporting dizziness when prompted)  Multiple STS and SPT performed to increase strength and overall improve tolerance to mobility in which she was able to demonstrate with repetition Niharika but required continued max verbal instruction 2* impaired cognition at this time   Patient unsafe to dc home at this time and will require continued skilled PT itnervention to maxmize function and reduce caregiver burden  Family/Caregiver Present Spouse, Michael    PT Barriers   Physical Impairment Decreased strength;Decreased range of motion;Decreased endurance; Impaired balance;Decreased mobility; Decreased coordination;Decreased cognition;Decreased skin integrity;Orthopedic restrictions;Pain   Functional Limitation Standing;Transfers; Walking   Plan   Treatment/Interventions Functional transfer training;LE strengthening/ROM; Therapeutic exercise; Endurance training;Cognitive reorientation;Patient/family training;Equipment eval/education; Bed mobility;Gait training; Compensatory technique education   Progress Slow progress, decreased activity tolerance   Recommendation   Recommendation Other (Comment)  (TBD )   Equipment Recommended Walker   PT Therapy Minutes   PT Time In 1230   PT Time Out 1330   PT Total Time (minutes) 60   PT Mode of treatment - Individual (minutes) 60   PT Mode of treatment - Concurrent (minutes) 0   PT Mode of treatment - Group (minutes) 0   PT Mode of treatment - Co-treat (minutes) 0   PT Mode of Teatment - Total time(minutes) 60 minutes   Therapy Time missed   Time missed?  No

## 2018-12-12 PROCEDURE — 97116 GAIT TRAINING THERAPY: CPT

## 2018-12-12 PROCEDURE — 97535 SELF CARE MNGMENT TRAINING: CPT

## 2018-12-12 PROCEDURE — 97112 NEUROMUSCULAR REEDUCATION: CPT

## 2018-12-12 PROCEDURE — 97530 THERAPEUTIC ACTIVITIES: CPT

## 2018-12-12 PROCEDURE — 97110 THERAPEUTIC EXERCISES: CPT

## 2018-12-12 RX ADMIN — AMIODARONE HYDROCHLORIDE 200 MG: 200 TABLET ORAL at 17:19

## 2018-12-12 RX ADMIN — ACETAMINOPHEN 975 MG: 325 TABLET, FILM COATED ORAL at 15:13

## 2018-12-12 RX ADMIN — OXYCODONE HYDROCHLORIDE 2.5 MG: 5 TABLET ORAL at 04:44

## 2018-12-12 RX ADMIN — SENNOSIDES AND DOCUSATE SODIUM 1 TABLET: 8.6; 5 TABLET ORAL at 08:28

## 2018-12-12 RX ADMIN — POLYETHYLENE GLYCOL 3350 17 G: 17 POWDER, FOR SOLUTION ORAL at 08:32

## 2018-12-12 RX ADMIN — VITAMIN D, TAB 1000IU (100/BT) 5000 UNITS: 25 TAB at 08:28

## 2018-12-12 RX ADMIN — APIXABAN 5 MG: 5 TABLET, FILM COATED ORAL at 20:42

## 2018-12-12 RX ADMIN — LEVOTHYROXINE SODIUM 50 MCG: 50 TABLET ORAL at 07:18

## 2018-12-12 RX ADMIN — PRAVASTATIN SODIUM 40 MG: 40 TABLET ORAL at 17:19

## 2018-12-12 RX ADMIN — SENNOSIDES AND DOCUSATE SODIUM 1 TABLET: 8.6; 5 TABLET ORAL at 17:21

## 2018-12-12 RX ADMIN — OXYCODONE HYDROCHLORIDE 2.5 MG: 5 TABLET ORAL at 17:23

## 2018-12-12 RX ADMIN — PANTOPRAZOLE SODIUM 20 MG: 20 TABLET, DELAYED RELEASE ORAL at 07:23

## 2018-12-12 RX ADMIN — OXYCODONE HYDROCHLORIDE 2.5 MG: 5 TABLET ORAL at 11:41

## 2018-12-12 RX ADMIN — ONDANSETRON 4 MG: 4 TABLET, ORALLY DISINTEGRATING ORAL at 11:41

## 2018-12-12 RX ADMIN — ONDANSETRON 4 MG: 4 TABLET, ORALLY DISINTEGRATING ORAL at 17:22

## 2018-12-12 RX ADMIN — DOCUSATE SODIUM 100 MG: 100 CAPSULE, LIQUID FILLED ORAL at 17:21

## 2018-12-12 RX ADMIN — DOCUSATE SODIUM 100 MG: 100 CAPSULE, LIQUID FILLED ORAL at 08:28

## 2018-12-12 RX ADMIN — APIXABAN 5 MG: 5 TABLET, FILM COATED ORAL at 08:28

## 2018-12-12 RX ADMIN — AMIODARONE HYDROCHLORIDE 200 MG: 200 TABLET ORAL at 08:28

## 2018-12-12 RX ADMIN — CALCIUM CARBONATE (ANTACID) CHEW TAB 500 MG 500 MG: 500 CHEW TAB at 08:28

## 2018-12-12 RX ADMIN — ACETAMINOPHEN 975 MG: 325 TABLET, FILM COATED ORAL at 21:05

## 2018-12-12 RX ADMIN — LIDOCAINE 2 PATCH: 50 PATCH CUTANEOUS at 08:28

## 2018-12-12 RX ADMIN — ONDANSETRON 4 MG: 4 TABLET, ORALLY DISINTEGRATING ORAL at 04:44

## 2018-12-12 RX ADMIN — CALCIUM CARBONATE (ANTACID) CHEW TAB 500 MG 500 MG: 500 CHEW TAB at 17:20

## 2018-12-12 RX ADMIN — METOPROLOL TARTRATE 25 MG: 25 TABLET, FILM COATED ORAL at 08:44

## 2018-12-12 NOTE — PCC NURSING
Pt admitted to [facility] s/p Right femoral neck fracture s/p posterior MIKE  Staples removed and steri strips placed; abd covering  Pt is WBAT RLE  Uses Abductor pillow at all times, left foot rotates out, use towels to straighten  Pain managed with Oxycodone, tylenol and lidocaine patch  Pt has Poor appetite, needs set-up and encouragement  Also needs encouragement to drink water  Pt anemic, s/p 3 units PRBC's  Pt w h/o afib- on  eliquis for anticoag  Orthostatics were positive- better after transfusions  Teds  Daily and Abd binder prn  On lopressor and amiodarone  Pt has dementia/ confusion  Pt can be incontinent of bladder at times  Pt requires q 2 hr positioning  Pt requires alarms for safety  This week we will monitor lab  Values, vital signs, work on bowel and pain management  Pt will be encouraged to increase input  We will monitor incision for s/s of infection and promote healing  We will prevent skin breakdown with turning, repositioning, weight shifts  We will monitor for adequate pain control  Pt will work on safety awareness and remain free from falls

## 2018-12-12 NOTE — PCC OCCUPATIONAL THERAPY
Pt is demonstrating fair progress with occupational therapy toward long term goals for ADL, IADL, and functional transfers/mobility  Pt continues to present with impairments in pain management, standing balance, activity tolerance, confidence, safety and carryover with LHAE  Occupational performance remains limited and pt continues to require assistance for functional transfers, ADL performance, bed mobility, and toileting overall mod A for LB dressing and min A for bathing and toileting  Pt has support from  and son upon discharge for IADL tasks  Pt continues to demonstrate decreased carryover with RW and hand placement during all stand pivot transfers  Pt becomes nervous and demonstrates decreased comprehension with all functional transfers  Pt is at high risk for falls due to these deficits  Pt will continue to benefit from skilled acute rehab OT services to address above mentioned barriers and maximize functional independence in baseline areas of occupation to meet established treatment goals with overall decreased burden of care  Pending family's ability to assist pt may need SNF rehab vs home

## 2018-12-12 NOTE — PROGRESS NOTES
Internal Medicine Progress Note  Patient: Kathya Jain  Age/sex: 66 y o  female  Medical Record #: [id number]      ASSESSMENT/PLAN:  Kathya Jain is seen and examined and management for following issues:    Right hip fracture; s/p right MIKE 12/4:  Pain control per primary service        PAF:  Continue Amiodarone/Lopressor 25 mg q 12 hours/Eliquis  Amio is new for her since hospitalization = d/w with cards and Amio is 200 mg BID x 1 week then to 200 mg qd      HTN/orthostasis:  Stopped HCTZ; gave 1 liter NSS yesterday for orthostasis; will try not to reduce Lopressor but see how she does today     ABLA:  did receive blood transfusion sunday for hemoglobin 7 6 = now 8 8; no sx      Severe AS/LVEF 55%: no volume overload at this time    Nausea: continue prn Zofran      Subjective: no dizziness today; +nausea = got Zofran    ROS:   GI: denies abdominal pain, change bowel habits or reflux symptoms  Neuro: No new neurologic changes  Respiratory: No Cough, SOB  Cardiovascular: No CP, palpitations     Scheduled Meds:    Current Facility-Administered Medications:  acetaminophen 975 mg Oral Q8H [facility] Theron Altamirano MD   amiodarone 200 mg Oral BID With Meals IRMA Hamilton   Followed by       Ayala Vargas ON 12/16/2018] amiodarone 200 mg Oral Daily With Breakfast IRMA Hamilton   apixaban 5 mg Oral Q12H [facility] Theron Altamirano MD   bisacodyl 10 mg Rectal Daily PRN Theron Altamirano MD   bisacodyl 10 mg Rectal Once Stew Trujillo MD   calcium carbonate 500 mg Oral BID With Meals Tehron Altamirano MD   cholecalciferol 5,000 Units Oral Daily Theron Altamirano MD   docusate sodium 100 mg Oral BID Stew Trujillo MD   levothyroxine 50 mcg Oral Early Morning Theron Altamirano MD   lidocaine 2 patch Topical Daily Theron Altamirano MD   metoprolol tartrate 25 mg Oral Q12H [facility] Theron Altamirano MD   ondansetron 4 mg Oral Q6H PRN IRMA [place]   oxyCODONE 2 5 mg Oral Q6H PRN Stew Trujillo MD   pantoprazole 20 mg Oral Early Morning Venson Kocher, MD   polyethylene glycol 17 g Oral Daily PRN Venson Kocher, MD   polyethylene glycol 17 g Oral Daily Mando Mei MD   pravastatin 40 mg Oral Daily With Ari Jain MD   senna-docusate sodium 1 tablet Oral BID Venson Kocher, MD       Labs:       Results from last 7 days  Lab Units 12/10/18  0525 12/09/18  0512   WBC Thousand/uL 7 87 7 29   HEMOGLOBIN g/dL 8 8* 7 6*   HEMATOCRIT % 27 1* 23 3*   PLATELETS Thousands/uL 152 135*       Results from last 7 days  Lab Units 12/10/18  0525 12/08/18  0448   POTASSIUM mmol/L 4 3 4 1   CHLORIDE mmol/L 105 109*   CO2 mmol/L 24 26   BUN mg/dL 16 19   CREATININE mg/dL 0 86 0 80   CALCIUM mg/dL 8 9 8 0*                  Glucose, i-STAT (mg/dl)   Date Value   12/04/2018 136   12/03/2018 128       Labs reviewed    Physical Examination:  Vitals:   Vitals:    12/11/18 2106 12/12/18 0545 12/12/18 0600 12/12/18 0845   BP: 132/76 107/56  117/59   BP Location: Left arm Left arm  Left arm   Pulse: 85 95  84   Resp: 20 18     Temp: 98 °F (36 7 °C) 98 7 °F (37 1 °C)     TempSrc: Oral Oral     SpO2: 97% 98%     Weight:   65 4 kg (144 lb 2 9 oz)    Height:         Constitutional:  NAD; pleasant; nontoxic  HEENT:  AT/NC; oropharynx negative for thrush on tongue   Neck: negative for JVD  CV:  +S1, S2;  irreg; no rub/+murmur  Pulmonary:  BBS without crackles/wheeze/rhonci; resp are unlabored  Abdominal:  soft, +BS, ND/NT  Skin:  no rashes  Musculoskeletal:  no edema  :  no zamarripa  Neurological/Psych:  AAO;  JENKINS 5/5; no depression/anxiety        [ X ] Total time spent: 30 Mins and greater than 50% of this time was spent counseling/coordinating care  ** Please Note: Dragon 360 Dictation voice to text software may have been used in the creation of this document   **

## 2018-12-12 NOTE — PCC PHYSICAL THERAPY
12/19/18   Patient making good progress with skilled PT intervention however, her progress remains limited by impaired cognition, impaired motor planning and sequencing, and pain in RLE  Patient able to progress to the following functioanl levels: mod A with bed mobility, CG for STS transfers, Niharika for SPT, Niharika for ' ambulation depending on pain, and min/mod A for 4-6 steps at [place] [id number]  She will continue to require skilled PT intervention to maximize function and reduce caregiver burden  At this time, recommendation for continued rehab vs 24 hour S/A with extensive family training required  12/12/18  Patient making slow progress with skilled PT intervention as she is limited by the following bariers: pain, decreased tolerance to ROM RLE, weakness RLE, limited tolerance to standing and ambulation, anxiety, and depression  Patient also limited during stance with orthostasis: DANA and ABD binder utilized  Patient able to ambulate 20' with RW at moderate A but demosntrates self limiting demeanor in which she requires max encouragement  She continues to require moderate Ax1 for bed mobility, transfers and gait with SBA at this time 2* safety  She will continue to require skilled PT intervention to maximize function and reduce caregiver burden  At this time, patient requires assist for all functional tasks and is limited by impaired cognition, requiring 24 hour S/A at this time

## 2018-12-12 NOTE — PROGRESS NOTES
12/12/18 0700   Pain Assessment   Pain Assessment 0-10   Pain Score 6   Pain Type Acute pain;Surgical pain   Pain Location Hip   Pain Orientation Right   Pain Descriptors Aching;Nagging   Pain Frequency Constant/continuous   Pain Onset Ongoing   Clinical Progression Not changed   Effect of Pain on Daily Activities impaired tolerance for stance and transfers   Hospital Pain Intervention(s) Repositioned   Restrictions/Precautions   Precautions Bed/chair alarms;Cognitive; Fall Risk;Pain;Supervision on toilet/commode;THR   Weight Bearing Restrictions Yes   RLE Weight Bearing Per Order WBAT   ROM Restrictions Yes   RLE ROM Restriction Other  (THPs)   Braces or Orthoses (ABD pillow)   QI: Oral Hygiene   Assistance Needed Set-up / [facility] Provided by [place] No physical assistance   Comment A to open tooth paste container 2* dec strength in b/l hands and P frustration tolerance   Oral Hygiene CARE Score 5   Grooming   Able To Comb/Brush Hair;Wash/Dry Face;Brush/Clean Teeth;Wash/Dry Hands   Limitation Noted In Problem Solving;Coordination; Safety;Strength;Timeliness   Findings Pt completed grooming seated in w/c  Pt unable to open toothpaste 2* dec FMC and hand strength  Pt became frustrated and upset with task   Grooming (FIM) 5 - [place] sets up supplies or applies device   QI: Shower/Bathe Self   Assistance Needed Physical assistance   Assistance Provided by [place] 25%-49%   Comment Dec standing tolerance and balance for jhon/rear bathing; unable to reach b/l feet  Shower/Bathe Self CARE Score 3   Bathing   Assessed Bath Style Sponge Bath   Anticipated D/C Bath Style Tub   Able to Gather/Transport No   Able to [employer] Temperature No   Able to Wash/Rinse/Dry (body part) Left Arm;Right Arm;L Upper Leg;R Upper Leg;Chest;Abdomen   Limitations Noted in Balance; Coordination; Endurance;Problem Solving; Safety;ROM;Strength;Timeliness   Positioning Seated;Standing   Findings  With MAX encouragement, pt bathed UB  OT demo'd LH reacher use for bathing b/l feet but pt unable to perform 2* dec UE strength, problem solving  Pt modA in stance to maintain balance while OT performed jhon and rear bathing  Bathing (FIM) 3 - Patient completes 5/10  6/10 or 7/10 parts   Tub/Shower Transfer   Not Assessed Sponge Bath   QI: Upper Body Dressing   Assistance Needed Physical assistance   Assistance Provided by [place] 25%-49%   Comment A to bring turtle neck shirt over head and adjust in back   Upper Body Dressing CARE Score 3   QI: Lower Body Dressing   Assistance Needed Physical assistance   Assistance Provided by [place] Total assistance   Comment Edu and given demo on [address] [facility]   Lower Body Dressing CARE Score 1   QI: Putting On/Taking Off Footwear   Assistance Needed Physical assistance   Assistance Provided by [place] Total assistance   Putting On/Taking Off Footwear CARE Score 1   Dressing/Undressing Clothing   Remove UB Clothes Pullover Shirt   Remove LB Clothes Socks   [address]; Undergarment;Socks;TEDs   Limitations Noted In Balance; Coordination; Endurance;Problem Solving; Safety;Strength;ROM; Timeliness   Positioning Supported Sit;Standing   Findings Pt dressing limited by pain, dec ROM, dec confidence, and dec balance  Pt req totA for all LB dressing and encouragement to complete UB     UB Dressing (FIM) 3 - Patient completes  50-74% of all tasks   LB Dressing (FIM) 1 - Patient completes less than 25% of all tasks   QI: Lying to Sitting on Side of Bed   Assistance Needed Physical assistance   Assistance Provided by [place] 50%-74%   Comment A to manage RLE and support trunk during transfer; HOB elevated   Lying to Sitting on Side of Bed CARE Score 2   QI: Sit to Stand   Assistance Needed Physical assistance   Assistance Provided by [place] Total assistance   Comment modAx2 for initial transfer from bed; maxAx1 for all other sit<>stands   Sit to Stand CARE Score 1   QI: Chair/Bed-to-Chair Transfer   Assistance Needed Physical assistance   Assistance Provided by [place] 50%-74%   Comment maxA with RW; pt req constant v/c for hand placement   Chair/Bed-to-Chair Transfer CARE Score 2   Transfer Bed/Chair/Wheelchair   Positioning Concerns Cognition   Limitations Noted In Balance; Endurance;Problem Solving;Pain Management;LE Strength; Sequencing   Adaptive Equipment Napoleon Bradford   Findings During transition from sit<>stand, pt unable to transition from hands on w/c arm rest to hands on RW req constant v/c for hand placement  Bed, Chair, Wheelchair Transfer (FIM) 2 - [place] needs to lift or boost to rise AND assist to sit   QI: [address] Needed Physical assistance   Assistance Provided by [place] Total assistance   Comment totA 2* inability to release hands from RW to perform hygiene   Toileting Hygiene CARE Score 1   Toileting   Able to [address] down no, up no  Able to Manage Clothing Closures No   Manage Hygiene Bowel;Bladder   Limitations Noted In Balance;Problem Solving; Safety;LE Strength; Sequencing   Findings Pt req modA to maintain balance for toileting hygiene  Pt unable to perform hygiene or CM  Toileting (FIM) 1 - Patient completes less than 25% of all tasks   QI: Toilet Transfer   Assistance Needed Physical assistance   Assistance Provided by [place] Total assistance   Comment modAx2 for transfer to commode 2* pain, weakness   Toilet Transfer CARE Score 1   Toilet Transfer   Surface Assessed Standard Commode   Transfer Technique Stand Pivot   Limitations Noted In Balance;Confidence; Endurance; Safety;LE Strength   Findings OT/NSG completed stand pivot to commode     Toilet Transfer (FIM) 1 - Patient requires assist of two people   Functional Standing Tolerance   Time 30sec   Activity Pt in stance for 30secs for jhon/rear hygiene and CM   Cognition   Overall Cognitive Status Impaired   Arousal/Participation Alert   Attention Difficulty attending to directions   Orientation Level Oriented to person;Oriented to place   Memory Decreased recall of precautions;Decreased recall of recent events;Decreased short term memory   Following Commands Follows one step commands inconsistently   Assessment   Treatment Assessment Pt participated in skilled OT session focusing on ADL retraining  Session limited by pain in RLE, poor nights sleep, dec confidence  Pt req MAX encouragement to complete any tasks independently during session  Pt cont to state "I can't do this  I need your help " Pt edu on THPs but demo no carryover with noted forward bend passed 90deg throughout session req physical intervention to correct  Pt would benefit from cont therapy focusing on bed mobility, standing tolerance, functional transfers, LHAE for LB dressing  Cont with POC  Prognosis Fair   Problem List Decreased strength;Decreased range of motion;Decreased endurance; Impaired balance;Decreased coordination;Decreased mobility; Decreased cognition; Impaired judgement;Decreased safety awareness;Orthopedic restrictions;Pain   Barriers to Discharge Inaccessible home environment;Decreased caregiver support   Plan   Treatment/Interventions ADL retraining;Functional transfer training; Therapeutic exercise; Endurance training;Bed mobility   Progress Slow progress, decreased activity tolerance   OT Therapy Minutes   OT Time In 0700   OT Time Out 0830   OT Total Time (minutes) 90   OT Mode of treatment - Individual (minutes) 90   OT Mode of treatment - Concurrent (minutes) 0   OT Mode of treatment - Group (minutes) 0   OT Mode of treatment - Co-treat (minutes) 0   OT Mode of Teatment - Total time(minutes) 90 minutes   Therapy Time missed   Time missed?  No

## 2018-12-12 NOTE — PCC CARE MANAGEMENT
Pt is participating with therapy and expects to return home  Pt has been educated on potential recommendations for contd therapy on dc  Following to assist w/appropriate dc plan

## 2018-12-12 NOTE — PROGRESS NOTES
12/12/18 1000   Pain Assessment   Pain Assessment 0-10   Pain Score 8   Pain Type Acute pain;Surgical pain   Pain Location Hip   Pain Orientation Right   Pain Radiating Towards RLE; GENERALIZED    Pain Frequency Constant/continuous   Pain Onset Ongoing   Clinical Progression Not changed   Effect of Pain on Daily Activities impaired tolerance to activity    Hospital Pain Intervention(s) Repositioned;Cold applied; Ambulation/increased activity; Emotional support   Response to Interventions no effect    Restrictions/Precautions   Precautions Bed/chair alarms;Cognitive;Pain;THR;Supervision on toilet/commode; Fall Risk   Weight Bearing Restrictions Yes   RLE Weight Bearing Per Order WBAT   ROM Restrictions Yes   RLE ROM Restriction (THP)   Braces or Orthoses Other (Comment)  (ABD pillow )   General   Change In Medical/Functional Status s/p standing, patient reports feelings of nausea with no production of emesis  When prompted with descriptors, patient reporting she feels dizziness  BP in sitting reading 130/59 and in standing 96/51  ABD binder and DANA previously donned  Obtained M/L binder for patient as S/M ill fitted/ too tight    Cognition   Overall Cognitive Status Impaired   Arousal/Participation Cooperative   Attention Difficulty attending to directions   Orientation Level Oriented to person;Disoriented to place; Disoriented to time;Disoriented to situation  (required 3 options to pick correct location/situation)   Memory Decreased short term memory;Decreased recall of recent events;Decreased recall of precautions   Following Commands Follows one step commands inconsistently   Comments Patient overall feeling "terrible" 2* pain, anxiety, and depression  She stated on 2 occasions "I think about killing myself/harming myself"  Immediately notified nursing; call placed to [facility] care  Focus of session also placed on deep breathing and relaxation techniques      Subjective   Subjective Patient agreeable to particiapte in PT session with moderate/max encouragement  QI: Sit to Lying   Assistance Needed Physical assistance   Assistance Provided by [place] Total assistance   Comment mod Ax1 + minAx1 as patient continues to demonstrate fear of falling and impaired sequencing for transfer back to bed    Sit to Lying CARE Score 1   QI: Sit to Stand   Assistance Needed Physical assistance   Assistance Provided by [place] Total assistance   Comment modAx1 with SBA of 2nd person    Sit to Stand CARE Score 1   QI: Chair/Bed-to-Chair Transfer   Assistance Needed Physical assistance   Assistance Provided by [place] Total assistance   Comment modAx1 with SBA of 2nd person    Chair/Bed-to-Chair Transfer CARE Score 1   Transfer Bed/Chair/Wheelchair   Limitations Noted In Balance;Confidence; Coordination; Endurance;Pain Management;Problem Solving; Sequencing;UE Strength;LE Strength   Adaptive Equipment Roller Walker   Stand Pivot Moderate Assist   Sit to Stand Moderate Assist   Stand to Sit Moderate Assist   Sit to Supine Maximum Assist;Assist x 1   Findings SBA of second person recommended at this time; patient with difficulty sequencing and problem solving; requires simple, one step commands    Bed, Chair, Wheelchair Transfer (FIM) 1 - Patient requires assist of two people   QI: Car Transfer   Reason if not Attempted Safety concerns   Car Transfer CARE Score 88   QI: Walk 10 Feet   Assistance Needed Physical assistance   Assistance Provided by [place] 50%-74%   Comment mod A 20' WITH JABARI FOLLOW    Walk 10 Feet CARE Score 2   QI: Walk 50 Feet with Two Turns   Reason if not Attempted Safety concerns   Walk 50 Feet with Two Turns CARE Score 88   QI: Walk 150 Feet   Reason if not Attempted Safety concerns   Walk 150 Feet CARE Score 88   QI: Walking 10 Feet on Uneven Surfaces   Reason if not Attempted Safety concerns   Walking 10 Feet on Uneven Surfaces CARE Score 88   Ambulation   Does the patient walk? 2   Yes   Primary Discharge Mode of Locomotion Walk   Walk Assist Level Moderate Assist;Chair Follow   Gait Pattern Inconsistant Yamilka;Decreased foot clearance; Forward Flexion; Step to;Decreased R stance   Assist Device [place] Sonia Block Walked (feet) 20 ft   Limitations Noted In Balance;Device Management; Endurance; Sequencing;Speed;Strength;Swing   Findings chair follow    Walking (FIM) 1 - Patient requires assist of two people   QI: Wheel 50 Feet with Two Turns   Reason if not Attempted Activity not applicable   Wheel 50 Feet with Two Turns CARE Score 9   QI: Wheel 150 Feet   Reason if not Attempted Activity not applicable   Wheel 225 Feet CARE Score 9   Wheelchair mobility   QI: Does the patient use a wheelchair? 0  No   QI: 1 Step (Curb)   Reason if not Attempted Safety concerns   1 Step (Curb) CARE Score 88   QI: 4 Steps   Reason if not Attempted Safety concerns   4 Steps CARE Score 88   QI: 12 Steps   Reason if not Attempted Safety concerns   12 Steps CARE Score 88   QI: Picking Up Object   Reason if not Attempted Safety concerns   Picking Up Object CARE Score 88   QI: Toilet Transfer   Assistance Needed Physical assistance   Assistance Provided by [place] Total assistance   Comment mod A for transfer + second person for cleaning/clothing management    Toilet Transfer CARE Score 1   Toilet Transfer   Surface Assessed Bedside Commode   Limitations Noted In Balance;Confidence; Endurance;Problem Solving;UE Strength;LE Strength   Positioning Concerns Cognition; Other  (THP)   Findings mod A for transfer + second person for cleaning/clothing management    Toilet Transfer (FIM) 1 - Patient requires assist of two people   Therapeutic Interventions   Strengthening seated therex 10x3 with AAROM RLE: ankle pumps, LAQ, hip flexion within acceptable range, hip ABD; supine therex: SAQ; repeated STS 5x   Balance static standing with UE support to increase tolerance during bathroom activities    Modalities ice applied to RLE for 12 minutes during seated therex Assessment   Treatment Assessment Patient making slow progress with skilled PT intervention as she is limited by the following bariers: pain, decreased tolerance to ROM RLE, weakness RLE, limited tolerance to standing and ambulation, anxiety, and depression  Patient also limited during stance with orthostasis: DANA and ABD binder utilized  Patient able to ambulate 20' with RW at moderate A but demosntrates self limiting demeanor in which she requires max encouragement  She continues to require moderate Ax1 for bed mobility, transfers and gait with SBA at this time 2* safety  She will continue to require skilled PT intervention to maximize function and reduce caregiver burden  At this time, patient requires assist for all functional tasks and is limited by impaired cognition, requiring 24 hour S/A at this time  Family/Caregiver Present [place] present towards end of session    PT Barriers   Physical Impairment Decreased strength;Decreased range of motion;Decreased endurance; Impaired balance;Decreased mobility; Decreased coordination;Decreased cognition;Decreased skin integrity;Orthopedic restrictions;Pain   Functional Limitation Standing;Transfers; Walking   Plan   Treatment/Interventions Functional transfer training;LE strengthening/ROM; Therapeutic exercise; Endurance training;Cognitive reorientation;Patient/family training;Equipment eval/education;Gait training;Bed mobility; Compensatory technique education   Progress Slow progress, decreased activity tolerance   Recommendation   Recommendation Other (Comment)  (pending progress)   Equipment Recommended Walker   PT Therapy Minutes   PT Time In 1000   PT Time Out 1130   PT Total Time (minutes) 90   PT Mode of treatment - Individual (minutes) 90   PT Mode of treatment - Concurrent (minutes) 0   PT Mode of treatment - Group (minutes) 0   PT Mode of treatment - Co-treat (minutes) 0   PT Mode of Teatment - Total time(minutes) 90 minutes   Therapy Time missed   Time missed?  No

## 2018-12-13 LAB
ABO GROUP BLD: NORMAL
ANION GAP SERPL CALCULATED.3IONS-SCNC: 7 MMOL/L (ref 4–13)
ANISOCYTOSIS BLD QL SMEAR: PRESENT
BASOPHILS # BLD MANUAL: 0.06 THOUSAND/UL (ref 0–0.1)
BASOPHILS NFR MAR MANUAL: 1 % (ref 0–1)
BLD GP AB SCN SERPL QL: NEGATIVE
BUN SERPL-MCNC: 21 MG/DL (ref 5–25)
CALCIUM SERPL-MCNC: 9.4 MG/DL (ref 8.3–10.1)
CHLORIDE SERPL-SCNC: 104 MMOL/L (ref 100–108)
CO2 SERPL-SCNC: 27 MMOL/L (ref 21–32)
CREAT SERPL-MCNC: 0.92 MG/DL (ref 0.6–1.3)
EOSINOPHIL # BLD MANUAL: 0.19 THOUSAND/UL (ref 0–0.4)
EOSINOPHIL NFR BLD MANUAL: 3 % (ref 0–6)
ERYTHROCYTE [DISTWIDTH] IN BLOOD BY AUTOMATED COUNT: 14.5 % (ref 11.6–15.1)
GFR SERPL CREATININE-BSD FRML MDRD: 60 ML/MIN/1.73SQ M
GLUCOSE SERPL-MCNC: 90 MG/DL (ref 65–140)
HCT VFR BLD AUTO: 24.6 % (ref 34.8–46.1)
HGB BLD-MCNC: 7.9 G/DL (ref 11.5–15.4)
LYMPHOCYTES # BLD AUTO: 0.64 THOUSAND/UL (ref 0.6–4.47)
LYMPHOCYTES # BLD AUTO: 10 % (ref 14–44)
MCH RBC QN AUTO: 31.9 PG (ref 26.8–34.3)
MCHC RBC AUTO-ENTMCNC: 32.1 G/DL (ref 31.4–37.4)
MCV RBC AUTO: 99 FL (ref 82–98)
MONOCYTES # BLD AUTO: 0.26 THOUSAND/UL (ref 0–1.22)
MONOCYTES NFR BLD: 4 % (ref 4–12)
NEUTROPHILS # BLD MANUAL: 5.22 THOUSAND/UL (ref 1.85–7.62)
NEUTS SEG NFR BLD AUTO: 81 % (ref 43–75)
NRBC BLD AUTO-RTO: 0 /100 WBCS
PLATELET # BLD AUTO: 200 THOUSANDS/UL (ref 149–390)
PLATELET BLD QL SMEAR: ADEQUATE
PMV BLD AUTO: 11 FL (ref 8.9–12.7)
POLYCHROMASIA BLD QL SMEAR: PRESENT
POTASSIUM SERPL-SCNC: 3.7 MMOL/L (ref 3.5–5.3)
RBC # BLD AUTO: 2.48 MILLION/UL (ref 3.81–5.12)
RH BLD: POSITIVE
SODIUM SERPL-SCNC: 138 MMOL/L (ref 136–145)
SPECIMEN EXPIRATION DATE: NORMAL
TOTAL CELLS COUNTED SPEC: 100
VARIANT LYMPHS # BLD AUTO: 1 %
WBC # BLD AUTO: 6.44 THOUSAND/UL (ref 4.31–10.16)

## 2018-12-13 PROCEDURE — 80048 BASIC METABOLIC PNL TOTAL CA: CPT

## 2018-12-13 PROCEDURE — 97530 THERAPEUTIC ACTIVITIES: CPT

## 2018-12-13 PROCEDURE — 97110 THERAPEUTIC EXERCISES: CPT

## 2018-12-13 PROCEDURE — 85027 COMPLETE CBC AUTOMATED: CPT

## 2018-12-13 PROCEDURE — 85007 BL SMEAR W/DIFF WBC COUNT: CPT

## 2018-12-13 PROCEDURE — 97116 GAIT TRAINING THERAPY: CPT

## 2018-12-13 PROCEDURE — 86850 RBC ANTIBODY SCREEN: CPT

## 2018-12-13 PROCEDURE — P9016 RBC LEUKOCYTES REDUCED: HCPCS

## 2018-12-13 PROCEDURE — 86923 COMPATIBILITY TEST ELECTRIC: CPT

## 2018-12-13 PROCEDURE — 86901 BLOOD TYPING SEROLOGIC RH(D): CPT

## 2018-12-13 PROCEDURE — 99233 SBSQ HOSP IP/OBS HIGH 50: CPT

## 2018-12-13 PROCEDURE — 30233N1 TRANSFUSION OF NONAUTOLOGOUS RED BLOOD CELLS INTO PERIPHERAL VEIN, PERCUTANEOUS APPROACH: ICD-10-PCS

## 2018-12-13 PROCEDURE — 86900 BLOOD TYPING SEROLOGIC ABO: CPT

## 2018-12-13 PROCEDURE — 97535 SELF CARE MNGMENT TRAINING: CPT

## 2018-12-13 RX ADMIN — AMIODARONE HYDROCHLORIDE 200 MG: 200 TABLET ORAL at 08:59

## 2018-12-13 RX ADMIN — ACETAMINOPHEN 975 MG: 325 TABLET, FILM COATED ORAL at 05:14

## 2018-12-13 RX ADMIN — ACETAMINOPHEN 975 MG: 325 TABLET, FILM COATED ORAL at 21:28

## 2018-12-13 RX ADMIN — LIDOCAINE 2 PATCH: 50 PATCH CUTANEOUS at 09:00

## 2018-12-13 RX ADMIN — LEVOTHYROXINE SODIUM 50 MCG: 50 TABLET ORAL at 05:14

## 2018-12-13 RX ADMIN — PRAVASTATIN SODIUM 40 MG: 40 TABLET ORAL at 17:26

## 2018-12-13 RX ADMIN — PANTOPRAZOLE SODIUM 20 MG: 20 TABLET, DELAYED RELEASE ORAL at 05:14

## 2018-12-13 RX ADMIN — APIXABAN 5 MG: 5 TABLET, FILM COATED ORAL at 08:58

## 2018-12-13 RX ADMIN — METOPROLOL TARTRATE 12.5 MG: 25 TABLET ORAL at 21:28

## 2018-12-13 RX ADMIN — OXYCODONE HYDROCHLORIDE 2.5 MG: 5 TABLET ORAL at 13:13

## 2018-12-13 RX ADMIN — CALCIUM CARBONATE (ANTACID) CHEW TAB 500 MG 500 MG: 500 CHEW TAB at 17:26

## 2018-12-13 RX ADMIN — SENNOSIDES AND DOCUSATE SODIUM 1 TABLET: 8.6; 5 TABLET ORAL at 08:58

## 2018-12-13 RX ADMIN — AMIODARONE HYDROCHLORIDE 200 MG: 200 TABLET ORAL at 17:26

## 2018-12-13 RX ADMIN — APIXABAN 5 MG: 5 TABLET, FILM COATED ORAL at 21:28

## 2018-12-13 RX ADMIN — VITAMIN D, TAB 1000IU (100/BT) 5000 UNITS: 25 TAB at 08:59

## 2018-12-13 RX ADMIN — CALCIUM CARBONATE (ANTACID) CHEW TAB 500 MG 500 MG: 500 CHEW TAB at 09:00

## 2018-12-13 RX ADMIN — ACETAMINOPHEN 975 MG: 325 TABLET, FILM COATED ORAL at 13:38

## 2018-12-13 NOTE — PROGRESS NOTES
12/13/18 1500   Pain Assessment   Pain Assessment 0-10   Pain Score 8   Pain Type Acute pain;Surgical pain   Pain Location Hip;Leg   Pain Orientation Right   Pain Radiating Towards RLE   Pain Frequency Constant/continuous   Effect of Pain on Daily Activities impaired tolerance to ROM    Restrictions/Precautions   Precautions Bed/chair alarms;Cognitive; Fall Risk;Pain;THR;Supervision on toilet/commode   Weight Bearing Restrictions Yes   RLE Weight Bearing Per Order WBAT   ROM Restrictions Yes   RLE ROM Restriction Other  (THP)   Braces or Orthoses Other (Comment)  (ABD PILLOW; TEDS; BINDER )   General   Change In Medical/Functional Status seated BP reading 123/52; standing 97/61  Patient's only complaint being nausea    Cognition   Overall Cognitive Status Impaired   Arousal/Participation Cooperative   Attention Difficulty attending to directions   Orientation Level Oriented to person   Memory Decreased recall of precautions;Decreased recall of recent events;Decreased short term memory   Following Commands Follows one step commands inconsistently   Subjective   Subjective Patient agreeable to particiapte in PT session   [place] Yoana to get back to bed    QI: Roll Left and Right   Assistance Needed Adaptive equipment;Physical assistance   Assistance Provided by [place] 25%-49%   Comment bed rails + pillow between LE; mod verbal instruction    Roll Left and Right CARE Score 3   QI: Sit to Lying   Assistance Needed Physical assistance   Assistance Provided by [place] 50%-74%   Comment assist with LE, patient able to manage trunk without assist    Sit to Lying CARE Score 2   QI: Sit to Stand   Assistance Needed Physical assistance   Assistance Provided by [place] 25%-49%   Comment from recliner chair 2x    Sit to Stand CARE Score 3   Bed Mobility   Able to Roll Left to Right;Right to Left   Adaptive Equipment Side Rails   Positioning Concerns Cognition  (THP's )   QI: Chair/Bed-to-Chair Transfer   Assistance Needed Physical assistance   Assistance Provided by [place] 75% or more   Comment without RW; limited by fear and anxiety    Chair/Bed-to-Chair Transfer CARE Score 2   Transfer Bed/Chair/Wheelchair   Limitations Noted In Balance;Confidence; Coordination; Endurance;Pain Management;Problem Solving; Sequencing;UE Strength;LE Strength   Stand Pivot Maximum Assist   Sit to Stand Moderate Assist   Stand to Sit Moderate Assist   Sit to Supine Moderate Assist   All Transfer Maximum Assist   Bed, Chair, Wheelchair Transfer (FIM) 3 - [place] lifts two limbs during transfer   Wheelchair mobility   QI: Does the patient use a wheelchair? 0  No   Therapeutic Interventions   Strengthening longsitting/seated therex AAROM RLE 10x3: ankle pumps, quad sets, glute sets, LAQ, ABD/ADD; supine therex: 5x bridging    Flexibility RLE heel cord stretch [id number]   Assessment   Treatment Assessment Patient engaged in PT treatment session focussing on LE therex to increase strength and ROM  Patient continues with increased pain associated with movement in RLE but was able to actively assist activity this session  Continue to focus during sessions to increase patient's tolerance, increase ROM, and for edema management  Will continue to require skilled PT to progress functional mobility (I) and safety  Family/Caregiver Present daughter and spouse    PT Barriers   Physical Impairment Decreased strength;Decreased range of motion;Decreased endurance; Impaired balance;Decreased mobility; Decreased coordination;Decreased cognition;Decreased safety awareness;Decreased skin integrity;Orthopedic restrictions;Pain   Functional Limitation Stair negotiation;Standing;Transfers; Walking   Plan   Treatment/Interventions Functional transfer training; Therapeutic exercise; Endurance training;Cognitive reorientation;Patient/family training;Equipment eval/education; Bed mobility;Gait training; Compensatory technique education;LE strengthening/ROM   Progress Slow progress, decreased activity tolerance   Recommendation   Recommendation Short-term skilled PT  (pending progress)   Equipment Recommended Walker   PT Therapy Minutes   PT Time In 1500   PT Time Out 1530   PT Total Time (minutes) 30   PT Mode of treatment - Individual (minutes) 30   PT Mode of treatment - Concurrent (minutes) 0   PT Mode of treatment - Group (minutes) 0   PT Mode of treatment - Co-treat (minutes) 0   PT Mode of Teatment - Total time(minutes) 30 minutes   Therapy Time missed   Time missed?  No

## 2018-12-13 NOTE — PROGRESS NOTES
12/13/18 1230   Pain Assessment   Pain Assessment 0-10   Pain Score 5   Pain Type Acute pain   Pain Location Abdomen   Pain Orientation Right   Response to Interventions MD present and aware of pain  Restrictions/Precautions   Precautions Bed/chair alarms; Fall Risk;Cognitive;THR;Supervision on toilet/commode   QI: Sit to Stand   Assistance Needed Physical assistance   Assistance Provided by [place] 50%-74%   Comment Pt is mod A for sit to stand transfers    Sit to Stand CARE Score 2   QI: Chair/Bed-to-Chair Transfer   Assistance Needed Physical assistance   Assistance Provided by [place] 75% or more   Comment Pt is max A for stand pivot transfer with RW  Pt requires one step direction with tactile cueing due to increased confusion  Chair/Bed-to-Chair Transfer CARE Score 2   Transfer Bed/Chair/Wheelchair   Limitations Noted In Balance; Endurance   Adaptive Equipment Roller Walker   Stand Pivot Maximum Assist;Moderate Assist   Sit to Stand Moderate Assist   Stand to Sit Moderate Assist   Bed, Chair, Wheelchair Transfer (FIM) 2 - Patient completes 25 - 49% of all tasks   QI: [address] Needed Physical assistance   Assistance Provided by [place] Total assistance   Comment Pt requires total A to pull pants down off of hips and assistance to complete bowel hygiene care  Pt requires verbal cueing to maintain b/l UE support on RW  Toileting Hygiene CARE Score 1   Toileting   Able to Pull Clothing down no, up no  Able to Manage Clothing Closures No   Manage Hygiene Bowel   Limitations Noted In Balance;Problem Solving; Safety;LE Strength   Toileting (FIM) 1 - Patient completes less than 25% of all tasks   QI: Toilet Transfer   Assistance Needed Physical assistance   Assistance Provided by [place] 75% or more   Comment Pt requires max A for stand pivot transfer with RW   Pt fearful and anxious and requires step by step direction for sequencing   Toilet Transfer CARE Score 2   Toilet Transfer   Surface Assessed Standard Commode   Transfer Technique Stand Pivot   Limitations Noted In Balance;Problem Solving;LE Strength   Toilet Transfer (FIM) 2 - [place] needs to lift or boost to rise AND assist to sit   Cognition   Overall Cognitive Status Impaired   Arousal/Participation Alert; Cooperative   Attention Difficulty attending to directions   Orientation Level Oriented to person;Oriented to place;Oriented to time   Memory Decreased short term memory;Decreased recall of precautions;Decreased long term memory   Following Commands Follows one step commands inconsistently   Activity Tolerance   Activity Tolerance Patient limited by fatigue   Assessment   Treatment Assessment Pt engaged in 30 minute OT session wtih focus on fxnl transfers, repetitive task training and toileting task  Pt with increased anxious behavior while completing stand pivot transfers with RW  Pt with watery stool but very small amount MD present and aware  Pt with one step verbal cues for problem solving  Continue with POC with focus on standing balance, fxnl transfers, LB dressing with LHAE  Prognosis Fair   Problem List Decreased strength;Decreased endurance; Impaired balance;Decreased mobility; Decreased range of motion;Decreased cognition; Impaired judgement;Decreased safety awareness   Plan   Treatment/Interventions Functional transfer training;ADL retraining;LE strengthening/ROM; Therapeutic exercise; Endurance training;Cognitive reorientation;Patient/family training;Equipment eval/education; Compensatory technique education   Recommendation   OT Discharge Recommendation (subacute vs home with family support)   OT Therapy Minutes   OT Time In 1230   OT Time Out 1300   OT Total Time (minutes) 30   OT Mode of treatment - Individual (minutes) 30   OT Mode of treatment - Concurrent (minutes) 0   OT Mode of treatment - Group (minutes) 0   OT Mode of treatment - Co-treat (minutes) 0   OT Mode of Teatment - Total time(minutes) 30 minutes   Therapy Time missed   Time missed?  No

## 2018-12-13 NOTE — PROGRESS NOTES
12/13/18 [facility] [place]   Pain Assessment   Pain Assessment 0-10   Pain Score 8   Pain Type Acute pain;Surgical pain   Pain Location Hip;Leg   Pain Orientation Right   Effect of Pain on Daily Activities impaired tolerance to activity    Patient's Stated Pain Goal No pain   Hospital Pain Intervention(s) Repositioned; Emotional support;Relaxation technique   Response to Interventions improved mood and participation this session    Restrictions/Precautions   Precautions Bed/chair alarms;Cognitive; Fall Risk;Pain;THR;Supervision on toilet/commode   Weight Bearing Restrictions Yes   RLE Weight Bearing Per Order WBAT   RLE ROM Restriction Other  (THP)   Braces or Orthoses Other (Comment)  (ABDUCTION PILLOW )   Cognition   Overall Cognitive Status Impaired   Arousal/Participation Cooperative   Attention Difficulty attending to directions   Orientation Level Oriented to person   Memory Decreased recall of precautions;Decreased recall of recent events;Decreased short term memory   Following Commands Follows one step commands inconsistently   Comments Daughter appeared to improve upon mood this session  Able to recall 1/3 THP's- will continue to reinforce  Subjective   Subjective Patient agreeable to participate in PT session    QI: Sit to Stand   Assistance Needed Physical assistance   Assistance Provided by [place] 50%-74%   Comment max verbal instruction; able to progress to CGA when hands placed on chair but limited carryover and not consistent    Sit to Stand CARE Score 2   QI: Chair/Bed-to-Chair Transfer   Assistance Needed Physical assistance   Assistance Provided by [place] 50%-74%   Comment with and without RW; unable to sequence using RW or demonstrate proper hand placement  Chair/Bed-to-Chair Transfer CARE Score 2   Transfer Bed/Chair/Wheelchair   Limitations Noted In Balance;Confidence; Coordination; Endurance;Pain Management;Problem Solving; Sequencing;UE Strength;LE Strength   Adaptive Equipment Roller [employer] Pivot Moderate Assist   Sit to Stand Moderate Assist   Stand to Sit Moderate Assist   All Transfer Moderate Assist   Bed, Chair, Wheelchair Transfer (FIM) 3 - [place] needs to lift, boost or assist to stand OR sit   QI: Car Transfer   Reason if not Attempted Safety concerns   Car Transfer CARE Score 88   QI: Walk 10 Feet   Assistance Needed Physical assistance   Assistance Provided by [place] Total assistance   Comment Chair follow required; assist needed for RW management and LE sequencing   Walk 10 Feet CARE Score 1   QI: Walk 50 Feet with Two Turns   Reason if not Attempted Safety concerns   Walk 50 Feet with Two Turns CARE Score 88   QI: Walk 150 Feet   Reason if not Attempted Safety concerns   Walk 150 Feet CARE Score 88   QI: Walking 10 Feet on Uneven Surfaces   Reason if not Attempted Safety concerns   Walking 10 Feet on Uneven Surfaces CARE Score 88   Ambulation   Does the patient walk? 2  Yes   Primary Discharge Mode of Locomotion Walk   Walk Assist Level Moderate Assist;Chair Follow   Gait Pattern [place]; Inconsistant Yamilka;Decreased foot clearance; Forward Flexion;Decreased R stance; Improper weight shift   Assist Device [place] Sonia Block Walked (feet) 40 ft  (with 2 standing pauses )   Limitations Noted In Balance; Coordination;Device Management; Endurance; Heel Strike;Sensation; Sequencing;Speed;Strength;Swing   Walking (FIM) 1 - Patient requires assist of two people   QI: Wheel 50 Feet with Two Turns   Reason if not Attempted Activity not applicable   Wheel 50 Feet with Two Turns CARE Score 9   QI: Wheel 150 Feet   Reason if not Attempted Activity not applicable   Wheel 599 Feet CARE Score 9   Wheelchair mobility   QI: Does the patient use a wheelchair? 0   No   QI: 1 Step (Curb)   Reason if not Attempted Safety concerns   1 Step (Curb) CARE Score 88   QI: 4 Steps   Reason if not Attempted Safety concerns   4 Steps CARE Score 88   QI: 12 Steps   Reason if not Attempted Safety concerns   12 Steps CARE Score 88   QI: Picking Up Object   Reason if not Attempted Safety concerns   Picking Up Object CARE Score 88   QI: Toilet Transfer   Assistance Needed Physical assistance   Assistance Provided by [place] Total assistance   Comment tactile instruction for hand placement; max verbal instruction   Toilet Transfer CARE Score 1   Toilet Transfer   Surface Assessed Bedside Commode   Limitations Noted In Balance;Confidence; Endurance;Problem Solving;ROM; Sequencing;UE Strength;LE Strength   Positioning Concerns Cognition   Findings SBA of 2nd person and assist of 2nd person to assist with cleaning/clothing management    Toilet Transfer (FIM) 1 - Patient requires assist of two people   Therapeutic Interventions   Strengthening 10x STS, 5x SPT   Other TEDS/Abd binder donned at the beginning of session   Assessment   Treatment Assessment Patient engaged in PT treatment session focussing on repetition of transfers for strengthening and to promote learning  Patient limited by anxiety and pain but showed general improvement compared to previous session  Patient with difficulty following concept of hand placement during STS and SPT however, without RW placed in front of her demonstrates understanding to push from chair  However, patient is walker reliant at this time  Patient appeared in improved spirits during session with daughter and spouse present- education provided to family on current status, goals and purpose/roles of rehab/therapists  At this time, patient requires skilled PT intervention to maximize function and reduce caregiver burden  Family/Caregiver Present daughter and spouse    Problem List Decreased strength;Decreased range of motion;Decreased endurance; Impaired balance;Decreased mobility; Decreased coordination;Decreased cognition;Decreased safety awareness;Decreased skin integrity;Orthopedic restrictions;Pain   Barriers to Discharge Inaccessible home environment;Decreased caregiver support  (cognition )   PT Barriers   Functional Limitation Transfers; Walking;Car transfers;Stair negotiation;Standing   Plan   Treatment/Interventions Functional transfer training;LE strengthening/ROM; Therapeutic exercise; Endurance training;Cognitive reorientation;Patient/family training;Equipment eval/education; Bed mobility;Gait training; Compensatory technique education;OT   Progress Slow progress, decreased activity tolerance   Recommendation   Recommendation Short-term skilled PT  (pending progress)   Equipment Recommended Walker   PT Therapy Minutes   PT Time In 1300   PT Time Out 1400   PT Total Time (minutes) 60   PT Mode of treatment - Individual (minutes) 60   PT Mode of treatment - Concurrent (minutes) 0   PT Mode of treatment - Group (minutes) 0   PT Mode of treatment - Co-treat (minutes) 0   PT Mode of Teatment - Total time(minutes) 60 minutes   Therapy Time missed   Time missed?  No

## 2018-12-13 NOTE — PROGRESS NOTES
Internal Medicine Progress Note  Patient: Cely Paris  Age/sex: 66 y o  female  Medical Record #: [id number]      ASSESSMENT/PLAN:  Cely Paris is seen and examined and management for following issues:    Right hip fracture; s/p right MIKE 12/4:  Pain control per primary service        PAF:  Continue Amiodarone/Lopressor 25 mg q 12 hours/Eliquis  Amio is new for her since hospitalization = d/w with cards and Amio is 200 mg BID x 1 week then to 200 mg qd; didn't get Lopressor last night or this AM 2/2 lowish BP = will reduce to 12 5mg q12hrs      HTN/orthostasis:  Stopped HCTZ; gave 1 liter NSS yesterday for orthostasis; yesterday, she had BPs sitting 130/59 and stand 96/51 with TEDS and abd binder = will see hoe she does today and reduce Lopressor to 12 5mg q12hrs  Additionally, she is to get 2 units PRBCs over 2 hours each  ABLA:  did receive blood transfusion sunday for hemoglobin 7 6 = now 7 9 and to get 2 U PRBCs      Severe AS/LVEF 55%: no volume overload at this time    Nausea: continue prn Zofran; LD was last evening    Diarrhea:  D/w primary service = stop bowel meds for now; if doesn't resolve then may be due to the Amiodarone      Subjective: no dizziness/nausea today    ROS:   GI: denies abdominal pain, change bowel habits or reflux symptoms  Neuro: No new neurologic changes  Respiratory: No Cough, SOB  Cardiovascular: No CP, palpitations     Scheduled Meds:    Current Facility-Administered Medications:  acetaminophen 975 mg Oral Q8H [address] Lyndsay Gonzales MD   amiodarone 200 mg Oral BID With Meals IRMA Rizzo   Followed by       Nora Uriarte ON 12/16/2018] amiodarone 200 mg Oral Daily With Breakfast IRMA Rizzo   apixaban 5 mg Oral Q12H [address] Lyndsay Gonzales MD   bisacodyl 10 mg Rectal Daily PRN Lyndsay Gonzales MD   bisacodyl 10 mg Rectal Once Zohra Biggs MD   calcium carbonate 500 mg Oral BID With Meals Lyndsay Gonzales MD   cholecalciferol 5,000 Units Oral Daily Stephanie Pablo Randy Miranda MD   docusate sodium 100 mg Oral BID Allison Lora MD   levothyroxine 50 mcg Oral Early Morning Ramón Lobo MD   lidocaine 2 patch Topical Daily Ramón Lobo MD   metoprolol tartrate 25 mg Oral Q12H [address] Ramón Lobo MD   ondansetron 4 mg Oral Q6H PRN IRMA Sanders   oxyCODONE 2 5 mg Oral Q6H PRN Allison Lora MD   pantoprazole 20 mg Oral Early Morning Ramón Lobo MD   polyethylene glycol 17 g Oral Daily PRN Ramón Lobo MD   polyethylene glycol 17 g Oral Daily Allison Lora MD   pravastatin 40 mg Oral Daily With Leonidas Garcia MD   senna-docusate sodium 1 tablet Oral BID Ramón Lobo MD       Labs:       Results from last 7 days  Lab Units 12/13/18  0612 12/10/18  0525   WBC Thousand/uL 6 44 7 87   HEMOGLOBIN g/dL 7 9* 8 8*   HEMATOCRIT % 24 6* 27 1*   PLATELETS Thousands/uL 200 152       Results from last 7 days  Lab Units 12/13/18  0612 12/10/18  0525   POTASSIUM mmol/L 3 7 4 3   CHLORIDE mmol/L 104 105   CO2 mmol/L 27 24   BUN mg/dL 21 16   CREATININE mg/dL 0 92 0 86   CALCIUM mg/dL 9 4 8 9                  Glucose, i-STAT (mg/dl)   Date Value   12/04/2018 136   12/03/2018 128       Labs reviewed    Physical Examination:  Vitals:   Vitals:    12/12/18 0845 12/12/18 1340 12/12/18 2029 12/13/18 0551   BP: 117/59 100/60 90/58 109/53   BP Location: Left arm Right arm Left arm Left arm   Pulse: 84 72 78 85   Resp:  18 16 16   Temp:  98 5 °F (36 9 °C) 98 °F (36 7 °C) 98 8 °F (37 1 °C)   TempSrc:  Oral Oral Oral   SpO2:  95% 94% 95%   Weight:       Height:         Constitutional:  NAD; pleasant; nontoxic  HEENT:  AT/NC; oropharynx negative for thrush on tongue   Neck: negative for JVD  CV:  +S1, S2;  RRR; no rub/+murmur  Pulmonary:  BBS without crackles/wheeze/rhonci; resp are unlabored  Abdominal:  soft, +BS, ND/NT  Skin:  no rashes  Musculoskeletal:  no edema  :  no zamarripa  Neurological/Psych:  AAO;  JENKINS 5/5; no depression/anxiety        [ X ] Total time spent: 30 Mins and greater than 50% of this time was spent counseling/coordinating care  ** Please Note: Dragon 360 Dictation voice to text software may have been used in the creation of this document   **

## 2018-12-13 NOTE — PROGRESS NOTES
12/13/18 0930   Pain Assessment   Pain Assessment 0-10   Pain Score No Pain   Restrictions/Precautions   Precautions Bed/chair alarms; Fall Risk;Cognitive;THR;Supervision on toilet/commode   QI: Lying to Sitting on Side of Bed   Assistance Needed Physical assistance   Assistance Provided by [facility] 50%-74%   Comment Pt requires assist to lift RLE out of bed  Pt requires assist at trunk to complete supine to sit transfer  Lying to Sitting on Side of Bed CARE Score 2   QI: Sit to Stand   Assistance Needed Physical assistance   Assistance Provided by [facility] 50%-74%   Comment Pt is mod A x 1 sit ot stand transfer wtih cueing to place RUE on RW and LUE to push up from chair  Pt with increased anxious and poor standing balance while attempting to push up with both hands from bed  Sit to Stand CARE Score 2   QI: Chair/Bed-to-Chair Transfer   Assistance Needed Physical assistance   Assistance Provided by [facility] 75% or more   Chair/Bed-to-Chair Transfer CARE Score 2   Transfer Bed/Chair/Wheelchair   Limitations Noted In Balance; Endurance;Problem Solving; Sequencing;LE Strength   Adaptive Equipment Roller Walker   Stand Pivot Maximum Assist   Sit to Stand Moderate Assist   Stand to Sit Moderate Assist   Bed, Chair, Wheelchair Transfer (FIM) 2 - Patient completes 25 - 49% of all tasks   QI: [address] Needed Physical assistance   Assistance Provided by [facility] Total assistance   Kain Salgado 83 Score 1   Toileting   Able to Pull Clothing down no, up no  Able to Manage Clothing Closures No   Manage Hygiene Bowel   Limitations Noted In Balance; Coordination; Safety;LE Strength; Sequencing   Toileting (FIM) 1 - Patient completes less than 25% of all tasks   QI: Toilet Transfer   Assistance Needed Physical assistance   Assistance Provided by [facility] 75% or more   Toilet Transfer CARE Score 2   Toilet Transfer   Surface Assessed Standard Commode   Transfer Technique Stand Pivot   Limitations Noted In Balance;ROM;Safety;Problem Solving; Endurance;Confidence;LE Strength; Sequencing   Adaptive Equipment Grab Bar   Positioning Concerns Cognition   Toilet Transfer (FIM) 2 - Patient completes 25 - 49% of all tasks   Cognition   Overall Cognitive Status Impaired   Arousal/Participation Alert; Cooperative   Attention Difficulty attending to directions   Orientation Level Oriented to person   Memory Decreased long term memory;Decreased short term memory;Decreased recall of precautions;Decreased recall of recent events   Following Commands Follows one step commands with increased time or repetition   Activity Tolerance   Activity Tolerance Patient limited by fatigue   Assessment   Treatment Assessment Pt engaged i 60 minute OT session with tuan on repetitive stand pivot transfers and toileting task  Pt continues to be limited by fear, decreased problem solving, decreased sequencing and poro comprhension with verbal cues  Pt requires one step directions with tactile cueing for reinforcement  Pt engaged in repetitive task training for stand pivot transfers to ensure carryover in varying environments, room and gym  Pt practiced each transfer x10 to ensure carryover  Pt continued to require max cueing with one step direction  Pt continues to benefit from RUE on RW and LUE pushing up from w/c  Prognosis Fair   Problem List Decreased strength;Decreased range of motion;Decreased endurance; Impaired balance;Decreased mobility; Impaired judgement;Decreased safety awareness;Decreased cognition   Plan   Treatment/Interventions ADL retraining;Functional transfer training;LE strengthening/ROM; Therapeutic exercise; Endurance training;Patient/family training;Gait training; Compensatory technique education   Progress Progressing toward goals   OT Therapy Minutes   OT Time In 0930   OT Time Out 1030   OT Total Time (minutes) 60   OT Mode of treatment - Individual (minutes) 60   OT Mode of treatment - Concurrent (minutes) 0   OT Mode of treatment - Group (minutes) 0   OT Mode of treatment - Co-treat (minutes) 0   OT Mode of Teatment - Total time(minutes) 60 minutes   Therapy Time missed   Time missed?  No

## 2018-12-13 NOTE — PLAN OF CARE
GASTROINTESTINAL - ADULT     Maintains adequate nutritional intake Progressing        HEMATOLOGIC - ADULT     Maintains hematologic stability Progressing        INFECTION - ADULT     Absence or prevention of progression during hospitalization Progressing        Knowledge Deficit     Patient/family/caregiver demonstrates understanding of disease process, treatment plan, medications, and discharge instructions Progressing        METABOLIC, FLUID AND ELECTROLYTES - ADULT     Electrolytes maintained within normal limits Progressing     Fluid balance maintained Progressing        MUSCULOSKELETAL - ADULT     Maintain or return mobility to safest level of function Progressing     Maintain proper alignment of affected body part Progressing        NEUROSENSORY - ADULT     Achieves maximal functionality and self care Progressing        PAIN - ADULT     Verbalizes/displays adequate comfort level or baseline comfort level Progressing        Potential for Falls     Patient will remain free of falls Progressing        Prexisting or High Potential for Compromised Skin Integrity     Skin integrity is maintained or improved Progressing        SAFETY ADULT     Maintain or return to baseline ADL function Progressing     Maintain or return mobility status to optimal level Progressing        SKIN/TISSUE INTEGRITY - ADULT     Skin integrity remains intact Progressing     Incision(s), wounds(s) or drain site(s) healing without S/S of infection Progressing     Oral mucous membranes remain intact Progressing

## 2018-12-13 NOTE — TEAM CONFERENCE
Acute RehabilitationTeam Conference Note  Date: 12/13/2018   Time: 10:51 AM       Patient Name:  Flynn Jackson       Medical Record Number: [id number]   YOB: 1940  Sex: Female          Room/Bed:  [facility]-01  Payor Info:  Payor: [employer] / Plan: MEDICARE A AND B / Product Type: Medicare A & B Fee for Service /      Admitting Diagnosis: Fracture of femoral neck, right ([facility] [address] ) [S72 001A]  Status post fracture of hip [Z87 81]   Admit Date/Time:  12/8/2018 12:23 PM  Admission Comments: No comment available     Primary Diagnosis:  Fracture of femoral neck, right ([facility] [address] )  Principal Problem: Fracture of femoral neck, right ([facility] [address] )    Patient Active Problem List    Diagnosis Date Noted    Delirium 12/10/2018    Onychomycosis 12/10/2018    Positional lightheadedness 12/10/2018    Chronic nausea 12/10/2018    Fracture of femoral neck, right ([facility] [address] ) 12/08/2018    Acute pain 12/08/2018    Osteoporosis 12/05/2018    Atrial fibrillation with rapid ventricular response ([facility] [address] ) 12/05/2018    NSTEMI (non-ST elevated myocardial infarction) ([facility] [address] ) 12/05/2018    Acute blood loss anemia 12/05/2018    Closed intertrochanteric fracture of hip, right, initial encounter ([facility] [address] ) 12/03/2018    Severe aortic stenosis 12/03/2018    Dehydration 12/03/2018    Closed fracture of neck of right femur ([facility] [address] ) 12/03/2018    Fall 12/03/2018    Ambulatory dysfunction 12/03/2018    Physical deconditioning 12/03/2018    MCI (mild cognitive impairment) 06/06/2018    Gait disturbance 06/06/2018    Atrial fibrillation ([facility] [address] ) 05/26/2018    Hypothyroidism 05/26/2018    Cognitive impairment 05/26/2018    Microscopic hematuria 04/05/2018    Dyslipidemia 01/31/2014    Hypertension 01/31/2014    Palpitations 01/31/2014       Physical Therapy:    Weight Bearing Status: Weight Bearing as Tolerated (RLE )  Transfers:  Moderate Assistance  Bed Mobility: Maximum Assistance  Amulation Distance (ft): 20 feet  Ambulation: Moderate Assistance (with chair follow )  Assistive Device for Ambulation: Raven Garciagood    12/12/18  Patient making slow progress with skilled PT intervention as she is limited by the following bariers: pain, decreased tolerance to ROM RLE, weakness RLE, limited tolerance to standing and ambulation, anxiety, and depression  Patient also limited during stance with orthostasis: DANA and ABD binder utilized  Patient able to ambulate 20' with RW at moderate A but demosntrates self limiting demeanor in which she requires max encouragement  She continues to require moderate Ax1 for bed mobility, transfers and gait with SBA at this time 2* safety  She will continue to require skilled PT intervention to maximize function and reduce caregiver burden  At this time, patient requires assist for all functional tasks and is limited by impaired cognition, requiring 24 hour S/A at this time  Occupational Therapy:  Eating: Supervision  Grooming: Supervision  Bathing: Moderate Assistance  Bathing: Moderate Assistance  Upper Body Dressing: Moderate Assistance  Lower Body Dressing: Total Assistance  Toileting: Total Assistance  Toilet Transfer: Assist of 2, Moderate Assistance  Cognition: Exceptions to WNL  Cognition: Decreased Memory, Decreased Comprehension, Decreased Safety  Orientation: Person, Place  Discharge Recommendations: Home with:  [address] with[de-identified] 24 Hour Supervision, 24 Hour Assistance       Pt is demonstrating fair progress with occupational therapy toward long term goals for ADL, IADL, and functional transfers/mobility  Pt continues to present with impairments in pain management, standing balance, activity tolerance  Occupational performance remains limited and pt continues to require assistance for functional transfers, ADL performance, bed mobility, and toileting  Pt has support from  and son upon discharge for IADL tasks   Pt will continue to benefit from skilled acute rehab OT services to address above mentioned barriers and maximize functional independence in baseline areas of occupation to meet established treatment goals with overall decreased burden of care  Speech Therapy:           No notes on file    Nursing Notes:  Appetite: Poor  Diet Type: Regular/House                      Diet Patient/Family Education Complete: Yes    Type of Wound (LDA): Incision           Incision 12/04/18 Hip Right (Active)   Incision Description OLIVIA 12/13/2018  9:00 AM   Nydia-wound Assessment Ecchymotic 12/13/2018 12:00 AM   Closure Staples 12/13/2018 12:00 AM   Drainage Amount Small 12/13/2018 12:00 AM   Drainage Description Serous 12/13/2018 12:00 AM   Treatments Site care 12/12/2018 12:15 PM   Dressing ABD;Dry dressing 12/13/2018  9:00 AM   Dressing Changed Changed 12/12/2018 12:15 PM   Patient Tolerance Tolerated well 12/8/2018 12:51 PM   Dressing Status Clean;Dry; Intact 12/13/2018  9:00 AM           Type of Wound Patient/Family Education: Yes  Bladder: 5 - Supervision     Bladder Patient/Family Education: Yes  Bowel: 5 - Supervision     Bowel Patient/Family Education: Yes  Pain Location: Hip  Pain Orientation: Right  Pain Score: 4                       Hospital Pain Intervention(s): Emotional support, Repositioned  Pain Patient/Family Education: Yes  Medication Management/Safety  Safe Administration: Yes  Medication Patient/Family Education Complete: Yes    Pt admitted to [facility] s/p Right femoral neck fracture s/p posterior MIKE  Incision with staples; abd covering  Bleeding has subsided after several days with pressure dressing in place  Pt is WBAT RLE  Uses Abductor pillow at all times, left foot rotates out, use towels to straighten  Pain managed with Oxycodone, tylenol and lidocaine patch  Pt has Poor appetite, needs set-up and encouragement  Also needs encouragement to drink water  Currently has R wrist IV site with NSS running at 60ml/hr  Pt has Dementia; Very confused  Pt is constipated without adequate BM since 12/4  Bowel meds given and small bm on 12/11  Pt can be incontinent of bladder at times, she describes as "drips"  Pain management with Tylenol and oxycodone  This week we will monitor lab  Values, vital signs, work on bowel and pain management  Pt will be encouraged to increase input  We will monitor incision for s/s of infection and promote healing  We will prevent skin breakdown with turning, repositionin, weight shifts, allevyn drsg for prevention and education  Pt will work on safety awareness and remain free from falls  Case Management:     Discharge Planning  Goal Length of Stay: 10  Living Arrangements: Spouse/significant other, Children  Support Systems: Spouse/significant other, Children  Assistance Needed: n/a  Type of Current Residence: Private residence  [address]: No  Pt is participating with therapy and expects to return home  Pt has been educated on potential recommendations for contd therapy on dc  Following to assist w/appropriate dc plan  Is the patient actively participating in therapies?  yes  List any modifications to the treatment plan:     Barriers Interventions   Memory, safety recall, carryover of techniques Cueing for safe task completions, safety education techniques   Anxiety, easily overwhelmed Neuropsych, speech evals, medication   Activity tolerance, balance Therapy exercises             Is the patient making expected progress toward goals? no  List any update or changes to goals:     Medical Goals: Patient will be medically stable for discharge to [facility] upon completion of rehab program and Patient will be able to manage medical conditions and comorbid conditions with medications and follow up upon completion of rehab program    Weekly Team Goals:   Rehab Team Goals  ADL Team Goal: Patient will require supervision with ADLs with least restrictive device upon completion of rehab program  Bowel/Bladder Team Goal: Patient will require assist with bladder/bowel management with least restrictive device upon completion of rehab program  Transfer Team Goal: Patient will require supervision with transfers with least restrictive device upon completion of rehab program  Locomotion Team Goal: Patient will require supervision with locomotion with least restrictive device upon completion of rehab program  Cognitive Team Goal: Patient will require supervision for basic and complex tasks upon completion of rehab program    Discussion: pt presents with above barriers and memory and poor carryover are biggest issues along with anxiety  Pt is mod a with sit to stand and ambulation with two person assist, max a bed mobility  Total for lower body and toileting  Dr Acuna Gins to speak with family as pt is not making progress and will need snf  Anticipated Discharge Date:  reteam vs move to snf  [facility] Team Members Present: The following team members are supervising care for this patient and were present during this Weekly Team Conference      Physician: Dr Concha Jacobs MD  : Toño Dawson MSW  Registered Nurse: Anselmo Gray, RN, CRRN  Physical Therapist: Bryan Serrano DPT  Occupational Therapist: Griselda Vargas MS, OTR/L  Speech Therapist:   Other:

## 2018-12-14 LAB
ABO GROUP BLD BPU: NORMAL
ABO GROUP BLD BPU: NORMAL
ANISOCYTOSIS BLD QL SMEAR: PRESENT
BASOPHILS # BLD MANUAL: 0 THOUSAND/UL (ref 0–0.1)
BASOPHILS NFR MAR MANUAL: 0 % (ref 0–1)
BPU ID: NORMAL
BPU ID: NORMAL
EOSINOPHIL # BLD MANUAL: 0.08 THOUSAND/UL (ref 0–0.4)
EOSINOPHIL NFR BLD MANUAL: 1 % (ref 0–6)
ERYTHROCYTE [DISTWIDTH] IN BLOOD BY AUTOMATED COUNT: 16.6 % (ref 11.6–15.1)
HCT VFR BLD AUTO: 32.7 % (ref 34.8–46.1)
HGB BLD-MCNC: 10.8 G/DL (ref 11.5–15.4)
LG PLATELETS BLD QL SMEAR: PRESENT
LYMPHOCYTES # BLD AUTO: 0.4 THOUSAND/UL (ref 0.6–4.47)
LYMPHOCYTES # BLD AUTO: 5 % (ref 14–44)
MCH RBC QN AUTO: 31.2 PG (ref 26.8–34.3)
MCHC RBC AUTO-ENTMCNC: 33 G/DL (ref 31.4–37.4)
MCV RBC AUTO: 95 FL (ref 82–98)
MONOCYTES # BLD AUTO: 0.16 THOUSAND/UL (ref 0–1.22)
MONOCYTES NFR BLD: 2 % (ref 4–12)
NEUTROPHILS # BLD MANUAL: 7.29 THOUSAND/UL (ref 1.85–7.62)
NEUTS BAND NFR BLD MANUAL: 1 % (ref 0–8)
NEUTS SEG NFR BLD AUTO: 91 % (ref 43–75)
NRBC BLD AUTO-RTO: 0 /100 WBCS
PLATELET # BLD AUTO: 223 THOUSANDS/UL (ref 149–390)
PLATELET BLD QL SMEAR: ADEQUATE
PMV BLD AUTO: 10.1 FL (ref 8.9–12.7)
POLYCHROMASIA BLD QL SMEAR: PRESENT
RBC # BLD AUTO: 3.46 MILLION/UL (ref 3.81–5.12)
RBC MORPH BLD: PRESENT
UNIT DISPENSE STATUS: NORMAL
UNIT DISPENSE STATUS: NORMAL
UNIT PRODUCT CODE: NORMAL
UNIT PRODUCT CODE: NORMAL
UNIT RH: NORMAL
UNIT RH: NORMAL
WBC # BLD AUTO: 7.92 THOUSAND/UL (ref 4.31–10.16)

## 2018-12-14 PROCEDURE — 99232 SBSQ HOSP IP/OBS MODERATE 35: CPT

## 2018-12-14 PROCEDURE — 97116 GAIT TRAINING THERAPY: CPT

## 2018-12-14 PROCEDURE — 97535 SELF CARE MNGMENT TRAINING: CPT

## 2018-12-14 PROCEDURE — 85007 BL SMEAR W/DIFF WBC COUNT: CPT | Performed by: NURSE PRACTITIONER

## 2018-12-14 PROCEDURE — 97110 THERAPEUTIC EXERCISES: CPT

## 2018-12-14 PROCEDURE — 97530 THERAPEUTIC ACTIVITIES: CPT

## 2018-12-14 PROCEDURE — 92610 EVALUATE SWALLOWING FUNCTION: CPT

## 2018-12-14 PROCEDURE — 85027 COMPLETE CBC AUTOMATED: CPT | Performed by: NURSE PRACTITIONER

## 2018-12-14 PROCEDURE — G0515 COGNITIVE SKILLS DEVELOPMENT: HCPCS

## 2018-12-14 RX ORDER — LANOLIN ALCOHOL/MO/W.PET/CERES
3 CREAM (GRAM) TOPICAL
Status: DISCONTINUED | OUTPATIENT
Start: 2018-12-14 | End: 2018-12-25 | Stop reason: HOSPADM

## 2018-12-14 RX ADMIN — LIDOCAINE 2 PATCH: 50 PATCH CUTANEOUS at 08:07

## 2018-12-14 RX ADMIN — PRAVASTATIN SODIUM 40 MG: 40 TABLET ORAL at 15:59

## 2018-12-14 RX ADMIN — LEVOTHYROXINE SODIUM 50 MCG: 50 TABLET ORAL at 06:01

## 2018-12-14 RX ADMIN — OXYCODONE HYDROCHLORIDE 2.5 MG: 5 TABLET ORAL at 08:09

## 2018-12-14 RX ADMIN — CALCIUM CARBONATE (ANTACID) CHEW TAB 500 MG 500 MG: 500 CHEW TAB at 08:06

## 2018-12-14 RX ADMIN — CALCIUM CARBONATE (ANTACID) CHEW TAB 500 MG 500 MG: 500 CHEW TAB at 15:59

## 2018-12-14 RX ADMIN — AMIODARONE HYDROCHLORIDE 200 MG: 200 TABLET ORAL at 16:00

## 2018-12-14 RX ADMIN — ACETAMINOPHEN 975 MG: 325 TABLET, FILM COATED ORAL at 06:01

## 2018-12-14 RX ADMIN — PANTOPRAZOLE SODIUM 20 MG: 20 TABLET, DELAYED RELEASE ORAL at 06:01

## 2018-12-14 RX ADMIN — APIXABAN 5 MG: 5 TABLET, FILM COATED ORAL at 08:07

## 2018-12-14 RX ADMIN — AMIODARONE HYDROCHLORIDE 200 MG: 200 TABLET ORAL at 08:06

## 2018-12-14 RX ADMIN — APIXABAN 5 MG: 5 TABLET, FILM COATED ORAL at 21:03

## 2018-12-14 RX ADMIN — MELATONIN 3 MG: at 21:03

## 2018-12-14 RX ADMIN — ACETAMINOPHEN 975 MG: 325 TABLET, FILM COATED ORAL at 14:01

## 2018-12-14 RX ADMIN — ACETAMINOPHEN 975 MG: 325 TABLET, FILM COATED ORAL at 21:03

## 2018-12-14 RX ADMIN — METOPROLOL TARTRATE 12.5 MG: 25 TABLET ORAL at 08:07

## 2018-12-14 RX ADMIN — VITAMIN D, TAB 1000IU (100/BT) 5000 UNITS: 25 TAB at 08:06

## 2018-12-14 NOTE — SOCIAL WORK
Dr Concha Jacobs met w/family and reviewed barriers to dc and team concern about dc planning  Cm following to assist w/dc planning needs

## 2018-12-14 NOTE — PROGRESS NOTES
Bedside Swallow Evaluation     12/14/18 1100   Pain Assessment   Pain Assessment 0-10   Pain Score 3   Pain Location Leg   Pain Orientation Right   Hospital Pain Intervention(s) Repositioned  (returned to bed)   Response to Interventions reported improvement when pt returned to bed   Restrictions/Precautions   Precautions Cognitive;Bed/chair alarms; Fall Risk;THR;Supervision on toilet/commode   Memory Skills   Memory (FIM) 2 - Recognizes, recalls/performs 25-49%   Social Interaction (FIM) 4 - Needs redirecting for appropriate language or to initiate interaction   Speech/Swallow Mechanism Exam   Labial Symmetry WFL   Labial ROM WFL   Facial Symmetry Left droop  (at rest only, addressed in [place] note on 12/5/18)   Facial ROM WFL   Facial Sensation WFL   Lingual Symmetry WFL   Lingual Strength WFL   Lingual ROM WFL   Mandible WFL   Dentition Adequate  (reports has partial plate at home for molars)   Volitional Cough Strong   Vocal Quality clear, adequate   Volitional Swallow WFL   Respratory Status Room air   Speech/Language/Cognition Assessmetn   Treatment Assessment Cognitive linguistic screen completed w/ pt known to have h/o dementia  Pt demonstrating overall deficits in STM recall, LTM recall, problem solving/reasoning and comprehension of more complex info  Pt oriented to person, place and month, but disoriented to year and only partially to situation  Pt demonstrated slower processing of auditory info, along w/ need for increased time to express herself  Pt w/ mild insight into current cognitive linguistid deficts, noting pt to state "I'm not like I used to be," and became tearful  Due to pt's deficits in memory and problem solving, pt will require distant supervision w/ meals and need for reminders for safe positioning to be OOB or FULLY upright for meals      Swallow Information   Current Risks for Dysphagia & Aspiration General debilitation;HX neurologic dx;Cognitive deficit   Current Symptoms/Concerns Cough; With pills   Current Diet Regular; Thin liquid   Baseline Diet Regular; Thin liquids   Consistencies Assessed and Performance   Materials Admnistered Soft/Level 3;Regular/Solid; Thin liquid   Oral Stage WFL   Phargngeal Stage WFL   Swallow Mechanics WFL;Swallow initation; Appears prompt;Good Larygneal rise   Esophageal Concerns No s/s reported   Recommendations   Diet Solid Recommendation Regular consistency   Diet Liquid Recommendation Thin liquid   Recommended Form of Meds Whole; With thin liquid; As desired; As tolerated  (only 1-2 pills taken at a time)   General Precautions Upright as possible for all oral intake  (FULLY upright for all PO intake)   Compensatory Swallowing Strategies Alternate solids and liquids   Further Evaluations (if globus sensation persists, GI)   Results Reviewed with RN;PT/Family/Caregiver   QI: Eating   Assistance Needed Set-up / clean-up   Assistance Provided by [place] No physical assistance   Eating CARE Score 5   Swallow Assessment   Swallow Treatment Assessment Pt assessed bedside for swallow function as per concerns regarding difficulty taking pills  Pt repositioned w/ assist from RN, Maxim Cuevas, to be fully upright in bed for more appropriate and safe positioning  Pt assessed w/ lunch consisting of regular diet textures of a ham sandwich and fresh fruit cup, along w/ thin liquids taken by straw sips  Following assist w/ tray set up, pt demonstrated ability to self feed w/ appropriate bite sizes and pacing  Adequate bolus retrieval w/o anterior loss of solids or liquids  Mastication of regular textures was fully functional and effective for bolus breakdown, noting adequate bolus formation w/o oral residual or pocketing observed  A-p transfers of solids and liquids appeared timely w/ swallow initiation also appearing overall timely across meal, noting swallows to intermittently appear mildly delayed but functional  Hyolaryngeal rise was judged to be [facility] to palpation   No overt s/s aspiration observed w/ meal to include intake of solids and liquids  When discussing potential swallow concerns, pt and pt's  denied any difficulty or issues  Pt's  and wife did state that occasionally food felt "slower moving" while pointing to her chest, however, stated that she resolved this feeling by taking a drink, while also stating that this only happens when she is not positioned fully upright  Of note, pt took meds administered by RN, Des Clemons, prior to SLP's arrival who reported that she gave meds 1-2 at a time w/ water which pt tolerated w/o any overt s/s aspiration or observed difficulty  At time of assessment, pt presenting w/ overall functional oral and pharyngeal swallow skills and appears to be tolerating current diet of regular and thin liquids w/o s/s of oral or pharyngeal difficulties  Following assessment, SLP educated pt and pt's  on recommendations for pt to take only 1-2 pills at a time and for pt to be positioned FULLY upright for all meals and PO intake to include water/pills, preferably to be OOB for all meals  Also discussed continuing to alternate solids/liquids during meals to which both pt and pt's  verbalized understanding of recs  No f/u questions asked of pt or pt's   Further skilled ST intervention for dysphagia therapy is not warranted at this time, however, should new concerns or difficulties arise, please re-consult  SLP Therapy Minutes   SLP Time In 1100   SLP Time Out 1130   SLP Total Time (minutes) 30   SLP Mode of treatment - Individual (minutes) 30   SLP Mode of treatment - Concurrent (minutes) 0   SLP Mode of treatment - Group (minutes) 0   SLP Mode of treatment - Co-treat (minutes) 0   SLP Mode of Teatment - Total time(minutes) 30 minutes   Therapy Time missed   Time missed?  No   Daily FIM Score   Problem solving (FIM) 2 - Needs direction more than ½ time to initiate, plan or complete simple tasks   Comprehension (FIM) 3 - Understands basic info/conversation 50-74% of time   Expression (FIM) 4 - Expresses basic info/needs 75-90% of time   Eating (FIM) 5 - Patient needs help to open contianers or set up tray

## 2018-12-14 NOTE — PROGRESS NOTES
Internal Medicine Progress Note  Patient: Lashanda Cortez  Age/sex: 66 y o  female  Medical Record #: [id number]      ASSESSMENT/PLAN:  Lashanda Cortez is seen and examined and management for following issues:    Right hip fracture; s/p right MIKE 12/4:  Pain control per primary service        PAF:  Continue Amiodarone/Lopressor 25 mg q 12 hours/Eliquis  Amio is new for her since hospitalization = d/w with cards and Amio is 200 mg BID x 1 week then 200 mg qd; because of missing doses, reduced Lopressor from 25 mg q12h to 12 5mg q12hrs = stable now      HTN/orthostasis:  will see how she does today; had required TEDS and abd binder for orthostasis; did get IVF NSS 12/12/18 and 2 U PRBCs yesterday; Lopressor reduced to 12 5mg q12hrs yesterday  ABLA:  did receive blood transfusion last sunday and 2 U PRBCs yesterday with improvement in hemoglobin but she is not sure she feels better because of it      Severe AS/LVEF 55%: no volume overload at this time    Nausea: continue prn Zofran; hasn't needed    Diarrhea:  D/w primary service = stop bowel meds for now; if doesn't resolve then may be due to the Amiodarone; no diarrhea since yesterday morning      Subjective: no diarrhea today     ROS:   GI: denies abdominal pain, change bowel habits or reflux symptoms  Neuro: No new neurologic changes  Respiratory: No Cough, SOB  Cardiovascular: No CP, palpitations     Scheduled Meds:    Current Facility-Administered Medications:  acetaminophen 975 mg Oral Q8H [address] Leia Vang MD   amiodarone 200 mg Oral BID With Meals IRMA Cifuentes   Followed by       Candis Tolbert ON 12/16/2018] amiodarone 200 mg Oral Daily With Breakfast IRMA Cifuentes   apixaban 5 mg Oral Q12H [address] Leia Vang MD   bisacodyl 10 mg Rectal Daily PRN Leia Vang MD   bisacodyl 10 mg Rectal Once Young Currie MD   calcium carbonate 500 mg Oral BID With Meals Leia Vang MD   cholecalciferol 5,000 Units Oral Daily [facility] Cyrus Nelson MD   levothyroxine 50 mcg Oral Early Morning Bill Minor MD   lidocaine 2 patch Topical Daily Bill Minor MD   metoprolol tartrate 12 5 mg Oral Q12H [address] IRMA Najera   ondansetron 4 mg Oral Q6H PRN IRMA Seals   oxyCODONE 2 5 mg Oral Q6H PRN Luz Donato MD   pantoprazole 20 mg Oral Early Morning Bill Minor MD   polyethylene glycol 17 g Oral Daily PRN Bill Minor MD   pravastatin 40 mg Oral Daily With Evonne Love MD       Labs:       Results from last 7 days  Lab Units 12/14/18  0759 12/13/18  0612   WBC Thousand/uL 7 92 6 44   HEMOGLOBIN g/dL 10 8* 7 9*   HEMATOCRIT % 32 7* 24 6*   PLATELETS Thousands/uL 223 200       Results from last 7 days  Lab Units 12/13/18  0612 12/10/18  0525   POTASSIUM mmol/L 3 7 4 3   CHLORIDE mmol/L 104 105   CO2 mmol/L 27 24   BUN mg/dL 21 16   CREATININE mg/dL 0 92 0 86   CALCIUM mg/dL 9 4 8 9                  Glucose, i-STAT (mg/dl)   Date Value   12/04/2018 136   12/03/2018 128       Labs reviewed    Physical Examination:  Vitals:   Vitals:    12/13/18 2033 12/13/18 2137 12/14/18 0019 12/14/18 0544   BP: 129/70 134/67 129/58 135/77   BP Location: Right arm Right arm Right arm Right arm   Pulse: 79 86 69 79   Resp: 18 18 20 20   Temp: 98 4 °F (36 9 °C)  97 9 °F (36 6 °C) 98 3 °F (36 8 °C)   TempSrc: Oral  Oral Oral   SpO2: 96% 96% 97% 95%   Weight:       Height:         Constitutional:  NAD; pleasant; nontoxic  HEENT:  AT/NC; oropharynx negative for thrush on tongue   Neck: negative for JVD  CV:  +S1, S2;  RRR; no rub/+murmur  Pulmonary:  BBS without crackles/wheeze/rhonci; resp are unlabored  Abdominal:  soft, +BS, ND/NT  Skin:  no rashes  Musculoskeletal:  no edema  :  no zamarripa  Neurological/Psych:  AA with some confusion at times;  JENKINS 5/5; no depression/anxiety        [ X ] Total time spent: 30 Mins and greater than 50% of this time was spent counseling/coordinating care      ** Please Note: Dragon 360 Dictation voice to text software may have been used in the creation of this document   **

## 2018-12-14 NOTE — PROGRESS NOTES
SLP TAA     12/14/18 1100   Patient Data   Rehab Impairment Impairment of mobility, safety and Activities of Daily Living (ADLs) due to Orthopedic Disorders:  08 11  Unilateral Hip Fracture    Etiologic Diagnosis Right Femoral Neck Fracture   Date of Onset 12/03/18   Restrictions/Precautions   Precautions Cognitive;Bed/chair alarms; Fall Risk;THR;Supervision on toilet/commode   Pain Assessment   Pain Assessment 0-10   Pain Score 3   Pain Location Leg   Pain Orientation Right   Hospital Pain Intervention(s) Repositioned  (returned to bed)   Response to Interventions reported improvement when pt returned to bed   QI: Eating   Assistance Needed Set-up / clean-up   Assistance Provided by [place] No physical assistance   Eating CARE Score 5   Eating Assessment   Swallow Precautions Yes  (positioned FULLY upright for all PO intake)   Bedside Swallow Results No   VBS Study Results No   Food To Mouth Yes   Positioning Upright   Safety Needs Increase Time   Meal Assessed Lunch   QI: Swallowing/Nutritional Status Regular food   Current Diet Regular; Thin   Intake Mode PO;Self   Finishes Timely Yes   Findings Pt is currently presenting w/ functional oral and pharyngeal swallow skills at time of assessment  Continue to recomment current diet of regular w/ thin liquids at this time  See SLP rehab note for full details      Eating (FIM) 5 - Patient needs help to open contianers or set up tray   Comprehension   Comprehension (FIM) 3 - Understands basic info/conversation 50-74% of time   Expression   Expression (FIM) 4 - Expresses basic info/needs 75-90% of time   Social Interaction   Social Interaction (FIM) 4 - Needs redirecting for appropriate language or to initiate interaction   Problem Solving   Problem solving (FIM) 2 - Needs direction more than ½ time to initiate, plan or complete simple tasks   Memory   Memory (FIM) 2 - Recognizes, recalls/performs 25-49%   Discharge Information   Patient's Discharge Plan to go home with    Patient's Rehab Expectations to get better   Barriers to Discharge Home Decreased Strength;Decreased Endurance;Decreased Cognitive Function;Pain   Impressions At time of assessment, pt presenting w/ overall functional oral and pharyngeal swallow skills and appears to be tolerating current diet of regular and thin liquids w/o s/s of oral or pharyngeal difficulties  Following assessment, SLP educated pt and pt's  on recommendations for pt to take only 1-2 pills at a time and for pt to be positioned FULLY upright for all meals and PO intake to include water/pills, preferably to be OOB for all meals  Also discussed continuing to alternate solids/liquids during meals to which both pt and pt's  verbalized understanding of recs  No f/u questions asked of pt or pt's   Further skilled ST intervention for dysphagia therapy is not warranted at this time, however, should new concerns or difficulties arise, please re-consult      SLP Therapy Minutes   SLP Time In 1100   SLP Time Out 1130   SLP Total Time (minutes) 30   SLP Mode of treatment - Individual (minutes) 30   SLP Mode of treatment - Concurrent (minutes) 0   SLP Mode of treatment - Group (minutes) 0   SLP Mode of treatment - Co-treat (minutes) 0   SLP Mode of Teatment - Total time(minutes) 30 minutes

## 2018-12-14 NOTE — PCC SPEECH THERAPY
ST orders received for completion of bedside swallow assessment 2/2 reported difficulty by nursing staff of pt having difficulty w/ taking pills  At time of assessment, pt presenting w/ overall functional oral and pharyngeal swallow skills and appears to be tolerating current diet of regular and thin liquids w/o s/s of oral or pharyngeal difficulties  Following assessment, SLP educated pt and pt's  on recommendations for pt to take only 1-2 pills at a time and for pt to be positioned FULLY upright for all meals and PO intake to include water/pills, preferably to be OOB for all meals  Also discussed continuing to alternate solids/liquids during meals to which both pt and pt's  verbalized understanding of recs  No f/u questions asked of pt or pt's   Further skilled ST intervention for dysphagia therapy is not warranted at this time, however, should new concerns or difficulties arise, please re-consult

## 2018-12-14 NOTE — PROGRESS NOTES
12/14/18 [facility]– [place]   Pain Assessment   Pain Assessment 0-10   Pain Score 6   Pain Type Acute pain;Surgical pain   Pain Location Leg;Hip   Pain Orientation Right   Effect of Pain on Daily Activities impaired tolerance to activity    Restrictions/Precautions   Precautions Bed/chair alarms;Cognitive; Fall Risk;Pain;THR;Supervision on toilet/commode   Weight Bearing Restrictions Yes   RLE Weight Bearing Per Order WBAT   ROM Restrictions Yes   RLE ROM Restriction Range Limitation  (THP)   Braces or Orthoses Other (Comment)  (ABDUCTION PILLOW, TEDS, BINDER )   Cognition   Overall Cognitive Status Impaired   Arousal/Participation Cooperative   Attention Difficulty attending to directions   Orientation Level Oriented to person;Oriented to situation   Memory Decreased recall of precautions;Decreased recall of recent events;Decreased short term memory   Following Commands Follows one step commands inconsistently   Comments Provided 12/13/18 with paper copy of hip precautions  Despite visual reminder and review of precautions at the beginning of session, patient unable to recall 2/3 THPs    Subjective   Subjective patient agreeable to particiapte in PT session   QI: Sit to Lying   Assistance Needed Physical assistance   Assistance Provided by [place] 50%-74%   Sit to Lying CARE Score 2   QI: Sit to Stand   Assistance Needed Physical assistance   Assistance Provided by [place] 50%-74%   Comment inconsistent 2* difficulty motor planning and sequencing placement of UE  Repetition appears to overwhelm patient and has limited effect on success  During times of anxiety, patient responding well to PLB and eyes closed for relaxation    Sit to Stand CARE Score 2   QI: Chair/Bed-to-Chair Transfer   Assistance Needed Physical assistance   Assistance Provided by [place] 50%-74%   Comment difficulty processing and executing task  When asked to perfom, patient aware of instruction to sit in chair but approached it head on      Chair/Bed-to-Chair Transfer CARE Score 2   Transfer Bed/Chair/Wheelchair   Findings Often times with increased time and initial failure performing task, patient will come to the correct technique however, needs promoting as trial task demonstrates impaired safety    Bed, Chair, Wheelchair Transfer (FIM) 3 - [place] needs to lift, boost or assist to stand OR sit   QI: Car Transfer   Reason if not Attempted Safety concerns   Car Transfer CARE Score 88   QI: Walk 10 Feet   Assistance Needed Physical assistance   Assistance Provided by [place] Total assistance   Comment modA  with max verbal encouragement and chair follow    Walk 10 Feet CARE Score 1   QI: Walk 50 Feet with Two Turns   Reason if not Attempted Safety concerns   Walk 50 Feet with Two Turns CARE Score 88   QI: Walk 150 Feet   Reason if not Attempted Safety concerns   Walk 150 Feet CARE Score 88   QI: Walking 10 Feet on Uneven Surfaces   Reason if not Attempted Safety concerns   Walking 10 Feet on Uneven Surfaces CARE Score 88   Ambulation   Does the patient walk? 2  Yes   Primary Discharge Mode of Locomotion Walk   Walk Assist Level Moderate Assist;Chair Follow   Gait Pattern Antalgic;Decreased foot clearance;Decreased R stance  (dec step length )   Assist Device Roller Walker   Distance Walked (feet) 80 ft  (x2)   Limitations Noted In Balance; Coordination;Device Management; Endurance; Heel Strike;Posture;Sensation; Sequencing;Speed;Strength;Swing   QI: Wheel 50 Feet with Two Turns   Reason if not Attempted Activity not applicable   Wheel 50 Feet with Two Turns CARE Score 9   QI: Wheel 150 Feet   Reason if not Attempted Activity not applicable   Wheel 328 Feet CARE Score 9   Wheelchair mobility   QI: Does the patient use a wheelchair? 0   No   QI: 1 Step (Curb)   Reason if not Attempted Safety concerns   1 Step (Curb) CARE Score 88   QI: 4 Steps   Reason if not Attempted Safety concerns   4 Steps CARE Score 88   QI: 12 Steps   Reason if not Attempted Safety concerns   12 Steps CARE Score 88   QI: Picking Up Object   Reason if not Attempted Safety concerns   Picking Up Object CARE Score 88   QI: Toilet Transfer   Assistance Needed Physical assistance   Assistance Provided by [place] 50%-74%   Comment mod A   Toilet Transfer CARE Score 2   Toilet Transfer   Surface Assessed Bedside Commode   Limitations Noted In Balance;LE Strength;Confidence;Problem Solving   Positioning Concerns Cognition; Safety   Toilet Transfer (FIM) 3 - [place] needs to lift, boost or assist to stand OR sit   Therapeutic Interventions   Strengthening seated 10x3 AAROM RLE: LAQ; supine therex: SLR, ABD/ADD, heel slides    Other 10x repeated STS, 10x repeated SPT    Other Comments   Comments obtained w/c with full length arm rests to decreased patient's difficulty reaching pack/ pushing off during transfers- limited success    Assessment   Treatment Assessment Patient making progress with skilled PT intervention but remains limited by impaired cognition, limiting her ability to plan and execute tasks safely and efficiently  Patient with dec complaints of pain and fatigue during session and was able to progress to 80'x2 ambulation with chair follow and moderate A  Patient requires increased time and assist for all tasks  She is unsafe to dc home at this time and will require skilled PT intervention to maximize funciton and reduce caregiver burden  Family/Caregiver Present Angel Milligan    PT Barriers   Physical Impairment Decreased strength;Decreased range of motion;Decreased endurance; Impaired balance;Decreased mobility; Decreased coordination;Decreased cognition; Impaired judgement;Decreased safety awareness;Decreased skin integrity;Orthopedic restrictions;Pain   Functional Limitation Standing;Transfers; Walking;Stair negotiation;Car transfers   Plan   Treatment/Interventions Functional transfer training;LE strengthening/ROM; Therapeutic exercise; Endurance training;Equipment eval/education;Patient/family training;Cognitive reorientation; Bed mobility;Gait training; Compensatory technique education   Progress Slow progress, decreased activity tolerance   Recommendation   Recommendation Short-term skilled PT   Equipment Recommended Walker   PT Therapy Minutes   PT Time In 1300   PT Time Out 1400   PT Total Time (minutes) 60   PT Mode of treatment - Individual (minutes) 60   PT Mode of treatment - Concurrent (minutes) 0   PT Mode of treatment - Group (minutes) 0   PT Mode of treatment - Co-treat (minutes) 0   PT Mode of Teatment - Total time(minutes) 60 minutes   Therapy Time missed   Time missed?  No

## 2018-12-14 NOTE — PROGRESS NOTES
12/14/18 0830   Pain Assessment   Pain Assessment 0-10   Pain Score 3   Pain Location Leg   Pain Orientation Right   Hospital Pain Intervention(s) (repositioned)   Response to Interventions pt with pain in RLE only during stance and then pain decreases  Restrictions/Precautions   Precautions Fall Risk;Cognitive;Bed/chair alarms;THR;Supervision on toilet/commode   QI: Putting On/Taking Off Footwear   Assistance Needed Physical assistance   Assistance Provided by [place] Total assistance   Putting On/Taking Off Footwear CARE Score 1   QI: Lying to Sitting on Side of Bed   Assistance Needed Physical assistance   Assistance Provided by [place] 50%-74%   Comment Pt requires assist with RLE nad assist at trunk for supine to sit transfer  Pt anxious and requires step bys tep direction   Lying to Sitting on Side of Bed CARE Score 2   QI: Sit to Stand   Assistance Needed Physical assistance   Assistance Provided by [place] 50%-74%   Comment Pt requires mod A for initial sit to stand transfer and then min A after mass repetition   Sit to Stand CARE Score 2   QI: Chair/Bed-to-Chair Transfer   Assistance Needed Physical assistance   Assistance Provided by [place] 50%-74%   Comment Pt reuqires mod A for stand pivot transfers with RW  Pt requires cueing maneuvering RW and seuqencing task  Pt benefits from one step direction as she is highly anxious  Pt also benefits from increased time to complete task   Chair/Bed-to-Chair Transfer CARE Score 2   Transfer Bed/Chair/Wheelchair   Stand Pivot Moderate Assist   Sit to Stand Moderate Assist   Bed, Chair, Wheelchair Transfer (FIM) 3 - [place] needs to lift, boost or assist to stand OR sit   QI: [address] Needed Physical assistance   Assistance Provided by [place] Total assistance   Comment Pt fearful unable to let go with one hand to attempt pants up/down   Pt required full assist for toileting task   [address] Score 1   Toileting   Able to [address] down no, up no  Able to Manage Clothing Closures No   Manage Hygiene Bladder   Limitations Noted In Balance;Problem Solving; Safety;LE Strength; Sequencing   Toileting (FIM) 1 - Patient completes less than 25% of all tasks   QI: Toilet Transfer   Assistance Needed Physical assistance   Assistance Provided by [place] 50%-74%   Comment Pt is mod A for stand pivot transfer to commode   Toilet Transfer CARE Score 2   Toilet Transfer   Surface Assessed Standard Commode   Transfer Technique Stand Pivot   Limitations Noted In Balance; Safety;ROM;Endurance; Sequencing;LE Strength   Adaptive Equipment Grab Bar   Toilet Transfer (FIM) 2 - [place] needs to lift or boost to rise AND assist to sit   Right Upper Extremity- Strength   R Weight/Reps/Sets 1 sets 10 reps with 1# dumbbell for bicep curl, and shoudler press  Pt required demonstration and hand over hand assist to problem solve movements  RUE Strength Comment Pt engaged in UE strengthening to improve functional transfers and ADL routine   Left Upper Extremity-Strength   L Weights/Reps/Sets 1 set 10 reps for bicep curl tricep press, shoulder press and OH press  LUE Strength Comment Pt engaged in UE strengthening to improve functional transfers and ADL routine   Cognition   Overall Cognitive Status Impaired   Arousal/Participation Alert; Cooperative   Attention Difficulty attending to directions   Orientation Level Oriented to person;Oriented to place   Memory Decreased short term memory;Decreased recall of precautions   Following Commands Follows one step commands inconsistently   Activity Tolerance   Activity Tolerance Patient tolerated treatment well   Assessment   Treatment Assessment Pt engaged in OT treatment session with focus on toileting, standing balance, fxnl cognition, UE exercises  Pt engaged in word matching worksheet  Pt with poor attention and reuqires repetitive direction to match words by drawing line connecting each  Pt able to connect 3/11 correctly   Pt reports, "I got lost in it"  Pt requires constant prompting for problem solving  Pt provided with written directions for sit to stand transfers  Pt required prompting to read each step and assist for R/L discrimination  Pt however noted with miprovement with sit to stand transfers  Pt continues to be tearful during session and nervous about all fxnl transfers  Pt continues to benefit from slow consistent directions and given extra time  Pt with poor comprehension with complex direction and gets distracted in enviornment with additional background noise  Continue with POC with focus on introducing LHAE, fxnl transfer training, UE strength and balance with unialterla hand release   Prognosis Fair   Problem List Decreased strength;Decreased endurance;Decreased range of motion; Impaired balance;Decreased coordination;Decreased cognition; Impaired judgement;Decreased safety awareness;Pain;Orthopedic restrictions   Plan   Treatment/Interventions ADL retraining;Functional transfer training;LE strengthening/ROM; Therapeutic exercise; Endurance training;Cognitive reorientation;Equipment eval/education;Patient/family training;Bed mobility;Gait training; Compensatory technique education   Progress Slow progress, cognitive deficits   Recommendation   OT Discharge Recommendation (home with family vs SNF pending progress)   OT Therapy Minutes   OT Time In 0830   OT Time Out 1000   OT Total Time (minutes) 90   OT Mode of treatment - Individual (minutes) 90   OT Mode of treatment - Concurrent (minutes) 0   OT Mode of treatment - Group (minutes) 0   OT Mode of treatment - Co-treat (minutes) 0   OT Mode of Teatment - Total time(minutes) 90 minutes   Therapy Time missed   Time missed?  No

## 2018-12-15 PROBLEM — G47.00 INSOMNIA: Status: ACTIVE | Noted: 2018-12-15

## 2018-12-15 PROBLEM — F41.9 ANXIETY: Status: ACTIVE | Noted: 2018-12-15

## 2018-12-15 PROCEDURE — 97530 THERAPEUTIC ACTIVITIES: CPT

## 2018-12-15 PROCEDURE — 97116 GAIT TRAINING THERAPY: CPT

## 2018-12-15 PROCEDURE — G0515 COGNITIVE SKILLS DEVELOPMENT: HCPCS | Performed by: OCCUPATIONAL THERAPY ASSISTANT

## 2018-12-15 PROCEDURE — 97535 SELF CARE MNGMENT TRAINING: CPT | Performed by: OCCUPATIONAL THERAPY ASSISTANT

## 2018-12-15 PROCEDURE — 99231 SBSQ HOSP IP/OBS SF/LOW 25: CPT | Performed by: PHYSICAL MEDICINE & REHABILITATION

## 2018-12-15 PROCEDURE — 97110 THERAPEUTIC EXERCISES: CPT

## 2018-12-15 PROCEDURE — 97530 THERAPEUTIC ACTIVITIES: CPT | Performed by: OCCUPATIONAL THERAPY ASSISTANT

## 2018-12-15 RX ADMIN — PRAVASTATIN SODIUM 40 MG: 40 TABLET ORAL at 16:17

## 2018-12-15 RX ADMIN — ACETAMINOPHEN 975 MG: 325 TABLET, FILM COATED ORAL at 13:28

## 2018-12-15 RX ADMIN — MELATONIN 3 MG: at 20:33

## 2018-12-15 RX ADMIN — LEVOTHYROXINE SODIUM 50 MCG: 50 TABLET ORAL at 05:15

## 2018-12-15 RX ADMIN — CALCIUM CARBONATE (ANTACID) CHEW TAB 500 MG 500 MG: 500 CHEW TAB at 16:17

## 2018-12-15 RX ADMIN — OXYCODONE HYDROCHLORIDE 2.5 MG: 5 TABLET ORAL at 03:42

## 2018-12-15 RX ADMIN — LIDOCAINE 2 PATCH: 50 PATCH CUTANEOUS at 08:08

## 2018-12-15 RX ADMIN — METOPROLOL TARTRATE 12.5 MG: 25 TABLET ORAL at 08:08

## 2018-12-15 RX ADMIN — METOPROLOL TARTRATE 12.5 MG: 25 TABLET ORAL at 20:33

## 2018-12-15 RX ADMIN — APIXABAN 5 MG: 5 TABLET, FILM COATED ORAL at 20:33

## 2018-12-15 RX ADMIN — OXYCODONE HYDROCHLORIDE 2.5 MG: 5 TABLET ORAL at 20:33

## 2018-12-15 RX ADMIN — VITAMIN D, TAB 1000IU (100/BT) 5000 UNITS: 25 TAB at 08:07

## 2018-12-15 RX ADMIN — ACETAMINOPHEN 975 MG: 325 TABLET, FILM COATED ORAL at 05:15

## 2018-12-15 RX ADMIN — ACETAMINOPHEN 975 MG: 325 TABLET, FILM COATED ORAL at 21:06

## 2018-12-15 RX ADMIN — PANTOPRAZOLE SODIUM 20 MG: 20 TABLET, DELAYED RELEASE ORAL at 05:15

## 2018-12-15 RX ADMIN — APIXABAN 5 MG: 5 TABLET, FILM COATED ORAL at 08:07

## 2018-12-15 RX ADMIN — CALCIUM CARBONATE (ANTACID) CHEW TAB 500 MG 500 MG: 500 CHEW TAB at 08:07

## 2018-12-15 RX ADMIN — POLYETHYLENE GLYCOL 3350 17 G: 17 POWDER, FOR SOLUTION ORAL at 08:08

## 2018-12-15 RX ADMIN — AMIODARONE HYDROCHLORIDE 200 MG: 200 TABLET ORAL at 08:09

## 2018-12-15 NOTE — PROGRESS NOTES
Physical Medicine and Rehabilitation Progress Note  Emre Daley 66 y o  female MRN: [id number]  Unit/Bed#: [facility] 962-01 Encounter: [id number]    Chief Complaints:  Want to go home    Subjective/Interval Events:   Patient reports having more energy today  She reports walking with rolling walker with therapy today without lightheadedness or significant SOB  She reports hip pain is worse with activity but helped adequately with current pain regimen  She denies fever, chills, sweats, calf pain, other complaints  ROS: A 10-point ROS was performed  Negative except as listed above  Overall Assessment/relevant history:  72-year-old female with a past medical history of atrial fibrillation on chronic anticoagulation with apixaban, hypertension, hyperlipidemia, hypothyroidism, chronic left facial droop, severe aortic stenosis, possible dementia, severe osteoporosis with recent fall at home found to have right intertrochanteric femur fracture who underwent right total hip arthroplasty by Dr Isauro Parmar on 12/04/2018  Postoperative  Most notable for transient SVT requiring rapid response and temporary Cardizem drip  Cardiology consulted and assisted with management and patient has been switched since transition to p o  Amiodarone  Postoperative course also notable for acute blood loss anemia requiring 1 unit PRBC transfusion  Patient was evaluated by skilled therapies and was found to have significant decline in ADLs and ambulation appropriate for admission to [place] [id number]      Functional status (recent):    PT/OT: Transfers mod assist     Functional status on admission to [facility]:  OT:  Total assist General in toilet transfers, lower body dressing, toileting, and bathing; upper body dressing mod assist, grooming and eating supervision      * Fracture of femoral neck, right ([address] )   Assessment & Plan    -S/p Right MIKE by Dr Isauro Parmar on 12/4/18  -WBAT RLE  -Daily PT/OT to improve mobility and self care   -Posterior hip precautions  -Maintain hip abduction foam when supine due to patient's cognitive deficits  -Please maintain compressive dressing for 48 hours, then replace if needed      Positional lightheadedness   Assessment & Plan    Improved s/p transfusion   Symptomatic orthostasis > transfusion as outlined above   Monitor vitals; orthostatics  Recently started on Amio; also on MTP  HCTZ holding per IM  Abdominal binder PRN     Delirium   Assessment & Plan    Stable  Post-op delirium on possible baseline dementia > seems fairly significant at times  >oxy to 2 5mg Q6H PRN   Medications reviewed; limit sedating medications but adequately treat pain  Frequent redirection, re-orientation, reassurance  Monitor for signs and symptoms of infection  Overstimulation precautions, optimize sleep-wake cycle, agitation behavioral scale, sleep log  If necessary provided 1-1     Acute pain   Assessment & Plan    Stable   -managed on scheduled Tylenol 975mg TID  -ICE V2xfdoc while awake  -PRN oxycodone2 5 for moderate to severe pain - use with caution given her delirium on dementia   Counseled on and continue to encourage deep breathing/relaxation/behavioral pain management techniques:     Deep breathin seconds in, 5 seconds out, 5 times per hour when awake and PRN when in pain or anticipate pain; avoid holding breath and tightening muscles and instead breathe slowly and deeply       Acute blood loss anemia   Assessment & Plan    Symptomatic anemia;  Hb 7 9 on  > improved to 10 8 after 2 U PRBCs > symptoms improved  >monitor for signs of acute bleed-iron and vitamin-C supplementation  -monitor intermittently     Cognitive impairment   Assessment & Plan    -Patient with baseline dementia and mild cognitive deficits  -Placed on fall precautions and bed alarms - freq checks by staff      Severe aortic stenosis   Assessment & Plan    -preload dependent   -encourage PO hydration  -Patient cardiologist is   Noé Rowe who she will follow up with as an outpatient     Atrial fibrillation [facility])   Assessment & Plan    -rate controlled on lopressor 25mg Q12h and new amiodarone 200mg BID  -anticoagulated with Eliquis 5mg BID  - Amiodarone 200mg BID - taper per cards   -Patient's home cardiologist is Dr Noé Rowe     Hypertension   Assessment & Plan    -MTP  -HCTZ held with symptomatic orthostasis  -IM to adjust dosing if needed      Insomnia   Assessment & Plan    Trial low dose melatonin 3mg QHS; Sleep wake log  Recommend appropriate sleep hygiene: patient counselled on this:   Sleep only as much as necessary to feel rested and then get up or sit up in bed, maintain regular sleep schedule (same bedtime and wake time everyday), do not force sleep, avoid caffeinated beverages after lunch, avoid alcohol at home near bedtime, do not smoke particularly in evening, do not go to bed hungry, adjust bedroom environment so you are comfortable prior to settling down for sleep, deal with concerns or worries before bedtime  Make a list of things to work on for next day so anxiety is reduced at night, exercise regularly when cleared by physician       Chronic nausea   Assessment & Plan    Apparently been worked up by GI in past  At approximate baseline; worsened with anxiety  PRN Zofran  Ensure optimal stooling     Onychomycosis   Assessment & Plan    Toenails; s/p podiatry consult and debridement      Hypothyroidism   Assessment & Plan    -managed on Synthroid     Dyslipidemia   Assessment & Plan    -managed on pravastatin         # Bowel function:  Constipation improved    # Bladder function: Intact  Monitor for urinary retention, incontinence, and signs of urinary infection        # Skin  Encourage regular turning and turn patient every 2 hours if not adequately ambulatory; - Rehabilitation team to perform skin checks regularly to monitor for erythema, wounds, rashes (if applicable incisions)    # Other  - Diet: general    - DVT prophy: Eliquis and SCDs    Disposition:   Home with supervision with [facility] 2 weeks from admission    Follow-up:   PCP, Ortho, Cards, PMR    ---------------------------------------------------------------------------------------------------------------------    Objective: Allergies and Medications per EMR    Physical Exam:  Vitals:    12/14/18 2042   BP: 107/50   Pulse: 76   Resp: 18   Temp: 98 5 °F (36 9 °C)   SpO2: 94%       General: Awake, alert in NAD  HENT:  MMM  Respiratory: Unlabored breathing, breath sounds equal, Lungs CTA, no wheezes, rales, or rhonchi  Cardiovascular: Regular rate and rhythm, no murmurs, rubs, or gallops  Gastrointestinal: Soft, non-tender, non-distended, normoactive bowel sounds  SkiN/MSK/Extremities:  No calf edema or calf tenderness to palpation  Neurologic/Psych:   MENTAL STATUS: orientated to self, hospital, not year, and partially to situaion   Affect: Euthymic       Diagnostic Studies: reviewed, no new imaging  No results found      Laboratory:      Results from last 7 days  Lab Units 12/13/18  0612 12/10/18  0525   HEMOGLOBIN g/dL 7 9* 8 8*   HEMATOCRIT % 24 6* 27 1*   WBC Thousand/uL 6 44 7 87       Results from last 7 days  Lab Units 12/13/18  0612 12/10/18  0525   BUN mg/dL 21 16   POTASSIUM mmol/L 3 7 4 3   CHLORIDE mmol/L 104 105   CREATININE mg/dL 0 92 0 86            Patient Active Problem List   Diagnosis    Microscopic hematuria    Dyslipidemia    Hypertension    Palpitations    Atrial fibrillation (HCC)    Hypothyroidism    Cognitive impairment    MCI (mild cognitive impairment)    Gait disturbance    Closed intertrochanteric fracture of hip, right, initial encounter ([facility] [address] )    Severe aortic stenosis    Dehydration    Closed fracture of neck of right femur (HCC)    Fall    Ambulatory dysfunction    Physical deconditioning    Osteoporosis    [facility] with [facility]    NSTEMI (non-ST elevated myocardial infarction) ([facility] )    Acute blood loss anemia    Fracture of femoral neck, right (HCC)    Acute pain    Delirium    Onychomycosis    Positional lightheadedness    Chronic nausea    Insomnia       ** Please Note: Fluency Direct voice to text software may have been used in the creation of this document  **    Total visit time:  At least 25 minutes, with more than 50% spent counseling/coordinating care    Chepe Richard NIETO, [address] [facility]  Physical Medicine and Rehabilitation  Brain Injury Medicine

## 2018-12-15 NOTE — PROGRESS NOTES
12/15/18 1330   Pain Assessment   Pain Assessment 0-10   Pain Score 5   Pain Type Acute pain   Pain Location Hip   Pain Orientation Right   Pain Descriptors Aching;Nagging   Pain Frequency Intermittent   Pain Onset Ongoing   Clinical Progression Gradually improving   Patient's Stated Pain Goal No pain   Hospital Pain Intervention(s) Medication (See MAR); Repositioned; Rest   Restrictions/Precautions   Precautions Bed/chair alarms;Cognitive; Fall Risk;Supervision on toilet/commode   Weight Bearing Restrictions Yes   RLE Weight Bearing Per Order WBAT   ROM Restrictions Yes   RLE ROM Restriction (THP's)   Cognition   Overall Cognitive Status Impaired   Arousal/Participation Alert; Cooperative   Attention Attends with cues to redirect   Orientation Level Oriented to person;Oriented to place   Memory Decreased short term memory;Decreased recall of recent events;Decreased recall of precautions   Following Commands Follows one step commands with increased time or repetition   Subjective   Subjective c/o 5/10 pain in her R hip today but she was still able to tolerate the entire therapy session with rest breaks in between  QI: Roll Left and Right   Reason if not Attempted Activity not applicable   Roll Left and Right CARE Score 9   QI: Sit to Lying   Reason if not Attempted Activity not applicable   Sit to Lying CARE Score 9   QI: Lying to Sitting on Side of Bed   Reason if not Attempted Activity not applicable   Lying to Sitting on Side of Bed CARE Score 9   QI: Sit to Stand   Assistance Needed Incidental touching   Assistance Provided by [place] Less than 25%   Comment CG   Sit to Stand CARE Score 3   QI: Chair/Bed-to-Chair Transfer   Assistance Needed Incidental touching   Assistance Provided by [place] Less than 25%   Comment CG using RW   Chair/Bed-to-Chair Transfer CARE Score 3   Transfer Bed/Chair/Wheelchair   Limitations Noted In Balance; Coordination; Endurance;Pain Management;Problem Solving; Sequencing;LE Strength;UE Strength   Adaptive Equipment Roller Walker   Stand Pivot Contact Guard   Sit to [employer]   Stand to [facility], Chair, Wheelchair Transfer (FIM) 4 - Patient requires steadying assist or light touching   QI: Car Transfer   Reason if not Attempted Activity not applicable   Car Transfer CARE Score 9   QI: Walk 10 Feet   Assistance Needed Incidental touching   Assistance Provided by [place] Less than 25%   Comment CG using RW   Walk 10 Feet CARE Score 3   QI: Walk 50 Feet with Two Turns   Assistance Needed Incidental touching   Assistance Provided by [place] Less than 25%   Comment CG using RW with CF   Walk 50 Feet with Two Turns CARE Score 3   QI: Walk 150 Feet   Reason if not Attempted Activity not applicable   Walk 415 Feet CARE Score 9   QI: Walking 10 Feet on Uneven Surfaces   Reason if not Attempted Activity not applicable   Walking 10 Feet on Uneven Surfaces CARE Score 9   Ambulation   Primary Discharge Mode of Locomotion Walk   Walk Assist Level Contact Guard   Gait Pattern Antalgic; Inconsistant [place];Decreased foot clearance;Decreased R stance; Improper weight shift   Assist Device Roller Walker   Distance Walked (feet) 100 ft  (100 feet x 2)   Limitations Noted In Balance; Coordination; Endurance;Speed;Strength; Sequencing   Findings wheelchair follow   Walking (FIM) 1 - Patient requires assist of two people   QI: Wheel 50 Feet with Two Turns   Reason if not Attempted Activity not applicable   Wheel 50 Feet with Two Turns CARE Score 9   QI: Wheel 150 Feet   Reason if not Attempted Activity not applicable   Wheel 782 Feet CARE Score 9   Wheelchair mobility   Wheelchair (FIM) 0 - Activity does not occur   QI: 1 Step (Curb)   Assistance Needed Physical assistance   Assistance Provided by [place] Less than 25%   Comment 2 steps with B/L HR with CG   1 Step (Curb) CARE Score 3   QI: 4 Steps   Reason if not Attempted Activity not applicable   4 Steps CARE Score 9   QI: 12 Steps   Reason if not Attempted Activity not applicable   12 Steps CARE Score 9   Stairs   Type Stairs   # of Steps 2   Weight Bearing Precautions Fall Risk   Assist Devices Bilateral Rail   Stairs (FIM) 1 - Patient goes up and down less than 4 stairs regardless of assist/device/set up   QI: Picking Up Object   Reason if not Attempted Activity not applicable   Picking Up Object CARE Score 9   QI: Toilet Transfer   Reason if not Attempted Activity not applicable   Toilet Transfer CARE Score 9   Toilet Transfer   Toilet Transfer (FIM) 0 - Activity does not occur   Therapeutic Interventions   Strengthening Seated ther ex with 2 5# ankle weights on LLE and AROM on RLE while maintaining R THP's for hip flexion, LAQ, hip adduction with ball squeezes, ankle DF/PF for 3 sets of 10 reps each  Assessment   Treatment Assessment Patient was very pleasant and very cooperative during therapy session today  She needed rest breaks in between due to fatigue  She needed verbal cues with proper hand placements during transfers and needs cues for sequencing during transfers, gait and stair management  She was only able to recall 1/3 of her hip precautions  She ambulated 100 feet x 2 with CG using RW with wheelchair follow  CG with sit to stand and SPT using RW  Ascended/descended 2 steps with B/L HR with CG  She will benefit from continued skilled PT services to improve BLE's strength to facilitate safety and [place] with transfers and gait, to improve endurance with gait and improve standing balance to reduce risk for falls  Problem List Decreased strength;Decreased endurance; Impaired balance;Decreased mobility; Decreased safety awareness; Impaired judgement;Orthopedic restrictions;Pain   Barriers to Discharge Inaccessible home environment;Decreased caregiver support   PT Barriers   Physical Impairment Decreased strength;Decreased range of motion;Decreased endurance; Impaired balance;Decreased mobility; Impaired judgement;Decreased safety awareness;Orthopedic restrictions;Pain   Functional Limitation Car transfers;Stair negotiation;Standing;Transfers; Walking   Plan   Treatment/Interventions Functional transfer training;LE strengthening/ROM; Elevations; Therapeutic exercise; Endurance training;Bed mobility;Gait training   Progress Progressing toward goals   Recommendation   Recommendation Home with family support;Home PT   Equipment Recommended Walker   PT Therapy Minutes   PT Time In 1330   PT Time Out 1430   PT Total Time (minutes) 60   PT Mode of treatment - Individual (minutes) 60   PT Mode of treatment - Concurrent (minutes) 0   PT Mode of treatment - Group (minutes) 0   PT Mode of treatment - Co-treat (minutes) 0   PT Mode of Teatment - Total time(minutes) 60 minutes   Therapy Time missed   Time missed?  No

## 2018-12-15 NOTE — PROGRESS NOTES
Occupational Therapy Treatment Note:     12/15/18 0830   Pain Assessment   Pain Assessment No/denies pain   Pain Score No Pain   Restrictions/Precautions   Precautions Bed/chair alarms;Cognitive; Fall Risk;Supervision on toilet/commode;THR   Weight Bearing Restrictions Yes   RLE Weight Bearing Per Order WBAT   ROM Restrictions Yes   RLE ROM Restriction Range Limitation  (THPs )   Braces or Orthoses Other (Comment)  (Abd pillow )   QI: Lying to Sitting on Side of Bed   Assistance Needed Set-up / clean-up; Verbal cues; Physical assistance   Assistance Provided by [place] 75% or more   Comment Step by step VCs for technique warranted; HOB slightly elevated using bed rail; assist warranted for RLE mgmt, to bring UB completely upright, and to scoot forward EOB so both feet planted firmly on floor  Lying to Sitting on Side of Bed CARE Score 2   QI: Sit to Stand   Assistance Needed Adaptive equipment;Set-up / clean-up; Verbal cues; Physical assistance   Assistance Provided by [place] 50%-74%   Comment Minimal lifting assist warranted using RW  Step by step VCs warranted for hand placement/foot positioning in order to ensure good safety, with little pt carryover noted  Sit to Stand CARE Score 2   QI: Chair/Bed-to-Chair Transfer   Assistance Needed Adaptive equipment;Set-up / clean-up; Verbal cues; Physical assistance   Assistance Provided by [place] 25%-49%   Comment SPT using RW; step by step VCs warranted for technique in order to ensure good safety and pt reassurance as pt stating, "I'm nervous "   Chair/Bed-to-Chair Transfer CARE Score 3   Transfer Bed/Chair/Wheelchair   Positioning Concerns Cognition   Limitations Noted In Balance;Confidence; Endurance;Problem Solving; Sequencing;UE Strength;LE Strength   Adaptive Equipment Roller Walker   Stand Pivot Minimal Assist   Sit to Stand Moderate Assist   Stand to Sit Minimal  (CGA)   Supine to Sit Maximum Assist   Bed, Chair, Wheelchair Transfer (FIM) 2 - Patient completes 22 - 49% of all tasks  (maxA bed mob supine > sit; modA transfers using RW  )   QI: [address] Needed Adaptive equipment;Set-up / clean-up; Verbal cues; Physical assistance   Assistance Provided by [place] 50%-74%   Comment Pt completed frontal jhon-care while standing unilaterally supported at RW with CGA; instructed pt on method of alternating unilateral hand release at RW for clothing mgmt over hips, however pt unable to comprehend and completed while in unsupported stance warranting Niharika to prevent fall due to noted LOB  Niharika warranted to pull up completely in the back  Toileting Hygiene CARE Score 2   Toileting   Able to Pull Clothing down yes, up no  (Assist to pull up completely in the back  )   Able to Manage Clothing Closures Other  (None needed; elastic waisted pants  )   Manage Hygiene Bladder   Limitations Noted In Balance;Problem Solving; Safety; Sequencing;LE Strength   Adaptive Equipment Other  (RW)   Toileting (FIM) 3 - Patient completes  50-74% of all tasks   QI: Toilet Transfer   Assistance Needed Adaptive equipment;Set-up / clean-up; Verbal cues; Physical assistance   Assistance Provided by [place] 50%-74%   Comment Step by step VCs for hand placement/footing in order to ensure good safety  Pt able to complete sit to stand transfers with minimal lifting assist and SPT with Niharika using RW  Toilet Transfer CARE Score 2   Toilet Transfer   Surface Assessed Standard Commode   Transfer Technique Stand Pivot   Limitations Noted In Balance;Confidence; Endurance;Problem Solving; Safety; Sequencing;UE Strength;LE Strength   Adaptive Equipment (RW)   Toilet Transfer (FIM) 3 - [place] needs to lift, boost or assist to stand OR sit   Functional Standing Tolerance   Time 6 mins; 7 mins   Activity Static standing table top activity of coloring rashawn coloring page while unilaterally supported      Comments CGA for balance; VCs for proper stance and to distribute weight evenly; mod VCs for direction following (instructed pt to color tree green and ornaments different colors, however pt would often initiate coloring ornaments green despite discussing < 1 min prior); rest break warranted due to c/o generalized weakness/fatigue; in order to improve upon standing balance/tolerance, BLE strength, and ability to focus to task at hand and follow simple instructions for carryover in completion of transfers and functional tasks  Cognition   Overall Cognitive Status Impaired   Arousal/Participation Alert; Cooperative   Attention Difficulty attending to directions   Orientation Level Oriented to person;Oriented to place   Memory Decreased short term memory;Decreased recall of recent events;Decreased recall of precautions   Following Commands Follows one step commands with increased time or repetition   Comments Pt able to recall 1/3 THPs; encouraged pt to continue looking to pre-established handouts for reference in order to ensure good carryover; pt oriented to correct month, day, place (when provided with increased time), city, and upcoming holiday, however was disoriented to date and year  Activity Tolerance   Activity Tolerance Patient tolerated treatment well   Assessment   Treatment Assessment OT tx sessions focused on bed mobility, transfers, standing balance/tolerance and LE strengthening, toileting, ability to focus to task at hand and follow simple instructions, and with overall activity tolerance/endurance  Pt frequently voicing c/o feeling "anxious" and "nervous;" pt verbalized interest in taking anti-anxiety medications and with consulting with Dr Eliana Santiago, psych services; reviewed with NSG; discussed with pt use of deep breathing and relaxation techniques for stress mgmt, which pt required VCs for initiation throughout sessions  Refer above for details on pt performance  Pt would benefit from continued skilled OT services in order to achieve highest functional abilities      Prognosis Fair   Problem List Decreased strength;Decreased range of motion;Decreased endurance; Impaired balance;Decreased mobility; Decreased cognition; Impaired judgement;Decreased safety awareness;Orthopedic restrictions   Barriers to Discharge Inaccessible home environment;Decreased caregiver support   Plan   Treatment/Interventions ADL retraining;Functional transfer training;LE strengthening/ROM; Endurance training;Cognitive reorientation;Patient/family training;Equipment eval/education; Bed mobility; Compensatory technique education;OT   Progress Slow progress, cognitive deficits   Recommendation   OT Discharge Recommendation Home with family support  (vs SNG pending pt progress  )   OT Therapy Minutes   OT Time In 0830   OT Time Out 0930   OT Total Time (minutes) 60   OT Mode of treatment - Individual (minutes) 60   OT Mode of treatment - Concurrent (minutes) 0   OT Mode of treatment - Group (minutes) 0   OT Mode of treatment - Co-treat (minutes) 0   OT Mode of Teatment - Total time(minutes) 60 minutes   Therapy Time missed   Time missed?  No   [place], [address]

## 2018-12-15 NOTE — PROGRESS NOTES
Internal Medicine Progress Note  Patient: Abby Vera  Age/sex: 66 y o  female  Medical Record #: [id number]      ASSESSMENT/PLAN:  Abby Vera is seen and examined and management for following issues:    Right hip fracture; s/p right MIKE 12/4:  Pain control per primary service        PAF:  Continue Amiodarone/Lopressor 25 mg q 12 hours/Eliquinder  Amio is new for her since hospitalization = d/w with cards and Amio is 200 mg BID x 1 week then 200 mg qd; because of missing doses, reduced Lopressor from 25 mg q12h to 12 5mg q12hrs = stable now      HTN/orthostasis:  will see how she does today; had required TEDS and abd binder for orthostasis; did get IVF NSS 12/12/18 and 2 U PRBCs yesterday; Lopressor reduced to 12 5mg q12hrs yesterday  ABLA:  did receive blood transfusion last sunday and 2 U PRBCs yesterday with improvement in hemoglobin but she is not sure she feels better because of it      Severe AS/LVEF 55%: no volume overload at this time    Nausea: continue prn Zofran; hasn't needed    Diarrhea:  D/w primary service = stop bowel meds for now; if doesn't resolve then may be due to the Amiodarone; no diarrhea since yesterday morning      Subjective: offers no complaints    ROS:   GI: denies abdominal pain, change bowel habits or reflux symptoms  Neuro: No new neurologic changes  Respiratory: No Cough, SOB  Cardiovascular: No CP, palpitations     Scheduled Meds:    Current Facility-Administered Medications:  acetaminophen 975 mg Oral Q8H [facility] Diane Ji MD   [START ON 12/16/2018] amiodarone 200 mg Oral Daily With Breakfast IRMA Jacobs   apixaban 5 mg Oral Q12H [facility] Diane Ji MD   bisacodyl 10 mg Rectal Daily PRN Diane Ji MD   bisacodyl 10 mg Rectal Once Kaylynn Waller MD   calcium carbonate 500 mg Oral BID With Meals Diane Ji MD   cholecalciferol 5,000 Units Oral Daily Diane Ji MD   levothyroxine 50 mcg Oral Early Morning Diane Ji MD   lidocaine 2 patch Topical Daily Alfonzo Bell MD   melatonin 3 mg Oral HS Derik Villegas MD   metoprolol tartrate 12 5 mg Oral Q12H [address] IRMA Grimm   ondansetron 4 mg Oral Q6H PRN IRMA Grimm   oxyCODONE 2 5 mg Oral Q6H PRN Derik Villegas MD   pantoprazole 20 mg Oral Early Morning Alfonzo Bell MD   polyethylene glycol 17 g Oral Daily PRN Alfonzo Bell MD   pravastatin 40 mg Oral Daily With Jeannie Salinas MD       Labs:       Results from last 7 days  Lab Units 12/14/18  0759 12/13/18  0612   WBC Thousand/uL 7 92 6 44   HEMOGLOBIN g/dL 10 8* 7 9*   HEMATOCRIT % 32 7* 24 6*   PLATELETS Thousands/uL 223 200       Results from last 7 days  Lab Units 12/13/18  0612 12/10/18  0525   POTASSIUM mmol/L 3 7 4 3   CHLORIDE mmol/L 104 105   CO2 mmol/L 27 24   BUN mg/dL 21 16   CREATININE mg/dL 0 92 0 86   CALCIUM mg/dL 9 4 8 9                  Glucose, i-STAT (mg/dl)   Date Value   12/04/2018 136   12/03/2018 128       Labs reviewed    Physical Examination:  Vitals:   Vitals:    12/14/18 1600 12/14/18 2042 12/15/18 0527 12/15/18 1317   BP: 122/59 107/50 126/63 122/69   BP Location: Right arm Left arm Right arm Left arm   Pulse: 78 76 70 74   Resp:  18 18 18   Temp:  98 5 °F (36 9 °C) 98 1 °F (36 7 °C) 98 3 °F (36 8 °C)   TempSrc:  Oral Oral Oral   SpO2:  94% 92% 97%   Weight:       Height:         Constitutional:  NAD; pleasant; nontoxic  HEENT:  AT/NC; oropharynx negative for thrush on tongue   Neck: negative for JVD  CV:  +S1, S2;  RRR; no rub/+murmur  Pulmonary:  BBS without crackles/wheeze/rhonci; resp are unlabored  Abdominal:  soft, +BS, ND/NT  Skin:  no rashes  Musculoskeletal:  no edema  :  no zamarripa  Neurological/Psych:  AA with some confusion at times;  JENKINS 5/5; no depression/anxiety        [ X ] Total time spent: 30 Mins and greater than 50% of this time was spent counseling/coordinating care      ** Please Note: Dragon 360 Dictation voice to text software may have been used in the creation of this document   **

## 2018-12-15 NOTE — PROGRESS NOTES
Physical Medicine and Rehabilitation Progress Note  Mary Beth Manzanares 66 y o  female MRN: [id number]  Unit/Bed#: -01 Encounter: [id number]    Chief Complaint: "I had some trouble sleeping last night, but will try melatonin"    Interval:  Difficulty sleeping  Denies any lightheadedness, dizziness, CP, SOB  No acute events overnight  Not feeling anxious right now  Family is in room, and agree that limiting medications that can worsen her confusion is for the best      ROS:  10 point ROS negative except for what is mentioned in Subj    Assessment/Plan:      [address]    For now avoiding any meds that can worsen delirium  - Working on sleep wake cycle  Starting melatonin  - Behavioral techniques  - Consider consult with Dr Lanre Luna for management  Insomnia   Assessment & Plan    Trial low dose melatonin 3mg QHS; Sleep wake log  Recommend appropriate sleep hygiene: patient counselled on this:   Sleep only as much as necessary to feel rested and then get up or sit up in bed, maintain regular sleep schedule (same bedtime and wake time everyday), do not force sleep, avoid caffeinated beverages after lunch, avoid alcohol at home near bedtime, do not smoke particularly in evening, do not go to bed hungry, adjust bedroom environment so you are comfortable prior to settling down for sleep, deal with concerns or worries before bedtime   Make a list of things to work on for next day so anxiety is reduced at night, exercise regularly when cleared by physician       Chronic nausea   Assessment & Plan    Apparently been worked up by GI in past  At approximate baseline; worsened with anxiety  PRN Zofran  Ensure optimal stooling     Positional lightheadedness   Assessment & Plan    Improved s/p transfusion 12/13  Symptomatic orthostasis > transfusion as outlined above   Monitor vitals; orthostatics  Recently started on Amio; also on MTP  HCTZ holding per IM  Abdominal binder PRN     Onychomycosis Assessment & Plan    Toenails; s/p podiatry consult and debridement      Delirium   Assessment & Plan    Stable  Post-op delirium on possible baseline dementia > seems fairly significant at times  >oxy to 2 5mg Q6H PRN   Medications reviewed; limit sedating medications but adequately treat pain  Frequent redirection, re-orientation, reassurance  Monitor for signs and symptoms of infection  Overstimulation precautions, optimize sleep-wake cycle, agitation behavioral scale, sleep log  If necessary provided 1-1     Acute pain   Assessment & Plan    Stable   -managed on scheduled Tylenol 975mg TID  -ICE L1lfsex while awake  -PRN oxycodone2 5 for moderate to severe pain - use with caution given her delirium on dementia   Counseled on and continue to encourage deep breathing/relaxation/behavioral pain management techniques:     Deep breathin seconds in, 5 seconds out, 5 times per hour when awake and PRN when in pain or anticipate pain; avoid holding breath and tightening muscles and instead breathe slowly and deeply       Acute blood loss anemia   Assessment & Plan    Symptomatic anemia;  Hb 7 9 on  > improved to 10 8 after 2 U PRBCs > symptoms improved  >monitor for signs of acute bleed-iron and vitamin-C supplementation  -monitor intermittently     Severe aortic stenosis   Assessment & Plan    -preload dependent   -encourage PO hydration  -Patient cardiologist is Dr Evonne Zavala who she will follow up with as an outpatient     Cognitive impairment   Assessment & Plan    -Patient with baseline dementia and mild cognitive deficits  -Placed on fall precautions and bed alarms - freq checks by staff      Hypothyroidism   Assessment & Plan    -managed on Synthroid     Atrial fibrillation (HCC)   Assessment & Plan    -rate controlled on lopressor 25mg Q12h and new amiodarone 200mg BID  -anticoagulated with Eliquis 5mg BID  - Amiodarone 200mg BID - taper per cards   -Patient's home cardiologist is Dr Evonne Zavala Hypertension   Assessment & Plan    -MTP  -HCTZ held with symptomatic orthostasis  -IM to adjust dosing if needed      Dyslipidemia   Assessment & Plan    -managed on pravastatin     * Fracture of femoral neck, right (HCC)   Assessment & Plan    -S/p Right MIKE by Dr Sukhi Vallejo on 12/4/18  -WBAT RLE  -Daily PT/OT to improve mobility and self care   -Posterior hip precautions  -Maintain hip abduction foam when supine due to patient's cognitive deficits  -Please maintain compressive dressing for 48 hours, then replace if needed         Scheduled Meds:  Current Facility-Administered Medications:  acetaminophen 975 mg Oral Q8H Jim Ulloa MD   [START ON 12/16/2018] amiodarone 200 mg Oral Daily With Breakfast IRMA Stephen   apixaban 5 mg Oral Q12H [address] Ramesh Alfaro MD   bisacodyl 10 mg Rectal Daily PRN Ramesh Alfaro MD   bisacodyl 10 mg Rectal Once Leatha Kennedy MD   calcium carbonate 500 mg Oral BID With Meals Ramesh Alfaro MD   cholecalciferol 5,000 Units Oral Daily Ramesh Alfaro MD   levothyroxine 50 mcg Oral Early Morning Ramesh Alfaro MD   lidocaine 2 patch Topical Daily Ramesh Alfaro MD   melatonin 3 mg Oral HS Leatha Kennedy MD   metoprolol tartrate 12 5 mg Oral Q12H [address] IRMA Najera   ondansetron 4 mg Oral Q6H PRN IRMA Stephen   oxyCODONE 2 5 mg Oral Q6H PRN Leatha Kennedy MD   pantoprazole 20 mg Oral Early Morning Ramesh Alfaro MD   polyethylene glycol 17 g Oral Daily PRN Ramesh Alfaro MD   pravastatin 40 mg Oral Daily With Akila Muro MD        Objective: Allergies per EMR    Physical Exam:  Temp:  [98 1 °F (36 7 °C)-99 5 °F (37 5 °C)] 98 1 °F (36 7 °C)  HR:  [70-83] 70  Resp:  [18-20] 18  BP: (107-126)/(50-63) 126/63  Oxygen Therapy  SpO2: 92 %    Gen: No acute distress, Well-nourished, well-appearing  HEENT: Moist mucus membranes, Normocephalic/Atraumatic  Cardiovascular: Regular rate, rhythm, S1/S2   Distal pulses palpable  Heme/Extr: No edema/clubbing/cyanosis  Pulmonary: Non-labored breathing  Lungs CTAB  : No zamarripa  GI: Soft, non-tender, non-distended  BS+  Integumentary: Skin is warm, dry  No rashes or ulcers  Neuro: Speech is intact  Appropriate to questioning  Psych: Normal mood and affect  Diagnostic Studies: Reviewed, no new imaging  No orders to display       Laboratory: Reviewed    Results from last 7 days  Lab Units 12/14/18  0759 12/13/18  0612 12/10/18  0525   HEMOGLOBIN g/dL 10 8* 7 9* 8 8*   HEMATOCRIT % 32 7* 24 6* 27 1*   WBC Thousand/uL 7 92 6 44 7 87       Results from last 7 days  Lab Units 12/13/18  0612 12/10/18  0525   BUN mg/dL 21 16   SODIUM mmol/L 138 136   POTASSIUM mmol/L 3 7 4 3   CHLORIDE mmol/L 104 105   CREATININE mg/dL 0 92 0 86            Patient Active Problem List   Diagnosis    Microscopic hematuria    Dyslipidemia    Hypertension    Palpitations    Atrial fibrillation [facility])    Hypothyroidism    Cognitive impairment    MCI (mild cognitive impairment)    Gait disturbance    Closed intertrochanteric fracture of hip, right, initial encounter ([facility] [address] )    Severe aortic stenosis    Dehydration    Closed fracture of neck of right femur ([facility])    Fall    Ambulatory dysfunction    Physical deconditioning    Osteoporosis    Atrial fibrillation with rapid ventricular response ([facility] [address] )    NSTEMI (non-ST elevated myocardial infarction) ([facility])    Acute blood loss anemia    Fracture of femoral neck, right ([facility])    Acute pain    Delirium    Onychomycosis    Positional lightheadedness    Chronic nausea    Insomnia       ** Please Note: Fluency Direct voice to text software may have been used in the creation of this document   **

## 2018-12-15 NOTE — PROGRESS NOTES
Physical Medicine and Rehabilitation Progress Note  Bethel Olvera 66 y o  female MRN: [id number]  Unit/Bed#: [facility] 962-01 Encounter: [id number]    Chief Complaints:  Tired    Subjective/Interval Events:   Patient reports feeling very exhausted today with difficulty working with therapist   She reports some lightheadness at rest worse with activity  Patient denies resting SOB but notes becoming easily winded with activity  She denies chest pain  Patient notes hip pain moderate with activity but overall not too bad  She denies fever, chills, sweats, calf pain, other complaints  ROS: A 10-point ROS was performed  Negative except as listed above  Overall Assessment/relevant history:  80-year-old female with a past medical history of atrial fibrillation on chronic anticoagulation with apixaban, hypertension, hyperlipidemia, hypothyroidism, chronic left facial droop, severe aortic stenosis, possible dementia, severe osteoporosis with recent fall at home found to have right intertrochanteric femur fracture who underwent right total hip arthroplasty by Dr Kyra Hannon on 12/04/2018  Postoperative  Most notable for transient SVT requiring rapid response and temporary Cardizem drip  Cardiology consulted and assisted with management and patient has been switched since transition to p o  Amiodarone  Postoperative course also notable for acute blood loss anemia requiring 1 unit PRBC transfusion  Patient was evaluated by skilled therapies and was found to have significant decline in ADLs and ambulation appropriate for [facility] to [facility]      Functional status (recent):    Transfers mod assist     Functional status on admission to [facility]:  OT:  Total assist General in toilet transfers, lower body dressing, toileting, and bathing; upper body dressing mod assist, grooming and eating supervision      * Fracture of femoral neck, right ([facility] [address] )   Assessment & Plan    -S/p Right MIKE by Dr Kyra Hannon on 18  -WBAT RLE  -Daily PT/OT to improve mobility and self care   -Posterior hip precautions  -Maintain hip abduction foam when supine due to patient's cognitive deficits  -Please maintain compressive dressing for 48 hours, then replace if needed      Positional lightheadedness   Assessment & Plan    Symptomatic orthostasis > transfusion as outlined above   Monitor vitals; orthostatics  Recently started on Amio; also on MTP  HCTZ holding per IM  Abdominal binder PRN     Delirium   Assessment & Plan    Stable  Post-op delirium on possible baseline dementia > seems fairly significant at times  >oxy to 2 5mg Q6H PRN   Medications reviewed; limit sedating medications but adequately treat pain  Frequent redirection, re-orientation, reassurance  Monitor for signs and symptoms of infection  Overstimulation precautions, optimize sleep-wake cycle, agitation behavioral scale, sleep log  If necessary provided 1-1     Acute pain   Assessment & Plan    Stable   -managed on scheduled Tylenol 975mg TID  -ICE [id number] while awake  -PRN oxycodone2 5 for moderate to severe pain - use with caution given her delirium on dementia   Counseled on and continue to encourage deep breathing/relaxation/behavioral pain management techniques:     Deep breathin seconds in, 5 seconds out, 5 times per hour when awake and PRN when in pain or anticipate pain; avoid holding breath and tightening muscles and instead breathe slowly and deeply       Acute blood loss anemia   Assessment & Plan    Symptomatic anemia;  Hb 7 9 on   >Discussed with IM; recommend 2 U PRBC today; patient recently received 1 unit PRBC   >monitor for signs of acute bleed-iron and vitamin-C supplementation  -monitor intermittently     Cognitive impairment   Assessment & Plan    -Patient with baseline dementia and mild cognitive deficits  -Placed on fall precautions and bed alarms - freq checks by staff      Severe aortic stenosis   Assessment & Plan    -preload dependent   -encourage PO hydration  -Patient cardiologist is Dr Evonne Zavala who she will follow up with as an outpatient     Atrial fibrillation [facility])   Assessment & Plan    -rate controlled on lopressor 25mg Q12h and new amiodarone 200mg BID  -anticoagulated with Eliquis 5mg BID  - Amiodarone 200mg BID - taper per cards   -Patient's home cardiologist is Dr Evonne Zavala     Hypertension   Assessment & Plan    -MTP  -HCTZ held with symptomatic orthostasis  -IM to adjust dosing if needed      Chronic nausea   Assessment & Plan    Apparently been worked up by GI in past  At approximate baseline; worsened with anxiety  PRN Zofran  Ensure optimal stooling     Onychomycosis   Assessment & Plan    Toenails; s/p podiatry consult and debridement      Hypothyroidism   Assessment & Plan    -managed on Synthroid     Dyslipidemia   Assessment & Plan    -managed on pravastatin         # Bowel function:  Constipation improved    # Bladder function: Intact  Monitor for urinary retention, incontinence, and signs of urinary infection  # Skin  Encourage regular turning and turn patient every 2 hours if not adequately ambulatory; - Rehabilitation team to perform skin checks regularly to monitor for erythema, wounds, rashes (if applicable incisions)    # Other  - Diet: general    - DVT prophy: Eliquis and SCDs    Disposition:   Home with supervision with [facility]IME 2 weeks from admission    Follow-up:   PCP, Ortho, Cristi, PMR    ---------------------------------------------------------------------------------------------------------------------    Objective:     Allergies and Medications per EMR    Physical Exam:  T=99 5 F, P 83, RR 20, /51, SpO2 96% on RA  General: Awake, alert in NAD  HENT:  MMM  Respiratory: Unlabored breathing, breath sounds equal, Lungs CTA, no wheezes, rales, or rhonchi  Cardiovascular: Regular rate and rhythm, no murmurs, rubs, or gallops  Gastrointestinal: Soft, non-tender, non-distended, normoactive bowel sounds  SkiN/MSK/Extremities:  No calf edema or calf tenderness to palpation  Neurologic/Psych:   MENTAL STATUS: orientation stable  Affect: Euthymic       Diagnostic Studies: reviewed, no new imaging  No results found  Laboratory:      Results from last 7 days  Lab Units 12/13/18  0612 12/10/18  0525   HEMOGLOBIN g/dL 7 9* 8 8*   HEMATOCRIT % 24 6* 27 1*   WBC Thousand/uL 6 44 7 87       Results from last 7 days  Lab Units 12/13/18  0612 12/10/18  0525   BUN mg/dL 21 16   POTASSIUM mmol/L 3 7 4 3   CHLORIDE mmol/L 104 105   CREATININE mg/dL 0 92 0 86            Patient Active Problem List   Diagnosis    Microscopic hematuria    Dyslipidemia    Hypertension    Palpitations    [facility]    Hypothyroidism    Cognitive impairment    MCI (mild cognitive impairment)    Gait disturbance    Closed intertrochanteric fracture of hip, right, initial encounter ([facility] [address] )    Severe aortic stenosis    Dehydration    Closed fracture of neck of right femur ([facility]    Fall    Ambulatory dysfunction    Physical deconditioning    Osteoporosis    Atrial fibrillation with rapid ventricular response ([facility] [address] )    NSTEMI (non-ST elevated myocardial infarction) ([facility])    Acute blood loss anemia    Fracture of femoral neck, right ([facility]    Acute pain    Delirium    Onychomycosis    Positional lightheadedness    Chronic nausea       ** Please Note: Fluency Direct voice to text software may have been used in the creation of this document  **    Total time spent: At least 35 minutes, with more than 50% spent counseling/coordinating care  In addition, this patient was discussed by the interdisciplinary team in weekly case conference today  The care of the patient was extensively discussed with all care providers and an appropriate rehabilitation plan was formulated unique for this patient   Barriers were identified preventing progression of therapy and appropriate interventions were discussed with each discipline  Please see the team note for input from all disciplines regarding barriers, intervention, and discharge planning      Ernie Ag MD, [facility]  Physical Medicine and Rehabilitation  Brain Injury Medicine

## 2018-12-15 NOTE — ASSESSMENT & PLAN NOTE
Remains Improved  Continue low dose melatonin 3mg QHS PRN;  Recommend appropriate sleep hygiene: patient counselled on this:   Sleep only as much as necessary to feel rested and then get up or sit up in bed, maintain regular sleep schedule (same bedtime and wake time everyday), do not force sleep, avoid caffeinated beverages after lunch, avoid alcohol at home near bedtime, do not smoke particularly in evening, do not go to bed hungry, adjust bedroom environment so you are comfortable prior to settling down for sleep, deal with concerns or worries before bedtime   Make a list of things to work on for next day so anxiety is reduced at night, exercise regularly when cleared by physician

## 2018-12-16 PROCEDURE — 97530 THERAPEUTIC ACTIVITIES: CPT

## 2018-12-16 PROCEDURE — 97110 THERAPEUTIC EXERCISES: CPT

## 2018-12-16 PROCEDURE — 97116 GAIT TRAINING THERAPY: CPT

## 2018-12-16 PROCEDURE — 97535 SELF CARE MNGMENT TRAINING: CPT

## 2018-12-16 RX ADMIN — ACETAMINOPHEN 975 MG: 325 TABLET, FILM COATED ORAL at 21:16

## 2018-12-16 RX ADMIN — ONDANSETRON 4 MG: 4 TABLET, ORALLY DISINTEGRATING ORAL at 06:17

## 2018-12-16 RX ADMIN — LEVOTHYROXINE SODIUM 50 MCG: 50 TABLET ORAL at 06:12

## 2018-12-16 RX ADMIN — LIDOCAINE 2 PATCH: 50 PATCH CUTANEOUS at 07:48

## 2018-12-16 RX ADMIN — ACETAMINOPHEN 975 MG: 325 TABLET, FILM COATED ORAL at 06:12

## 2018-12-16 RX ADMIN — METOPROLOL TARTRATE 12.5 MG: 25 TABLET ORAL at 07:46

## 2018-12-16 RX ADMIN — POLYETHYLENE GLYCOL 3350 17 G: 17 POWDER, FOR SOLUTION ORAL at 07:53

## 2018-12-16 RX ADMIN — PANTOPRAZOLE SODIUM 20 MG: 20 TABLET, DELAYED RELEASE ORAL at 06:12

## 2018-12-16 RX ADMIN — VITAMIN D, TAB 1000IU (100/BT) 5000 UNITS: 25 TAB at 07:46

## 2018-12-16 RX ADMIN — LIDOCAINE 2 PATCH: 50 PATCH CUTANEOUS at 09:00

## 2018-12-16 RX ADMIN — APIXABAN 5 MG: 5 TABLET, FILM COATED ORAL at 21:16

## 2018-12-16 RX ADMIN — APIXABAN 5 MG: 5 TABLET, FILM COATED ORAL at 07:47

## 2018-12-16 RX ADMIN — OXYCODONE HYDROCHLORIDE 2.5 MG: 5 TABLET ORAL at 14:34

## 2018-12-16 RX ADMIN — CALCIUM CARBONATE (ANTACID) CHEW TAB 500 MG 500 MG: 500 CHEW TAB at 16:17

## 2018-12-16 RX ADMIN — ACETAMINOPHEN 975 MG: 325 TABLET, FILM COATED ORAL at 14:34

## 2018-12-16 RX ADMIN — AMIODARONE HYDROCHLORIDE 200 MG: 200 TABLET ORAL at 07:47

## 2018-12-16 RX ADMIN — CALCIUM CARBONATE (ANTACID) CHEW TAB 500 MG 500 MG: 500 CHEW TAB at 07:46

## 2018-12-16 RX ADMIN — MELATONIN 3 MG: at 21:16

## 2018-12-16 RX ADMIN — OXYCODONE HYDROCHLORIDE 2.5 MG: 5 TABLET ORAL at 07:47

## 2018-12-16 RX ADMIN — PRAVASTATIN SODIUM 40 MG: 40 TABLET ORAL at 16:17

## 2018-12-16 NOTE — PROGRESS NOTES
Internal Medicine Progress Note  Patient: Jesus Levine  Age/sex: 66 y o  female  Medical Record #: [id number]      ASSESSMENT/PLAN:  Jesus Levine is seen and examined and management for following issues:    Right hip fracture; s/p right MIKE 12/4:  Pain control per primary service        PAF:  Continue Amiodarone/Lopressor 25 mg q 12 hours/Eliquis  Amio is new for her since hospitalization = d/w with cards and Amio is 200 mg BID x 1 week then 200 mg qd; because of missing doses, reduced Lopressor from 25 mg q12h to 12 5mg q12hrs = stable now      HTN/orthostasis:  will see how she does today; had required TEDS and abd binder for orthostasis; did get IVF NSS 12/12/18 and 2 U PRBCs yesterday; Lopressor reduced to 12 5mg q12hrs yesterday  ABLA:  did receive blood transfusion last sunday and 2 U PRBCs yesterday with improvement in hemoglobin but she is not sure she feels better because of it      Severe AS/LVEF 55%: no volume overload at this time    Nausea: continue prn Zofran; hasn't needed    Diarrhea:  D/w primary service = stop bowel meds for now; if doesn't resolve then may be due to the Amiodarone; no diarrhea       Subjective: offers no complaints    ROS:   GI: denies abdominal pain, change bowel habits or reflux symptoms  Neuro: No new neurologic changes  Respiratory: No Cough, SOB  Cardiovascular: No CP, palpitations     Scheduled Meds:    Current Facility-Administered Medications:  acetaminophen 975 mg Oral Q8H [facility] Ramón Lobo MD   amiodarone 200 mg Oral Daily With Breakfast IRMA Sanders   apixaban 5 mg Oral Q12H [facility] Ramón Lobo MD   bisacodyl 10 mg Rectal Daily PRN Ramón Lobo MD   bisacodyl 10 mg Rectal Once Allison MD Geno   calcium carbonate 500 mg Oral BID With Meals Ramón Lobo MD   cholecalciferol 5,000 Units Oral Daily Ramón Lobo MD   levothyroxine 50 mcg Oral Early Morning Ramón Lobo MD   lidocaine 2 patch Topical Daily Ramón Lobo MD   melatonin 3 mg Oral HS Billy Castle MD   metoprolol tartrate 12 5 mg Oral Q12H [facility] IRMA Salinas   ondansetron 4 mg Oral Q6H PRN IRMA Salinas   oxyCODONE 2 5 mg Oral Q6H PRN Billy Castle MD   pantoprazole 20 mg Oral Early Morning Christine Miramontes MD   polyethylene glycol 17 g Oral Daily PRN Christine Miramontes MD   pravastatin 40 mg Oral Daily With Caitlyn Kearney MD       Labs:       Results from last 7 days  Lab Units 12/14/18  0759 12/13/18  0612   WBC Thousand/uL 7 92 6 44   HEMOGLOBIN g/dL 10 8* 7 9*   HEMATOCRIT % 32 7* 24 6*   PLATELETS Thousands/uL 223 200       Results from last 7 days  Lab Units 12/13/18  0612 12/10/18  0525   POTASSIUM mmol/L 3 7 4 3   CHLORIDE mmol/L 104 105   CO2 mmol/L 27 24   BUN mg/dL 21 16   CREATININE mg/dL 0 92 0 86   CALCIUM mg/dL 9 4 8 9                  Glucose, i-STAT (mg/dl)   Date Value   12/04/2018 136   12/03/2018 128       Labs reviewed    Physical Examination:  Vitals:   Vitals:    12/15/18 1317 12/15/18 2025 12/16/18 0633 12/16/18 1414   BP: 122/69 128/60 143/96 118/78   BP Location: Left arm Left arm Right arm Right arm   Pulse: 74 82 89 82   Resp: 18 18 18 18   Temp: 98 3 °F (36 8 °C) 97 6 °F (36 4 °C) 98 7 °F (37 1 °C) 97 6 °F (36 4 °C)   TempSrc: Oral Oral Oral Oral   SpO2: 97% 97% 92% 95%   Weight:       Height:         Constitutional:  NAD; pleasant; nontoxic  HEENT:  AT/NC; oropharynx negative for thrush on tongue   Neck: negative for JVD  CV:  +S1, S2;  RRR; no rub/+murmur  Pulmonary:  BBS without crackles/wheeze/rhonci; resp are unlabored  Abdominal:  soft, +BS, ND/NT  Skin:  no rashes  Musculoskeletal:  no edema  :  no zamarripa  Neurological/Psych:  AA with some confusion at times;  JENKINS 5/5; no depression/anxiety        [ X ] Total time spent: 30 Mins and greater than 50% of this time was spent counseling/coordinating care  ** Please Note: Dragon 360 Dictation voice to text software may have been used in the creation of this document  **

## 2018-12-16 NOTE — PROGRESS NOTES
12/16/18 1230   Pain Assessment   Pain Assessment 0-10   Pain Score 5   Pain Type Acute pain   Pain Location Hip   Pain Orientation Right   Pain Radiating Towards RLE   Pain Descriptors Aching;Discomfort   Pain Frequency Intermittent   Pain Onset Ongoing   Clinical Progression Gradually improving   Hospital Pain Intervention(s) Repositioned;Rest;Relaxation technique   Response to Interventions Pt tolerated Tx w/ dec c/o pain   Restrictions/Precautions   Precautions Bed/chair alarms;Cognitive; Fall Risk;Supervision on toilet/commode;THR;Pain   RLE Weight Bearing Per Order WBAT   ROM Restrictions Yes   RLE ROM Restriction Other  (THPs)   QI: Oral Hygiene   Assistance Needed Supervision;Verbal cues   Assistance Provided by [place] No physical assistance   Comment Pt able to manage toothpaste/materials w/ inc time and VC from OT to problem solve how to open/close   Oral Hygiene CARE Score 4   Grooming   Able To Comb/Brush Hair;Wash/Dry Face;Brush/Clean Teeth;Wash/Dry Hands   Limitation Noted In Problem Solving;Strength; Safety;Timeliness   Findings seated 2* dec stand letty/impaired balance   Grooming (FIM) 5 - Patient requires supervision/monitoring   QI: Shower/Bathe Self   Assistance Needed Physical assistance   Assistance Provided by [place] 50%-74%   Shower/Bathe Self CARE Score 2   Bathing   Assessed Bath Style Sponge Bath   Anticipated D/C Bath Style Tub   Able to Gather/Transport No   Able to [employer] Temperature No   Able to Wash/Rinse/Dry (body part) Left Arm;Right Arm;L Upper Leg;R Upper Leg;Chest;Abdomen;Perineal Area; Buttocks   Limitations Noted in Balance; Endurance;Problem Solving;ROM;Safety;Strength;Timeliness   Positioning Seated;Standing   Findings  Pt requires Niharika in stance to attempt to complete bathing of buttocks, however required A from OT for thoroughness 2* fatigue  Pt able to complete perineal hygiene in stance w/ steadying A  Pt requires A from OT to bathe b/l LE      Bathing (FIM) 3 - Patient completes 5/10  6/10 or 7/10 parts   Tub/Shower Transfer   Not Assessed Sponge Bath   QI: Upper Body Dressing   Assistance Needed Incidental touching   Assistance Provided by [place] Less than 25%   Comment Pt able to complete w/ inc time/encouragment, however pt required incidental A for mgmt of shirt in back  Upper Body Dressing CARE Score 3   QI: Lower Body Dressing   Assistance Needed Physical assistance   Assistance Provided by [place] 50%-74%   Comment Pt requires A to thread pants/brief over b/l LE 2* poor carryover w/ LHAE and pt THP  Pt Niharika in stance to complete CM over hips w/ no LOB noted  Pt noted w/ improved stand tolerance/balance this session, however poor carryover for unilateral release on RW and dec safety awareness  Lower Body Dressing CARE Score 2   QI: Putting On/Taking Off Footwear   Assistance Needed Physical assistance   Assistance Provided by [place] Total assistance   Comment Pt depend to don/doff socks and TEDs   Putting On/Taking Off Footwear CARE Score 1   QI: Picking Up Object   Reason if not Attempted Safety concerns   Picking Up Object CARE Score 88   Dressing/Undressing Clothing   Remove UB Clothes Other  (hospital gown tied to simulate shirt)   Remove LB Clothes Socks; [address] [facility] LB Clothes Pants; Undergarment;Socks;TEDs   Limitations Noted In Balance; Coordination; Endurance;Problem Solving; Safety;Strength;ROM; Timeliness   Positioning Supported Sit;Standing   UB Dressing (FIM) 4 - Patient completes 75% of all tasks   LB Dressing (FIM) 2 - Patient completes 25-49% of all tasks   QI: Sit to Lying   Assistance Needed Physical assistance   Assistance Provided by [place] 50%-74%   Comment Pt cont to require A w/ b/l LE  Pt noted to be uncomfortable in recliner despite multiple attempts at repositioning, pt returned to bed  Once in bed pt cont to report discomfort, however could not verbalize needs/clarify comfortable positioning   OT repositioned pt several times for optimal comfort w/ pt agreeable to comfort eventually  Pt and pt 's DTR explained that pt feels "trapped" as if she "can't get up" when SCDs are on and hip abductor wede is in place  OT provided edu on need for all items and that pt requires A from staff to safely get up/move around  Sit to Lying CARE Score 2   QI: Sit to Stand   Assistance Needed Physical assistance   Assistance Provided by [place] 25%-49%   Sit to Stand CARE Score 3   QI: Chair/Bed-to-Chair Transfer   Assistance Needed Physical assistance   Assistance Provided by [place] 25%-49%   Chair/Bed-to-Chair Transfer CARE Score 3   Transfer Bed/Chair/Wheelchair   Positioning Concerns Cognition   Limitations Noted In Balance;Confidence; Coordination; Endurance;Problem Solving; Sequencing;UE Strength;LE Strength   Adaptive Equipment Roller Walker   Sit Pivot Minimal Assist   Stand Pivot Minimal Assist   Sit to Stand Minimal;Other  (CG)   Stand to Sit Minimal;Other  (CG)   Supine to Sit Moderate Assist   Findings Pt CG sit<>stand at best w/ minimal VC and deep breath prior to transfer to dec pt anxious presentation  Pt Niharika sit<>stand w/ fatigue and inc anxious behavior  Pt benefits from tactile cues, minimal VC, inc time, and deep breathing to manage pt 's anxious behavior and inc overall fxnl performance  Pt Niharika w/ RW for SPT and short distance fxnl ambulation in room  Bed, Chair, Wheelchair Transfer (FIM) 3 - [place] needs to lift, boost or assist to stand OR sit   Cognition   Overall Cognitive Status Impaired   Attention Attends with cues to redirect   Orientation Level Oriented to person   Memory Decreased recall of precautions   Following Commands Follows one step commands with increased time or repetition   Comments Pt cont to recall 1/3 precautions despite cont edu     Activity Tolerance   Activity Tolerance Patient tolerated treatment well   Assessment   Treatment Assessment Pt participated in skilled OT Tx session w/ focus on completing ADL routine and optimal positioning in recliner/bed for pt 's comfort  Pt requires max inc time to complete ADL routine 2* dec problem solving  Pt 's  and DTR present during repositioning of pt and attempting to explain pt 's needs 2* pt w/ inc difficulty this session verbalizing comfort level  See above note for detail  Cont OT POC w/ focus on THP education, safety awareness, repetitive transfer training, stand letty, and dynamic standing balance to maximize pt indep w/ ADLs/transfers and dec caregiver burden  Prognosis Fair   Problem List Decreased strength;Decreased range of motion;Decreased endurance; Impaired balance;Decreased mobility; Decreased coordination;Decreased cognition; Impaired judgement;Decreased safety awareness;Orthopedic restrictions;Pain   Barriers to Discharge Inaccessible home environment;Decreased caregiver support   Plan   Treatment/Interventions ADL retraining;Functional transfer training;LE strengthening/ROM; Therapeutic exercise; Endurance training;Cognitive reorientation;Patient/family training;Equipment eval/education; Bed mobility; Compensatory technique education   Progress Progressing toward goals   OT Therapy Minutes   OT Time In 1230   OT Time Out 1430   OT Total Time (minutes) 120   OT Mode of treatment - Individual (minutes) 120   OT Mode of treatment - Concurrent (minutes) 0   OT Mode of treatment - Group (minutes) 0   OT Mode of treatment - Co-treat (minutes) 0   OT Mode of Teatment - Total time(minutes) 120 minutes   Therapy Time missed   Time missed?  No

## 2018-12-16 NOTE — PROGRESS NOTES
12/16/18 0900   Pain Assessment   Pain Assessment 0-10   Pain Score 3   Pain Type Acute pain;Surgical pain   Pain Location Hip   Pain Orientation Right   Effect of Pain on Daily Activities pain did not appear to limit session greatly    Patient's Stated Pain Goal No pain   Hospital Pain Intervention(s) Cold applied   Response to Interventions ice applied post session; repositioning performed with some relief found    Restrictions/Precautions   Precautions Bed/chair alarms;Cognitive; Fall Risk;Pain;THR;Supervision on toilet/commode   Weight Bearing Restrictions Yes   RLE Weight Bearing Per Order WBAT   ROM Restrictions Yes   RLE ROM Restriction Other  (THPs)   Braces or Orthoses (ABD pillow, TEDs, Binder )   Cognition   Overall Cognitive Status Impaired   Arousal/Participation Cooperative   Attention Attends with cues to redirect   Orientation Level Oriented to person;Oriented to situation   Memory Decreased recall of precautions  (able to recall 1/3)   Following Commands Follows one step commands with increased time or repetition   Comments Patient with increased participation this session; engaging in more conversation/banter and appears to follow directions more easily with less time required   Subjective   Subjective Patient agreeable to particiapte in PT session   QI: Lying to Sitting on Side of Bed   Assistance Needed Physical assistance   Assistance Provided by [place] 50%-74%   Comment with HOB elevated    Lying to Sitting on Side of Bed CARE Score 2   QI: Sit to Stand   Assistance Needed Incidental touching;Verbal cues   Assistance Provided by [place] Less than 25%   Comment CGA using RW; less cues for proper hand placement    Sit to Stand CARE Score 3   QI: Chair/Bed-to-Chair Transfer   Assistance Needed Physical assistance   Assistance Provided by [place] 25%-49%   Comment Niharika with RW    Chair/Bed-to-Chair Transfer CARE Score 3   Transfer Bed/Chair/Wheelchair   Limitations Noted In Balance;Confidence; Coordination;Problem Solving;LE Strength   Adaptive Equipment Roller Walker   Stand Pivot Minimal Assist   Sit to [employer]   Stand to [facility]   Supine to Sit Maximum Assist   Bed, Chair, Wheelchair Transfer (FIM) 3 - [place] lifts two limbs during transfer   QI: Car Transfer   Reason if not Attempted Safety concerns   Car Transfer CARE Score 88   QI: Walk 10 Feet   Assistance Needed Incidental touching;Verbal cues   Assistance Provided by [place] Less than 25%   Comment CGA with RW and chair closeby    Walk 10 Feet CARE Score 3   QI: Walk 50 Feet with Two Turns   Assistance Needed Incidental touching;Verbal cues   Assistance Provided by [place] Less than 25%   Comment CGA with RW and chair closeby    Walk 50 Feet with Two Turns CARE Score 3   QI: Walk 150 Feet   Reason if not Attempted Activity not applicable   Walk 044 Feet CARE Score 9   QI: Walking 10 Feet on Uneven Surfaces   Reason if not Attempted Activity not applicable   Walking 10 Feet on Uneven Surfaces CARE Score 9   Ambulation   Does the patient walk? 2  Yes   Primary Discharge Mode of Locomotion Walk   Walk Assist Level Contact Guard   Gait Pattern Antalgic;Decreased L stance   Assist Device Roller Walker   Distance Walked (feet) 90 ft  (x3)   Limitations Noted In Balance; Coordination; Endurance;Strength;Swing   Walking (FIM) 2 - Patient ambulates between 50 - 149 feet regardless of assist/device/set up   QI: Wheel 50 Feet with Two Turns   Reason if not Attempted Activity not applicable   Wheel 50 Feet with Two Turns CARE Score 9   QI: Wheel 150 Feet   Reason if not Attempted Activity not applicable   Wheel 139 Feet CARE Score 9   Wheelchair mobility   QI: Does the patient use a wheelchair? 0   No   QI: Picking Up Object   Reason if not Attempted Safety concerns   Picking Up Object CARE Score 88   QI: Toilet Transfer   Assistance Needed Physical assistance   Assistance Provided by [place] 25%-49%   Comment able to perform with Ax1 prior to session    Toilet Transfer CARE Score 3   Toilet Transfer   Surface Assessed Bedside Commode   Limitations Noted In Confidence;Balance;LE Strength; Sequencing   Positioning Concerns Cognition   Toilet Transfer (FIM) 4 - Patient completes 75% of all tasks   Therapeutic Interventions   Strengthening seated LAQ 10x2 with slight resistance in end range flexion; long sitting RLE external rotation 10x2   Flexibility HS/heel cord stretch RLE 60 seconds x2   Balance dyanmic stance at [facility] 60 seconds    Modalities ice applied post session for R hip pain   Other TEDs + binder applied at the beginning of session   Assessment   Treatment Assessment Patient showing progress with skilled PT intervention thusfar  Patient increasing particiaption and engaging in more appropriate conversation in therapy gym with other patients and therapists  She continues to demonstrate difficulty with hand placement during tranfers but has decreased time needed to problem solve with decreased instruciton required  Her ambulatory quaity and distance has improved with less pain reported  SHe will continue to benefit from skilled PT itnervention to maximize function and reduce caregiver burden,    PT Barriers   Physical Impairment Decreased strength;Decreased range of motion;Decreased endurance; Impaired balance;Decreased mobility; Decreased coordination;Decreased cognition;Decreased safety awareness;Decreased skin integrity;Orthopedic restrictions;Pain   Functional Limitation Car transfers;Stair negotiation;Standing;Transfers; Walking   Plan   Treatment/Interventions Functional transfer training;LE strengthening/ROM; Therapeutic exercise; Endurance training;Cognitive reorientation;Patient/family training;Equipment eval/education; Bed mobility;Gait training; Compensatory technique education   Progress Progressing toward goals   Recommendation   Recommendation Other (Comment)  (HHPT vs STR pending progress)   Equipment Recommended Walker   PT Therapy Minutes   PT Time In 0900   PT Time Out 1000   PT Total Time (minutes) 60   PT Mode of treatment - Individual (minutes) 60   PT Mode of treatment - Concurrent (minutes) 0   PT Mode of treatment - Group (minutes) 0   PT Mode of treatment - Co-treat (minutes) 0   PT Mode of Teatment - Total time(minutes) 60 minutes   Therapy Time missed   Time missed?  No

## 2018-12-17 LAB
ANION GAP SERPL CALCULATED.3IONS-SCNC: 7 MMOL/L (ref 4–13)
BASOPHILS # BLD AUTO: 0.03 THOUSANDS/ΜL (ref 0–0.1)
BASOPHILS NFR BLD AUTO: 1 % (ref 0–1)
BUN SERPL-MCNC: 20 MG/DL (ref 5–25)
CALCIUM SERPL-MCNC: 8.8 MG/DL (ref 8.3–10.1)
CHLORIDE SERPL-SCNC: 107 MMOL/L (ref 100–108)
CO2 SERPL-SCNC: 26 MMOL/L (ref 21–32)
CREAT SERPL-MCNC: 0.9 MG/DL (ref 0.6–1.3)
EOSINOPHIL # BLD AUTO: 0.09 THOUSAND/ΜL (ref 0–0.61)
EOSINOPHIL NFR BLD AUTO: 2 % (ref 0–6)
ERYTHROCYTE [DISTWIDTH] IN BLOOD BY AUTOMATED COUNT: 15.5 % (ref 11.6–15.1)
GFR SERPL CREATININE-BSD FRML MDRD: 61 ML/MIN/1.73SQ M
GLUCOSE SERPL-MCNC: 89 MG/DL (ref 65–140)
HCT VFR BLD AUTO: 34.9 % (ref 34.8–46.1)
HGB BLD-MCNC: 10.9 G/DL (ref 11.5–15.4)
IMM GRANULOCYTES # BLD AUTO: 0.14 THOUSAND/UL (ref 0–0.2)
IMM GRANULOCYTES NFR BLD AUTO: 2 % (ref 0–2)
LYMPHOCYTES # BLD AUTO: 0.92 THOUSANDS/ΜL (ref 0.6–4.47)
LYMPHOCYTES NFR BLD AUTO: 16 % (ref 14–44)
MCH RBC QN AUTO: 30.7 PG (ref 26.8–34.3)
MCHC RBC AUTO-ENTMCNC: 31.2 G/DL (ref 31.4–37.4)
MCV RBC AUTO: 98 FL (ref 82–98)
MONOCYTES # BLD AUTO: 0.39 THOUSAND/ΜL (ref 0.17–1.22)
MONOCYTES NFR BLD AUTO: 7 % (ref 4–12)
NEUTROPHILS # BLD AUTO: 4.35 THOUSANDS/ΜL (ref 1.85–7.62)
NEUTS SEG NFR BLD AUTO: 72 % (ref 43–75)
NRBC BLD AUTO-RTO: 0 /100 WBCS
PLATELET # BLD AUTO: 256 THOUSANDS/UL (ref 149–390)
PMV BLD AUTO: 10.1 FL (ref 8.9–12.7)
POTASSIUM SERPL-SCNC: 3.9 MMOL/L (ref 3.5–5.3)
RBC # BLD AUTO: 3.55 MILLION/UL (ref 3.81–5.12)
SODIUM SERPL-SCNC: 140 MMOL/L (ref 136–145)
WBC # BLD AUTO: 5.92 THOUSAND/UL (ref 4.31–10.16)

## 2018-12-17 PROCEDURE — 97116 GAIT TRAINING THERAPY: CPT

## 2018-12-17 PROCEDURE — 97530 THERAPEUTIC ACTIVITIES: CPT

## 2018-12-17 PROCEDURE — 97535 SELF CARE MNGMENT TRAINING: CPT

## 2018-12-17 PROCEDURE — 80048 BASIC METABOLIC PNL TOTAL CA: CPT | Performed by: NURSE PRACTITIONER

## 2018-12-17 PROCEDURE — 97110 THERAPEUTIC EXERCISES: CPT

## 2018-12-17 PROCEDURE — 99232 SBSQ HOSP IP/OBS MODERATE 35: CPT

## 2018-12-17 PROCEDURE — 85025 COMPLETE CBC W/AUTO DIFF WBC: CPT | Performed by: NURSE PRACTITIONER

## 2018-12-17 RX ORDER — METOPROLOL SUCCINATE 25 MG/1
12.5 TABLET, EXTENDED RELEASE ORAL
Status: DISCONTINUED | OUTPATIENT
Start: 2018-12-17 | End: 2018-12-25 | Stop reason: HOSPADM

## 2018-12-17 RX ORDER — POLYETHYLENE GLYCOL 3350 17 G/17G
17 POWDER, FOR SOLUTION ORAL ONCE
Status: DISCONTINUED | OUTPATIENT
Start: 2018-12-17 | End: 2018-12-25 | Stop reason: HOSPADM

## 2018-12-17 RX ORDER — DOCUSATE SODIUM 100 MG/1
100 CAPSULE, LIQUID FILLED ORAL 2 TIMES DAILY
Status: DISCONTINUED | OUTPATIENT
Start: 2018-12-17 | End: 2018-12-25 | Stop reason: HOSPADM

## 2018-12-17 RX ADMIN — ACETAMINOPHEN 975 MG: 325 TABLET, FILM COATED ORAL at 06:01

## 2018-12-17 RX ADMIN — VITAMIN D, TAB 1000IU (100/BT) 5000 UNITS: 25 TAB at 08:56

## 2018-12-17 RX ADMIN — APIXABAN 5 MG: 5 TABLET, FILM COATED ORAL at 08:56

## 2018-12-17 RX ADMIN — APIXABAN 5 MG: 5 TABLET, FILM COATED ORAL at 21:28

## 2018-12-17 RX ADMIN — METOPROLOL TARTRATE 12.5 MG: 25 TABLET ORAL at 08:56

## 2018-12-17 RX ADMIN — CALCIUM CARBONATE (ANTACID) CHEW TAB 500 MG 500 MG: 500 CHEW TAB at 15:49

## 2018-12-17 RX ADMIN — MELATONIN 3 MG: at 21:28

## 2018-12-17 RX ADMIN — CALCIUM CARBONATE (ANTACID) CHEW TAB 500 MG 500 MG: 500 CHEW TAB at 08:56

## 2018-12-17 RX ADMIN — AMIODARONE HYDROCHLORIDE 200 MG: 200 TABLET ORAL at 08:57

## 2018-12-17 RX ADMIN — ACETAMINOPHEN 975 MG: 325 TABLET, FILM COATED ORAL at 14:23

## 2018-12-17 RX ADMIN — ACETAMINOPHEN 975 MG: 325 TABLET, FILM COATED ORAL at 21:28

## 2018-12-17 RX ADMIN — DOCUSATE SODIUM 100 MG: 100 CAPSULE, LIQUID FILLED ORAL at 17:24

## 2018-12-17 RX ADMIN — PRAVASTATIN SODIUM 40 MG: 40 TABLET ORAL at 15:49

## 2018-12-17 RX ADMIN — LEVOTHYROXINE SODIUM 50 MCG: 50 TABLET ORAL at 06:01

## 2018-12-17 RX ADMIN — PANTOPRAZOLE SODIUM 20 MG: 20 TABLET, DELAYED RELEASE ORAL at 06:01

## 2018-12-17 NOTE — PROGRESS NOTES
12/17/18 1330   Pain Assessment   Pain Assessment 0-10   Pain Score 7   Pain Type Acute pain;Surgical pain   Pain Location Hip   Pain Orientation Right   Pain Descriptors Aching;Nagging   Pain Onset Ongoing   Clinical Progression Not changed   Hospital Pain Intervention(s) Repositioned;Cold applied; Rest   Restrictions/Precautions   Precautions Bed/chair alarms;Cognitive; Fall Risk;Pain;Supervision on toilet/commode   Weight Bearing Restrictions Yes   RLE Weight Bearing Per Order WBAT   ROM Restrictions Yes   RLE ROM Restriction (THPs)   Braces or Orthoses (abduction pillow)   QI: Roll Left and Right   Assistance Needed Physical assistance   Assistance Provided by [place] 25%-49%   Comment A with RLE with abduction pillow   Roll Left and Right CARE Score 3   QI: Sit to Lying   Assistance Needed Physical assistance   Assistance Provided by [place] 25%-49%   Comment A with RLE with abduction pillow   Sit to Lying CARE Score 3   QI: Sit to Stand   Assistance Needed Physical assistance   Assistance Provided by [place] Less than 25%   Comment Niharika   Sit to Stand CARE Score 3   QI: Chair/Bed-to-Chair Transfer   Assistance Needed Physical assistance   Assistance Provided by [place] Less than 25%   Comment Niharika with RW, v/c   Chair/Bed-to-Chair Transfer CARE Score 3   Transfer Bed/Chair/Wheelchair   Positioning Concerns Cognition   Limitations Noted In Confidence;Pain Management;LE Strength   Adaptive Equipment Roller Walker   Findings Pt able to perform transfer at [facility] with v/c to slow pace  Bed, Chair, Wheelchair Transfer (FIM) 4 - Patient completes 75% of all tasks   Therapeutic Excerise-Strength   UE Strength Yes   Right Upper Extremity- Strength   R Position Seated   Equipment [place]   R Weight/Reps/Sets 2#/10/3   RUE Strength Comment bicep curls, chest presses, shoulder presses to inc UE strength for functional transfers   Pt req rest breaks 2* weakness and fatigue in RUE   Left Upper Extremity-Strength   LUE Strength Comment Refer to RUE   Cognition   Overall Cognitive Status Impaired   Arousal/Participation Cooperative   Attention Attends with cues to redirect   Orientation Level Oriented to person;Oriented to place;Oriented to situation;Oriented to time   Memory Decreased recall of precautions   Following Commands Follows one step commands with increased time or repetition   Comments Pt able to recall all THPs during session   Activity Tolerance   Activity Tolerance Patient limited by fatigue;Patient limited by pain   Assessment   Treatment Assessment Pt participated in skilled OT session focusing on functional transfers and UE strengthening  Pt's daughter and  present during session and provided encouragement to pt to inc participation in therapy  Pt able to tolerate UE exercises  Pt fatigued at end of session but able to complete [place] stand pivot with RW when cued to take ext time to position appropriately for transfers  Pt would benefit from cont therapy focusing on functional transfers, bed mobility, and overall strength to inc indep with ADL performance  Cont with POC  Prognosis Fair   Problem List Decreased strength;Decreased range of motion;Decreased endurance; Impaired balance;Decreased mobility; Decreased coordination;Decreased cognition; Impaired judgement;Decreased safety awareness;Orthopedic restrictions;Pain   Barriers to Discharge Inaccessible home environment;Decreased caregiver support   Plan   Treatment/Interventions ADL retraining;Functional transfer training; Therapeutic exercise; Endurance training;Bed mobility   Progress Progressing toward goals   Recommendation   OT Discharge Recommendation (SNF)   OT Therapy Minutes   OT Time In 1330   OT Time Out 1400   OT Total Time (minutes) 30   OT Mode of treatment - Individual (minutes) 30   OT Mode of treatment - Concurrent (minutes) 0   OT Mode of treatment - Group (minutes) 0   OT Mode of treatment - Co-treat (minutes) 0   OT Mode of Teatment - Total time(minutes) 30 minutes   Therapy Time missed   Time missed?  No

## 2018-12-17 NOTE — PROGRESS NOTES
12/17/18 1000   Pain Assessment   Pain Assessment 0-10   Pain Score 3   Pain Type Acute pain;Surgical pain   Pain Location Hip   Pain Orientation Right   Pain Descriptors Aching   Pain Frequency Constant/continuous   Pain Onset Ongoing   Clinical Progression Not changed   Effect of Pain on Daily Activities pt with dec confidence in RLE strength during transfers 2* pain   Hospital Pain Intervention(s) Repositioned; Rest   Restrictions/Precautions   Precautions Bed/chair alarms;Cognitive; Fall Risk;Pain;Supervision on toilet/commode   Weight Bearing Restrictions Yes   RLE Weight Bearing Per Order WBAT   ROM Restrictions Yes   RLE ROM Restriction (THPs)   Braces or Orthoses (abduction pillow while in bed)   QI: Oral Hygiene   Assistance Needed Set-up / [facility] Provided by [place] No physical assistance   Oral Hygiene CARE Score 5   Grooming   Able To Initiate Tasks;Comb/Brush Hair;Wash/Dry Face;Brush/Clean Teeth   Limitation Noted In Coordination;Strength   Findings Pt cont to perform grooming tasks seated in w/c 2* dec standing balance with hand release from RW   Grooming (FIM) 5 - [place] sets up supplies or applies device   QI: Roll Left and Right   Assistance Needed Physical assistance   Assistance Provided by [place] 25%-49%   Comment A to manage RLE, v/c to sequence transfer   Roll Left and Right CARE Score 3   QI: Lying to Sitting on Side of Bed   Assistance Needed Physical assistance   Assistance Provided by [place] 25%-49%   Comment HOB elevated   Lying to Sitting on Side of Bed CARE Score 3   QI: Sit to Stand   Assistance Needed Physical assistance   Assistance Provided by [place] 25%-49%   Comment fluctuates from CGA to [address] with fatigue and distraction   Sit to Stand CARE Score 3   QI: Chair/Bed-to-Chair Transfer   Assistance Needed Physical assistance   Assistance Provided by [place] 25%-49%   Comment modA with RW   Chair/Bed-to-Chair Transfer CARE Score 3   Transfer Bed/Chair/Wheelchair Limitations Noted In Confidence; Endurance;Pain Management; Sequencing   Adaptive Equipment Roller Walker   Findings Pt engaged in repetitive sit<>stands to inc indep with functional transfers  Pt cont to present with anxious and tearful behavior during movement  Pt cont to fluctuate with transfers from CGA to [address] with inc v/c  Pt instructed to reference written directions for transfers on RW but cont to demo P carryover with sequencing  Pt's indep with transfers limited by pain, weakness, dec sequencing, and dec confidence  Bed, Chair, Wheelchair Transfer (FIM) 3 - [place] needs to lift, boost or assist to stand OR sit   QI: [address] Needed Incidental touching   Assistance Provided by [place] Less than 25%   Comment CGA to maintain balance in stance   [address] Score 3   Toileting   Able to [address] down yes, up no  Manage Hygiene Bladder   Limitations Noted In Balance; Coordination; Safety;LE Strength   Adaptive Equipment (RW)   Findings Pt able to perform jhon hygiene in stance with CGA to maintain balance  Pt req A for CM over rear in stance  Toileting (FIM) 4 - Patient requires steadying assist or light touching   QI: Toilet Transfer   Assistance Needed Physical assistance   Assistance Provided by [place] Less than 25%   Comment Niharika for transfer to/from commode   Toilet Transfer CARE Score 3   Toilet Transfer   Surface Assessed Standard Commode   Transfer Technique Stand Pivot   Limitations Noted In Balance;Confidence; Endurance;LE Strength   Positioning Concerns Cognition   Toilet Transfer (FIM) 4 - Patient completes 75% of all tasks   Functional Standing Tolerance   Time 2min   Activity hygiene and CM   Comments CGA for balance during toileting   Cognition   Overall Cognitive Status Impaired   Arousal/Participation Cooperative   Attention Attends with cues to redirect   Orientation Level Oriented to person;Oriented to place;Oriented to situation   Memory Decreased recall of precautions   Following Commands Follows one step commands with increased time or repetition   Activity Tolerance   Activity Tolerance Patient limited by fatigue;Patient limited by pain   Assessment   Treatment Assessment Pt participated in skilled OT session focusing on functional transfers, toileting, and grooming  Pt cont to fluctuate with functional transfers 2* pain, fatigue, and confidence  Pt req one step, simple cues to complete transfers  Pt tearful throughout entire session and reports frustration with self and situation is making it hard for her to stay motivated  Pt engaged in conversation regarding overall progress during hospital stay regarding inc indep with functional transfers and ADL performance since admission and edu on the benefits of continue therapy at [facility]  Pt would benefit from cont therapy focusing on functional transfers, ADL performance, and overall strength to dec burden of care  Cont with POC  Prognosis Fair   Problem List Decreased strength;Decreased range of motion;Decreased endurance; Impaired balance;Decreased mobility; Decreased coordination;Decreased cognition; Impaired judgement;Decreased safety awareness;Orthopedic restrictions;Pain   Barriers to Discharge Inaccessible home environment;Decreased caregiver support   Plan   Treatment/Interventions ADL retraining;Functional transfer training; Therapeutic exercise; Endurance training;Bed mobility   Progress Progressing toward goals   Recommendation   OT Discharge Recommendation (SNF)   OT Therapy Minutes   OT Time In 1000   OT Time Out 1100   OT Total Time (minutes) 60   OT Mode of treatment - Individual (minutes) 60   OT Mode of treatment - Concurrent (minutes) 0   OT Mode of treatment - Group (minutes) 0   OT Mode of treatment - Co-treat (minutes) 0   OT Mode of Teatment - Total time(minutes) 60 minutes   Therapy Time missed   Time missed?  No

## 2018-12-17 NOTE — PROGRESS NOTES
12/17/18 1230   Pain Assessment   Pain Assessment 0-10   Pain Score 3   Pain Type Surgical pain;Acute pain   Pain Location Hip;Groin   Pain Orientation Right   Restrictions/Precautions   Precautions Bed/chair alarms;Cognitive; Fall Risk;Supervision on toilet/commode   Braces or Orthoses (TEDs abdominal binder)   Cognition   Overall Cognitive Status Impaired   Subjective   Subjective pt c/o minor pain in R hip during mobility   QI: Roll Left and Right   Assistance Needed Physical assistance   Assistance Provided by [place] 25%-49%   Roll Left and Right CARE Score 3   QI: Sit to Lying   Assistance Needed Physical assistance   Assistance Provided by [place] 50%-74%   Sit to Lying CARE Score 2   QI: Lying to Sitting on Side of Bed   Assistance Needed Physical assistance   Assistance Provided by [place] 50%-74%   Lying to Sitting on Side of Bed CARE Score 2   QI: Sit to Stand   Assistance Needed Physical assistance   Assistance Provided by [place] 25%-49%   Comment Niharika with fatigue, CGA start of session   Sit to Stand CARE Score 3   QI: Chair/Bed-to-Chair Transfer   Assistance Needed Physical assistance; Adaptive equipment;Verbal cues   Assistance Provided by [place] Less than 25%   Chair/Bed-to-Chair Transfer CARE Score 3   Transfer Bed/Chair/Wheelchair   Limitations Noted In Balance; Coordination;Problem Solving;UE Strength;LE Strength   Adaptive Equipment Roller Walker   Stand Pivot Contact Guard;Minimal Assist   Sit to Stand Minimal Assist   Stand to Sit Contact Guard   Supine to Sit Moderate Assist   Sit to Supine Moderate Assist   Findings due to fatigue pt needed more A getting in and out of bed and with STS at end of session  Bed, Chair, Wheelchair Transfer (FIM) 3 - [place] lifts two limbs during transfer   QI: Car Transfer   Reason if not Attempted Safety concerns   Car Transfer CARE Score 88   QI: Walk 10 Feet   Assistance Needed Incidental touching; Adaptive equipment   Walk 10 Feet CARE Score 4   QI: Walk 50 Feet with Two Turns   Assistance Needed Incidental touching; Adaptive equipment   Walk 50 Feet with Two Turns CARE Score 4   QI: Walk 150 Feet   Comment limited by fatigue   Reason if not Attempted Safety concerns   Walk 150 Feet CARE Score 88   QI: Walking 10 Feet on Uneven Surfaces   Reason if not Attempted Safety concerns   Walking 10 Feet on Uneven Surfaces CARE Score 88   Ambulation   Does the patient walk? 2  Yes   Primary Discharge Mode of Locomotion Walk   Walk Assist Level Contact Guard   Gait Pattern Antalgic; Slow [place];Decreased foot clearance; Improper weight shift   Assist Device [place] Block Walked (feet) 135 ft   Limitations Noted In Balance; Endurance; Sequencing;Speed;Strength   Findings pt limited with gait distance this session due to fatigue, no decrease safety noted, increase time to perform   Walking (FIM) 2 - Patient ambulates between 50 - 149 feet regardless of assist/device/set up   Wheelchair mobility   QI: Does the patient use a wheelchair? 0  No   QI: 4 Steps   Assistance Needed Physical assistance; Adaptive equipment   Assistance Provided by [place] 25%-49%   4 Steps CARE Score 3   QI: 12 Steps   Reason if not Attempted Safety concerns   12 Steps CARE Score 88   Stairs   Type Stairs   # of Steps 6   Weight Bearing Precautions Fall Risk   Assist Devices Bilateral Rail   Findings pt performed 4 steps ascending fwd and descneding bwds  performed 2 steps descending fwd   pt has single rail at home, spouse stated they are putting another in   [address] (FIM) 2 - Patient goes up and down 4 - 11 stairs regardless of assist/device/setup   QI: Toilet Transfer   Assistance Needed Physical assistance   Assistance Provided by [place] 25%-49%   Toilet Transfer CARE Score 3   Toilet Transfer   Surface Assessed Standard Toilet   Limitations Noted In [address]  (and RW)   Positioning Concerns Cognition; Safety   Findings pt went to bathroom by PT gym, pt had gas no BM  Toilet Transfer (FIM) 3 - [place] needs to lift, boost or assist to stand OR sit   Therapeutic Interventions   Strengthening standing alt hip flex to improve wt bearing and strenghtening on RLE x20  supine SAQ on R x20, AA heel slides on R x20 during rest break   Assessment   Treatment Assessment pt trialed steps today and able to improve, but has difficulty descneding fwd at this time due to pain and balance concerns  pt had diffculty with bed mobility this session require more VCs and hands on A because of RLE  no c/o being lightheaded this session, just overall fatigue from pt   will cont to improve strength, endurance and safety to progress with functional mobility and Ind  Family/Caregiver Present spouse nad dtr   Problem List Decreased strength;Decreased range of motion;Decreased endurance; Impaired balance;Decreased mobility; Decreased coordination;Decreased cognition; Impaired judgement;Decreased safety awareness;Orthopedic restrictions;Pain   Barriers to Discharge Inaccessible home environment;Decreased caregiver support   PT Barriers   Physical Impairment Decreased strength;Decreased range of motion;Decreased endurance; Impaired balance;Decreased mobility; Decreased coordination;Decreased cognition;Decreased safety awareness;Decreased skin integrity;Orthopedic restrictions;Pain   Functional Limitation Car transfers;Stair negotiation;Standing;Transfers; Walking   Plan   Treatment/Interventions Functional transfer training;LE strengthening/ROM; Endurance training;Patient/family training;Bed mobility;Gait training   Progress Progressing toward goals   Recommendation   Recommendation 24 hour supervision/assist;Home PT; Home with family support   Equipment Recommended Walker   PT Therapy Minutes   PT Time In 1230   PT Time Out 1330   PT Total Time (minutes) 60   PT Mode of treatment - Individual (minutes) 60   PT Mode of treatment - Concurrent (minutes) 0   PT Mode of treatment - Group (minutes) 0 PT Mode of treatment - Co-treat (minutes) 0   PT Mode of Teatment - Total time(minutes) 60 minutes   Therapy Time missed   Time missed?  No

## 2018-12-17 NOTE — PROGRESS NOTES
12/17/18 1100   Pain Assessment   Pain Assessment No/denies pain   Restrictions/Precautions   Precautions Bed/chair alarms;Cognitive; Fall Risk;Supervision on toilet/commode   Braces or Orthoses (TEDs and abdominal binder)   Cognition   Overall Cognitive Status Impaired   Subjective   Subjective pt doing ok  pt states she is fearful at times with moving and afraid of falling   QI: Sit to Stand   Assistance Needed Physical assistance;Verbal cues   Assistance Provided by [place] 25%-49%   Comment needs more cueing than phsyical help    Sit to Stand CARE Score 3   QI: Chair/Bed-to-Chair Transfer   Assistance Needed Physical assistance; Adaptive equipment   Assistance Provided by [place] 25%-49%   Comment RW   Chair/Bed-to-Chair Transfer CARE Score 3   Transfer Bed/Chair/Wheelchair   Limitations Noted In Balance;Problem Solving; Sequencing;UE Strength;LE Strength   Adaptive Equipment Roller Walker   Stand Pivot Contact Guard;Minimal Assist   Sit to Stand Contact Guard;Minimal Assist   Stand to [facility]   Findings pt requires increase cueing for hand placement this session, less physical A needed   Bed, Chair, Wheelchair Transfer (FIM) 3 - [place] needs to lift, boost or assist to stand OR sit   QI: Car Transfer   Reason if not Attempted Safety concerns   Car Transfer CARE Score 88   QI: Walk 10 Feet   Assistance Needed Incidental touching; Adaptive equipment   Walk 10 Feet CARE Score 4   QI: Walk 50 Feet with Two Turns   Assistance Needed Incidental touching; Adaptive equipment   Walk 50 Feet with Two Turns CARE Score 4   QI: Walk 150 Feet   Assistance Needed Incidental touching; Adaptive equipment   Walk 150 Feet CARE Score 4   Ambulation   Does the patient walk? 2  Yes   Primary Discharge Mode of Locomotion Walk   Walk Assist Level Contact Guard   Gait Pattern Antalgic; Slow Yamilka;Decreased foot clearance; Improper weight shift   Assist Device Napoleon Block Walked (feet) 150 ft   Limitations Noted In Balance; Endurance; Heel Strike; Sequencing;Speed;Strength;Swing   Findings CF for safety   Walking (FIM) 1 - Patient requires assist of two people   Wheelchair mobility   QI: Does the patient use a wheelchair? 0  No   Therapeutic Interventions   Strengthening repeated STS to improve sequencing and funcitonal strengthening   Flexibility B/L gastroc and HS x3mins   Assessment   Treatment Assessment pt tolerated tx well,with cont focus on improving safety and activity tolerance with functional txs  pt cont to need more VCs for sequencing this session for hand placement  pt demonstrated improved gait distance with no rest breaks or c/o lightheaded  will cont to focus on safety and strenghtening to progress with functional mobility and ind  Problem List Decreased strength;Decreased range of motion;Decreased endurance; Impaired balance;Decreased mobility; Decreased coordination;Decreased cognition; Impaired judgement;Decreased safety awareness;Orthopedic restrictions;Pain   Barriers to Discharge Inaccessible home environment;Decreased caregiver support   PT Barriers   Physical Impairment Decreased strength;Decreased range of motion;Decreased endurance; Impaired balance;Decreased mobility; Decreased coordination;Decreased cognition;Decreased safety awareness;Decreased skin integrity;Orthopedic restrictions;Pain   Functional Limitation Car transfers;Stair negotiation;Standing;Transfers; Walking   Plan   Treatment/Interventions Functional transfer training;LE strengthening/ROM; Endurance training;Patient/family training;Bed mobility;Gait training   Progress Progressing toward goals   Recommendation   Recommendation 24 hour supervision/assist;Home PT; Home with family support   PT Therapy Minutes   PT Time In 1100   PT Time Out 1130   PT Total Time (minutes) 30   PT Mode of treatment - Individual (minutes) 30   PT Mode of treatment - Concurrent (minutes) 0   PT Mode of treatment - Group (minutes) 0   PT Mode of treatment - Co-treat (minutes) 0   PT Mode of Teatment - Total time(minutes) 30 minutes   Therapy Time missed   Time missed?  No

## 2018-12-17 NOTE — PROGRESS NOTES
Internal Medicine Progress Note  Patient: Vreena Doe  Age/sex: 66 y o  female  Medical Record #: [id number]      ASSESSMENT/PLAN:  Verena Doe is seen and examined and management for following issues:    Right hip fracture; s/p right MIKE 12/4:  Pain control per primary service        PAF:  on Amiodarone 200 mg qd (new for her)/Lopressor 12 5 mg q 12 hours/Eliquis  Still missing doses of Lopressor 2/2 BP, will change to Toprol 12 5mg qhs      HTN/orthostasis: TEDS and abd binder for orthostasis    ABLA:  3 U PRBCs since admit to [facility] = stable now    Severe AS/LVEF 55%: no volume overload at this time    Nausea: continue prn Zofran; hasn't needed much = LD 12/16    Diarrhea:  Seems to have resolved but watch 2/2 Amio       Subjective: offers no complaints  Appetite has improved      ROS:   GI: denies abdominal pain, change bowel habits or reflux symptoms  Neuro: No new neurologic changes  Respiratory: No Cough, SOB  Cardiovascular: No CP, palpitations     Scheduled Meds:    Current Facility-Administered Medications:  acetaminophen 975 mg Oral Q8H [facility] Venson Kocher, MD   amiodarone 200 mg Oral Daily With Breakfast IRMA Bell   apixaban 5 mg Oral Q12H [facility] Venson Kocher, MD   bisacodyl 10 mg Rectal Daily PRN Venson Kocher, MD   bisacodyl 10 mg Rectal Once Mando Mei MD   calcium carbonate 500 mg Oral BID With Meals Venson Kocher, MD   cholecalciferol 5,000 Units Oral Daily Venson Kocher, MD   levothyroxine 50 mcg Oral Early Morning Venson Kocher, MD   lidocaine 2 patch Topical Daily Venson Kocher, MD   melatonin 3 mg Oral HS Mando Mei MD   metoprolol tartrate 12 5 mg Oral Q12H [facility] IRMA Najera   ondansetron 4 mg Oral Q6H PRN IRMA Bell   oxyCODONE 2 5 mg Oral Q6H PRN Mando Mei MD   pantoprazole 20 mg Oral Early Morning Venson Kocher, MD   polyethylene glycol 17 g Oral Daily PRN Venson Kocher, MD   pravastatin 40 mg Oral Daily With [employer] Luz Farooq MD       Labs:       Results from last 7 days  Lab Units 12/17/18  0517 12/14/18  0759   WBC Thousand/uL 5 92 7 92   HEMOGLOBIN g/dL 10 9* 10 8*   HEMATOCRIT % 34 9 32 7*   PLATELETS Thousands/uL 256 223       Results from last 7 days  Lab Units 12/17/18  0517 12/13/18  0612   POTASSIUM mmol/L 3 9 3 7   CHLORIDE mmol/L 107 104   CO2 mmol/L 26 27   BUN mg/dL 20 21   CREATININE mg/dL 0 90 0 92   CALCIUM mg/dL 8 8 9 4                  Glucose, i-STAT (mg/dl)   Date Value   12/04/2018 136   12/03/2018 128       Labs reviewed    Physical Examination:  Vitals:   Vitals:    12/16/18 2013 12/16/18 2059 12/17/18 0509 12/17/18 0855   BP: (!) 84/50 108/58 144/72 134/63   BP Location: Right arm Right arm Right arm Right arm   Pulse: 72  82 82   Resp: 18  18    Temp: 98 5 °F (36 9 °C)  97 8 °F (36 6 °C)    TempSrc: Oral  Oral    SpO2: 94%  94%    Weight:       Height:         Constitutional:  NAD; pleasant; nontoxic  HEENT:  AT/NC; oropharynx negative for thrush on tongue   Neck: negative for JVD  CV:  +S1, S2;  RRR; no rub/+murmur  Pulmonary:  BBS without crackles/wheeze/rhonci; resp are unlabored  Abdominal:  soft, +BS, ND/NT  Skin:  no rashes  Musculoskeletal:  Has some RLE edema  :  no zamarripa  Neurological/Psych:  AA with confusion;  JENKINS 4/5; no depression/anxiety        [ X ] Total time spent: 30 Mins and greater than 50% of this time was spent counseling/coordinating care  ** Please Note: Dragon 360 Dictation voice to text software may have been used in the creation of this document   **

## 2018-12-18 PROCEDURE — 97530 THERAPEUTIC ACTIVITIES: CPT

## 2018-12-18 PROCEDURE — 97535 SELF CARE MNGMENT TRAINING: CPT

## 2018-12-18 PROCEDURE — 99232 SBSQ HOSP IP/OBS MODERATE 35: CPT

## 2018-12-18 PROCEDURE — 97116 GAIT TRAINING THERAPY: CPT

## 2018-12-18 PROCEDURE — 97110 THERAPEUTIC EXERCISES: CPT

## 2018-12-18 RX ADMIN — AMIODARONE HYDROCHLORIDE 200 MG: 200 TABLET ORAL at 08:30

## 2018-12-18 RX ADMIN — DOCUSATE SODIUM 100 MG: 100 CAPSULE, LIQUID FILLED ORAL at 08:30

## 2018-12-18 RX ADMIN — OXYCODONE HYDROCHLORIDE 2.5 MG: 5 TABLET ORAL at 13:30

## 2018-12-18 RX ADMIN — APIXABAN 5 MG: 5 TABLET, FILM COATED ORAL at 21:18

## 2018-12-18 RX ADMIN — APIXABAN 5 MG: 5 TABLET, FILM COATED ORAL at 08:30

## 2018-12-18 RX ADMIN — DOCUSATE SODIUM 100 MG: 100 CAPSULE, LIQUID FILLED ORAL at 17:28

## 2018-12-18 RX ADMIN — CALCIUM CARBONATE (ANTACID) CHEW TAB 500 MG 500 MG: 500 CHEW TAB at 08:30

## 2018-12-18 RX ADMIN — ACETAMINOPHEN 975 MG: 325 TABLET, FILM COATED ORAL at 21:18

## 2018-12-18 RX ADMIN — PANTOPRAZOLE SODIUM 20 MG: 20 TABLET, DELAYED RELEASE ORAL at 05:40

## 2018-12-18 RX ADMIN — LIDOCAINE 2 PATCH: 50 PATCH CUTANEOUS at 08:30

## 2018-12-18 RX ADMIN — LEVOTHYROXINE SODIUM 50 MCG: 50 TABLET ORAL at 05:40

## 2018-12-18 RX ADMIN — OXYCODONE HYDROCHLORIDE 2.5 MG: 5 TABLET ORAL at 05:40

## 2018-12-18 RX ADMIN — MELATONIN 3 MG: at 21:18

## 2018-12-18 RX ADMIN — VITAMIN D, TAB 1000IU (100/BT) 5000 UNITS: 25 TAB at 08:30

## 2018-12-18 RX ADMIN — ACETAMINOPHEN 975 MG: 325 TABLET, FILM COATED ORAL at 05:40

## 2018-12-18 RX ADMIN — CALCIUM CARBONATE (ANTACID) CHEW TAB 500 MG 500 MG: 500 CHEW TAB at 17:28

## 2018-12-18 RX ADMIN — METOPROLOL SUCCINATE 12.5 MG: 25 TABLET, EXTENDED RELEASE ORAL at 21:18

## 2018-12-18 RX ADMIN — ACETAMINOPHEN 975 MG: 325 TABLET, FILM COATED ORAL at 14:09

## 2018-12-18 RX ADMIN — PRAVASTATIN SODIUM 40 MG: 40 TABLET ORAL at 17:28

## 2018-12-18 NOTE — PROGRESS NOTES
12/18/18 1230   Pain Assessment   Pain Assessment 0-10   Pain Score 7   Pain Type Acute pain;Surgical pain   Pain Location Leg   Pain Orientation Right   Effect of Pain on Daily Activities patient expressing pain initially at R knee but later reproting groin pain  Ice applied during therex with elevation performed post session  RN notified and pain medication administered during session  RN reporting patient has not had any pain medication "in awhile"  Spouse and patient encouraged to ask for medication prior to therapy to help control it and icnrease participation    Patient's Stated Pain Goal No pain   Hospital Pain Intervention(s) Cold applied; Ambulation/increased activity;Repositioned   Response to Interventions increased with mobility/ AAROM    Restrictions/Precautions   Precautions Bed/chair alarms; Fall Risk;Pain;THR;Supervision on toilet/commode   Weight Bearing Restrictions Yes   RLE Weight Bearing Per Order WBAT   ROM Restrictions Yes   RLE ROM Restriction Range Limitation  (THPs)   Braces or Orthoses (ABD binder/TEDS)   Cognition   Overall Cognitive Status Impaired   Attention Attends with cues to redirect   Orientation Level Oriented to person;Oriented to place   Memory Decreased recall of precautions;Decreased recall of recent events;Decreased short term memory;Decreased recall of biographical information  (able to recall 2/3 THPs )   Following Commands Follows one step commands with increased time or repetition   Comments Patient encouraged to familirize self with list in room to increase memory of THPs  Able to demonstrate understanding during tasks      Subjective   Subjective Patient agreeable to PT session    QI: Sit to Lying   Assistance Needed Physical assistance   Assistance Provided by [place] 50%-74%   Comment A with LE on high/low mat    Sit to Lying CARE Score 2   QI: Lying to Sitting on Side of Bed   Assistance Needed Physical assistance   Assistance Provided by [place] 25%-49%   Comment from high low mat    Lying to Sitting on Side of Bed CARE Score 3   QI: Sit to Stand   Assistance Needed Physical assistance   Assistance Provided by [place] 25%-49%   Comment Niharika with RW    Sit to Stand CARE Score 3   QI: Chair/Bed-to-Chair Transfer   Assistance Needed Physical assistance   Assistance Provided by [place] 50%-74%   Comment increased instruction and assist needed with pain increase in L hip, patient difficulty focussing    Chair/Bed-to-Chair Transfer CARE Score 2   Transfer Bed/Chair/Wheelchair   Limitations Noted In Balance;Confidence; Coordination;Pain Management; Endurance; Sequencing;UE Strength;LE Strength   Adaptive Equipment Roller Walker   Stand Pivot Moderate Assist   Sit to Stand Minimal Assist   Stand to Sit Minimal Assist   Supine to Sit Minimal Assist   Sit to Supine Moderate Assist   All Transfer Moderate Assist   Findings allow increased time for patient to problem solve during tasks    Bed, Chair, Wheelchair Transfer (FIM) 3 - [place] needs to lift, boost or assist to stand OR sit   QI: Car Transfer   Reason if not Attempted Safety concerns   Car Transfer CARE Score 88   QI: Walk 10 Feet   Assistance Needed Incidental touching   Assistance Provided by [place] Less than 25%   Comment CGA with RW    Walk 10 Feet CARE Score 3   QI: Walk 50 Feet with Two Turns   Assistance Needed Incidental touching   Assistance Provided by [place] Less than 25%   Comment CGA with RW    Walk 50 Feet with Two Turns CARE Score 3   QI: Walk 150 Feet   Reason if not Attempted Safety concerns   Walk 150 Feet CARE Score 88   QI: Walking 10 Feet on Uneven Surfaces   Reason if not Attempted Safety concerns   Walking 10 Feet on Uneven Surfaces CARE Score 88   Ambulation   Does the patient walk? 2  Yes   Primary Discharge Mode of Locomotion Walk   Walk Assist Level Contact Guard   Gait Pattern Antalgic; Inconsistant Yamilka; Forward Flexion;Decreased R stance;Narrow MINNIE   Assist Device Napoleon Block Walked (feet) 130 ft  (x2)   Limitations Noted In Balance; Coordination;Device Management; Endurance; Heel Strike; Sequencing   Findings limited by pain and fatigue    Walking (FIM) 2 - Patient ambulates between 50 - 149 feet regardless of assist/device/set up   QI: Wheel 50 Feet with Two Turns   Reason if not Attempted Activity not applicable   Wheel 50 Feet with Two Turns CARE Score 9   QI: Wheel 150 Feet   Reason if not Attempted Activity not applicable   Wheel 747 Feet CARE Score 9   Wheelchair mobility   QI: Does the patient use a wheelchair? 0  No   Stairs   Findings Patient expressing goal to perform stairs this date but was unable 2* increase pain in RLE    QI: Picking Up Object   Reason if not Attempted Safety concerns   Picking Up Object CARE Score 88   QI: Toilet Transfer   Assistance Needed Physical assistance   Assistance Provided by [place] 25%-49%   Toilet Transfer CARE Score 3   Toilet Transfer   Surface Assessed Bedside Commode   Limitations Noted In Balance; Sequencing;UE Strength;LE Strength   Positioning Concerns Cognition   Findings patient having small BM; requires assist for cleaning, able to initiate assistance with doffing of pants  LOB with dynamic balance noted    Toilet Transfer (FIM) 4 - Patient requires steadying assist or light touching   Therapeutic Interventions   Strengthening 10x2 seated therex: ankle pumps, LAQ + supine : SAQ, ABD/ADD, SLR  (small range with AAROM for RLE 2* pain )   Flexibility BLE heel cord, gastroc, ADD, quad stretch 60 seconds x2   Modalities ice donned for 15 minutes during supine therex   Assessment   Treatment Assessment Patient engaged in PT treatment session where she demonstrated inconsistent progress from beginning to end of session  Patient's progress highly deopendent on pain and ability to focus on task  Patient distracted by pain in R hip at the end of session where she demonstrated increased difficulty with problem solving and required increased assist to transfer   Will continue to require skilled PT intervention to maximize function and reduce caregiver burden  Family/Caregiver Present Erasmo Zambrano, spouse    PT Barriers   Physical Impairment Decreased strength;Decreased range of motion;Decreased endurance; Impaired balance;Decreased mobility; Decreased coordination;Decreased cognition; Impaired judgement;Decreased safety awareness;Orthopedic restrictions;Pain   Functional Limitation Standing;Transfers; Walking;Stair negotiation   Plan   Treatment/Interventions Functional transfer training;LE strengthening/ROM; Therapeutic exercise; Endurance training;Cognitive reorientation;Patient/family training;Equipment eval/education; Bed mobility;Gait training; Compensatory technique education   Progress Progressing toward goals   Recommendation   Recommendation Short-term skilled PT   Equipment Recommended Walker   PT Therapy Minutes   PT Time In 1230   PT Time Out 1400   PT Total Time (minutes) 90   PT Mode of treatment - Individual (minutes) 90   PT Mode of treatment - Concurrent (minutes) 0   PT Mode of treatment - Group (minutes) 0   PT Mode of treatment - Co-treat (minutes) 0   PT Mode of Teatment - Total time(minutes) 90 minutes   Therapy Time missed   Time missed?  No

## 2018-12-18 NOTE — PROGRESS NOTES
Internal Medicine Progress Note  Patient: Abby Vera  Age/sex: 66 y o  female  Medical Record #: [id number]      ASSESSMENT/PLAN:  Abby Vera is seen and examined and management for following issues:    Right hip fracture; s/p right MIKE 12/4:  Pain control per primary service        PAF:  on Amiodarone 200 mg qd (new for her)/Toprol/Eliquis  Had been missing doses of Lopressor 2/2 BP, changed to Toprol 12 5mg qhs  No other changes today      HTN/orthostasis: TEDS and abd binder for orthostasis = improved    ABLA:  3 U PRBCs since admit to [facility] = stable now    Severe AS/LVEF 55%: no volume overload at this time    Nausea: continue prn Zofran; hasn't needed much = LD 12/16    Diarrhea:  Seems to have resolved but watch 2/2 Amio       Subjective: offers no complaints  Appetite has improved      ROS:   GI: denies abdominal pain, change bowel habits or reflux symptoms  Neuro: No new neurologic changes  Respiratory: No Cough, SOB  Cardiovascular: No CP, palpitations     Scheduled Meds:    Current Facility-Administered Medications:  acetaminophen 975 mg Oral Q8H [address] Diane Ji MD   amiodarone 200 mg Oral Daily With Breakfast IRMA Jacobs   apixaban 5 mg Oral Q12H [address] Diane Ji MD   bisacodyl 10 mg Rectal Daily PRN Diane Ji MD   bisacodyl 10 mg Rectal Once Kaylynn Waller MD   calcium carbonate 500 mg Oral BID With Meals Diane Ji MD   cholecalciferol 5,000 Units Oral Daily Diane Ji MD   docusate sodium 100 mg Oral BID Kaylynn Waller MD   levothyroxine 50 mcg Oral Early Morning Diane Ji MD   lidocaine 2 patch Topical Daily Diane Ji MD   melatonin 3 mg Oral HS Kaylynn Waller MD   metoprolol succinate 12 5 mg Oral HS IRMA Najera   ondansetron 4 mg Oral Q6H PRN IRMA Jacobs   oxyCODONE 2 5 mg Oral Q6H PRN Kaylynn Waller MD   pantoprazole 20 mg Oral Early Morning Diane Ji MD   polyethylene glycol 17 g Oral Daily PRN Feng Mendez Ilene Littlejohn MD   polyethylene glycol 17 g Oral Once Reggie Eugene MD   pravastatin 40 mg Oral Daily With Alfredo Marinelli MD       Labs:       Results from last 7 days  Lab Units 12/17/18  0517 12/14/18  0759   WBC Thousand/uL 5 92 7 92   HEMOGLOBIN g/dL 10 9* 10 8*   HEMATOCRIT % 34 9 32 7*   PLATELETS Thousands/uL 256 223       Results from last 7 days  Lab Units 12/17/18  0517 12/13/18  0612   POTASSIUM mmol/L 3 9 3 7   CHLORIDE mmol/L 107 104   CO2 mmol/L 26 27   BUN mg/dL 20 21   CREATININE mg/dL 0 90 0 92   CALCIUM mg/dL 8 8 9 4                  Glucose, i-STAT (mg/dl)   Date Value   12/04/2018 136   12/03/2018 128       Labs reviewed    Physical Examination:  Vitals:   Vitals:    12/17/18 0855 12/17/18 1306 12/17/18 2012 12/18/18 0524   BP: 134/63 113/73 102/53 137/66   BP Location: Right arm Left arm Left arm Right arm   Pulse: 82 84 78 75   Resp:  18 20 18   Temp:  97 8 °F (36 6 °C) 98 °F (36 7 °C) 98 6 °F (37 °C)   TempSrc:  Oral Oral Oral   SpO2:  98% 94% 96%   Weight:       Height:         Constitutional:  NAD; pleasant; nontoxic  HEENT:  AT/NC; oropharynx negative for thrush on tongue   Neck: negative for JVD  CV:  +S1, S2;  RRR; no rub/+murmur  Pulmonary:  BBS without crackles/wheeze/rhonci; resp are unlabored  Abdominal:  soft, +BS, ND/NT  Skin:  no rashes  Musculoskeletal:  Has some RLE edema  :  no zamarripa  Neurological/Psych:  AA with confusion;  JENKINS 4/5; no depression/anxiety        [ X ] Total time spent: 30 Mins and greater than 50% of this time was spent counseling/coordinating care  ** Please Note: Dragon 360 Dictation voice to text software may have been used in the creation of this document   **

## 2018-12-18 NOTE — SOCIAL WORK
Completed fmla paperwork provided to pts dtr  Reviewed with dr Luana Delarosa current plan is for pt to be able to return home hopefully making good gains  Informed dtr and pts spouse that team mtg will be held on Thursday and either dr Luana Delarosa or cm will follow up with team recommendations

## 2018-12-18 NOTE — CONSULTS
Consultation - Neuropsychology    Vic Lai 66 y o  female MRN: [id number]  Unit/Bed#: -01 Encounter: [id number]    Assessment/Plan     Assessment:  Pt has a noted history of anxiety, mild cognitive impairment, and dementia  Pt cognitive impairment hinders understanding of rehab process  Pt had verbalized suicidal ideation to treatment team members; pt was cooperative and engaged during consult and reported feelings of helplessness and "feeling lost"; she denied current suicidal ideation, intent and plan; and a plan for pt to express her feelings to others and improve mood was discussed  Pt stated she would participate in future psychology services to help manage mood  Psychosocial stressors include: pt current injuries; ongoing medical issues; symptoms of dementia which pt is aware of and causes frustration, anxiety, and depressed mood  Family and social support includes: pt  and children  Dx: F[age] Adjustment disorder with depressed and anxious mood  Code: [id number]    Plan:   Pt will be afforded rehab psychology services while at [facility] to help pt cope with illness  History of Present Illness   Physician Requesting Consult: Shari Castellano MD  Reason for Consult / Principal Problem: coping, adjustment  Patient is a 66 y o  female   Primary complaints include: symptoms of dementia, agitation, anxiety, concern about health problems, fearfulness, feeling depressed, increased irritability, poor concentration and tearfulness  Psychosocial Stressors: health      Inpatient consult to Neuropsychology  Consult performed by: Anuradha David ordered by: Hubert Carrizales          Psychiatric Review Of Systems:  sleep: no  appetite changes: no  weight changes: no  energy/anergy: yes  interest/pleasure/anhedonia: yes  somatic symptoms: yes  anxiety/panic: yes  mireille: no  guilty/hopeless: no  self injurious behavior/risky behavior: no    Historical Information   Past Psychiatric History: history of anxiety, MCI, dementia  Substance Abuse History:  Use of Alcohol: denied and 0 out of 4 on CAGE    Smoking history: former cigarette smoker; quit in 1960  Family Psychiatric History: none noted  Social History  Education: high school diploma/GED  Marital history:   Living arrangement, social support: The patient lives in home with   Occupational History: retired  Functioning Relationships: good support system, good relationship with spouse or significant other and good relationship with children  Other Pertinent History: None    Traumatic History:   Abuse: none noted  Other Traumatic Events: none noted       Past Medical History:   Diagnosis Date    Atrial fibrillation ([address] )     Disease of thyroid gland     Hyperlipidemia     Hypertension     Psychiatric disorder     dementia       Medical Review Of Systems:  Review of Systems    Meds/Allergies   current meds:   Current Facility-Administered Medications   Medication Dose Route Frequency    acetaminophen (TYLENOL) tablet 975 mg  975 mg Oral Q8H [facility]    amiodarone tablet 200 mg  200 mg Oral Daily With Breakfast    apixaban (ELIQUIS) tablet 5 mg  5 mg Oral Q12H [facility]    bisacodyl (DULCOLAX) rectal suppository 10 mg  10 mg Rectal Daily PRN    bisacodyl (DULCOLAX) rectal suppository 10 mg  10 mg Rectal Once    calcium carbonate (TUMS) chewable tablet 500 mg  500 mg Oral BID With Meals    cholecalciferol (VITAMIN D3) tablet 5,000 Units  5,000 Units Oral Daily    docusate sodium (COLACE) capsule 100 mg  100 mg Oral BID    levothyroxine tablet 50 mcg  50 mcg Oral Early Morning    lidocaine (LIDODERM) 5 % patch 2 patch  2 patch Topical Daily    melatonin tablet 3 mg  3 mg Oral HS    metoprolol succinate (TOPROL-XL) 24 hr tablet 12 5 mg  12 5 mg Oral HS    ondansetron (ZOFRAN-ODT) dispersible tablet 4 mg  4 mg Oral Q6H PRN    oxyCODONE (ROXICODONE) IR tablet 2 5 mg  2 5 mg Oral Q6H PRN    pantoprazole (PROTONIX) EC tablet 20 mg  20 mg Oral Early Morning    polyethylene glycol (MIRALAX) packet 17 g  17 g Oral Daily PRN    polyethylene glycol (MIRALAX) packet 17 g  17 g Oral Once    pravastatin (PRAVACHOL) tablet 40 mg  40 mg Oral Daily With Dinner     No Known Allergies    Objective   Vital signs in last 24 hours:  Temp:  [97 8 °F (36 6 °C)-98 6 °F (37 °C)] 97 8 °F (36 6 °C)  HR:  [75-87] 87  Resp:  [18-20] 18  BP: (102-137)/(53-66) 124/64      Intake/Output Summary (Last 24 hours) at 12/18/18 1445  Last data filed at 12/18/18 1200   Gross per 24 hour   Intake              400 ml   Output                0 ml   Net              400 ml       Mental Status Evaluation:  Appearance:  age appropriate   Behavior:  normal   Speech:  normal pitch and normal volume   Mood:  anxious and depressed   Affect:  normal   Language: WNL   Thought Process:  goal directed and logical   Thought Content:  normal   Perceptual Disturbances: None   Risk Potential: none; no current SI/HI     Sensorium:  person and place   Cognition:  impaired due to dementia   Consciousness:  alert and awake    Attention: attention span and concentration were age appropriate   Intellect: within normal limits   Fund of Knowledge: vocabulary: WNL   Insight:  age appropriate   Judgment: age appropriate   Muscle Strength and Tone: see PT eval   Gait/Station: see PT eval   Motor Activity: no abnormal movements

## 2018-12-18 NOTE — PROGRESS NOTES
12/18/18 0700   Pain Assessment   Pain Assessment 0-10   Pain Score 3   Pain Type Acute pain;Surgical pain   Pain Location Hip   Pain Orientation Right   Pain Descriptors Aching;Discomfort   Pain Frequency Constant/continuous   Pain Onset Ongoing   Clinical Progression Gradually improving   Effect of Pain on Daily Activities pt req ext time to complete movements due to fear of pain   Patient's Stated Pain Goal No pain   Hospital Pain Intervention(s) Repositioned; Rest   Restrictions/Precautions   Precautions Bed/chair alarms;Cognitive; Fall Risk;Supervision on toilet/commode   Weight Bearing Restrictions Yes   RLE Weight Bearing Per Order WBAT   ROM Restrictions Yes   RLE ROM Restriction (THPs)   Braces or Orthoses (TEDs and abdominal binder OOB)   QI: [address] Provided by Boss No physical assistance   Eating CARE Score 6   Eating Assessment   Positioning Upright;Out of Bed   Safety Needs Increase Time   Meal Assessed Breakfast   Finishes Timely Yes   Opens Packages Yes   Findings Pt able to manage all packaging and cut/stir foods  Pt must be upright during food intake to inc safety   Eating (FIM) 6 - Patient requires increased time or safety concern   QI: Oral Hygiene   Assistance Needed Supervision   Assistance Provided by Boss No physical assistance   Oral Hygiene CARE Score 4   Grooming   Able To Acquire Items;Comb/Brush Hair;Wash/Dry Face;Wash/Dry Hands;Brush/Clean Teeth   Limitation Noted In Coordination; Sequencing;Strength   Findings Pt completed grooming at sink seated 2* dec endurance and standing balance with hand release from RW  Pt req encouragement to initiate tasks at this time but able ot brush teeth and hair in w/c  Grooming (FIM) 5 - Patient requires supervision/monitoring   QI: Shower/Bathe Self   Assistance Needed Verbal cues; Supervision; Adaptive equipment   Assistance Provided by Boss Less than 25%   Shower/Bathe Self CARE Score 3   Bathing Assessed Bath Style Sponge Bath   Anticipated D/C Bath Style Tub;Sponge Bath   Able to [place] Shon No   Able to [employer] Temperature No   Able to Wash/Rinse/Dry (body part) Left Arm;Right Arm;L Upper Leg;R Upper Leg;L Lower Leg/Foot;R Lower Leg/Foot;Chest;Abdomen;Perineal Area; Buttocks   Limitations Noted in Balance; Endurance; Coordination;Problem Solving; Safety;Strength   Positioning Seated;Standing   Adaptive Equipment Longhand Reacher   Findings  Pt completed sponge bath on EOB  Pt able to wash UB and upper legs while seated  Pt CGA to stand with RW to perform jhon/rear bathing with unilateral release  Pt utilized reacher with wash clothe to simulate long handled sponge for bathing b/l feet  Pt cont to req encouragement to inc indep with tasks  Pt demo P carryover with THPs during functional tasks req v/c to limit bending passed 90deg  Bathing (FIM) 4 - Patient requires steadying assist or light touching   Tub/Shower Transfer   Not Assessed Sponge Bath;Balance; Safety   QI: Upper Body Dressing   Assistance Needed Set-up / clean-up;Supervision   Assistance Provided by [place] No physical assistance   Comment ext time to adjust shirt in back    Upper Body Dressing CARE Score 4   QI: Lower Body Dressing   Assistance Needed Physical assistance   Assistance Provided by [place] 25%-49%   Comment Pt attempted to utilize reacher to thread b/l LEs  Pt unable to coordinate and problem solve therefore OT thread R foot into pant leg  Pt then able to grasp pants with reacher to pull up and thread LLE through pant leg  Pt Niharika in stance for CM  Lower Body Dressing CARE Score 3   QI: Putting On/Taking Off Footwear   Assistance Needed Set-up / [place] Josue; Verbal cues   Assistance Provided by [place] No physical assistance   Comment Pt req A to put sock on sockaide 2* dec hand strength and coordination but pt able utilize sock aide to then don sock of b/l feet     Putting On/Taking Off Footwear CARE Score 4 Dressing/Undressing Clothing   Remove UB Clothes Other  (gown)   Remove LB [address] [facility] [address] LB Clothes Pants; Undergarment;Socks;TEDs   Limitations Noted In Balance; Coordination; Endurance;Problem Solving;Strength;ROM   Positioning Supported Sit;Standing   Findings Pt cont to req A for LB dressing 2* dec ROM, pain, and dec problem solving  Pt would benefit from cont work on [address] and dynamic balance for CM in stance  UB Dressing (FIM) 5 - [place] sets up supplies or applies device   LB Dressing (FIM) 3 - Patient completes  50-74% of all tasks   QI: Roll Left and Right   Assistance Needed Physical assistance   Assistance Provided by [place] 25%-49%   Comment A with RLE with abduction pillow to limit leg crossing   Roll Left and Right CARE Score 3   QI: Lying to Sitting on Side of Bed   Assistance Needed Physical assistance   Assistance Provided by [place] 25%-49%   Comment HOB elevated   Lying to Sitting on Side of Bed CARE Score 3   QI: Sit to Stand   Assistance Needed Physical assistance   Assistance Provided by [place] 25%-49%   Comment A to boost from bed surface   Sit to Stand CARE Score 3   QI: Chair/Bed-to-Chair Transfer   Assistance Needed Physical assistance   Assistance Provided by [place] 25%-49%   Comment modA with RW 2* dec endurance   Chair/Bed-to-Chair Transfer CARE Score 3   Transfer Bed/Chair/Wheelchair   Limitations Noted In Confidence; Endurance;Problem Solving;LE Strength   Adaptive Equipment Roller Walker   Findings Pt cont to req mod to max v/c for functional transfers with RW to assist with sequencing  Pt with noted dec confidence and req redirection to focus on transfers  Pt cont to demo P recall of hand placement for transfers     Bed, Chair, Wheelchair Transfer (FIM) 3 - [place] needs to lift, boost or assist to stand OR sit   QI: [address] Needed Incidental touching   Assistance Provided by [place] Less than 25%   Comment CGA for jhon hygiene in stance   [address] Score 3   Toileting   Able to [address] down yes, up yes  Able to Manage Clothing Closures No   Manage Hygiene Bladder   Limitations Noted In Balance;Problem Solving; Safety;LE Strength   Adaptive Equipment (RW)   Findings Pt able to perform hygiene and CM in stance with CGA for balance and v/c for sequencing and encouragement   Toileting (FIM) 4 - Patient requires steadying assist or light touching   QI: Toilet Transfer   Assistance Needed Physical assistance   Assistance Provided by [place] 25%-49%   Toilet Transfer CARE Score 3   Toilet Transfer   Surface Assessed Standard Commode   Transfer Technique Stand Pivot   Limitations Noted In Balance;Confidence;Problem Solving; Safety; Sequencing   Positioning Concerns Cognition   Findings Pt cont to req A for toilet transfer to commode 2* dec endurance, problem solving, and dec confidence limitning safety   Toilet Transfer (FIM) 3 - [place] needs to lift, boost or assist to stand OR sit   Cognition   Overall Cognitive Status Impaired   Arousal/Participation Cooperative   Attention Attends with cues to redirect   Orientation Level Oriented to person;Oriented to place;Oriented to situation   Memory Decreased recall of precautions   Following Commands Follows one step commands with increased time or repetition   Comments Pt cont to have difficulty recalling THPs during functional tasks req v/c to maintain integrity of joint  Engaged in conversation regarding hip precaution "do's and don'ts" and provided handwritten list to reference in room  Additional Activities   Additional Activities Comments Pt reports that written directions on RW are too complex/confusing when performing transfer  Pt engaged in discussion regarding simpler phrases to inc success with transfers  Pt referenced old directions and gave OT different phrases that she feels she will understand better  OT created new transfer directions  F/u to determine effectiveness  Activity Tolerance   Activity Tolerance Patient limited by fatigue   Assessment   Treatment Assessment Pt participated in skilled OT session focusing on ADL retraining, THPs recall, toileting, and overall activity tolerance  Pt cont to be limited by fatigue, pain, and dec confidence  Pt req max encouragement to participate in own self-care at times  Pt would benefit from continued therapy to inc indep with task participation for ADLs and functional transfers  Prognosis Fair   Problem List Decreased strength;Decreased range of motion;Decreased endurance; Impaired balance;Decreased coordination;Decreased safety awareness;Orthopedic restrictions;Pain   Barriers to Discharge Inaccessible home environment;Decreased caregiver support   Plan   Treatment/Interventions ADL retraining;Functional transfer training; Therapeutic exercise; Endurance training;Bed mobility   Progress Slow progress, decreased activity tolerance   OT Therapy Minutes   OT Time In 0700   OT Time Out 0830   OT Total Time (minutes) 90   OT Mode of treatment - Individual (minutes) 90   OT Mode of treatment - Concurrent (minutes) 0   OT Mode of treatment - Group (minutes) 0   OT Mode of treatment - Co-treat (minutes) 0   OT Mode of Teatment - Total time(minutes) 90 minutes   Therapy Time missed   Time missed?  No

## 2018-12-19 PROCEDURE — 99232 SBSQ HOSP IP/OBS MODERATE 35: CPT

## 2018-12-19 PROCEDURE — 97535 SELF CARE MNGMENT TRAINING: CPT

## 2018-12-19 PROCEDURE — 97116 GAIT TRAINING THERAPY: CPT

## 2018-12-19 PROCEDURE — 97530 THERAPEUTIC ACTIVITIES: CPT

## 2018-12-19 PROCEDURE — 97110 THERAPEUTIC EXERCISES: CPT

## 2018-12-19 RX ADMIN — AMIODARONE HYDROCHLORIDE 200 MG: 200 TABLET ORAL at 08:55

## 2018-12-19 RX ADMIN — PANTOPRAZOLE SODIUM 20 MG: 20 TABLET, DELAYED RELEASE ORAL at 05:36

## 2018-12-19 RX ADMIN — CALCIUM CARBONATE (ANTACID) CHEW TAB 500 MG 500 MG: 500 CHEW TAB at 08:55

## 2018-12-19 RX ADMIN — ONDANSETRON 4 MG: 4 TABLET, ORALLY DISINTEGRATING ORAL at 05:37

## 2018-12-19 RX ADMIN — APIXABAN 5 MG: 5 TABLET, FILM COATED ORAL at 08:55

## 2018-12-19 RX ADMIN — CALCIUM CARBONATE (ANTACID) CHEW TAB 500 MG 500 MG: 500 CHEW TAB at 17:07

## 2018-12-19 RX ADMIN — METOPROLOL SUCCINATE 12.5 MG: 25 TABLET, EXTENDED RELEASE ORAL at 21:12

## 2018-12-19 RX ADMIN — VITAMIN D, TAB 1000IU (100/BT) 5000 UNITS: 25 TAB at 08:55

## 2018-12-19 RX ADMIN — MELATONIN 3 MG: at 21:11

## 2018-12-19 RX ADMIN — LEVOTHYROXINE SODIUM 50 MCG: 50 TABLET ORAL at 05:36

## 2018-12-19 RX ADMIN — PRAVASTATIN SODIUM 40 MG: 40 TABLET ORAL at 17:07

## 2018-12-19 RX ADMIN — ACETAMINOPHEN 975 MG: 325 TABLET, FILM COATED ORAL at 05:36

## 2018-12-19 RX ADMIN — DOCUSATE SODIUM 100 MG: 100 CAPSULE, LIQUID FILLED ORAL at 08:55

## 2018-12-19 RX ADMIN — ACETAMINOPHEN 975 MG: 325 TABLET, FILM COATED ORAL at 21:11

## 2018-12-19 RX ADMIN — APIXABAN 5 MG: 5 TABLET, FILM COATED ORAL at 21:11

## 2018-12-19 RX ADMIN — DOCUSATE SODIUM 100 MG: 100 CAPSULE, LIQUID FILLED ORAL at 17:07

## 2018-12-19 RX ADMIN — ACETAMINOPHEN 975 MG: 325 TABLET, FILM COATED ORAL at 13:31

## 2018-12-19 NOTE — PROGRESS NOTES
Internal Medicine Progress Note  Patient: Maria Eugenia Torres  Age/sex: 66 y o  female  Medical Record #: [id number]      ASSESSMENT/PLAN:  Maria Eugenia Torres is seen and examined and management for following issues:    Right hip fracture; s/p right MIKE 12/4:  Pain control per primary service        PAF:  on Amiodarone 200 mg qd (new for her)/Toprol/Eliquis  Had been missing doses of Lopressor 2/2 BP, changed to Toprol 12 5mg qhs  No other changes today      HTN/orthostasis: TEDS and abd binder for orthostasis = improved    ABLA:  3 U PRBCs since admit to [facility] = stable now    Severe AS/LVEF 55%: no volume overload at this time    Nausea: continue prn Zofran; hasn't needed much = LD this AM which was effective    Diarrhea:  Seems to have resolved but watch 2/2 Amio       Subjective: offers no complaints  Appetite has improved quite a bit      ROS:   GI: denies abdominal pain, change bowel habits or reflux symptoms  Neuro: No new neurologic changes  Respiratory: No Cough, SOB  Cardiovascular: No CP, palpitations     Scheduled Meds:    Current Facility-Administered Medications:  acetaminophen 975 mg Oral Q8H [facility] Rafi Gómez MD   amiodarone 200 mg Oral Daily With Breakfast IRMA Ambrocio   apixaban 5 mg Oral Q12H [facility] Rafi Gómez MD   bisacodyl 10 mg Rectal Daily PRN Rafi Gómez MD   bisacodyl 10 mg Rectal Once Alejo Weldon MD   calcium carbonate 500 mg Oral BID With Meals Rafi Gómez MD   cholecalciferol 5,000 Units Oral Daily Rafi Gómez MD   docusate sodium 100 mg Oral BID Alejo Weldon MD   levothyroxine 50 mcg Oral Early Morning Rafi Gómez MD   lidocaine 2 patch Topical Daily Rafi Gómez MD   melatonin 3 mg Oral HS Alejo Wedlon MD   metoprolol succinate 12 5 mg Oral HS IRMA Najera   ondansetron 4 mg Oral Q6H PRN IRMA Ambrocio   oxyCODONE 2 5 mg Oral Q6H PRN Alejo Weldon MD   pantoprazole 20 mg Oral Early Morning Rafi Gómez MD   polyethylene glycol 17 g Oral Daily PRN Kim Crespo MD   polyethylene glycol 17 g Oral Once Enoch Spivey MD   pravastatin 40 mg Oral Daily With Shaka Gagnon MD       Labs:       Results from last 7 days  Lab Units 12/17/18  0517 12/14/18  0759   WBC Thousand/uL 5 92 7 92   HEMOGLOBIN g/dL 10 9* 10 8*   HEMATOCRIT % 34 9 32 7*   PLATELETS Thousands/uL 256 223       Results from last 7 days  Lab Units 12/17/18  0517 12/13/18  0612   POTASSIUM mmol/L 3 9 3 7   CHLORIDE mmol/L 107 104   CO2 mmol/L 26 27   BUN mg/dL 20 21   CREATININE mg/dL 0 90 0 92   CALCIUM mg/dL 8 8 9 4                  Glucose, i-STAT (mg/dl)   Date Value   12/04/2018 136   12/03/2018 128       Labs reviewed    Physical Examination:  Vitals:   Vitals:    12/18/18 1357 12/18/18 2018 12/19/18 0513 12/19/18 0618   BP: 124/64 112/56 145/89    BP Location: Left arm Right arm Right arm    Pulse: 87 95 77    Resp: 18 18 18    Temp: 97 8 °F (36 6 °C) 98 1 °F (36 7 °C) 98 6 °F (37 °C)    TempSrc: Oral Oral Oral    SpO2: 98% 95% 95%    Weight:    67 2 kg (148 lb 2 4 oz)   Height:         Constitutional:  NAD; pleasant; nontoxic  HEENT:  AT/NC; oropharynx negative for thrush on tongue   Neck: negative for JVD  CV:  +S1, S2;  RRR; no rub/+murmur  Pulmonary:  BBS without crackles/wheeze/rhonci; resp are unlabored  Abdominal:  soft, +BS, ND/NT  Skin:  no rashes  Musculoskeletal:  Has some RLE edema  :  no zamarripa  Neurological/Psych:  AA with confusion but seems to be improving last few days;  JENKINS 4/5; no depression/anxiety        [ X ] Total time spent: 30 Mins and greater than 50% of this time was spent counseling/coordinating care  ** Please Note: Dragon 360 Dictation voice to text software may have been used in the creation of this document   **

## 2018-12-19 NOTE — PROGRESS NOTES
Physical Medicine and Rehabilitation Progress Note  Bishop Valencia 66 y o  female MRN: [id number]  Unit/Bed#: [facility] 962-01 Encounter: [id number]    Chief Complaints:  Decline in ambulation     Subjective/Interval Events:   Patient reports sleeping well last 2 evenings and does not want to change her melatonin dose  Patient reports pain is somewhat better and she is able to return he has urine bed and transfer with therapies  Patient reported having a very firm stool which was somewhat painful yesterday  She reports nausea and appetite are improving  Patient reports less anxiety  She denies shortness of breath or lightheadedness at rest or with activity, fever, chills, sweats, calf pain cough, or other complaints    ROS: A 10-point ROS was performed  Negative except as listed above  Overall Assessment/relevant history:  70-year-old female with a past medical history of atrial fibrillation on chronic anticoagulation with apixaban, hypertension, hyperlipidemia, hypothyroidism, chronic left facial droop, severe aortic stenosis, possible dementia, severe osteoporosis with recent fall at home found to have right intertrochanteric femur fracture who underwent right total hip arthroplasty by Dr Chester Lambert on 12/04/2018  Postoperative  Most notable for transient SVT requiring rapid response and temporary Cardizem drip  Cardiology consulted and assisted with management and patient has been switched since transition to p o  Amiodarone  Postoperative course also notable for acute blood loss anemia requiring 1 unit PRBC transfusion  Patient was evaluated by skilled therapies and was found to have significant decline in ADLs and ambulation [facility] [place]      Functional status (recent):    Transfers at times Min assist now; lower body dressing moderate assist, bathing Min assist    Functional status on admission to [facility]:  OT:  Total assist General in toilet transfers, lower body dressing, toileting, and bathing; upper body dressing mod assist, grooming and eating supervision      * Fracture of femoral neck, right ([address] )   Assessment & Plan    -S/p Right MIKE by Dr Arpita Yanez on 18  -WBAT RLE  -Daily PT/OT to improve mobility and self care   -Posterior hip precautions  -Maintain hip abduction foam when supine due to patient's cognitive deficits  -Please maintain compressive dressing for 48 hours, then replace if needed      Positional lightheadedness   Assessment & Plan    Improved s/p transfusion   Monitor vitals; orthostatics  Recently started on Amio; also on MTP  HCTZ holding per IM  Abdominal binder PRN     Delirium   Assessment & Plan    Some improvement  Post-op delirium on possible baseline dementia > seems fairly significant at times  >oxy to 2 5mg Q6H PRN   Medications reviewed; limit sedating medications but adequately treat pain  Frequent redirection, re-orientation, reassurance  Monitor for signs and symptoms of infection  Overstimulation precautions, optimize sleep-wake cycle, agitation behavioral scale, sleep log  If necessary provided 1-1     Acute pain   Assessment & Plan    Some improvement  -managed on scheduled Tylenol 975mg TID  -ICE [id number] while awake  -PRN oxycodone2 5 for moderate to severe pain - use with caution given her delirium on dementia   Counseled on and continue to encourage deep breathing/relaxation/behavioral pain management techniques:     Deep breathin seconds in, 5 seconds out, 5 times per hour when awake and PRN when in pain or anticipate pain; avoid holding breath and tightening muscles and instead breathe slowly and deeply       Acute blood loss anemia   Assessment & Plan    Improved 10 9 on   Symptomatic anemia > improved; Hb 7 9 on  > improved to 10 8 after 2 U PRBCs   >monitor for signs of acute bleed-iron and vitamin-C supplementation  -monitor intermittently     Cognitive impairment   Assessment & Plan    -Patient with baseline dementia and mild cognitive deficits  -Placed on fall precautions and bed alarms - freq checks by staff      Severe aortic stenosis   Assessment & Plan    -preload dependent   -encourage PO hydration  -Patient cardiologist is Dr Vanessa Rocha who she will follow up with as an outpatient     Atrial fibrillation [facility])   Assessment & Plan    -rate controlled on lopressor 25mg Q12h and new amiodarone 200mg BID  -anticoagulated with Eliquis 5mg BID  - Amiodarone 200mg BID - taper per cards   -Patient's home cardiologist is Dr Vanessa Rocha     Hypertension   Assessment & Plan    -MTP  -HCTZ held with symptomatic orthostasis  -IM to adjust dosing if needed      Anxiety   Assessment & Plan    Some improvement  - Working on sleep wake cycle  Starting melatonin  - Behavioral techniques  - Consider consult with Dr Osker Goldmann for management  Insomnia   Assessment & Plan    Improved  Continue low dose melatonin 3mg QHS; Sleep wake log  Recommend appropriate sleep hygiene: patient counselled on this:   Sleep only as much as necessary to feel rested and then get up or sit up in bed, maintain regular sleep schedule (same bedtime and wake time everyday), do not force sleep, avoid caffeinated beverages after lunch, avoid alcohol at home near bedtime, do not smoke particularly in evening, do not go to bed hungry, adjust bedroom environment so you are comfortable prior to settling down for sleep, deal with concerns or worries before bedtime   Make a list of things to work on for next day so anxiety is reduced at night, exercise regularly when cleared by physician       Chronic nausea   Assessment & Plan    Improving  Apparently been worked up by GI in past  At approximate baseline; worsened with anxiety  PRN Zofran  Ensure optimal stooling     Onychomycosis   Assessment & Plan    Toenails; s/p podiatry consult and debridement      Hypothyroidism   Assessment & Plan    -managed on Synthroid     Dyslipidemia   Assessment & Plan -managed on pravastatin         # Bowel function:  Constipation with recent firm stool; schedule Colace; MiraLax and as needed regimen    # Bladder function: Intact  Monitor for urinary retention, incontinence, and signs of urinary infection  # Skin  Encourage regular turning and turn patient every 2 hours if not adequately ambulatory; - Rehabilitation team to perform skin checks regularly to monitor for erythema, wounds, rashes (if applicable incisions)    # Other  - Diet: general    - DVT prophy: Eliquis and SCDs    Disposition:   Home with supervision with ELOS 2 weeks from admission    Follow-up:   PCP, Ortho, Cards, PMR    ---------------------------------------------------------------------------------------------------------------------    Objective: Allergies and Medications per EMR    Physical Exam:  Temperature 97 8° F, pulse 84, respiratory rate 18, blood pressure 113/73, SpO2 94% on room air  General: Awake, alert in NAD, more well-appearing  HENT:  MMM  Respiratory: Unlabored breathing, breath sounds equal, Lungs CTA, no wheezes, rales, or rhonchi  Cardiovascular: Regular rate and rhythm, no murmurs, rubs, or gallops  Gastrointestinal: Soft, non-tender, non-distended, normoactive bowel sounds  SkiN/MSK/Extremities:  No calf edema or calf tenderness to palpation  Neurologic/Psych:   MENTAL STATUS: orientated to self, hospital, year, and largely to situation  Affect: Euthymic       Diagnostic Studies: reviewed, no new imaging  No results found      Laboratory:      Results from last 7 days  Lab Units 12/13/18  0612 12/10/18  0525   HEMOGLOBIN g/dL 7 9* 8 8*   HEMATOCRIT % 24 6* 27 1*   WBC Thousand/uL 6 44 7 87       Results from last 7 days  Lab Units 12/17/18  0517 12/13/18  0612   BUN mg/dL 20 21   POTASSIUM mmol/L 3 9 3 7   CHLORIDE mmol/L 107 104   CREATININE mg/dL 0 90 0 92            Patient Active Problem List   Diagnosis    Microscopic hematuria    Dyslipidemia    Hypertension    Palpitations    [facility] fibrillation [facility])    Hypothyroidism    Cognitive impairment    MCI (mild cognitive impairment)    Gait disturbance    Closed intertrochanteric fracture of hip, right, initial encounter ([facility] [address]    Severe aortic stenosis    Dehydration    Closed fracture of neck of right femur ([facility])    Fall    Ambulatory dysfunction    Physical deconditioning    Osteoporosis    Atrial fibrillation with rapid ventricular response ([facility] [address] )    NSTEMI (non-ST elevated myocardial infarction) ([facility])    Acute blood loss anemia    Fracture of femoral neck, right ([facility])    Acute pain    Delirium    Onychomycosis    Positional lightheadedness    Chronic nausea    Insomnia    Anxiety       ** Please Note: Fluency Direct voice to text software may have been used in the creation of this document  **    Total visit time:  At least 25 minutes, with more than 50% spent counseling/coordinating care    Selvin Cedillo MD, [facility] [facility]  Physical Medicine and Rehabilitation  Brain Injury Medicine

## 2018-12-19 NOTE — PROGRESS NOTES
Physical Medicine and Rehabilitation Progress Note  Farshad Amador 66 y o  female MRN: [id number]  Unit/Bed#: [facility] 962-01 Encounter: [id number]    Chief Complaints:  Decline in function    Subjective/Interval Events:   Patient reports pain still can be fairly significant at times with therapy although she states she can better tolerate activity  She reports her energy remains improved without recent lightheadedness  Patient denies significant nausea  She denies fever, chills, abdominal pain, calf pain, other complaints except for occasional dry cough but without SOB  ROS: A 10-point ROS was performed  Negative except as listed above  Overall Assessment/relevant history:  79-year-old female with a past medical history of atrial fibrillation on chronic anticoagulation with apixaban, hypertension, hyperlipidemia, hypothyroidism, chronic left facial droop, severe aortic stenosis, possible dementia, severe osteoporosis with recent fall at home found to have right intertrochanteric femur fracture who underwent right total hip arthroplasty by Dr Osmar Meneses on 12/04/2018  Postoperative  Most notable for transient SVT requiring rapid response and temporary Cardizem drip  Cardiology consulted and assisted with management and patient has been switched since transition to p o  Amiodarone  Postoperative course also notable for acute blood loss anemia requiring 1 unit PRBC transfusion  Patient was evaluated by skilled therapies and was found to have significant decline in ADLs and ambulation appropriate for [facility]      Functional status (recent):    Transfers Mod-Min assist; ambulation Min assist improved during 30 feet with rolling walker    Functional status on admission to [facility]:  OT:  Total assist General in toilet transfers, lower body dressing, toileting, and bathing; upper body dressing mod assist, grooming and eating supervision      * Fracture of femoral neck, right [facility])   Assessment & Plan    -S/p Right MIKE by Dr Anum Darling on 18  -WBAT RLE  -Daily PT/OT to improve mobility and self care   -Posterior hip precautions  -Maintain hip abduction foam when supine due to patient's cognitive deficits  -Please maintain compressive dressing for 48 hours, then replace if needed      Positional lightheadedness   Assessment & Plan    Improved s/p transfusion   Monitor vitals; orthostatics  Recently started on Amio; also on MTP  HCTZ holding per IM  Abdominal binder PRN     Delirium   Assessment & Plan    Improving   Post-op delirium on possible baseline dementia > seems fairly significant at times  >oxy to 2 5mg Q6H PRN   Medications reviewed; limit sedating medications but adequately treat pain  Frequent redirection, re-orientation, reassurance  Monitor for signs and symptoms of infection  Overstimulation precautions, optimize sleep-wake cycle, agitation behavioral scale, sleep log  If necessary provided 1-1     Acute pain   Assessment & Plan    Better controlled   -managed on scheduled Tylenol 975mg TID  -ICE [id number] while awake  -PRN oxycodone2 5 for moderate to severe pain - use with caution given her delirium on dementia   Counseled on and continue to encourage deep breathing/relaxation/behavioral pain management techniques:     Deep breathin seconds in, 5 seconds out, 5 times per hour when awake and PRN when in pain or anticipate pain; avoid holding breath and tightening muscles and instead breathe slowly and deeply       Acute blood loss anemia   Assessment & Plan    Improved 10 9 on   Symptomatic anemia > improved; Hb 7 9 on  > improved to 10 8 after 2 U PRBCs   >monitor for signs of acute bleed-iron and vitamin-C supplementation  -monitor intermittently     Cognitive impairment   Assessment & Plan    -Patient with baseline dementia and mild cognitive deficits  -Placed on fall precautions and bed alarms - freq checks by staff      Severe aortic stenosis Assessment & Plan    -preload dependent   -encourage PO hydration  -Patient cardiologist is Dr Bea Milligan who she will follow up with as an outpatient     Atrial fibrillation [facility])   Assessment & Plan    -rate controlled on lopressor 25mg Q12h and new amiodarone 200mg BID  -anticoagulated with Eliquis 5mg BID  - Amiodarone 200mg BID - taper per cards   -Patient's home cardiologist is Dr Bea Milligan     Hypertension   Assessment & Plan    -MTP  -HCTZ held with symptomatic orthostasis  -IM to adjust dosing if needed      Anxiety   Assessment & Plan    Some improvement  - Working on sleep wake cycle  Starting melatonin  - Behavioral techniques  - Consider consult with Dr Jessy Melendrez for management  Insomnia   Assessment & Plan    Improved  Continue low dose melatonin 3mg QHS; Sleep wake log  Recommend appropriate sleep hygiene: patient counselled on this:   Sleep only as much as necessary to feel rested and then get up or sit up in bed, maintain regular sleep schedule (same bedtime and wake time everyday), do not force sleep, avoid caffeinated beverages after lunch, avoid alcohol at home near bedtime, do not smoke particularly in evening, do not go to bed hungry, adjust bedroom environment so you are comfortable prior to settling down for sleep, deal with concerns or worries before bedtime  Make a list of things to work on for next day so anxiety is reduced at night, exercise regularly when cleared by physician       Chronic nausea   Assessment & Plan    Improving  Apparently been worked up by GI in past  At approximate baseline; worsened with anxiety  PRN Zofran  Ensure optimal stooling     Onychomycosis   Assessment & Plan    Toenails; s/p podiatry consult and debridement      Hypothyroidism   Assessment & Plan    -managed on Synthroid     Dyslipidemia   Assessment & Plan    -managed on pravastatin         # Bowel function:  Colace; MiraLax and as needed regimen    # Bladder function: Intact   Monitor for urinary retention, incontinence, and signs of urinary infection  # Skin  Encourage regular turning and turn patient every 2 hours if not adequately ambulatory; - Rehabilitation team to perform skin checks regularly to monitor for erythema, wounds, rashes (if applicable incisions)    # Other  - Diet: general    - DVT prophy: Eliquis and SCDs    Disposition:   Home with supervision with EL[facility] 2 weeks from admission    Follow-up:   PCP, Ortho, Cards, PMR    ---------------------------------------------------------------------------------------------------------------------    Objective: Allergies and Medications per EMR    Physical Exam:  Temperature 97 8° F, pulse 87, respiratory rate 18, blood pressure 124/64, SpO2 98% on room air  General: Awake, alert in NAD  HENT:  MMM  Respiratory: Unlabored breathing, breath sounds equal, Lungs CTA, no wheezes, rales, or rhonchi  Cardiovascular: Regular rate and rhythm, no murmurs, rubs, or gallops  Gastrointestinal: Soft, non-tender, non-distended, normoactive bowel sounds  SkiN/MSK/Extremities:  No calf edema or calf tenderness to palpation; incision healing appropriately with evolving ecchymosis  Neurologic/Psych:   MENTAL STATUS: orientated to self, hospital, year, and situation  Affect: Euthymic       Diagnostic Studies: reviewed, no new imaging  No results found      Laboratory:      Results from last 7 days  Lab Units 12/13/18  0612 12/10/18  0525   HEMOGLOBIN g/dL 7 9* 8 8*   HEMATOCRIT % 24 6* 27 1*   WBC Thousand/uL 6 44 7 87       Results from last 7 days  Lab Units 12/17/18  0517 12/13/18  0612   BUN mg/dL 20 21   POTASSIUM mmol/L 3 9 3 7   CHLORIDE mmol/L 107 104   CREATININE mg/dL 0 90 0 92            Patient Active Problem List   Diagnosis    Microscopic hematuria    Dyslipidemia    Hypertension    Palpitations    Atrial fibrillation (HCC)    Hypothyroidism    Cognitive impairment    MCI (mild cognitive impairment)    Gait disturbance    Closed intertrochanteric fracture of hip, right, initial encounter ([facility] [address] )    Severe aortic stenosis    Dehydration    Closed fracture of neck of right femur ([facility] [address] )    Fall    Ambulatory dysfunction    Physical deconditioning    Osteoporosis    [facility] with [facility] ([facility]    NSTEMI (non-ST elevated myocardial infarction) ([facility])    Acute blood loss anemia    Fracture of femoral neck, right ([facility])    Acute pain    Delirium    Onychomycosis    Positional lightheadedness    Chronic nausea    Insomnia    Anxiety       ** Please Note: Fluency Direct voice to text software may have been used in the creation of this document  **    Total visit time:  At least 25 minutes, with more than 50% spent counseling/coordinating care    Jonh Bishop MD, [address] [facility]  Physical Medicine and Rehabilitation  Brain Injury Medicine

## 2018-12-19 NOTE — PROGRESS NOTES
Physical Medicine and Rehabilitation Progress Note  Bishop Valencia 66 y o  female MRN: [id number]  Unit/Bed#: [facility] 962-01 Encounter: [id number]    Chief Complaints:  Decline in ambulation     Subjective/Interval Events:   Patient reporting her appetite remains improved  She notes mild lower abdominal discomfort after lunch but not bad and she still is hungry and denies nausea or constipation  Patient denies lightheadedness, fever, chills, abdominal, pain, calf pain, depression, or other significant complaints  ROS: A 10-point ROS was performed  Negative except as listed above  Overall Assessment/relevant history:  66-year-old female with a past medical history of atrial fibrillation on chronic anticoagulation with apixaban, hypertension, hyperlipidemia, hypothyroidism, chronic left facial droop, severe aortic stenosis, possible dementia, severe osteoporosis with recent fall at home found to have right intertrochanteric femur fracture who underwent right total hip arthroplasty by Dr Chester Lambert on 12/04/2018  Postoperative  Most notable for transient SVT requiring rapid response and temporary Cardizem drip  Cardiology consulted and assisted with management and patient has been switched since transition to p o  Amiodarone  Postoperative course also notable for acute blood loss anemia requiring 1 unit PRBC transfusion  Patient was evaluated by skilled therapies and was found to have significant decline in ADLs and ambulation appropriate for admission to [place] [address]      Functional status (recent):    Transfers min assist-mod assist; Ambulation min assist rolling walker >50 ft    Functional status on admission to [facility]:  OT:  Total assist General in toilet transfers, lower body dressing, toileting, and bathing; upper body dressing mod assist, grooming and eating supervision      * Fracture of femoral neck, right ([facility] [address] )   Assessment & Plan    -S/p Right MIKE by Dr Chester Lambert on 18  -WBAT RLE  -Daily PT/OT to improve mobility and self care   -Posterior hip precautions  -Maintain hip abduction foam when supine due to patient's cognitive deficits  -Please maintain compressive dressing for 48 hours, then replace if needed      Positional lightheadedness   Assessment & Plan    Improved s/p transfusion   Monitor vitals; orthostatics  Recently started on Amio; also on MTP  HCTZ holding per IM  Abdominal binder PRN     Delirium   Assessment & Plan    Improving   Post-op delirium on possible baseline dementia > seems fairly significant at times  >oxy to 2 5mg Q6H PRN   Medications reviewed; limit sedating medications but adequately treat pain  Frequent redirection, re-orientation, reassurance  Monitor for signs and symptoms of infection  Overstimulation precautions, optimize sleep-wake cycle, agitation behavioral scale, sleep log  If necessary provided 1-1     Acute pain   Assessment & Plan    Acceptable control  -managed on scheduled Tylenol 975mg TID; -PRN oxycodone2 5 for moderate to severe pain - use with caution given her delirium on dementia > tolerating   -ICE [id number] while awake  Counseled on and continue to encourage deep breathing/relaxation/behavioral pain management techniques:     Deep breathin seconds in, 5 seconds out, 5 times per hour when awake and PRN when in pain or anticipate pain; avoid holding breath and tightening muscles and instead breathe slowly and deeply       Acute blood loss anemia   Assessment & Plan    Improved 10 9 on   Symptomatic anemia > improved; Hb 7 9 on  > improved to 10 8 after 2 U PRBCs   >monitor for signs of acute bleed-iron and vitamin-C supplementation  -monitor intermittently     Cognitive impairment   Assessment & Plan    -Patient with baseline dementia and mild cognitive deficits  -Placed on fall precautions and bed alarms - freq checks by staff      Severe aortic stenosis   Assessment & Plan    -preload dependent   -encourage PO hydration  -Patient cardiologist is Dr Daryn Yoder who she will follow up with as an outpatient     Atrial fibrillation [facility])   Assessment & Plan    -rate controlled on lopressor 12 5mg Q12h and new amiodarone 200mg qday  -anticoagulated with Eliquis 5mg BID  - Amiodarone 200mg qday - taper per cards   -Patient's home cardiologist is Dr Daryn Yoder     Hypertension   Assessment & Plan    -MTP  -HCTZ held with symptomatic orthostasis  -IM to adjust dosing if needed      Anxiety   Assessment & Plan    Remains improved overall   - Behavioral techniques  - Consider consult with Dr Janine Suarez for management  Insomnia   Assessment & Plan    Improved  Continue low dose melatonin 3mg QHS; Sleep wake log  Recommend appropriate sleep hygiene: patient counselled on this:   Sleep only as much as necessary to feel rested and then get up or sit up in bed, maintain regular sleep schedule (same bedtime and wake time everyday), do not force sleep, avoid caffeinated beverages after lunch, avoid alcohol at home near bedtime, do not smoke particularly in evening, do not go to bed hungry, adjust bedroom environment so you are comfortable prior to settling down for sleep, deal with concerns or worries before bedtime  Make a list of things to work on for next day so anxiety is reduced at night, exercise regularly when cleared by physician       Chronic nausea   Assessment & Plan    Improving  Apparently been worked up by GI in past  At approximate baseline; worsened with anxiety  PRN Zofran  Ensure optimal stooling     Onychomycosis   Assessment & Plan    Toenails; s/p podiatry consult and debridement      Hypothyroidism   Assessment & Plan    -managed on Synthroid     Dyslipidemia   Assessment & Plan    -managed on pravastatin         # Bowel function:  Colace; MiraLax and as needed regimen    # Bladder function: Intact  Monitor for urinary retention, incontinence, and signs of urinary infection      # Skin  Encourage regular turning and turn patient every 2 hours if not adequately ambulatory; - Rehabilitation team to perform skin checks regularly to monitor for erythema, wounds, rashes (if applicable incisions)    # Other  - Diet: general    - DVT prophy: Eliquis and SCDs    Disposition:   Home with supervision with AIME 2 weeks from admission    Follow-up:   PCP, Ortho, Cards, PMR    ---------------------------------------------------------------------------------------------------------------------    Objective: Allergies and Medications per EMR    Physical Exam:  Vitals:    12/19/18 0513   BP: 145/89   Pulse: 77   Resp: 18   Temp: 98 6 °F (37 °C)   SpO2: 95%   General: Awake, alert in NAD  HENT:  MMM  Respiratory: Unlabored breathing, breath sounds equal, Lungs CTA, no wheezes, rales, or rhonchi  Cardiovascular: Regular rate and rhythm, no murmurs, rubs, or gallops  Gastrointestinal: Soft, non-tender, non-distended, normoactive bowel sounds - benign   SkiN/MSK/Extremities:  No calf edema or calf tenderness to palpation; incision healing appropriately with evolving ecchymosis  Neurologic/Psych:   MENTAL STATUS: orientated to self, hospital, year, and situation  Affect: Euthymic       Diagnostic Studies: reviewed, no new imaging  No results found      Laboratory:      Results from last 7 days  Lab Units 12/13/18  0612 12/10/18  0525   HEMOGLOBIN g/dL 7 9* 8 8*   HEMATOCRIT % 24 6* 27 1*   WBC Thousand/uL 6 44 7 87       Results from last 7 days  Lab Units 12/17/18  0517 12/13/18  0612   BUN mg/dL 20 21   POTASSIUM mmol/L 3 9 3 7   CHLORIDE mmol/L 107 104   CREATININE mg/dL 0 90 0 92            Patient Active Problem List   Diagnosis    Microscopic hematuria    Dyslipidemia    Hypertension    Palpitations    Atrial fibrillation (HCC)    Hypothyroidism    Cognitive impairment    MCI (mild cognitive impairment)    Gait disturbance    Closed intertrochanteric fracture of hip, right, initial encounter ([address] )    Severe aortic stenosis    Dehydration    Closed fracture of neck of right femur ([facility])    Fall    Ambulatory dysfunction    Physical deconditioning    Osteoporosis    Atrial fibrillation with rapid ventricular response ([facility])    NSTEMI (non-ST elevated myocardial infarction) ([facility])    Acute blood loss anemia    Fracture of femoral neck, right ([facility])    Acute pain    Delirium    Onychomycosis    Positional lightheadedness    Chronic nausea    Insomnia    Anxiety       ** Please Note: Fluency Direct voice to text software may have been used in the creation of this document  **    Total visit time:  At least 25 minutes, with more than 50% spent counseling/coordinating care    Bryan Vences MD, [address] [facility]  Physical Medicine and Rehabilitation  Brain Injury Medicine

## 2018-12-19 NOTE — PROGRESS NOTES
12/19/18 1230   Pain Assessment   Pain Assessment 0-10   Pain Score 6   Pain Location Leg   Pain Orientation Right   Pain Frequency Intermittent   Pain Onset (with activity)   Hospital Pain Intervention(s) Rest;Cold applied;Elevated;Distraction   Restrictions/Precautions   Precautions THR; Fall Risk;Cognitive;Bed/chair alarms   RLE Weight Bearing Per Order WBAT   Cognition   Overall Cognitive Status Impaired   QI: Sit to Stand   Assistance Needed Incidental touching;Verbal cues   Sit to Stand CARE Score 4   QI: Chair/Bed-to-Chair Transfer   Assistance Needed Physical assistance;Verbal cues; Adaptive equipment   Assistance Provided by Leon 25%-49%   Chair/Bed-to-Chair Transfer CARE Score 3   Transfer Bed/Chair/Wheelchair   Limitations Noted In LE Strength;Confidence;Balance;Problem Solving; Endurance   Adaptive Equipment Roller Walker   Bed, Chair, Wheelchair Transfer (FIM) 4 - Patient completes 75% of all tasks   QI: Car Transfer   Assistance Needed Physical assistance;Verbal cues; Adaptive equipment   Assistance Provided by Leon 25%-49%   Car Transfer CARE Score 3   QI: Walk 10 Feet   Assistance Needed Physical assistance;Verbal cues; Adaptive equipment   Walk 10 Feet CARE Score -   QI: Walk 50 Feet with Two Turns   Reason if not Attempted Safety concerns   Walk 50 Feet with Two Turns CARE Score 88   QI: Walk 150 Feet   Reason if not Attempted Safety concerns   Walk 150 Feet CARE Score 88   QI: Walking 10 Feet on Uneven Surfaces   Reason if not Attempted Activity not applicable   Walking 10 Feet on Uneven Surfaces CARE Score 9   Ambulation   Does the patient walk? 2  Yes   Primary Discharge Mode of Locomotion Walk   Walk Assist Level Minimum Assist   Gait Pattern Decreased foot clearance; Slow Yamilka; Step to   Assist Device Napoleon Block Walked (feet) 20 ft   Walking (FIM) 1 - Patient ambulates less than 49 feet regardless of assist/device/set up   QI: Toilet Transfer   Assistance Needed Physical assistance;Verbal cues; Adaptive equipment   Assistance Provided by [place] 25%-49%   Toilet Transfer CARE Score 3   Toilet Transfer   Surface Assessed Bedside Commode   Limitations Noted In Balance; Safety   Toilet Transfer (FIM) 4 - Patient completes 75% of all tasks   Other Comments   Comments Spent majority of session walking 10-20 ft chair to chair working on approchaing and turns  Pt perfroms consistently with MiN A, simple cueing is much better for her and removing RW before her sitting down eliminates confusion of hand placement   Assessment   Treatment Assessment Pt participating well despite reporting R LE discomfort  Pt easily distracted to take focus off R leg and then participated very well  Will benefit from continued practice omn all functional transfers with spouse getting hands on to assist and cue  recommend continue with repetitive practice of tasks, focusing on one thing at a time   Family/Caregiver Present spouse,    Recommendation   Recommendation Home with family support;24 hour supervision/assist;Home PT   Equipment Recommended Walker; Wheelchair   PT Therapy Minutes   PT Time In 1230   PT Time Out 1330   PT Total Time (minutes) 60   PT Mode of treatment - Individual (minutes) 60   PT Mode of treatment - Concurrent (minutes) 0   PT Mode of treatment - Group (minutes) 0   PT Mode of treatment - Co-treat (minutes) 0   PT Mode of Teatment - Total time(minutes) 60 minutes   Therapy Time missed   Time missed?  No

## 2018-12-19 NOTE — PROGRESS NOTES
12/19/18 1030   Pain Assessment   Pain Assessment No/denies pain   Pain Score No Pain   Restrictions/Precautions   Precautions Bed/chair alarms; Fall Risk;THR   QI: Eating   Assistance Needed Set-up / clean-up   Assistance Provided by [place] No physical assistance   Eating CARE Score 5   Eating Assessment   Positioning Upright;Out of Bed   Safety Needs Increase Time   Meal Assessed Breakfast   Intake Mode PO;Self   Eating (FIM) 5 - Patient requires supervision, cueing or coaxing   QI: Sit to Stand   Assistance Needed Physical assistance   Assistance Provided by [place] Less than 25%   Comment Pt requires min A for stand to sit due to poor hand placement during transfers and impulsivity   Sit to Stand CARE Score 3   QI: Chair/Bed-to-Chair Transfer   Assistance Needed Physical assistance   Assistance Provided by [place] 25%-49%   Comment Pt is min A for stand pivot transfer with RW  Pt unabel to problem solve and requires step by step cueing due to confusion   Chair/Bed-to-Chair Transfer CARE Score 3   Transfer Bed/Chair/Wheelchair   Adaptive Equipment Roller Walker   Stand Pivot Minimal Assist   Sit to Stand Minimal   Stand to Sit Minimal   Bed, Chair, Wheelchair Transfer (FIM) 3 - Leupp needs to lift, boost or assist to stand OR sit   QI: [address] Needed Physical assistance   Assistance Provided by [place] 25%-49%   Comment Pt requires min A for pants    [address] Score 3   Toileting   Able to [address] down yes, up yes     Able to Manage Clothing Closures No   Manage Hygiene Bladder   Toileting (FIM) 4 - Patient requires steadying assist or light touching   QI: Toilet Transfer   Assistance Needed Physical assistance   Assistance Provided by Leupp 25%-49%   Toilet Transfer CARE Score 3   Toilet Transfer   Surface Assessed Standard Commode   Transfer Technique Stand Pivot   Limitations Noted In Balance;Confidence;Problem Solving;LE Strength   Toilet Transfer (FIM) 3 - Leupp needs to lift, boost or assist to stand OR sit   Functional Standing Tolerance   Time 3 min, 2 min, 4 minutes   Activity Pt engaged in dish washing activity with intermittent b/l hand release to wash hands  Pt with weakness in LLE while in stance  Cognition   Overall Cognitive Status Impaired   Arousal/Participation Alert; Cooperative   Attention Difficulty attending to directions   Memory Decreased short term memory;Decreased long term memory;Decreased recall of precautions   Following Commands Follows one step commands inconsistently   Activity Tolerance   Activity Tolerance Patient limited by fatigue   Assessment   Treatment Assessment Pt engaged in OT treatment session with focus on standing balance, functional reach and standing tolerance  Pt continues to be limited by signficant cognitive deficits  Pt continues to require step by step direction for all functional transfers despite being given visual cues and written directions  Pt stood at sink to complete dishwashing task pt with b/l hand release while in stance  Pt continues to be anxious during transfers and new tasks  Pt required assistance to position RW safely for item retrieval from dresser  Pt continued to requiring cueing to place hand on top of dresser for support while reaching  Pt  present for later portion of session  Prognosis Fair   Problem List Decreased strength;Decreased endurance; Impaired balance;Decreased mobility; Decreased cognition;Decreased coordination; Impaired judgement;Decreased safety awareness;Orthopedic restrictions;Pain   Plan   Treatment/Interventions ADL retraining;LE strengthening/ROM; Functional transfer training; Therapeutic exercise; Endurance training;Patient/family training;Equipment eval/education;Gait training; Compensatory technique education; Bed mobility   Progress Progressing toward goals   Recommendation   OT Discharge Recommendation (SNF vs home with family assistance3 )   OT Therapy Minutes   OT Time In 1030   OT Time Out 1130   OT Total Time (minutes) 60   OT Mode of treatment - Individual (minutes) 60   OT Mode of treatment - Concurrent (minutes) 0   OT Mode of treatment - Group (minutes) 0   OT Mode of treatment - Co-treat (minutes) 0   OT Mode of Teatment - Total time(minutes) 60 minutes   Therapy Time missed   Time missed?  No

## 2018-12-19 NOTE — PROGRESS NOTES
12/19/18 0900   Pain Assessment   Pain Assessment No/denies pain   Pain Score No Pain   Pain Type Acute pain   Pain Location Knee   Pain Orientation Right   Pain Radiating Towards RLE   Pain Descriptors Aching;Discomfort   Effect of Pain on Daily Activities Patient with no pain at rest upon arrival of PT  Following activity, patient reporting mild pain in R knee  Patient's Stated Pain Goal No pain   Restrictions/Precautions   Precautions Bed/chair alarms;Pain;Supervision on toilet/commode;THR;Fall Risk   Weight Bearing Restrictions Yes   RLE Weight Bearing Per Order WBAT   ROM Restrictions Yes   RLE ROM Restriction Range Limitation  (THPs)   Braces or Orthoses Other (Comment)  (ABD binder/TEDS/ ABD pillow )   General   Change In Medical/Functional Status mild nausea reported during ambulation that subsided with PLB    Cognition   Overall Cognitive Status Impaired   Arousal/Participation Cooperative   Attention Attends with cues to redirect   Orientation Level Oriented to person;Oriented to time   Memory Decreased short term memory;Decreased recall of recent events;Decreased recall of precautions   Following Commands Follows one step commands with increased time or repetition   Comments Patient talking about her dementia  She reports feeling forgetful and it g"gets in the way of her performance"    Subjective   Subjective Patient agreeable to particiapte in PT session    QI: Sit to Stand   Assistance Needed Physical assistance   Assistance Provided by [place] Less than 25%   Comment CGA/Niharika with RW   Sit to Stand CARE Score 3   Bed Mobility   Findings Pt OOB in chair upon arrival    QI: Chair/Bed-to-Chair Transfer   Assistance Needed Physical assistance   Assistance Provided by [place] 25%-49%   Comment becomes confused at times during task; allow for extra time   Chair/Bed-to-Chair Transfer CARE Score 3   Transfer Bed/Chair/Wheelchair   Limitations Noted In Balance;Confidence; Coordination;Pain Management;Problem Solving; Sequencing;LE Strength   Adaptive Equipment Roller Walker   Stand Pivot Minimal Assist;Moderate Assist   Sit to Stand Minimal Assist   Stand to Sit Minimal Assist   All Transfer Moderate Assist;Minimal Assist   QI: Car Transfer   Reason if not Attempted Safety concerns   Car Transfer CARE Score 88   QI: Walk 10 Feet   Assistance Needed Incidental touching;Verbal cues; Adaptive equipment   Assistance Provided by [place] Less than 25%   Comment CGA with RW   Walk 10 Feet CARE Score 3   QI: Walk 50 Feet with Two Turns   Assistance Needed Incidental touching;Verbal cues; Adaptive equipment   Assistance Provided by [place] Less than 25%   Comment CGA with RW   Walk 50 Feet with Two Turns CARE Score 3   QI: Walk 150 Feet   Reason if not Attempted Safety concerns   Walk 150 Feet CARE Score 88   QI: Walking 10 Feet on Uneven Surfaces   Reason if not Attempted Safety concerns   Walking 10 Feet on Uneven Surfaces CARE Score 88   Ambulation   Does the patient walk? 2  Yes   Primary Discharge Mode of Locomotion Walk   Walk Assist Level Contact Guard   Gait Pattern Antalgic; Inconsistant Yamilka; Forward Flexion;Decreased R stance;Shuffle   Assist Device Napoleon Sonia Block Walked (feet) 54 ft   Limitations Noted In Balance; Coordination;Device Management; Safety; Sequencing   QI: Wheel 50 Feet with Two Turns   Reason if not Attempted Activity not applicable   Wheel 50 Feet with Two Turns CARE Score 9   QI: Wheel 150 Feet   Reason if not Attempted Activity not applicable   Wheel 593 Feet CARE Score 9   Wheelchair mobility   QI: Does the patient use a wheelchair? 0   No   QI: 1 Step (Curb)   Assistance Needed Physical assistance   Assistance Provided by [place] 25%-49%   1 Step (Curb) CARE Score 3   QI: 4 Steps   Assistance Needed Physical assistance   Assistance Provided by [place] 50%-74%   Comment verbal instruction; Niharika + mod verbal instruction    4 Steps CARE Score 2   Stairs   Type Stairs   # of Steps 4   Weight Bearing Precautions Fall Risk   Assist Devices Bilateral Rail   QI: Picking Up Object   Reason if not Attempted Safety concerns   Picking Up Object CARE Score 88   Therapeutic Interventions   Strengthening 5x repeated STS; 10x4 LAQ RLE   Flexibility RLE HS stretch 60 seconds x2   Assessment   Treatment Assessment Patient making good progress with skilled PT intervention however, her progress remains limited by impaired cognition, impaired motor planning and sequencing, and pain in RLE  Patient able to progress to the following functioanl levels: mod A with bed mobility, CG for STS transfers, Niharika for SPT, Niharika for ' ambulation depending on pain, and min/mod A for 4-6 steps at [facility]  She will continue to require skilled PT intervention to maximize function and reduce caregiver burden  Barriers to Discharge Comments cognition    PT Barriers   Physical Impairment Decreased strength;Decreased range of motion;Decreased endurance; Impaired balance;Decreased mobility; Decreased coordination;Decreased cognition;Decreased safety awareness;Decreased skin integrity;Orthopedic restrictions;Pain   Functional Limitation Standing;Transfers; Walking;Stair negotiation   Plan   Treatment/Interventions Functional transfer training;LE strengthening/ROM; Elevations; Therapeutic exercise; Endurance training;Cognitive reorientation;Patient/family training;Equipment eval/education; Bed mobility;Gait training; Compensatory technique education   Progress Progressing toward goals   Recommendation   Recommendation Other (Comment)  (24 hour S/A + HHPT vs STR)   Equipment Recommended Walker   PT Therapy Minutes   PT Time In 0900   PT Time Out 0930   PT Total Time (minutes) 30   PT Mode of treatment - Individual (minutes) 30   PT Mode of treatment - Concurrent (minutes) 0   PT Mode of treatment - Group (minutes) 0   PT Mode of treatment - Co-treat (minutes) 0   PT Mode of Teatment - Total time(minutes) 30 minutes   Therapy Time missed Time missed?  No

## 2018-12-19 NOTE — PROGRESS NOTES
12/19/18 4394   Pain Assessment   Pain Assessment 0-10   Pain Score 5   Pain Type Acute pain;Surgical pain   Pain Location Knee;Leg;Hip   Pain Orientation Right   Pain Descriptors Aching;Discomfort   Pain Frequency Constant/continuous   Pain Onset Ongoing   Clinical Progression Not changed   Effect of Pain on Daily Activities pain in R knee and leg following therapy   Patient's Stated Pain Goal No pain   Hospital Pain Intervention(s) Rest   Restrictions/Precautions   Precautions Bed/chair alarms;Cognitive; Fall Risk;Supervision on toilet/commode   Weight Bearing Restrictions Yes   RLE Weight Bearing Per Order WBAT   ROM Restrictions Yes   RUE ROM Restriction Other  (THP)   Braces or Orthoses (TEDs and abdominal binder)   QI: Sit to Stand   Assistance Needed Physical assistance   Assistance Provided by [place] Less than 25%   Comment ext time and v/c to complete sit<>stand at CGA   Sit to Stand CARE Score 3   QI: Chair/Bed-to-Chair Transfer   Assistance Needed Physical assistance   Assistance Provided by [place] Less than 25%   Comment CGA for stand pivot with RW when given ext time and v/c for hand placement   Chair/Bed-to-Chair Transfer CARE Score 3   Transfer Bed/Chair/Wheelchair   Positioning Concerns Cognition   Limitations Noted In Confidence;LE Strength; Sequencing   Adaptive Equipment Roller Walker   Findings Pt transfered to recliner in room at [facility] level with v/c to sequence hand placement  Bed, Chair, Wheelchair Transfer (FIM) 4 - Patient requires steadying assist or light touching   Functional Standing Tolerance   Time 6min, 9min   Activity Pt engaged in static stance with unilateral support to promote tolerance and standing balance for ADL task performance  Pt had support from RW to sort cards on tray table  Comments maintained stance at [facility]  Pt requested seated rest break due to inc discomfort in R knee and leg     Cognition   Overall Cognitive Status Impaired   Arousal/Participation Cooperative Attention Attends with cues to redirect   Orientation Level Oriented to person;Oriented to place;Oriented to situation   Memory Decreased short term memory;Decreased recall of recent events;Decreased recall of precautions   Following Commands Follows one step commands with increased time or repetition   Activity Tolerance   Activity Tolerance Patient tolerated treatment well   Assessment   Treatment Assessment Pt participated in skilled OT session focusing on standing tolerance and functional transfers  Pt completed all transfers at [facility] during session when given ext time and v/c to sequence hand placement  Pt cont to be limited in indep by pain in RLE and cognition affecting short term memory and processing  Pt demo inc standing tolerance and balance during card sorting task  Pt would benefit from cont therapy focusing on balance and tolerance in stance for ADL performance  Cont with POC   Prognosis Fair   Problem List Decreased strength;Decreased range of motion;Decreased endurance; Impaired balance;Decreased coordination;Decreased safety awareness;Orthopedic restrictions;Pain   Barriers to Discharge Inaccessible home environment;Decreased caregiver support; Other (Comment)  (Cognition)   Plan   Treatment/Interventions ADL retraining;Functional transfer training; Therapeutic exercise; Endurance training;Patient/family training;Bed mobility   Progress Progressing toward goals   OT Therapy Minutes   OT Time In 0930   OT Time Out 1000   OT Total Time (minutes) 30   OT Mode of treatment - Individual (minutes) 30   OT Mode of treatment - Concurrent (minutes) 0   OT Mode of treatment - Group (minutes) 0   OT Mode of treatment - Co-treat (minutes) 0   OT Mode of Teatment - Total time(minutes) 30 minutes   Therapy Time missed   Time missed?  No

## 2018-12-20 LAB
ANION GAP SERPL CALCULATED.3IONS-SCNC: 5 MMOL/L (ref 4–13)
BASOPHILS # BLD AUTO: 0.03 THOUSANDS/ΜL (ref 0–0.1)
BASOPHILS NFR BLD AUTO: 1 % (ref 0–1)
BUN SERPL-MCNC: 19 MG/DL (ref 5–25)
CALCIUM SERPL-MCNC: 8.7 MG/DL (ref 8.3–10.1)
CHLORIDE SERPL-SCNC: 105 MMOL/L (ref 100–108)
CO2 SERPL-SCNC: 28 MMOL/L (ref 21–32)
CREAT SERPL-MCNC: 0.79 MG/DL (ref 0.6–1.3)
EOSINOPHIL # BLD AUTO: 0.07 THOUSAND/ΜL (ref 0–0.61)
EOSINOPHIL NFR BLD AUTO: 1 % (ref 0–6)
ERYTHROCYTE [DISTWIDTH] IN BLOOD BY AUTOMATED COUNT: 15.4 % (ref 11.6–15.1)
GFR SERPL CREATININE-BSD FRML MDRD: 72 ML/MIN/1.73SQ M
GLUCOSE SERPL-MCNC: 83 MG/DL (ref 65–140)
HCT VFR BLD AUTO: 37.5 % (ref 34.8–46.1)
HGB BLD-MCNC: 11.6 G/DL (ref 11.5–15.4)
IMM GRANULOCYTES # BLD AUTO: 0.04 THOUSAND/UL (ref 0–0.2)
IMM GRANULOCYTES NFR BLD AUTO: 1 % (ref 0–2)
LYMPHOCYTES # BLD AUTO: 0.71 THOUSANDS/ΜL (ref 0.6–4.47)
LYMPHOCYTES NFR BLD AUTO: 14 % (ref 14–44)
MCH RBC QN AUTO: 30.8 PG (ref 26.8–34.3)
MCHC RBC AUTO-ENTMCNC: 30.9 G/DL (ref 31.4–37.4)
MCV RBC AUTO: 100 FL (ref 82–98)
MONOCYTES # BLD AUTO: 0.29 THOUSAND/ΜL (ref 0.17–1.22)
MONOCYTES NFR BLD AUTO: 6 % (ref 4–12)
NEUTROPHILS # BLD AUTO: 3.8 THOUSANDS/ΜL (ref 1.85–7.62)
NEUTS SEG NFR BLD AUTO: 77 % (ref 43–75)
NRBC BLD AUTO-RTO: 0 /100 WBCS
PLATELET # BLD AUTO: 231 THOUSANDS/UL (ref 149–390)
PMV BLD AUTO: 11 FL (ref 8.9–12.7)
POTASSIUM SERPL-SCNC: 4.9 MMOL/L (ref 3.5–5.3)
RBC # BLD AUTO: 3.77 MILLION/UL (ref 3.81–5.12)
SODIUM SERPL-SCNC: 138 MMOL/L (ref 136–145)
WBC # BLD AUTO: 4.94 THOUSAND/UL (ref 4.31–10.16)

## 2018-12-20 PROCEDURE — 99233 SBSQ HOSP IP/OBS HIGH 50: CPT

## 2018-12-20 PROCEDURE — 97110 THERAPEUTIC EXERCISES: CPT

## 2018-12-20 PROCEDURE — 97535 SELF CARE MNGMENT TRAINING: CPT

## 2018-12-20 PROCEDURE — 80048 BASIC METABOLIC PNL TOTAL CA: CPT | Performed by: NURSE PRACTITIONER

## 2018-12-20 PROCEDURE — 97530 THERAPEUTIC ACTIVITIES: CPT

## 2018-12-20 PROCEDURE — 97112 NEUROMUSCULAR REEDUCATION: CPT

## 2018-12-20 PROCEDURE — 85025 COMPLETE CBC W/AUTO DIFF WBC: CPT | Performed by: NURSE PRACTITIONER

## 2018-12-20 RX ADMIN — ACETAMINOPHEN 975 MG: 325 TABLET, FILM COATED ORAL at 14:05

## 2018-12-20 RX ADMIN — DOCUSATE SODIUM 100 MG: 100 CAPSULE, LIQUID FILLED ORAL at 17:19

## 2018-12-20 RX ADMIN — LEVOTHYROXINE SODIUM 50 MCG: 50 TABLET ORAL at 06:04

## 2018-12-20 RX ADMIN — CALCIUM CARBONATE (ANTACID) CHEW TAB 500 MG 500 MG: 500 CHEW TAB at 08:36

## 2018-12-20 RX ADMIN — METOPROLOL SUCCINATE 12.5 MG: 25 TABLET, EXTENDED RELEASE ORAL at 21:23

## 2018-12-20 RX ADMIN — DOCUSATE SODIUM 100 MG: 100 CAPSULE, LIQUID FILLED ORAL at 08:36

## 2018-12-20 RX ADMIN — PRAVASTATIN SODIUM 40 MG: 40 TABLET ORAL at 17:19

## 2018-12-20 RX ADMIN — AMIODARONE HYDROCHLORIDE 200 MG: 200 TABLET ORAL at 08:36

## 2018-12-20 RX ADMIN — VITAMIN D, TAB 1000IU (100/BT) 5000 UNITS: 25 TAB at 08:36

## 2018-12-20 RX ADMIN — ACETAMINOPHEN 975 MG: 325 TABLET, FILM COATED ORAL at 21:23

## 2018-12-20 RX ADMIN — CALCIUM CARBONATE (ANTACID) CHEW TAB 500 MG 500 MG: 500 CHEW TAB at 17:19

## 2018-12-20 RX ADMIN — ACETAMINOPHEN 975 MG: 325 TABLET, FILM COATED ORAL at 06:04

## 2018-12-20 RX ADMIN — APIXABAN 5 MG: 5 TABLET, FILM COATED ORAL at 21:23

## 2018-12-20 RX ADMIN — APIXABAN 5 MG: 5 TABLET, FILM COATED ORAL at 08:36

## 2018-12-20 RX ADMIN — PANTOPRAZOLE SODIUM 20 MG: 20 TABLET, DELAYED RELEASE ORAL at 06:04

## 2018-12-20 RX ADMIN — MELATONIN 3 MG: at 21:23

## 2018-12-20 NOTE — PROGRESS NOTES
12/20/18 1240   Pain Assessment   Pain Assessment 0-10   Pain Score 7   Pain Type Acute pain;Surgical pain   Pain Location Leg   Pain Orientation Right   Pain Descriptors Aching;Nagging   Pain Frequency Constant/continuous   Pain Onset Ongoing   Clinical Progression Not changed   Effect of Pain on Daily Activities dec tolerance for functional transfers   Hospital Pain Intervention(s) Repositioned; Ambulation/increased activity   Restrictions/Precautions   Precautions Cognitive; Fall Risk;Bed/chair alarms;Supervision on toilet/commode   Weight Bearing Restrictions Yes   RLE Weight Bearing Per Order WBAT   ROM Restrictions Yes   RLE ROM Restriction (THPs)   QI: Oral Hygiene   Assistance Needed Supervision   Assistance Provided by [place] No physical assistance   Oral Hygiene CARE Score 4   Grooming   Able To Initiate Tasks; Acquire Items;Comb/Brush Hair;Wash/Dry Face;Brush/Clean Teeth   Limitation Noted In Strength   Findings Pt completes all grooming seated in w/c 2* pain in RLE, fatigue with ext stance, and dec balance with UE release from RW   Grooming (FIM) 5 - Patient requires supervision/monitoring   Tub/Shower Transfer   Findings  provided hand out and education on tub bench for discharge to [facility] for bathing   will purchase indep      QI: Sit to Lying   Assistance Needed Physical assistance   Assistance Provided by [place] Less than 25%   Comment light touch with RLE and v/c to maintain precautions   Sit to Lying CARE Score 3   QI: Sit to Stand   Assistance Needed Incidental touching   Assistance Provided by [place] Less than 25%   Comment ext time for sequencing   Sit to Stand CARE Score 3   QI: Chair/Bed-to-Chair Transfer   Assistance Needed Incidental touching   Assistance Provided by [place] Less than 25%   Comment CGA with RW and v/c for sequencing   Chair/Bed-to-Chair Transfer CARE Score 3   Transfer Bed/Chair/Wheelchair   Positioning Concerns Cognition   Limitations Noted In Confidence; Sequencing   Adaptive Equipment Roller Walker   Findings Pt and  engaged in transfer training with RW from [employer] [facility], rani, armed chair, and w/c  Pt's  educated on safe sequencing with RW and educated to keep hands on pt's to provide support if necessary during transfer  Pt able to complete all transfers at [facility] with v/c during session  Pt's  had noted difficulty recalling sequencing for transfers and difficulty communicating with pt effectively without stumbling over words  Pt became frustrated during practice req rest break and redirection to task  Pt and  would benefit from cont repetitive transfer practice to inc safety upon discahrge  Bed, Chair, Wheelchair Transfer (FIM) 4 - Patient requires steadying assist or light touching   QI: [address] Needed Incidental touching   Assistance Provided by [place] Less than 25%   Toileting Hygiene CARE Score 3   Toileting   Able to [address] down yes, up yes  Manage Hygiene Bladder   Limitations Noted In Balance; Safety; Sequencing   Findings  edu to purchase briefs for home to limit safety concerns with urgency or accidents during transfers  Toileting (FIM) 4 - Patient requires steadying assist or light touching   QI: Toilet Transfer   Assistance Needed Incidental touching   Assistance Provided by [place] Less than 25%   Comment RW   Toilet Transfer CARE Score 3   Toilet Transfer   Surface Assessed Standard Commode   Transfer Technique Standard   Limitations Noted In Balance;Confidence; Endurance; Sequencing   Positioning Concerns Cognition   Findings v/c for hand placement during trasnfer to commode   Toilet Transfer (FIM) 4 - Patient requires steadying assist or light touching   Cognition   Overall Cognitive Status Impaired   Arousal/Participation Alert; Cooperative   Attention Attends with cues to redirect   Orientation Level Oriented to place;Oriented to person;Oriented to time   Memory Decreased short term memory;Decreased long term memory;Decreased recall of precautions   Following Commands Follows multistep commands inconsistently   Activity Tolerance   Activity Tolerance Patient limited by pain   Medical Staff Made Aware NSG notified   Assessment   Treatment Assessment Pt and  participated in skilled OT session focusing on toileting, functional transfers, and DME discussion for ADL performance upon discharge  Pt's  reports willingness to support pt during all ADL tasks but states that he is "slightly nervous he will be unable to remember everything we teach him " Pt and  practice repetitive stand pivot transfers with RW during session  Refer above for details  Pt and  given printed handouts regarding tub transfer bench for bathing after discharge  Bedside commode will be ordered to inc height of toilet at home   also aware that he can place commode by bedside at night if pt is fatigued  Pt's  will be on unit by 11am tomorrow and available for ADL family training over weekend and plan is to cont family training with  during every session present  Pt would benefit from cont therapy focusing on transfers, strength/endurance, ADL performance, and cont family training to inc indep upon discharge  Cont with POC  OT Family training done with: Mayra Levine ()   Assessment of family training  would benefit from cont family training for functional transfers and ADL tasks  Prognosis Fair   Problem List Decreased strength;Decreased endurance; Impaired balance;Decreased mobility; Decreased cognition;Decreased coordination; Impaired judgement;Decreased safety awareness;Orthopedic restrictions;Pain;Decreased range of motion   Barriers to Discharge Inaccessible home environment;Decreased caregiver support; Other (Comment)   Plan   Treatment/Interventions ADL retraining;Functional transfer training; Therapeutic exercise; Endurance training;Patient/family training;Bed mobility   Progress Progressing toward goals   Recommendation   Equipment Recommended Bedside commode  (transfer tub bench)   OT Equipment ordered bedside commode   Date ordered 12/20/18   OT Therapy Minutes   OT Time In 1240   OT Time Out 1410   OT Total Time (minutes) 90   OT Mode of treatment - Individual (minutes) 90   OT Mode of treatment - Concurrent (minutes) 0   OT Mode of treatment - Group (minutes) 0   OT Mode of treatment - Co-treat (minutes) 0   OT Mode of Teatment - Total time(minutes) 90 minutes   Therapy Time missed   Time missed?  No

## 2018-12-20 NOTE — PHYSICAL THERAPY NOTE
PT PROGRESS NOTE    Following TEAM, plan for family training Friday-Sunday with [facility] Monday 12/24/18 vs 12/25/18  Spoke with Lois Doshi, spouse, who has been observing PT/OT session since admission  Agreeable to plan for family training- typically comes in around 11:00-11:30 but can come earlier if notified  Daughter encouraged to participate as well  Home has 3 ILYA with R rail + small raise in floor to enter home  Once inside, patient is situated on 1 floor  Focus of family training 1) stairs with R rail, possibly sideways approach- spouse to call contractor to determine if L sided rail can be added 2) bed mobility 3) transfers to all surface with simple instruction 4) understanding of THP 4) w/c parts management/ safety 5) short distance ambulation if w/c does not fit in home  Spouse to measure door frames to determine if w/c is feasible in home  At this time, patient requires assist for all mobility and instruction 2* cognition and impairments in STM, processing and motor planning  At this time, will need to practice w/c mobility as patient's tolerance to ambulation and inconsistency unsafe for spouse  Likely, 2* above mentioned cognitive deficits, spouse will be required to assist with w/c pushing  Will initiate in 12/21/18 session        Olivia Stark, PT

## 2018-12-20 NOTE — PROGRESS NOTES
12/20/18 0045   Pain Assessment   Pain Assessment 0-10   Pain Score 3   Pain Type Acute pain   Pain Location Leg   Pain Orientation Right   Pain Descriptors [facility] Pain Intervention(s) Ambulation/increased activity;Cold applied   Restrictions/Precautions   Precautions THR; Fall Risk;Bed/chair alarms;Cognitive   RLE Weight Bearing Per Order WBAT   RLE ROM Restriction Range Limitation  (THR)   Cognition   Overall Cognitive Status Impaired   Arousal/Participation Alert; Cooperative   Attention Difficulty attending to directions   Orientation Level Oriented to place;Oriented to person;Oriented to time   Memory Decreased short term memory;Decreased long term memory;Decreased recall of precautions   Following Commands Follows one step commands inconsistently   Subjective   Subjective reports she feels a little "barfy"   QI: Sit to Stand   Assistance Needed Supervision;Verbal cues   Sit to Stand CARE Score 4   QI: Chair/Bed-to-Chair Transfer   Assistance Needed Supervision;Verbal cues   Chair/Bed-to-Chair Transfer CARE Score 4   Transfer Bed/Chair/Wheelchair   Limitations Noted In Balance; Endurance;Problem Solving; Sequencing;UE Strength;LE Strength   Adaptive Equipment Roller Walker   Stand Pivot Supervision   Sit to Stand Supervision   Stand to W  R  [place], Chair, Wheelchair Transfer (FIM) 5 - Patient requires verbal cues   QI: Walk 10 Feet   Assistance Needed Incidental touching   Walk 10 Feet CARE Score 4   QI: Walk 50 Feet with Two Turns   Assistance Needed Incidental touching   Walk 50 Feet with Two Turns CARE Score 4   Ambulation   Does the patient walk? 2  Yes   Primary Discharge Mode of Locomotion Walk   Walk Assist Level Contact Guard   Gait Pattern Inconsistant Yamilka; Slow Yamilka; Forward Flexion;Narrow MINNIE;Step to;Decreased R stance; Improper weight shift   Assist Device Napoleon Block Walked (feet) 50 ft   Limitations Noted In Balance; Endurance; Heel Strike;Posture;Speed;Strength;Swing   Walking (FIM) 2 - Patient ambulates between 50 - 149 feet regardless of assist/device/set up   QI: 4 Steps   Assistance Needed Physical assistance   Assistance Provided by [place] 25%-49%   4 Steps CARE Score 3   Stairs   Type Stairs   # of Steps 4   Weight Bearing Precautions Fall Risk   Assist Devices Bilateral Rail   Findings needed VC for sequence each time   Stairs (FIM) 2 - Patient goes up and down 4 - 11 stairs regardless of assist/device/setup   Therapeutic Interventions   Strengthening LAQ with 2# CW on LLE 10x3; adduction yellow ball 20x2; abduction manually resisted 10x2   Flexibility hamstring and gastroc 60"x2   Assessment   Treatment Assessment pt continues to be limited by cog deficits and requires simple cues but very often, and extra time to process requests/directions; xfers with S and is able to walk with CG but she needs the RW removed from her hands so that she may reach to sit down, not able to process letting go of the RW and reaching back for the arms of the chair; short distance walking with good turns but needs VC to keep turning to get aligned with the chair and needs directions to take a few steps back until she is at the chair and it is safe to sit; poor carryover from other sessions and during this session which decreases safety awareness; continue functional xfer training and include  when he is here to start FT for xfers and mobility; pt would benefit from increasing strength, endurance, balance, and carryover for xfers/mobility; continue POC as per PT   Problem List Decreased strength;Decreased endurance; Impaired balance;Decreased mobility; Decreased cognition;Decreased coordination; Impaired judgement;Decreased safety awareness;Orthopedic restrictions;Pain;Decreased range of motion   Barriers to Discharge Inaccessible home environment;Decreased caregiver support; Other (Comment)   PT Barriers   Functional Limitation Standing;Transfers; Walking;Stair negotiation;Car transfers   Plan   Treatment/Interventions ADL retraining;Functional transfer training;LE strengthening/ROM; Elevations; Therapeutic exercise; Endurance training;Cognitive reorientation;Patient/family training;Equipment eval/education; Bed mobility;Gait training   Progress Progressing toward goals   Recommendation   Recommendation 24 hour supervision/assist;Home PT; Home with family support   Equipment Recommended Walker   PT Therapy Minutes   PT Time In 0830   PT Time Out 1000   PT Total Time (minutes) 90   PT Mode of treatment - Individual (minutes) 90   PT Mode of treatment - Concurrent (minutes) 0   PT Mode of treatment - Group (minutes) 0   PT Mode of treatment - Co-treat (minutes) 0   PT Mode of Teatment - Total time(minutes) 90 minutes   Therapy Time missed   Time missed?  No

## 2018-12-20 NOTE — TEAM CONFERENCE
Acute RehabilitationTeam Conference Note  Date: 12/20/2018   Time: 10:39 AM       Patient Name:  Emre Daley       Medical Record Number: [id number]   YOB: 1940  Sex: Female          Room/Bed:  [facility]/[place] [address]01  Payor Info:  Payor: Daphene Batten / Plan: MEDICARE A AND B / Product Type: Medicare A & B Fee for Service /      Admitting Diagnosis: Fracture of femoral neck, right ([facility] ) [S72 001A]  Status post fracture of hip [Z87 81]   Admit Date/Time:  12/8/2018 12:23 PM  Admission Comments: No comment available     Primary Diagnosis:  Fracture of femoral neck, right ([facility] )  Principal Problem: Fracture of femoral neck, right ([facility] )    Patient Active Problem List    Diagnosis Date Noted    Insomnia 12/15/2018    Anxiety 12/15/2018    Delirium 12/10/2018    Onychomycosis 12/10/2018    Positional lightheadedness 12/10/2018    Chronic nausea 12/10/2018    Fracture of femoral neck, right ([facility] ) 12/08/2018    Acute pain 12/08/2018    Osteoporosis 12/05/2018    Atrial fibrillation with rapid ventricular response ([facility] ) 12/05/2018    NSTEMI (non-ST elevated myocardial infarction) ([facility] ) 12/05/2018    Acute blood loss anemia 12/05/2018    Closed intertrochanteric fracture of hip, right, initial encounter ([facility] ) 12/03/2018    Severe aortic stenosis 12/03/2018    Dehydration 12/03/2018    Closed fracture of neck of right femur ([facility] ) 12/03/2018    Fall 12/03/2018    Ambulatory dysfunction 12/03/2018    Physical deconditioning 12/03/2018    MCI (mild cognitive impairment) 06/06/2018    Gait disturbance 06/06/2018    Atrial fibrillation ([facility] ) 05/26/2018    Hypothyroidism 05/26/2018    Cognitive impairment 05/26/2018    Microscopic hematuria 04/05/2018    Dyslipidemia 01/31/2014    Hypertension 01/31/2014    Palpitations 01/31/2014       Physical Therapy:    Weight Bearing Status: Weight Bearing as Tolerated (RLE)  Transfers: Minimal Assistance  Bed Mobility: Moderate Assistance  Amulation Distance (ft): 50 feet  Ambulation: Minimal Assistance  Assistive Device for Ambulation: Roller Walker  Number of Stairs: 4  Assistive Device for Stairs: Bilateral [employer] [place]  Stair Assistance: Minimal Assistance    12/19/18   Patient making good progress with skilled PT intervention however, her progress remains limited by impaired cognition, impaired motor planning and sequencing, and pain in RLE  Patient able to progress to the following functioanl levels: mod A with bed mobility, CG for STS transfers, Niharika for SPT, Niharika for ' ambulation depending on pain, and min/mod A for 4-6 steps at [place] [address]  She will continue to require skilled PT intervention to maximize function and reduce caregiver burden  At this time, recommendation for continued rehab vs 24 hour S/A with extensive family training required  12/12/18  Patient making slow progress with skilled PT intervention as she is limited by the following bariers: pain, decreased tolerance to ROM RLE, weakness RLE, limited tolerance to standing and ambulation, anxiety, and depression  Patient also limited during stance with orthostasis: DANA and ABD binder utilized  Patient able to ambulate 20' with RW at moderate A but demosntrates self limiting demeanor in which she requires max encouragement  She continues to require moderate Ax1 for bed mobility, transfers and gait with SBA at this time 2* safety  She will continue to require skilled PT intervention to maximize function and reduce caregiver burden  At this time, patient requires assist for all functional tasks and is limited by impaired cognition, requiring 24 hour S/A at this time  Occupational Therapy:  Eating: Modified Independent  Grooming: Supervision  Bathing: Minimal Assistance  Bathing: Minimal Assistance  Upper Body Dressing: Supervision  Lower Body Dressing: Moderate Assistance  Toileting: Moderate Assistance  Tub/Shower Transfer:  (n/a)  Toilet Transfer:  Moderate Assistance  Cognition: Exceptions to WNL  Cognition: Decreased Memory, Decreased Safety, Behavioral Considerations, Decreased Executive Functions, Impulsive, Decreased Comprehension, Decreased Attention  Orientation: Person, Place  Discharge Recommendations:  (home vs SNF rehab pending progress)  DC Home with[de-identified] 24 Hour Supervision, 24 Hour Assistance       Pt is demonstrating fair progress with occupational therapy toward long term goals for ADL, IADL, and functional transfers/mobility  Pt continues to present with impairments in pain management, standing balance, activity tolerance, confidence, safety and carryover with LHAE  Occupational performance remains limited and pt continues to require assistance for functional transfers, ADL performance, bed mobility, and toileting overall mod A for LB dressing and min A for bathing and toileting  Pt has support from  and son upon discharge for IADL tasks  Pt continues to demonstrate decreased carryover with RW and hand placement during all stand pivot transfers  Pt becomes nervous and demonstrates decreased comprehension with all functional transfers  Pt is at high risk for falls due to these deficits  Pt will continue to benefit from skilled acute rehab OT services to address above mentioned barriers and maximize functional independence in baseline areas of occupation to meet established treatment goals with overall decreased burden of care  Pending family's ability to assist pt may need SNF rehab vs home  Speech Therapy:           ST orders received for completion of bedside swallow assessment 2/2 reported difficulty by nursing staff of pt having difficulty w/ taking pills  At time of assessment, pt presenting w/ overall functional oral and pharyngeal swallow skills and appears to be tolerating current diet of regular and thin liquids w/o s/s of oral or pharyngeal difficulties   Following assessment, SLP educated pt and pt's  on recommendations for pt to take only 1-2 pills at a time and for pt to be positioned FULLY upright for all meals and PO intake to include water/pills, preferably to be OOB for all meals  Also discussed continuing to alternate solids/liquids during meals to which both pt and pt's  verbalized understanding of recs  No f/u questions asked of pt or pt's   Further skilled ST intervention for dysphagia therapy is not warranted at this time, however, should new concerns or difficulties arise, please re-consult  Nursing Notes:  Appetite: Poor  Diet Type: Regular/House                      Diet Patient/Family Education Complete: Yes    Type of Wound (LDA): Incision           Incision 12/04/18 Hip Right (Active)   Incision Description Clean;Dry; Intact 12/19/2018  5:30 PM   Nydia-wound Assessment Ecchymotic;Clean;Dry; Intact 12/19/2018  5:30 PM   Closure Adhesive closure strips 12/19/2018  5:30 PM   Drainage Amount Small 12/19/2018  5:30 PM   Drainage Description Serosanguineous 12/19/2018  5:30 PM   Treatments Site care 12/19/2018  5:30 PM   Dressing ABD 12/19/2018  5:30 PM   Dressing Changed New dressing applied 12/19/2018  5:30 PM   Patient Tolerance Tolerated well 12/19/2018  5:30 PM   Dressing Status Clean;Dry; Intact 12/20/2018 12:03 AM           Type of Wound Patient/Family Education: Yes  Bladder: 5 - Supervision     Bladder Patient/Family Education: Yes  Bowel: 5 - Supervision     Bowel Patient/Family Education: Yes  Pain Location: Abdomen  Pain Orientation: Bilateral, Lower  Pain Score: 0                       Hospital Pain Intervention(s): Medication (See MAR), Repositioned  Pain Patient/Family Education: Yes  Medication Management/Safety  Safe Administration: Yes  Medication Patient/Family Education Complete: Yes (pt needs reinforcement with all education)    Pt admitted to [facility] s/p Right femoral neck fracture s/p posterior MIKE  Staples removed and steri strips placed; abd covering  Pt is WBAT RLE  Uses Abductor pillow at all times, left foot rotates out, use towels to straighten  Pain managed with Oxycodone, tylenol and lidocaine patch  Pt has Poor appetite, needs set-up and encouragement  Also needs encouragement to drink water  Pt anemic, s/p 3 units PRBC's  Pt w h/o afib- on  eliquis for anticoag  Orthostatics were positive- better after transfusions  Teds  Daily and Abd binder prn  On lopressor and amiodarone  Pt has dementia/ confusion  Pt can be incontinent of bladder at times  Pt requires q 2 hr positioning  Pt requires alarms for safety  This week we will monitor lab  Values, vital signs, work on bowel and pain management  Pt will be encouraged to increase input  We will monitor incision for s/s of infection and promote healing  We will prevent skin breakdown with turning, repositioning, weight shifts  We will monitor for adequate pain control  Pt will work on safety awareness and remain free from falls  Case Management:     Discharge Planning  Goal Length of Stay: 10  Living Arrangements: Spouse/significant other, Children  Support Systems: Spouse/significant other, Children  Assistance Needed: n/a  Type of Current Residence: Private residence  [address]: No  Pt is participating with therapy and expects to return home  Pt has been educated on potential recommendations for contd therapy on dc  Following to assist w/appropriate dc plan  Is the patient actively participating in therapies?  yes  List any modifications to the treatment plan:     Barriers Interventions   anxiety Coping strategies   cognition Cueing to complete tasks, family education                 Is the patient making expected progress toward goals? no  List any update or changes to goals:     Medical Goals: Patient will be medically stable for discharge to [facility] upon completion of rehab program and Patient will be able to manage medical conditions and comorbid conditions with medications and follow up upon completion of rehab program    Weekly Team Goals:   Rehab Team Goals  ADL Team Goal: Patient will require supervision with ADLs with least restrictive device upon completion of rehab program  Bowel/Bladder Team Goal: Patient will require assist with bladder/bowel management with least restrictive device upon completion of rehab program  Transfer Team Goal: Patient will require supervision with transfers with least restrictive device upon completion of rehab program  Locomotion Team Goal: Patient will require supervision with locomotion with least restrictive device upon completion of rehab program  Cognitive Team Goal: Patient will require supervision for basic and complex tasks upon completion of rehab program    Discussion: pt presents with above barriers and is functioning at min a with transfers and stair negotiation with handrails  Total a with lower body adls, lori upper body  Team feels pt is still a high burden of care and would llike spouse to come in for training  Recommendations are for w/c level at home with [address] [place] [address] services  Anticipated Discharge Date:  tenative 12/24/18  [facility] Team Members Present: The following team members are supervising care for this patient and were present during this Weekly Team Conference      Physician: Dr Sam Villarreal MD  : Papo Diez MSW  Registered Nurse: Ricky Giles, SHAUN  Physical Therapist: Facundo Muñoz DPT  Occupational Therapist: Daniel Ortiz MS, OTR/L, CBIS  Speech Therapist:   Other:

## 2018-12-20 NOTE — PROGRESS NOTES
Internal Medicine Progress Note  Patient: Abby Vera  Age/sex: 66 y o  female  Medical Record #: [id number]      ASSESSMENT/PLAN:  Abby Vera is seen and examined and management for following issues:    Right hip fracture; s/p right MIKE 12/4:  Pain control per primary service        PAF:  on Amiodarone 200 mg qd (new for her)/Toprol/Eliquis  Had been missing doses of Lopressor 2/2 BP, changed to Toprol 12 5mg qhs  No other changes today      HTN/orthostasis: TEDS and abd binder for orthostasis = improved    ABLA:  3 U PRBCs since admit to [facility] = stable now    Severe AS/LVEF 55%: no volume overload at this time    Nausea: continue prn Zofran; hasn't needed much = LD yesterday AM which was effective    Diarrhea:  Seems to have resolved but watch 2/2 Amio       Subjective: offers no complaints  Appetite has improved quite a bit and she is brighter      ROS:   GI: denies abdominal pain, change bowel habits or reflux symptoms  Neuro: No new neurologic changes  Respiratory: No Cough, SOB  Cardiovascular: No CP, palpitations     Scheduled Meds:    Current Facility-Administered Medications:  acetaminophen 975 mg Oral Q8H [address] Diane Ji MD   amiodarone 200 mg Oral Daily With Breakfast IRMA Jacobs   apixaban 5 mg Oral Q12H [address] Diane Ji MD   bisacodyl 10 mg Rectal Daily PRN Diane Ji MD   calcium carbonate 500 mg Oral BID With Meals Diane Ji MD   cholecalciferol 5,000 Units Oral Daily Diane Ji MD   docusate sodium 100 mg Oral BID Kaylynn Waller MD   levothyroxine 50 mcg Oral Early Morning Diane Ji MD   lidocaine 2 patch Topical Daily Diane Ji MD   melatonin 3 mg Oral HS Kaylynn Waller MD   metoprolol succinate 12 5 mg Oral HS IRMA Najera   ondansetron 4 mg Oral Q6H PRN IRMA Jacobs   oxyCODONE 2 5 mg Oral Q6H PRN Kaylynn Waller MD   pantoprazole 20 mg Oral Early Morning Diane Ji MD   polyethylene glycol 17 g Oral Daily JOAN Wagner MD   polyethylene glycol 17 g Oral Once Reggie Eugene MD   pravastatin 40 mg Oral Daily With Alfredo Marinelli MD       Labs:       Results from last 7 days  Lab Units 12/20/18  0545 12/17/18  0517   WBC Thousand/uL 4 94 5 92   HEMOGLOBIN g/dL 11 6 10 9*   HEMATOCRIT % 37 5 34 9   PLATELETS Thousands/uL 231 256       Results from last 7 days  Lab Units 12/20/18  0545 12/17/18  0517   POTASSIUM mmol/L 4 9 3 9   CHLORIDE mmol/L 105 107   CO2 mmol/L 28 26   BUN mg/dL 19 20   CREATININE mg/dL 0 79 0 90   CALCIUM mg/dL 8 7 8 8                  Glucose, i-STAT (mg/dl)   Date Value   12/04/2018 136   12/03/2018 128       Labs reviewed    Physical Examination:  Vitals:   Vitals:    12/19/18 0618 12/19/18 1333 12/19/18 2002 12/20/18 0532   BP:  130/63 123/60 125/63   BP Location:  Left arm Left arm Left arm   Pulse:  86 74 79   Resp:  18 18 18   Temp:  97 8 °F (36 6 °C) 98 1 °F (36 7 °C) 98 3 °F (36 8 °C)   TempSrc:  Oral Oral Oral   SpO2:  98% 95% 97%   Weight: 67 2 kg (148 lb 2 4 oz)      Height:         Constitutional:  NAD; pleasant; nontoxic  HEENT:  AT/NC; oropharynx negative for thrush on tongue   Neck: negative for JVD  CV:  +S1, S2;  RRR; no rub/+murmur  Pulmonary:  BBS without crackles/wheeze/rhonci; resp are unlabored  Abdominal:  soft, +BS, ND/NT  Skin:  no rashes  Musculoskeletal:  Has trace RLE edema  :  no zamarripa  Neurological/Psych:  AA with mild confusion but seems to be improved last few days;  JENKINS 4/5; no depression/anxiety        [ X ] Total time spent: 30 Mins and greater than 50% of this time was spent counseling/coordinating care  ** Please Note: Dragon 360 Dictation voice to text software may have been used in the creation of this document   **

## 2018-12-20 NOTE — CONSULTS
Consultation/Progress note - Vic Lai 66 y o  female MRN: [id number]    Unit/Bed#: -01 Encounter: [id number]      Assessment/Plan     Assessment:  Pt participated in psychology services to address coping skills, adjustment, and motivation in rehab  Pt presented in neutral mood, full range affect  Discussed progress on rehab goals; reviewed cognitive-behavioral coping skills to help pt manage mood and maintain motivation  Provided supportive counseling  Pt was engaged and cooperative; pt presented with cognitive impairments which at times hinders progress; pt is aware of these deficits which causes depressed and anxious mood  She reported motivation in rehab sessions  Dx: F43 23 Adjustment disorder with depressed and anxious mood  Code: 53318      Plan:  Continue psychology services while pt is at [facility] [place]       Consults    Review of Systems    Historical Information   Past Medical History:   Diagnosis Date    Atrial fibrillation ([address] )     Disease of thyroid gland     Hyperlipidemia     Hypertension     Psychiatric disorder     dementia     Past Surgical History:   Procedure Laterality Date    HIP ARTHROPLASTY Right 12/4/2018    Procedure: ARTHROPLASTY HIP TOTAL;  Surgeon: Mackenzie Amanda MD;  [facility]: [facility];  Service: Orthopedics    HYSTERECTOMY       Social History   History   Alcohol Use No     History   Drug Use No     History   Smoking Status    Former Smoker    Quit date: 1960   Smokeless Tobacco    Never Used   Meds/Allergies   current meds:   Current Facility-Administered Medications   Medication Dose Route Frequency    acetaminophen (TYLENOL) tablet 975 mg  975 mg Oral Q8H [address]    amiodarone tablet 200 mg  200 mg Oral Daily With Breakfast    apixaban (ELIQUIS) tablet 5 mg  5 mg Oral Q12H [address]    bisacodyl (DULCOLAX) rectal suppository 10 mg  10 mg Rectal Daily PRN    calcium carbonate (TUMS) chewable tablet 500 mg  500 mg Oral BID With Meals    cholecalciferol (VITAMIN D3) tablet 5,000 Units  5,000 Units Oral Daily    docusate sodium (COLACE) capsule 100 mg  100 mg Oral BID    levothyroxine tablet 50 mcg  50 mcg Oral Early Morning    lidocaine (LIDODERM) 5 % patch 2 patch  2 patch Topical Daily    melatonin tablet 3 mg  3 mg Oral HS    metoprolol succinate (TOPROL-XL) 24 hr tablet 12 5 mg  12 5 mg Oral HS    ondansetron (ZOFRAN-ODT) dispersible tablet 4 mg  4 mg Oral Q6H PRN    oxyCODONE (ROXICODONE) IR tablet 2 5 mg  2 5 mg Oral Q6H PRN    pantoprazole (PROTONIX) EC tablet 20 mg  20 mg Oral Early Morning    polyethylene glycol (MIRALAX) packet 17 g  17 g Oral Daily PRN    polyethylene glycol (MIRALAX) packet 17 g  17 g Oral Once    pravastatin (PRAVACHOL) tablet 40 mg  40 mg Oral Daily With Dinner     No Known Allergies    Objective   Vitals: Blood pressure 125/63, pulse 79, temperature 98 3 °F (36 8 °C), temperature source Oral, resp  rate 18, height 5' 3" (1 6 m), weight 67 2 kg (148 lb 2 4 oz), SpO2 97 %      Intake/Output Summary (Last 24 hours) at 12/20/18 1026  Last data filed at 12/20/18 0801   Gross per 24 hour   Intake              720 ml   Output              100 ml   Net              620 ml     Invasive Devices          No matching active lines, drains, or airways          Physical Exam

## 2018-12-21 PROCEDURE — 97530 THERAPEUTIC ACTIVITIES: CPT

## 2018-12-21 PROCEDURE — 97535 SELF CARE MNGMENT TRAINING: CPT

## 2018-12-21 PROCEDURE — 97110 THERAPEUTIC EXERCISES: CPT

## 2018-12-21 PROCEDURE — 99232 SBSQ HOSP IP/OBS MODERATE 35: CPT

## 2018-12-21 PROCEDURE — 97116 GAIT TRAINING THERAPY: CPT

## 2018-12-21 RX ADMIN — DOCUSATE SODIUM 100 MG: 100 CAPSULE, LIQUID FILLED ORAL at 17:05

## 2018-12-21 RX ADMIN — DOCUSATE SODIUM 100 MG: 100 CAPSULE, LIQUID FILLED ORAL at 08:14

## 2018-12-21 RX ADMIN — APIXABAN 5 MG: 5 TABLET, FILM COATED ORAL at 08:14

## 2018-12-21 RX ADMIN — PRAVASTATIN SODIUM 40 MG: 40 TABLET ORAL at 17:05

## 2018-12-21 RX ADMIN — AMIODARONE HYDROCHLORIDE 200 MG: 200 TABLET ORAL at 08:14

## 2018-12-21 RX ADMIN — METOPROLOL SUCCINATE 12.5 MG: 25 TABLET, EXTENDED RELEASE ORAL at 21:47

## 2018-12-21 RX ADMIN — MELATONIN 3 MG: at 21:47

## 2018-12-21 RX ADMIN — APIXABAN 5 MG: 5 TABLET, FILM COATED ORAL at 21:47

## 2018-12-21 RX ADMIN — CALCIUM CARBONATE (ANTACID) CHEW TAB 500 MG 500 MG: 500 CHEW TAB at 17:05

## 2018-12-21 RX ADMIN — ACETAMINOPHEN 975 MG: 325 TABLET, FILM COATED ORAL at 14:04

## 2018-12-21 RX ADMIN — VITAMIN D, TAB 1000IU (100/BT) 5000 UNITS: 25 TAB at 08:14

## 2018-12-21 RX ADMIN — PANTOPRAZOLE SODIUM 20 MG: 20 TABLET, DELAYED RELEASE ORAL at 06:03

## 2018-12-21 RX ADMIN — ACETAMINOPHEN 975 MG: 325 TABLET, FILM COATED ORAL at 06:03

## 2018-12-21 RX ADMIN — ACETAMINOPHEN 975 MG: 325 TABLET, FILM COATED ORAL at 21:47

## 2018-12-21 RX ADMIN — LEVOTHYROXINE SODIUM 50 MCG: 50 TABLET ORAL at 06:03

## 2018-12-21 RX ADMIN — CALCIUM CARBONATE (ANTACID) CHEW TAB 500 MG 500 MG: 500 CHEW TAB at 08:14

## 2018-12-21 NOTE — PROGRESS NOTES
Internal Medicine Progress Note  Patient: Jesus Levine  Age/sex: 66 y o  female  Medical Record #: [id number]      ASSESSMENT/PLAN:  Jesus Levine is seen and examined and management for following issues:    Right hip fracture; s/p right MIKE 12/4:  Pain control per primary service        PAF:  on Amiodarone 200 mg qd (new for her)/Toprol/Eliquis  Had been missing doses of Lopressor 2/2 BP, changed to Toprol 12 5mg qhs  No other changes today      HTN/orthostasis: TEDS and abd binder for orthostasis = improved  Only on Toprol 12 5mg qhs and will keep on since has PAF    ABLA:  3 U PRBCs since admit to [facility] = stable now    Severe AS/LVEF 55%: no volume overload at this time    Nausea: continue prn Zofran; hasn't needed much = LD 12/19    Diarrhea:  Seems to have resolved but watch 2/2 Amio       Subjective: offers no complaints        ROS:   GI: denies abdominal pain, change bowel habits or reflux symptoms  Neuro: No new neurologic changes  Respiratory: No Cough, SOB  Cardiovascular: No CP, palpitations     Scheduled Meds:    Current Facility-Administered Medications:  acetaminophen 975 mg Oral Q8H [address] Ramón Lobo MD   amiodarone 200 mg Oral Daily With Breakfast IRMA Sanders   apixaban 5 mg Oral Q12H [address] Ramón Lobo MD   bisacodyl 10 mg Rectal Daily PRN Ramón Lobo MD   calcium carbonate 500 mg Oral BID With Meals Ramón Lobo MD   cholecalciferol 5,000 Units Oral Daily Ramón Lobo MD   docusate sodium 100 mg Oral BID Allison Lora MD   levothyroxine 50 mcg Oral Early Morning Ramón Lobo MD   lidocaine 2 patch Topical Daily Ramón Lobo MD   melatonin 3 mg Oral HS Allison Lora MD   metoprolol succinate 12 5 mg Oral HS IRMA Najera   ondansetron 4 mg Oral Q6H PRN IRMA Sanders   oxyCODONE 2 5 mg Oral Q6H PRN Allison Lora MD   pantoprazole 20 mg Oral Early Morning Ramón Lobo MD   polyethylene glycol 17 g Oral Daily PRN Ramón Lobo MD polyethylene glycol 17 g Oral Once Rudy Ramirez MD   pravastatin 40 mg Oral Daily With Cyndi Villafuerte MD       Labs:       Results from last 7 days  Lab Units 12/20/18  0545 12/17/18  0517   WBC Thousand/uL 4 94 5 92   HEMOGLOBIN g/dL 11 6 10 9*   HEMATOCRIT % 37 5 34 9   PLATELETS Thousands/uL 231 256       Results from last 7 days  Lab Units 12/20/18  0545 12/17/18  0517   POTASSIUM mmol/L 4 9 3 9   CHLORIDE mmol/L 105 107   CO2 mmol/L 28 26   BUN mg/dL 19 20   CREATININE mg/dL 0 79 0 90   CALCIUM mg/dL 8 7 8 8                  Glucose, i-STAT (mg/dl)   Date Value   12/04/2018 136   12/03/2018 128       Labs reviewed    Physical Examination:  Vitals:   Vitals:    12/20/18 0532 12/20/18 1307 12/20/18 2015 12/21/18 0630   BP: 125/63 115/68 135/64 135/73   BP Location: Left arm Left arm Left arm Left arm   Pulse: 79 93 78 79   Resp: 18 18 18 18   Temp: 98 3 °F (36 8 °C) 98 1 °F (36 7 °C) 97 8 °F (36 6 °C) 98 5 °F (36 9 °C)   TempSrc: Oral Oral Oral Oral   SpO2: 97% 99% 95% 94%   Weight:       Height:         Constitutional:  NAD; pleasant; nontoxic  HEENT:  AT/NC; oropharynx negative for thrush on tongue   Neck: negative for JVD  CV:  +S1, S2;  RRR; no rub/+murmur  Pulmonary:  BBS without crackles/wheeze/rhonci; resp are unlabored  Abdominal:  soft, +BS, ND/NT  Skin:  no rashes  Musculoskeletal:  Has no edema  :  no zamarripa  Neurological/Psych:  AA with mild confusion but improved this past week;  JENKINS 4/5; no depression/anxiety        [ X ] Total time spent: 30 Mins and greater than 50% of this time was spent counseling/coordinating care  ** Please Note: Dragon 360 Dictation voice to text software may have been used in the creation of this document   **

## 2018-12-21 NOTE — PROGRESS NOTES
Physical Medicine and Rehabilitation Progress Note  Gumaro Borges 66 y o  female MRN: [id number]  Unit/Bed#: [facility] 961-01 Encounter: [id number]    Chief Complaints:  Decline in ambulation     Subjective/Interval Events:   Patient reports right leg a little more sore today after therapies although she is noticing she is doing better than she was last week  Patient is okay with trying some ice at this time  She reports sleeping adequately  She denies abdominal pain or constipation  Patient denies lightheadedness at rest or with activity as well as fever calf pain, cough, or other complaints  ROS: A 10-point ROS was performed  Negative except as listed above  Overall Assessment/relevant history:  44-year-old female with a past medical history of atrial fibrillation on chronic anticoagulation with apixaban, hypertension, hyperlipidemia, hypothyroidism, chronic left facial droop, severe aortic stenosis, possible dementia, severe osteoporosis with recent fall at home found to have right intertrochanteric femur fracture who underwent right total hip arthroplasty by Dr Margarito Horn on 12/04/2018  Postoperative  Most notable for transient SVT requiring rapid response and temporary Cardizem drip  Cardiology consulted and assisted with management and patient has been switched since transition to p o  Amiodarone  Postoperative course also notable for acute blood loss anemia requiring 1 unit PRBC transfusion  Patient was evaluated by skilled therapies and was found to have significant decline in ADLs and ambulation appropriate for admission to [facility]      Functional status (recent):    Transfers min assist to supervision; stairs max assist  Toileting and toilet transfers min assist    Functional status on admission to [facility]:  OT:  Total assist General in toilet transfers, lower body dressing, toileting, and bathing; upper body dressing mod assist, grooming and eating supervision      * Fracture of femoral neck, right (HCC)   Assessment & Plan    -S/p Right MIKE by Dr Anum Darling on 18  -WBAT RLE  -Daily PT/OT to improve mobility and self care   -Posterior hip precautions  -Maintain hip abduction foam when supine due to patient's cognitive deficits  -Please maintain compressive dressing for 48 hours, then replace if needed      Positional lightheadedness   Assessment & Plan    Improved s/p transfusion   Monitor vitals; orthostatics  Recently started on Amio; also on MTP  HCTZ holding per IM  Abdominal binder PRN     Delirium   Assessment & Plan    Improving   Post-op delirium on possible baseline dementia > seems fairly significant at times  >oxy to 2 5mg Q6H PRN   Medications reviewed; limit sedating medications but adequately treat pain  Frequent redirection, re-orientation, reassurance  Monitor for signs and symptoms of infection  Overstimulation precautions, optimize sleep-wake cycle, agitation behavioral scale, sleep log  If necessary provided 1-1     Acute pain   Assessment & Plan    Acceptable control  -managed on scheduled Tylenol 975mg TID; -PRN oxycodone2 5 for moderate to severe pain - use with caution given her delirium on dementia > tolerating   -ICE [id number] PRN while awake  Counseled on and continue to encourage deep breathing/relaxation/behavioral pain management techniques:     Deep breathin seconds in, 5 seconds out, 5 times per hour when awake and PRN when in pain or anticipate pain; avoid holding breath and tightening muscles and instead breathe slowly and deeply       Acute blood loss anemia   Assessment & Plan    Continues to improve now 11 6   Symptomatic anemia > remains improved Hb 7 9 on  > improved to 10 8 after 2 U PRBCs   >monitor for signs of acute bleed-iron and vitamin-C supplementation  -monitor intermittently     Cognitive impairment   Assessment & Plan    -Patient with baseline dementia and mild cognitive deficits  -Placed on fall precautions and bed alarms - freq checks by staff      Severe aortic stenosis   Assessment & Plan    -preload dependent   -encourage PO hydration  -Patient cardiologist is Dr Bridget Ram who she will follow up with as an outpatient     Atrial fibrillation [facility])   Assessment & Plan    -rate controlled on lopressor 12 5mg Q12h and new amiodarone 200mg qday  -anticoagulated with Eliquis 5mg BID  - Amiodarone 200mg qday - taper per cards   -Patient's home cardiologist is Dr Bridget Ram     Hypertension   Assessment & Plan    -MTP  -HCTZ held with symptomatic orthostasis  -IM to adjust dosing if needed      Anxiety   Assessment & Plan    Remains improved overall   - Behavioral techniques  - neuropsych consult not necessary at this time     Insomnia   Assessment & Plan    Remains Improved  Continue low dose melatonin 3mg QHS; Sleep wake log  Recommend appropriate sleep hygiene: patient counselled on this:   Sleep only as much as necessary to feel rested and then get up or sit up in bed, maintain regular sleep schedule (same bedtime and wake time everyday), do not force sleep, avoid caffeinated beverages after lunch, avoid alcohol at home near bedtime, do not smoke particularly in evening, do not go to bed hungry, adjust bedroom environment so you are comfortable prior to settling down for sleep, deal with concerns or worries before bedtime   Make a list of things to work on for next day so anxiety is reduced at night, exercise regularly when cleared by physician       Chronic nausea   Assessment & Plan    Improving  Apparently been worked up by GI in past  At approximate baseline; worsened with anxiety  PRN Zofran  Ensure optimal stooling     Onychomycosis   Assessment & Plan    Toenails; s/p podiatry consult and debridement      Hypothyroidism   Assessment & Plan    -managed on Synthroid     Dyslipidemia   Assessment & Plan    -managed on pravastatin         # Bowel function:  Stooling appropriately; Colace; MiraLax and as needed regimen    # Bladder function: Intact  Monitor for urinary retention, incontinence, and signs of urinary infection  # Skin  Encourage regular turning and turn patient every 2 hours if not adequately ambulatory; - Rehabilitation team to perform skin checks regularly to monitor for erythema, wounds, rashes (if applicable incisions)    # Other  - Diet: general    - DVT prophy: Eliquis and SCDs    Disposition:   Home with supervision/Min assist with estimated length of stay Tuesday    Follow-up:   PCP, Ortho, Cards, PMR    ---------------------------------------------------------------------------------------------------------------------    Objective: Allergies and Medications per EMR    Physical Exam:  Temperature 98 1° F, pulse 93, respiratory rate 18, blood pressure 115/60, SpO2 95% on room air  General: Awake, alert in NAD  HENT:  MMM  Respiratory: Unlabored breathing, breath sounds equal, Lungs CTA, no wheezes, rales, or rhonchi  Cardiovascular: Regular rate and rhythm, no murmurs, rubs, or gallops  Gastrointestinal: Soft, non-tender, non-distended, normoactive bowel sounds - benign   SkiN/MSK/Extremities:  No calf edema or calf tenderness to palpation  Neurologic/Psych:   MENTAL STATUS: orientation stable  Affect: Euthymic       Diagnostic Studies: reviewed, no new imaging  No results found      Laboratory:      Results from last 7 days  Lab Units 12/13/18  0612 12/10/18  0525   HEMOGLOBIN g/dL 7 9* 8 8*   HEMATOCRIT % 24 6* 27 1*   WBC Thousand/uL 6 44 7 87       Results from last 7 days  Lab Units 12/20/18  0545 12/17/18  0517   BUN mg/dL 19 20   POTASSIUM mmol/L 4 9 3 9   CHLORIDE mmol/L 105 107   CREATININE mg/dL 0 79 0 90            Patient Active Problem List   Diagnosis    Microscopic hematuria    Dyslipidemia    Hypertension    Palpitations    Atrial fibrillation (HCC)    Hypothyroidism    Cognitive impairment    MCI (mild cognitive impairment)    Gait disturbance    Closed intertrochanteric fracture of hip, right, initial encounter ([facility] )    Severe aortic stenosis    Dehydration    Closed fracture of neck of right femur ([facility] )    Fall    Ambulatory dysfunction    Physical deconditioning    Osteoporosis    [facility] fibrillation with rapid ventricular response ([facility])    NSTEMI (non-ST elevated myocardial infarction) ([facility])    Acute blood loss anemia    Fracture of femoral neck, right ([facility])    Acute pain    Delirium    Onychomycosis    Positional lightheadedness    Chronic nausea    Insomnia    Anxiety       ** Please Note: Fluency Direct voice to text software may have been used in the creation of this document  **    Total time spent: At least 35 minutes, with more than 50% spent counseling/coordinating care  In addition, this patient was discussed by the interdisciplinary team in weekly case conference today  The care of the patient was extensively discussed with all care providers and an appropriate rehabilitation plan was formulated unique for this patient  Barriers were identified preventing progression of therapy and appropriate interventions were discussed with each discipline  Please see the team note for input from all disciplines regarding barriers, intervention, and discharge planning      Mateo Appiah MD, [facility]  Physical Medicine and Rehabilitation  Brain Injury Medicine

## 2018-12-21 NOTE — PHYSICAL THERAPY NOTE
PT NOTE    PATIENT IS A 66YEAR OLD FEMALE PRESENTING S/P FALL WHERE SHE SUSTAINED A FEMORAL RIGHT NECK FRACTURE AND IS SEEN IN PT S/P ARTHROPLASTY HIP TOTAL  DME ORDER PLACED FOR POLY FLY WHEELCHAIR  AT THIS TIME, AMBULATION INCONSISTENT AND UNSAFE WITHOUT THERAPIST PRESENT AS SHE IS LIMITED BY PAIN (8/10 WITH AMBULATION AND MOBILITY AT TIMES), INSTABILITY IN STANCE, AND FATIGUE  SHE IS ALSO HIGHLY LIMITED BY HER IMPAIRED COGNITION AND COMORBIDITIES OF ATRIAL FIBRILLATION, OSTEOPOROSIS, AND ANXIETY  RECOMMENDATION FOR W/C MADE AT THIS TIME 2* QUALITY AND DISTANCE OF AMBULATION UNPREDICTABLE  SPC NOT APPROPRIATE AT THIS TIME AND ROLLER WALKER NOT SAFE FOR AT HOME/FAMILY USE WITHOUT PRESENCE OF THERAPIST; OTHER THAN FOR TRANSFERS TO AND FROM SURFACE  NOTIFIED SPOUSE OF PT RECOMMENDATION FOR W/C  WHEELCHAIR TO BE UTILIZED TO ACCESS ENVIRONMENT FOR MEALS, BEDROOM ACCESS AND BATHROOM ACCESS  FAMILY TRAINING INITIATED AND WILL CONTINUE; FAMILY DEMONSTRATING UNDERSTANDING OF IT'S USE  AT THIS TIME, POLY FLY WITH TRANSPORT CHAIR ACCESS RECOMMENDED AS PATIENT UNABLE TO SELF PROPEL  PATIENT IS LIMITED BY COGNITION, DIFFICULTY PROCESSING AND MOTOR PLANNING  SHE IS UNABLE TO COMPREHEND AND DOES NOT HAVE THE COORDINATION TO SELF PROPEL  TRIALED SPECIFICALLY POLY FLY DURING SESSION WITH SUCCESS      Ronen Nava, PT

## 2018-12-21 NOTE — PROGRESS NOTES
12/21/18 0830   Pain Assessment   Pain Assessment No/denies pain   Pain Score No Pain   Restrictions/Precautions   Precautions Fall Risk;Cognitive; Impulsive;THR;Supervision on toilet/commode   Eating Assessment   Meal Assessed Breakfast   QI: Swallowing/Nutritional Status Regular food   Intake Mode PO;Self   Finishes Timely Yes   Opens Packages Yes   Eating (FIM) 5 - Patient requires supervision, cueing or coaxing   QI: Oral Hygiene   Assistance Needed Supervision   Assistance Provided by [place] No physical assistance   Oral Hygiene CARE Score 4   Grooming   Able To Initiate Tasks; Apply Make-up;Comb/Brush Hair;Wash/Dry Face;Brush/Clean Teeth;Wash/Dry Hands   Findings Pt standing at sink to complete grooming  Pt abelt o retrieve all items  Pt with verbal cueing while turnign away from sink with RW   Grooming (FIM) 5 - Patient requires supervision/monitoring   QI: Shower/Bathe Self   Assistance Needed Physical assistance   Assistance Provided by [place] Less than 25%   Comment Pt requires assist to bathe b/l LE's below knees due to hip precautions and inability to problem solve use of LHAE due to anxious behavior and decreased safety   Shower/Bathe Self CARE Score 3   Bathing   Assessed Bath Style Sponge Bath   Anticipated D/C Bath Style Tub;Sponge Bath   Able to BitGravity No   Able to [employer] Temperature No   Able to Wash/Rinse/Dry (body part) Left Arm;Right Arm;L Upper Leg;R Upper Leg;Chest;Abdomen;Buttocks; Perineal Area   Limitations Noted in Balance; Endurance   Positioning Standing;Seated   Bathing (FIM) 4 - Patient completes 8/10 or 9/10 parts   Tub/Shower Transfer   Limitations Noted In Balance; Endurance;Problem Solving; Sequencing; Safety;LE Strength   Adaptive Equipment Seat with Back;Transfer Bench;Grab Bars   Assessed Shower   Shower Transfer (FIM) 3 - [place] needs to lift, boost or assist to stand OR sit   QI: Upper Body Dressing   Assistance Needed Set-up / Patricia Charity Provided by [place] No physical assistance   Upper Body Dressing CARE Score 5   QI: Lower Body Dressing   Assistance Needed Physical assistance   Assistance Provided by [place] 50%-74%   Comment Pt requires assist to thread b/l LE's into pants and undewear  Pt unabelto sequence use of LHAE  Pt able to pull pants and underwear up over hips   Lower Body Dressing CARE Score 2   QI: Putting On/Taking Off Footwear   Assistance Needed Physical assistance   Assistance Provided by [place] Total assistance   Comment Pt anxious and unable to sequence use of sock aid despite using it in past  Pt unable to problems olve using dressing stick to doff socks  Pt required hadn over hand assistance   Putting On/Taking Off Footwear CARE Score 1   QI: Picking Up Object   Reason if not Attempted Safety concerns   Picking Up Object CARE Score 88   Dressing/Undressing Clothing   Remove UB Clothes Pullover Shirt   Remove LB Clothes [address] [place] [address] UB Clothes Pullover Shirt   Don LB Clothes Pants; Undergarment;Socks;TEDs   Limitations Noted In Balance; Coordination; Endurance;Problem Solving; Sequencing;Strength; Safety;Timeliness   Positioning Supported Sit;Standing   UB Dressing (FIM) 5 - Patient requires supervision/monitoring   LB Dressing (FIM) 3 - Patient completes  50-74% of all tasks   QI: Lying to Sitting on Side of Bed   Assistance Needed Physical assistance   Assistance Provided by [place] 25%-49%   Lying to Sitting on Side of Bed CARE Score 3   QI: Sit to Stand   Assistance Needed Physical assistance   Assistance Provided by [place] Less than 25%   Comment Pt requires extended time and verbal cueing for hand placement  Pt continues to attempt to sit and stand by pulling on RW      Sit to Stand CARE Score 3   QI: Chair/Bed-to-Chair Transfer   Assistance Needed Incidental touching   Assistance Provided by [place] No physical assistance   Comment CGA for stand pivot transfer with no device   Chair/Bed-to-Chair Transfer CARE Score 4   Transfer Bed/Chair/Wheelchair   Adaptive Equipment Roller Walker   Sit Pivot Minimal Assist   Stand Pivot Minimal Assist   Sit to Stand Minimal   Bed, Chair, Wheelchair Transfer (FIM) 4 - Patient requires steadying assist or light touching   QI: [address] Needed Incidental touching   Assistance Provided by [place] No physical assistance   Toileting Hygiene CARE Score 4   Toileting   Able to Pull Clothing down yes, up yes  Able to Manage Clothing Closures No   Manage Hygiene Bladder   Limitations Noted In Balance;Problem Solving;LE Strength   Toileting (FIM) 4 - Patient requires steadying assist or light touching   QI: Toilet Transfer   Assistance Needed Incidental touching   Assistance Provided by [place] No physical assistance   Toilet Transfer CARE Score 4   Toilet Transfer   Surface Assessed Standard Commode   Transfer Technique Stand Pivot   Limitations Noted In Balance; Endurance; Safety; Sequencing;LE Strength   Toilet Transfer (FIM) 4 - Patient requires steadying assist or light touching   Right Upper Extremity- Strength   R Weight/Reps/Sets 2 set 15 reps with 2# dumbbell for bicep curl, shoulder press, forward rows, backward rows, hioztonal abduction and chest press   RUE Strength Comment Pt engaged in UE strengthening to improve independence with sit to stand transfers   Left Upper Extremity-Strength   L Weights/Reps/Sets 2 set 15 reps with 2# dumbbell for bicep curl, shoulder press, forward rows, backward rows, hioztonal abduction and chest press   LUE Strength Comment Pt engaged in UE strengthening to improve independence with sit to stand transfers   Cognition   Overall Cognitive Status Impaired   Arousal/Participation Alert; Cooperative   Attention Attends with cues to redirect   Orientation Level Oriented X4   Memory Decreased long term memory;Decreased short term memory;Decreased recall of precautions   Following Commands Follows multistep commands with increased time or repetition   Activity Tolerance   Activity Tolerance Patient limited by fatigue   Assessment   Treatment Assessment Pt engaged in OT treatment session with focus on ADLs and fxnl transfers  Pt with poor carryover of LHAE  Pt with improved stand pivot transfers but still requires verbal cueing for sequencing  Pt did not attempt to sit prematurely  Will continue with daily family training with    will be present tomorrow at 10am to complete ADL training  Prognosis Fair   Problem List Decreased strength;Decreased endurance; Impaired balance;Decreased mobility; Impaired judgement;Decreased safety awareness;Pain;Orthopedic restrictions   Plan   Treatment/Interventions ADL retraining;Functional transfer training;LE strengthening/ROM; Therapeutic exercise; Endurance training;Cognitive reorientation;Equipment eval/education; Bed mobility;Gait training; Compensatory technique education   Progress Progressing toward goals   Recommendation   OT Discharge Recommendation Home with family support   OT Therapy Minutes   OT Time In 0830   OT Time Out 1000   OT Total Time (minutes) 90   OT Mode of treatment - Individual (minutes) 90   OT Mode of treatment - Concurrent (minutes) 0   OT Mode of treatment - Group (minutes) 0   OT Mode of treatment - Co-treat (minutes) 0   OT Mode of Teatment - Total time(minutes) 90 minutes   Therapy Time missed   Time missed?  No

## 2018-12-21 NOTE — SOCIAL WORK
In preparation for dc on 12/25 cm received order from therapy for a wheelchair and commode  [place] [facility] [place] [facility] as [facility] currently at capacity

## 2018-12-21 NOTE — PROGRESS NOTES
12/21/18 1230   Pain Assessment   Pain Assessment 0-10   Pain Score 8   Pain Type Acute pain   Pain Location Back   Pain Orientation Lower   Effect of Pain on Daily Activities with distraction, patient does not perseverate on pain    Patient's Stated Pain Goal No pain   Hospital Pain Intervention(s) Repositioned; Ambulation/increased activity; Distraction   Restrictions/Precautions   Precautions Bed/chair alarms;Cognitive; Fall Risk;Pain;THR;Supervision on toilet/commode   Weight Bearing Restrictions Yes   RLE Weight Bearing Per Order WBAT   ROM Restrictions Yes   RLE ROM Restriction Other  (THPs)   Braces or Orthoses Other (Comment)  (abduction pillow )   Cognition   Overall Cognitive Status Impaired   Arousal/Participation Alert; Cooperative   Attention Attends with cues to redirect   Orientation Level Oriented X4   Memory Decreased recall of precautions;Decreased recall of recent events;Decreased short term memory   Following Commands Follows one step commands with increased time or repetition   Comments Patient able to follow simple, one step commands    Subjective   Subjective Patient agreeable to particiapte in PT session; Obed Huang, present for PT session    QI: Sit to Stand   Assistance Needed Incidental touching   Assistance Provided by [place] Less than 25%   Comment CGA with RW   Sit to Stand CARE Score 3   QI: Chair/Bed-to-Chair Transfer   Assistance Needed Incidental touching   Assistance Provided by [place] Less than 25%   Comment CGA with RW   Chair/Bed-to-Chair Transfer CARE Score 3   Transfer Bed/Chair/Wheelchair   Limitations Noted In Balance; Coordination;LE Strength;Problem Solving; Sequencing   Adaptive Equipment Roller Walker   Stand Pivot Contact Guard;Supervision   Sit to [employer]   Stand to [employer], Chair, Wheelchair Transfer (FIM) 4 - Patient requires steadying assist or light touching   QI: Walk 10 Feet   Assistance Needed Incidental touching Assistance Provided by [place] Less than 25%   Comment CGA/CS WITH RW   Walk 10 Feet CARE Score 3   QI: Walk 50 Feet with Two Turns   Assistance Needed Incidental touching   Assistance Provided by [place] Less than 25%   Comment CG/cs with RW   Walk 50 Feet with Two Turns CARE Score 3   QI: Walking 10 Feet on Uneven Surfaces   Reason if not Attempted Activity not applicable   Walking 10 Feet on Uneven Surfaces CARE Score 9   Ambulation   Does the patient walk? 2  Yes   Primary Discharge Mode of Locomotion Wheelchair;Walk   Walk Assist Level Contact Guard;Close Supervision   Gait Pattern Inconsistant Yamilka; Antalgic;Decreased R stance   Assist Device Roller Sonia Block Walked (feet) 50 ft  (x2)   Limitations Noted In Balance;Device Management; Sequencing;Speed;Strength;Swing   Walking (FIM) 2 - Patient ambulates between 50 - 149 feet regardless of assist/device/set up   QI: Wheel 50 Feet with Two Turns   Reason if not Attempted Activity not applicable   Wheel 50 Feet with Two Turns CARE Score 9   QI: Wheel 150 Feet   Reason if not Attempted Activity not applicable   Wheel 707 Feet CARE Score 9   Wheelchair mobility   QI: Does the patient use a wheelchair? 0   No   QI: 1 Step (Curb)   Assistance Needed Physical assistance   Assistance Provided by [place] 50%-74%   Comment 1, 6" curb step, 4x fwd + backward: 1 with unsuccessful sideways approach and 3x with R hand rail and L HHA    1 Step (Curb) CARE Score 2   QI: 12 Steps   Reason if not Attempted Activity not applicable   12 Steps CARE Score 9   Stairs   Type Stairs   # of Steps 1  (x3)   Weight Bearing Precautions Fall Risk   Assist Devices Single Rail  (+ HHA )   Findings 1, 6" curb step, 4x fwd + backward: 1 with unsuccessful sideways approach and 3x with R hand rail and L HHA    QI: Picking Up Object   Reason if not Attempted Safety concerns   Picking Up Object CARE Score 88   QI: Toilet Transfer   Assistance Needed Incidental touching   Assistance Provided by [place] Less than 25%   Comment CGA/CS with RW    Toilet Transfer CARE Score 3   Toilet Transfer   Surface Assessed Standard Toilet   Limitations Noted In Balance;Confidence;LE Strength; Sequencing   Positioning Concerns Cognition; Safety   Findings able to perform clothing management without assist- required steadying at times    Toilet Transfer (FIM) 4 - Patient requires steadying assist or light touching   Therapeutic Interventions   Balance 15'x 6 ambulation focus on turns and spouse's instruction; 15'x 2 ambulation stepping over weighted dowel to simulate rise in floor in patient's bathroom    Assessment   Treatment Assessment Patient, spouse and daughter engaged in 80 minute family training session  During session, Poly[employer] w/c obtained for patient for practice as DME order placed this date (w/c and BSC)  Polyfly obtained 2* patient's inconsistently and safety with ambulation, pain, and impaired cognition  Patient with difficulty processing, sequencing and planning and is unable to demonstrate ability to self propel chair at  this time  Door frames measured at smallest 24"; additional benefit of Ployfly being removable wheels to fit in smaller spaces  Recommendation for home: ambulate short distances where w/c cannot fit and perform SPT with RW  Ambulation to be performed with home health PT where she will continue to benefit from skilled PT intervention to progress functional mobility (I) and safety  In  Addition, OT to address daughter's questions in regards to bed railing, wedge, and shower/tub bench  Spouse specifically trained on w/c parts management, guarding techniques and simple instruction  To carryover for future sessions: instruct spouse to be loud, assertive and simple  Use the following phrases: "stand up, sit down, keep turning"  Spouse often wordy and passive which confuses and frustrates patient  During stair training, patient unable to safely perform sideways approach   Patient highly anxious and fearul of task  Carryover right side rail with left hand hold assist and deep breathing techniques  Mentioned removable ramp however, spouse to call contractor to determine ability to add L side rail (would be appx 30-36" apart) prior to d/c home  Family training to continue over weekend  Continued practice needed on above tasks as well as bed mobility and car transfers  Spouse to express what he is comfortable with for d/c to ensure patient and family's safety  Family/Caregiver Present Spouse and Daughter    PT Family training done with: Spouse and Daughter - see assessment    PT Barriers   Physical Impairment Decreased strength;Decreased range of motion;Decreased endurance; Impaired balance;Decreased mobility; Decreased coordination;Decreased cognition;Decreased safety awareness;Decreased skin integrity;Orthopedic restrictions;Pain   Functional Limitation Walking;Transfers;Standing;Stair negotiation;Car transfers   Plan   Treatment/Interventions Functional transfer training;LE strengthening/ROM; Elevations; Therapeutic exercise; Endurance training;Cognitive reorientation;Patient/family training;Equipment eval/education; Bed mobility;Gait training; Compensatory technique education   Progress Progressing toward goals   Recommendation   Recommendation 24 hour supervision/assist;Home PT   Equipment Recommended Wheelchair   PT Equipment ordered Wheelchair    Date ordered 12/21/18   PT Therapy Minutes   PT Time In 1230   PT Time Out 1400   PT Total Time (minutes) 90   PT Mode of treatment - Individual (minutes) 90   PT Mode of treatment - Concurrent (minutes) 0   PT Mode of treatment - Group (minutes) 0   PT Mode of treatment - Co-treat (minutes) 0   PT Mode of Teatment - Total time(minutes) 90 minutes   Therapy Time missed   Time missed?  No

## 2018-12-22 PROCEDURE — 97530 THERAPEUTIC ACTIVITIES: CPT

## 2018-12-22 PROCEDURE — 97535 SELF CARE MNGMENT TRAINING: CPT

## 2018-12-22 RX ADMIN — APIXABAN 5 MG: 5 TABLET, FILM COATED ORAL at 21:25

## 2018-12-22 RX ADMIN — APIXABAN 5 MG: 5 TABLET, FILM COATED ORAL at 08:13

## 2018-12-22 RX ADMIN — LIDOCAINE 2 PATCH: 50 PATCH CUTANEOUS at 08:26

## 2018-12-22 RX ADMIN — AMIODARONE HYDROCHLORIDE 200 MG: 200 TABLET ORAL at 08:13

## 2018-12-22 RX ADMIN — ACETAMINOPHEN 975 MG: 325 TABLET, FILM COATED ORAL at 21:26

## 2018-12-22 RX ADMIN — CALCIUM CARBONATE (ANTACID) CHEW TAB 500 MG 500 MG: 500 CHEW TAB at 08:13

## 2018-12-22 RX ADMIN — ACETAMINOPHEN 975 MG: 325 TABLET, FILM COATED ORAL at 05:38

## 2018-12-22 RX ADMIN — ACETAMINOPHEN 975 MG: 325 TABLET, FILM COATED ORAL at 13:21

## 2018-12-22 RX ADMIN — CALCIUM CARBONATE (ANTACID) CHEW TAB 500 MG 500 MG: 500 CHEW TAB at 16:16

## 2018-12-22 RX ADMIN — ONDANSETRON 4 MG: 4 TABLET, ORALLY DISINTEGRATING ORAL at 05:38

## 2018-12-22 RX ADMIN — METOPROLOL SUCCINATE 12.5 MG: 25 TABLET, EXTENDED RELEASE ORAL at 21:25

## 2018-12-22 RX ADMIN — DOCUSATE SODIUM 100 MG: 100 CAPSULE, LIQUID FILLED ORAL at 08:13

## 2018-12-22 RX ADMIN — VITAMIN D, TAB 1000IU (100/BT) 5000 UNITS: 25 TAB at 08:13

## 2018-12-22 RX ADMIN — MELATONIN 3 MG: at 21:29

## 2018-12-22 RX ADMIN — PRAVASTATIN SODIUM 40 MG: 40 TABLET ORAL at 16:16

## 2018-12-22 RX ADMIN — PANTOPRAZOLE SODIUM 20 MG: 20 TABLET, DELAYED RELEASE ORAL at 05:38

## 2018-12-22 RX ADMIN — LEVOTHYROXINE SODIUM 50 MCG: 50 TABLET ORAL at 05:38

## 2018-12-22 NOTE — PROGRESS NOTES
12/22/18 1400   Pain Assessment   Pain Assessment No/denies pain   Restrictions/Precautions   Precautions Bed/chair alarms;Cognitive; Fall Risk;Supervision on toilet/commode   Cognition   Overall Cognitive Status Impaired   Subjective   Subjective pt c/o discomfort in belly   QI: Sit to Lying   Assistance Needed Physical assistance   Assistance Provided by [place] Less than 25%   Sit to Lying CARE Score 3   QI: Lying to Sitting on Side of Bed   Assistance Needed Physical assistance   Assistance Provided by [place] Less than 25%   Lying to Sitting on Side of Bed CARE Score 3   QI: Sit to Stand   Assistance Needed Verbal cues; Incidental touching   Sit to Stand CARE Score 4   Bed Mobility   Findings getting out to the R, A with RLE getting in and out   QI: Chair/Bed-to-Chair Transfer   Assistance Needed Verbal cues; Adaptive equipment; Incidental touching   Assistance Provided by [place] No physical assistance   Chair/Bed-to-Chair Transfer CARE Score 4   Transfer Bed/Chair/Wheelchair   Limitations Noted In Balance; Coordination;Problem Solving; Sequencing;LE Strength   Adaptive Equipment Roller Walker   Stand Pivot Contact Guard   Sit to [facility]   Stand to [facility]   Supine to Sit Minimal Assist   Sit to Supine Minimal Assist   Findings allow pt time to problem solve through txs   Bed, Chair, Wheelchair Transfer (FIM) 4 - [place] lifts one extremity during transfer   QI: Walk 10 Feet   Assistance Needed Incidental touching;Verbal cues; Adaptive equipment   Walk 10 Feet CARE Score 4   QI: Walk 50 Feet with Two Turns   Reason if not Attempted Safety concerns   Walk 50 Feet with Two Turns CARE Score 88   QI: Walk 150 Feet   Reason if not Attempted Safety concerns   Walk 150 Feet CARE Score 88   QI: Walking 10 Feet on Uneven Surfaces   Reason if not Attempted Safety concerns   Walking 10 Feet on Uneven Surfaces CARE Score 88   Ambulation   Does the patient walk? 2   Yes   Primary Discharge Mode of Locomotion Wheelchair   Walk Assist Level Contact Guard   Gait Pattern Inconsistant Yamilka; Slow Yamilka;Decreased foot clearance; Improper weight shift   Assist Device Jezer Sonia Block Walked (feet) 10 ft  (x6)   Limitations Noted In Balance; Coordination; Endurance; Sequencing;Speed;Strength   Findings practiced short distances in gym from w/c to mat, incorporating spouse  cues given to spouse for simple instructions nad given pt time to problem solve through tasks  needs cues for instruction of task   Walking (FIM) 1 - Patient ambulates less than 49 feet regardless of assist/device/set up   Wheelchair mobility   QI: Does the patient use a wheelchair? 0  No   QI: 4 Steps   Assistance Needed Physical assistance   Assistance Provided by [place] 25%-49%   Comment R HR and L HHA up fwds/ down bwds for safety   4 Steps CARE Score 3   QI: 12 Steps   Reason if not Attempted Safety concerns   12 Steps CARE Score 88   Stairs   Type Stairs   # of Steps 4   Weight Bearing Precautions Fall Risk   Assist Devices Single Rail;Hand Hold   Findings hand hold on the L, R HR on 6"  steps  pt becomes very anxious with stairs  pt's spouse stated there are enough hands around that if pt is unsafe performing they can consider bumping method in w/c   Stairs (FIM) 2 - Patient goes up and down 4 - 11 stairs regardless of assist/device/setup   QI: Toilet Transfer   Assistance Needed Incidental touching;Verbal cues; Adaptive equipment   Comment grab bar   Toilet Transfer CARE Score 4   Toilet Transfer   Surface Assessed Raised Toilet   Limitations Noted In Balance; Endurance; Safety   Adaptive Equipment Grab Bar   Positioning Concerns Cognition   Findings steadying A   Toilet Transfer (FIM) 4 - Patient requires steadying assist or light touching   Assessment   Treatment Assessment session cont to focus on including spouse in tx session and working on short distance amb and txs    pt cont to have difficulty with problem solving through txs and needs reminding of task at hand  pt's spouse tends to give to many instructions and instructed on fewer and simple commands for pt to better understand  pt fearful of laying flat at this time and has a higher bed at home  family has put in order for bedrail and wedge pillow  spouse stated pt has been sleeping in a recliner for several months prior and could be an alternative at d/c   suggestion made to put recliner in room if feasible so  is close by for help if pt needs it at night   stairs cont to be a barrier for pt due to fear and anxiety  pt's spouse stated they can bump her in w/c if necessary  will cont to work with pt and pt's spouse with functional txs and mobility for safe d/c home  Family/Caregiver Present spouse nad dtr, start of session   Problem List Decreased strength;Decreased endurance; Impaired balance;Decreased mobility; Impaired judgement;Decreased safety awareness;Decreased cognition;Orthopedic restrictions   Barriers to Discharge Inaccessible home environment;Decreased caregiver support; Other (Comment)   PT Barriers   Physical Impairment Decreased strength;Decreased range of motion;Decreased endurance; Impaired balance;Decreased mobility; Decreased coordination;Decreased cognition;Decreased safety awareness;Decreased skin integrity;Orthopedic restrictions;Pain   Functional Limitation Walking;Transfers;Standing;Stair negotiation;Car transfers   Plan   Treatment/Interventions Functional transfer training;LE strengthening/ROM; Endurance training;Patient/family training;Bed mobility;Gait training   Progress Progressing toward goals   Recommendation   Recommendation 24 hour supervision/assist;Home PT; Home with family support   Equipment Recommended Wheelchair   PT Therapy Minutes   PT Time In 1400   PT Time Out 1530   PT Total Time (minutes) 90   PT Mode of treatment - Individual (minutes) 90   PT Mode of treatment - Concurrent (minutes) 0   PT Mode of treatment - Group (minutes) 0   PT Mode of treatment - Co-treat (minutes) 0   PT Mode of Teatment - Total time(minutes) 90 minutes   Therapy Time missed   Time missed?  No

## 2018-12-22 NOTE — PROGRESS NOTES
Physical Medicine and Rehabilitation Progress Note  Solo Gonzalez 66 y o  female MRN: [id number]  Unit/Bed#: [facility] 961-01 Encounter: [id number]    Chief Complaints:  Decline in ambulation     Subjective/Interval Events:   Patient reports hoping she can get home soon  She reports leg pain is not bad today  She reports sleeping well and eating well  She denies fever, chills, lightheadedness, SOB, calf pain, or other complaints  ROS: A 10-point ROS was performed  Negative except as listed above  Overall Assessment/relevant history:  77-year-old female with a past medical history of atrial fibrillation on chronic anticoagulation with apixaban, hypertension, hyperlipidemia, hypothyroidism, chronic left facial droop, severe aortic stenosis, possible dementia, severe osteoporosis with recent fall at home found to have right intertrochanteric femur fracture who underwent right total hip arthroplasty by Dr Radames Knight on 12/04/2018  Postoperative  Most notable for transient SVT requiring rapid response and temporary Cardizem drip  Cardiology consulted and assisted with management and patient has been switched since transition to p o  Amiodarone  Postoperative course also notable for acute blood loss anemia requiring 1 unit PRBC transfusion  Patient was evaluated by skilled therapies and was found to have significant decline in ADLs and ambulation appropriate for admission to [place] [id number]      Functional status (recent):    Transfers min assist'; ambulation min assist 50 ft with rolling walker   Shower transfers and LB dressing mod assist     Functional status on admission to [facility]:  OT:  Total assist General in toilet transfers, lower body dressing, toileting, and bathing; upper body dressing mod assist, grooming and eating supervision      * Fracture of femoral neck, right ([facility] [address] )   Assessment & Plan    -S/p Right MIKE by Dr Radames Knight on 12/4/18  -WBAT RLE  -Daily PT/OT to improve mobility and self care   -Posterior hip precautions  -Maintain hip abduction foam when supine due to patient's cognitive deficits  -Please maintain compressive dressing for 48 hours, then replace if needed      Positional lightheadedness   Assessment & Plan    Improved s/p transfusion   Monitor vitals; orthostatics  Recently started on Amio; also on MTP  HCTZ holding per IM  Abdominal binder PRN     Delirium   Assessment & Plan    Improved   Post-op delirium on possible baseline dementia > fairly significant earlier in ARC course   >oxy 2 5mg Q6H PRN   Medications reviewed; limit sedating medications but adequately treat pain  Frequent redirection, re-orientation, reassurance  Monitor for signs and symptoms of infection  Overstimulation precautions, optimize sleep-wake cycle, agitation behavioral scale, sleep log       Acute pain   Assessment & Plan    Acceptable control  -managed on scheduled Tylenol 975mg TID; -PRN oxycodone2 5 for moderate to severe pain - use with caution given her delirium on dementia > tolerating   -ICE [id number] PRN while awake  Counseled on and continue to encourage deep breathing/relaxation/behavioral pain management techniques:     Deep breathin seconds in, 5 seconds out, 5 times per hour when awake and PRN when in pain or anticipate pain; avoid holding breath and tightening muscles and instead breathe slowly and deeply       Acute blood loss anemia   Assessment & Plan    Improving 11 6   Symptomatic anemia > remains improved Hb 7 9 on  > improved to 10 8 after 2 U PRBCs   >monitor for signs of acute bleed-iron and vitamin-C supplementation  -monitor intermittently     Cognitive impairment   Assessment & Plan    -Patient with baseline dementia and mild cognitive deficits  -Placed on fall precautions and bed alarms - freq checks by staff      Severe aortic stenosis   Assessment & Plan    -preload dependent   -encourage PO hydration  -Patient cardiologist is Dr Joan Cash who she will follow up with as an outpatient     Atrial fibrillation [facility])   Assessment & Plan    -rate controlled on lopressor 12 5mg Q12h and new amiodarone 200mg qday  -anticoagulated with Eliquis 5mg BID  - Amiodarone 200mg qday - taper per cards   -Patient's home cardiologist is Dr Campos Graham     Hypertension   Assessment & Plan    -MTP  -HCTZ held with symptomatic orthostasis  -IM to adjust dosing if needed      Anxiety   Assessment & Plan    Remains improved overall   - Behavioral techniques  - neuropsych consult not necessary at this time     Insomnia   Assessment & Plan    Remains Improved  Continue low dose melatonin 3mg QHS; Sleep wake log  Recommend appropriate sleep hygiene: patient counselled on this:   Sleep only as much as necessary to feel rested and then get up or sit up in bed, maintain regular sleep schedule (same bedtime and wake time everyday), do not force sleep, avoid caffeinated beverages after lunch, avoid alcohol at home near bedtime, do not smoke particularly in evening, do not go to bed hungry, adjust bedroom environment so you are comfortable prior to settling down for sleep, deal with concerns or worries before bedtime  Make a list of things to work on for next day so anxiety is reduced at night, exercise regularly when cleared by physician       Chronic nausea   Assessment & Plan    Improved   Apparently been worked up by GI in past  PRN Zofran  Ensure optimal stooling     Onychomycosis   Assessment & Plan    Toenails; s/p podiatry consult and debridement      Hypothyroidism   Assessment & Plan    -managed on Synthroid     Dyslipidemia   Assessment & Plan    -managed on pravastatin         # Bowel function:  Stooling appropriately; Colace; MiraLax and as needed regimen    # Bladder function: Intact  Monitor for urinary retention, incontinence, and signs of urinary infection      # Skin  Encourage regular turning and turn patient every 2 hours if not adequately ambulatory; - Rehabilitation team to perform skin checks regularly to monitor for erythema, wounds, rashes (if applicable incisions)    # Other  - Diet: general    - DVT prophy: Eliquis and SCDs    Disposition:   Home with supervision/Min assist with estimated length of stay Tuesday    Follow-up:   PCP, Ortho, Cards, PMR    ---------------------------------------------------------------------------------------------------------------------    Objective: Allergies and Medications per EMR    Physical Exam:  Vitals:    12/21/18 2006   BP: 114/70   Pulse: 77   Resp: 18   Temp: 98 4 °F (36 9 °C)   SpO2: 96%     General: Awake, alert in NAD  HENT:  MMM  Respiratory: Unlabored breathing, breath sounds equal, Lungs CTA, no wheezes, rales, or rhonchi  Cardiovascular: Regular rate and rhythm, no murmurs, rubs, or gallops  Gastrointestinal: Soft, non-tender, non-distended, normoactive bowel sounds  SkiN/MSK/Extremities:  No calf edema or calf tenderness to palpation  Neurologic/Psych:   MENTAL STATUS: orientation stable  Affect: Euthymic       Diagnostic Studies: reviewed, no new imaging  No results found      Laboratory:    Lab Results   Component Value Date    HGB 11 6 12/20/2018    HCT 37 5 12/20/2018    WBC 4 94 12/20/2018    K 4 9 12/20/2018     12/20/2018    GLUCOSE 136 12/04/2018    CREATININE 0 79 12/20/2018    BUN 19 12/20/2018         Patient Active Problem List   Diagnosis    Microscopic hematuria    Dyslipidemia    Hypertension    Palpitations    Atrial fibrillation (HCC)    Hypothyroidism    Cognitive impairment    MCI (mild cognitive impairment)    Gait disturbance    Cl[facility] fracture of [facility], right[facility]    Severe aortic stenosis    Dehydration    Closed fracture of neck of right femur ([facility] [address] )    Fall    Ambulatory dysfunction    Physical deconditioning    Osteoporosis    [facility] fibrillation with rapid ventricular response [facility]    NSTEMI (non-ST elevated myocardial infarction) ([facility] )    Acute blood loss anemia    Fracture of femoral neck, right (HCC)    Acute pain    Delirium    Onychomycosis    Positional lightheadedness    Chronic nausea    Insomnia    Anxiety       ** Please Note: Flu[employer] Direct voice to text software may have been used in the creation of this document  **    Total visit time:  At least 25 minutes, with more than 50% spent counseling/coordinating care    Paul Adkins MD, [facility] [facility]  Physical Medicine and Rehabilitation  Brain Injury Medicine

## 2018-12-22 NOTE — PROGRESS NOTES
Internal Medicine Progress Note  Patient: Adriana Zuleta  Age/sex: 66 y o  female  Medical Record #: [id number]      ASSESSMENT/PLAN:  Adriana Zuleta is seen and examined and management for following issues:    Right hip fracture; s/p right MIKE 12/4:  Pain control per primary service        PAF:  on Amiodarone 200 mg qd (new for her)/Toprol/Eliquis  Had been missing doses of Lopressor 2/2 BP, changed to Toprol 12 5mg qhs  No other changes today      HTN/orthostasis: continue TEDS = hasnt needed abd binder for a few days  Only on Toprol 12 5mg qhs and will keep on since has PAF    ABLA:  3 U PRBCs since admit to [facility] = stable now    Severe AS/LVEF 55%: no volume overload at this time    Nausea: continue prn Zofran; uses about every 3 days = improved    Diarrhea:  Seems to have resolved but watch 2/2 Amio       Subjective: offers no complaints        ROS:   GI: denies abdominal pain, change bowel habits or reflux symptoms  Neuro: No new neurologic changes  Respiratory: No Cough, SOB  Cardiovascular: No CP, palpitations     Scheduled Meds:    Current Facility-Administered Medications:  acetaminophen 975 mg Oral Q8H [facility] Brad Casarez MD   amiodarone 200 mg Oral Daily With Breakfast IRMA Jaimes   apixaban 5 mg Oral Q12H [facility] Link MD Hermelindo   bisacodyl 10 mg Rectal Daily PRN Brad Casarez MD   calcium carbonate 500 mg Oral BID With Meals Brad Casarez MD   cholecalciferol 5,000 Units Oral Daily Link MD Hermelindo   docusate sodium 100 mg Oral BID Starr Vo MD   levothyroxine 50 mcg Oral Early Morning Link MD Hermelindo   lidocaine 2 patch Topical Daily Link MD Hermelindo   melatonin 3 mg Oral HS Starr Vo MD   metoprolol succinate 12 5 mg Oral HS IRMA Najera   ondansetron 4 mg Oral Q6H PRN IRMA Jaimes   oxyCODONE 2 5 mg Oral Q6H PRN Starr Vo MD   pantoprazole 20 mg Oral Early Morning Link MD Hermelindo   polyethylene glycol 17 g Oral Daily PRN Jose Uriostegui Lindsey Preciado MD   polyethylene glycol 17 g Oral Once Sayra Gar MD   pravastatin 40 mg Oral Daily With Vanita Darling MD       Labs:       Results from last 7 days  Lab Units 12/20/18  0545 12/17/18  0517   WBC Thousand/uL 4 94 5 92   HEMOGLOBIN g/dL 11 6 10 9*   HEMATOCRIT % 37 5 34 9   PLATELETS Thousands/uL 231 256       Results from last 7 days  Lab Units 12/20/18  0545 12/17/18  0517   POTASSIUM mmol/L 4 9 3 9   CHLORIDE mmol/L 105 107   CO2 mmol/L 28 26   BUN mg/dL 19 20   CREATININE mg/dL 0 79 0 90   CALCIUM mg/dL 8 7 8 8                  Glucose, i-STAT (mg/dl)   Date Value   12/04/2018 136   12/03/2018 128       Labs reviewed    Physical Examination:  Vitals:   Vitals:    12/21/18 0630 12/21/18 1300 12/21/18 2006 12/22/18 0541   BP: 135/73 115/68 114/70 140/74   BP Location: Left arm Right arm Left arm Left arm   Pulse: 79 88 77 83   Resp: 18 18 18 20   Temp: 98 5 °F (36 9 °C) 98 5 °F (36 9 °C) 98 4 °F (36 9 °C) 98 2 °F (36 8 °C)   TempSrc: Oral Oral Oral Oral   SpO2: 94% 96% 96% 92%   Weight:       Height:         Constitutional:  NAD; pleasant; nontoxic  HEENT:  AT/NC; oropharynx negative for thrush on tongue   Neck: negative for JVD  CV:  +S1, S2;  RRR; no rub/+murmur  Pulmonary:  BBS without crackles/wheeze/rhonci; resp are unlabored  Abdominal:  soft, +BS, ND/NT  Skin:  no rashes  Musculoskeletal:  Has no edema  :  no zamarripa  Neurological/Psych:  AA with mild confusion but improved this past week;  JENKINS 4/5; no depression/anxiety        [ X ] Total time spent: 30 Mins and greater than 50% of this time was spent counseling/coordinating care  ** Please Note: Dragon 360 Dictation voice to text software may have been used in the creation of this document   **

## 2018-12-22 NOTE — PROGRESS NOTES
12/22/18 1000   Pain Assessment   Pain Assessment No/denies pain   Pain Score No Pain   Restrictions/Precautions   Precautions Bed/chair alarms; Fall Risk;Cognitive;THR;Supervision on toilet/commode   QI: Eating   Assistance Needed Set-up / [address] Provided by [place] No physical assistance   Eating CARE Score 5   Eating Assessment   Positioning Upright;Out of Bed   Current Diet Regular; Thin   Intake Mode PO;Self   Eating (FIM) 5 - Patient requires supervision, cueing or coaxing   QI: Oral Hygiene   Assistance Needed Supervision   Assistance Provided by [place] No physical assistance   Oral Hygiene CARE Score 4   Grooming   Able To Initiate Tasks; Wash/Dry Face;Comb/Brush Hair;Brush/Clean Teeth;Wash/Dry Hands   Limitation Noted In Strength   Findings Pt standing at sink to complete grooming tasks with RW for support   Grooming (FIM) 5 - Patient requires supervision/monitoring   QI: Shower/Bathe Self   Assistance Needed Physical assistance   Assistance Provided by [place] 25%-49%   Comment due to pt cognitive deficits, pt is unable to comprehend using LH sponge  Pt able to bath UB while seated in chair  Pt requires promptigmn to wash upper thighs  Pt able to wash jhon area in stance  Pt requires assist for thorughness with buttock and assist to wash b/l LE's below knees   Shower/Bathe Self CARE Score 3   Bathing   Assessed Bath Style Sponge Bath   Anticipated D/C Bath Style Sponge Bath   Able to Gather/Transport No   Able to [employer] Temperature No   Able to Wash/Rinse/Dry (body part) Left Arm;Right Arm;L Upper Leg;R Upper Leg;Chest;Abdomen;Perineal Area   Limitations Noted in Balance; Endurance; Safety;Strength   Positioning Seated;Standing   Bathing (FIM) 3 - Patient completes 5/10  6/10 or 7/10 parts   Tub/Shower Transfer   Findings attempted dry tub transfer with transfer bench  pt requires total  A to lift b/l LE's into tub and assist to move hips over on tub bench  pt is unsafe to complete transfer  Pt  does not feel comfortable and pt is not safe and thus pt will sponge bathe at baseline  Tub Transfer (FIM) 1 - Patient requires assist of two people   QI: Upper Body Dressing   Assistance Needed Set-up / clean-up   Assistance Provided by [place] No physical assistance   Upper Body Dressing CARE Score 5   QI: Lower Body Dressing   Assistance Needed Physical assistance   Assistance Provided by [place] 50%-74%   Comment Pt  ablet o assist by threading underwear and pants over feet  Pt able to pull pants up over hips but requires assist with underwear    Lower Body Dressing CARE Score 2   QI: Putting On/Taking Off Footwear   Assistance Needed Physical assistance   Assistance Provided by [place] Total assistance   Comment Pt  assisted with donning socks/doffing socks   Putting On/Taking Off Footwear CARE Score 1   QI: Picking Up Object   Reason if not Attempted Safety concerns   Picking Up Object CARE Score 88   Dressing/Undressing Clothing   Remove UB Clothes Pullover Shirt   Remove LB Clothes Socks; Undergarment;Pants   Don UB [address] [place] [address]; Undergarment;Socks; Shoes   Limitations Noted In Balance; Coordination; Safety;Problem Solving;Strength;Timeliness; Endurance   Positioning Supported Sit;Standing   UB Dressing (FIM) 5 - Patient requires supervision/monitoring   LB Dressing (FIM) 2 - Patient completes 25-49% of all tasks   QI: Sit to Stand   Assistance Needed Incidental touching   Assistance Provided by [place] No physical assistance   Comment Pt is CGA for sit to stand transfers with RW  Pt responding well to verbal cueing for hand placement today given simple one step cueing  Sit to Stand CARE Score 4   QI: Chair/Bed-to-Chair Transfer   Assistance Needed Physical assistance   Assistance Provided by [place] No physical assistance   Comment Pt is CGA for stand pivot transfers with RW      Chair/Bed-to-Chair Transfer CARE Score -   Transfer Bed/Chair/Wheelchair Stand Pivot Contact Guard   Sit to Stand (ga)   Stand to Sit (cga)   Bed, Chair, Wheelchair Transfer (FIM) 4 - Patient requires steadying assist or light touching   QI: [address] Needed Incidental touching   Assistance Provided by [place] No physical assistance   [address] Score 4   Toileting   Able to [address] down yes, up yes  Able to Manage Clothing Closures No   Manage Hygiene Bladder   Limitations Noted In Balance; Coordination; Safety;LE Strength; Sequencing   Toileting (FIM) 4 - Patient requires steadying assist or light touching   QI: Toilet Transfer   Assistance Needed Incidental touching   Assistance Provided by [place] No physical assistance   Toilet Transfer CARE Score 4   Toilet Transfer   Surface Assessed Standard Commode   Transfer Technique Stand Pivot   Limitations Noted In Balance;Confidence; Sequencing;LE Strength   Toilet Transfer (FIM) 4 - Patient requires steadying assist or light touching   Cognition   Overall Cognitive Status Impaired   Arousal/Participation Alert; Cooperative   Attention Attends with cues to redirect   Orientation Level Oriented X4   Memory Decreased long term memory;Decreased short term memory;Decreased recall of precautions   Following Commands Follows one step commands with increased time or repetition   Activity Tolerance   Activity Tolerance Patient limited by fatigue   Assessment   Treatment Assessment Pt  present and family training completed with ADL routine  Pt  able to provide clear one step directions for transfers on/off toilet and pt ablet o follow one step commands without getting anxious  Pt demonstrated need to guide hand back onto surface she was transferring to, but did so safely  Pt  present for dry tub transfer pt unsafe to complete transfer and required extensive assistance (total A)   and pt in agreement to complete sponge bathing upon d/c   Pt completed remainder of session by sponge bathing  Pt  educated on pt's poor carryover of LHAE and  agreeable to provide necessary assist to complete LB dressing   educated on JAZMIN stockings and binder if pt's BP's low and educated  on purchasing BP cuff to assess BP's daily  Discussed pt getting bathed at EOB duet o  being able to assist pt more easily  Please complete all ADLs while seated on EOB  Pt dtr present at end of session  Showed  and dtr how to take wheels off w/c and how to lock brandon wheels  Family educated to keep wheels off of chair while in home for ease in use  Pt  demonstrated ability to don/doff leg rests  Assisted pt dtr in ordering bed wedge and bedrail online in PM  Pt is getting commode, RW and  w/c  Pt overall tolerated session well and demonstrates improved safety during session  Will continue with family training tomorrow in PM session  Monday to complete ADL training with family  Prognosis Fair   Problem List Decreased strength;Decreased endurance; Impaired balance;Decreased mobility; Impaired judgement;Decreased safety awareness;Decreased cognition;Orthopedic restrictions   Recommendation   OT Discharge Recommendation Home with family support   Equipment Recommended Bedside commode  (bedrail, RW, transport w/c )   OT Equipment ordered bedside commode, RW, w/c  (family ordered bedrail and bed wedge)   OT Therapy Minutes   OT Time In 1000   OT Time Out 1145   OT Total Time (minutes) 105   OT Mode of treatment - Individual (minutes) 105   OT Mode of treatment - Concurrent (minutes) 0   OT Mode of treatment - Group (minutes) 0   OT Mode of treatment - Co-treat (minutes) 0   OT Mode of Teatment - Total time(minutes) 105 minutes   Therapy Time missed   Time missed?  No

## 2018-12-23 PROCEDURE — 97535 SELF CARE MNGMENT TRAINING: CPT

## 2018-12-23 PROCEDURE — 97530 THERAPEUTIC ACTIVITIES: CPT

## 2018-12-23 RX ORDER — POLYETHYLENE GLYCOL 3350 17 G/17G
17 POWDER, FOR SOLUTION ORAL DAILY
Status: DISCONTINUED | OUTPATIENT
Start: 2018-12-23 | End: 2018-12-25 | Stop reason: HOSPADM

## 2018-12-23 RX ADMIN — ACETAMINOPHEN 975 MG: 325 TABLET, FILM COATED ORAL at 14:21

## 2018-12-23 RX ADMIN — CALCIUM CARBONATE (ANTACID) CHEW TAB 500 MG 500 MG: 500 CHEW TAB at 07:32

## 2018-12-23 RX ADMIN — APIXABAN 5 MG: 5 TABLET, FILM COATED ORAL at 07:32

## 2018-12-23 RX ADMIN — DOCUSATE SODIUM 100 MG: 100 CAPSULE, LIQUID FILLED ORAL at 07:33

## 2018-12-23 RX ADMIN — LIDOCAINE 2 PATCH: 50 PATCH CUTANEOUS at 07:32

## 2018-12-23 RX ADMIN — ACETAMINOPHEN 975 MG: 325 TABLET, FILM COATED ORAL at 05:46

## 2018-12-23 RX ADMIN — LEVOTHYROXINE SODIUM 50 MCG: 50 TABLET ORAL at 05:46

## 2018-12-23 RX ADMIN — CALCIUM CARBONATE (ANTACID) CHEW TAB 500 MG 500 MG: 500 CHEW TAB at 17:02

## 2018-12-23 RX ADMIN — MELATONIN 3 MG: at 22:59

## 2018-12-23 RX ADMIN — PANTOPRAZOLE SODIUM 20 MG: 20 TABLET, DELAYED RELEASE ORAL at 05:46

## 2018-12-23 RX ADMIN — DOCUSATE SODIUM 100 MG: 100 CAPSULE, LIQUID FILLED ORAL at 17:02

## 2018-12-23 RX ADMIN — VITAMIN D, TAB 1000IU (100/BT) 5000 UNITS: 25 TAB at 07:32

## 2018-12-23 RX ADMIN — BISACODYL 10 MG: 10 SUPPOSITORY RECTAL at 14:34

## 2018-12-23 RX ADMIN — ACETAMINOPHEN 975 MG: 325 TABLET, FILM COATED ORAL at 22:59

## 2018-12-23 RX ADMIN — APIXABAN 5 MG: 5 TABLET, FILM COATED ORAL at 22:58

## 2018-12-23 RX ADMIN — LIDOCAINE 2 PATCH: 50 PATCH CUTANEOUS at 08:00

## 2018-12-23 RX ADMIN — AMIODARONE HYDROCHLORIDE 200 MG: 200 TABLET ORAL at 07:33

## 2018-12-23 RX ADMIN — POLYETHYLENE GLYCOL 3350 17 G: 17 POWDER, FOR SOLUTION ORAL at 14:21

## 2018-12-23 RX ADMIN — PRAVASTATIN SODIUM 40 MG: 40 TABLET ORAL at 17:02

## 2018-12-23 NOTE — PROGRESS NOTES
12/23/18 1330   Pain Assessment   Pain Assessment No/denies pain   Pain Score No Pain   Restrictions/Precautions   Precautions Bed/chair alarms;Cognitive; Fall Risk;Supervision on toilet/commode;THR   QI: Lying to Sitting on Side of Bed   Assistance Needed Physical assistance   Assistance Provided by [place] Less than 25%   Comment Pt requires guiding assist with LE's to bring off side of bed from sidlying position  Pt then requires guiding assist at trunk to complete supine to sit   Lying to Sitting on Side of Bed CARE Score 3   QI: Sit to Stand   Assistance Needed Incidental touching   Assistance Provided by [place] No physical assistance   Sit to Stand CARE Score 4   QI: Chair/Bed-to-Chair Transfer   Assistance Needed Incidental touching   Assistance Provided by [place] Less than 25%   Comment guarding assistance for stand pivot transfer and cueing for hand placement   Chair/Bed-to-Chair Transfer CARE Score 3   Transfer Bed/Chair/Wheelchair   Limitations Noted In Balance;Problem Solving; Sequencing   Adaptive Equipment Roller Walker   Stand Pivot Contact Guard   Sit to Stand (cga)   [employer], Chair, Wheelchair Transfer (FIM) 4 - Patient requires steadying assist or light touching   Cognition   Overall Cognitive Status Impaired   Arousal/Participation Alert; Cooperative   Attention Attends with cues to redirect   Orientation Level Oriented to person   Memory Decreased long term memory;Decreased short term memory;Decreased recall of precautions   Following Commands Follows one step commands with increased time or repetition   Activity Tolerance   Activity Tolerance Patient tolerated treatment well   Assessment   Treatment Assessment Pt engaged in 30 minute OT session with focus on family training with   Reviewed w/c management with  including how to take on/off elevating leg rests and standard leg rests  Pt  with increased ease with standard leg rests   Pt will spend most of day in recliner at home with legs elevated   educated to keep legs elevated for edema management   repetitively practiced leg rest management and ablet to promblem solve and complete 3x without cueing  Pt  able to set up environment to transfer pt from w/c back to bed to practice bed mobiity  Pt  with good set up of enviornment in safe manner   able to cue pt appropriately for stand pivot back to bed   assisteds afely for sit to supine transfer  However with supine to sit transfer pthusband requires increased practice  Pt with increased independence to complete supine to sit by turning onto side and using siderail to push up   attempted to pull on both of pt's arms to assist with supein to sit   practiced on/with thearpist x 3 trials to place his arms around the shoudlers to assist the trunk from supine to sit  With practice on therapist,  able to complete safely   is schedule for d/c ADL tomorrow  Please complete ADL while seated on EOB as this is how pt and  plan to complete at home  Ordered bed rail and bed wedge from [facility] with dtr on 12/22 and RW, w/c and BSC will come from [facility]     given therapy card to call with any questions  OTR will check in on family on rashawn day before d/c to review any further questions  OT Family training done with:  Ranjana Vickers   Prognosis Fair   Problem List Decreased strength;Decreased endurance;Decreased mobility; Decreased coordination;Decreased cognition; Impaired judgement;Decreased safety awareness   Plan   Treatment/Interventions Functional transfer training;ADL retraining; Therapeutic exercise; Endurance training   Progress Progressing toward goals   Recommendation   OT Discharge Recommendation Home with family support   OT Therapy Minutes   OT Time In 1330   OT Time Out 1400   OT Total Time (minutes) 30   OT Mode of treatment - Individual (minutes) 30   OT Mode of treatment - Concurrent (minutes) 0   OT Mode of treatment - Group (minutes) 0   OT Mode of treatment - Co-treat (minutes) 0   OT Mode of Teatment - Total time(minutes) 30 minutes   Therapy Time missed   Time missed?  No

## 2018-12-23 NOTE — PROGRESS NOTES
Internal Medicine Progress Note  Patient: Brian Gutiérrez  Age/sex: 66 y o  female  Medical Record #: [id number]      ASSESSMENT/PLAN:  Brian Gutiérrez is seen and examined and management for following issues:    Right hip fracture; s/p right MIKE 12/4:  Pain control per primary service        PAF:  on Amiodarone 200 mg qd (new for her)/Toprol/Eliquis  Had been missing doses of Lopressor 2/2 BP, changed to Toprol 12 5mg qhs  No other changes today      HTN/orthostasis: continue TEDS = hasnt needed abd binder for a few days  Only on Toprol 12 5mg qhs and will keep on since has PAF    ABLA:  3 U PRBCs since admit to [facility] = stable now    Severe AS/LVEF 55%: no volume overload at this time    Nausea: continue prn Zofran; uses about every 3 days = improved    Diarrhea:  Seems to have resolved but watch 2/2 Amio       Subjective: offers no complaints        ROS:   GI: denies abdominal pain, change bowel habits or reflux symptoms  Neuro: No new neurologic changes  Respiratory: No Cough, SOB  Cardiovascular: No CP, palpitations     Scheduled Meds:    Current Facility-Administered Medications:  acetaminophen 975 mg Oral Q8H [address] Alfonzo Bell MD   amiodarone 200 mg Oral Daily With Breakfast IRMA Grimm   apixaban 5 mg Oral Q12H [address] Alfonzo Bell MD   bisacodyl 10 mg Rectal Daily PRN Alfonzo Bell MD   calcium carbonate 500 mg Oral BID With Meals Alfonzo Bell MD   cholecalciferol 5,000 Units Oral Daily Alfonzo Bell MD   docusate sodium 100 mg Oral BID Derik Villegas MD   levothyroxine 50 mcg Oral Early Morning Alfonzo Bell MD   lidocaine 2 patch Topical Daily Alfonzo Bell MD   melatonin 3 mg Oral HS Derik Villegas MD   metoprolol succinate 12 5 mg Oral HS IRMA Najera   ondansetron 4 mg Oral Q6H PRN IRMA Grimm   oxyCODONE 2 5 mg Oral Q6H PRN Derik Villegas MD   pantoprazole 20 mg Oral Early Morning Alfonzo Bell MD   polyethylene glycol 17 g Oral Daily PRN Dexter Rivera Jagdish Jeff MD   polyethylene glycol 17 g Oral Once Lyndsey Oneill MD   pravastatin 40 mg Oral Daily With Lizz Davenport MD       Labs:       Results from last 7 days  Lab Units 12/20/18  0545 12/17/18  0517   WBC Thousand/uL 4 94 5 92   HEMOGLOBIN g/dL 11 6 10 9*   HEMATOCRIT % 37 5 34 9   PLATELETS Thousands/uL 231 256       Results from last 7 days  Lab Units 12/20/18  0545 12/17/18  0517   POTASSIUM mmol/L 4 9 3 9   CHLORIDE mmol/L 105 107   CO2 mmol/L 28 26   BUN mg/dL 19 20   CREATININE mg/dL 0 79 0 90   CALCIUM mg/dL 8 7 8 8                  Glucose, i-STAT (mg/dl)   Date Value   12/04/2018 136   12/03/2018 128       Labs reviewed    Physical Examination:  Vitals:   Vitals:    12/22/18 0541 12/22/18 1300 12/22/18 2048 12/23/18 0546   BP: 140/74 116/54 116/69 135/63   BP Location: Left arm Left arm Left arm Left arm   Pulse: 83 85 79 73   Resp: 20 20 16 16   Temp: 98 2 °F (36 8 °C) 98 5 °F (36 9 °C) 97 7 °F (36 5 °C) 99 °F (37 2 °C)   TempSrc: Oral Oral Oral Oral   SpO2: 92% 94% 98% 94%   Weight:       Height:         Constitutional:  NAD; pleasant; nontoxic  Neck: negative for JVD  CV:  +S1, S2;  RRR; no rub/+murmur  Pulmonary:  BBS without crackles/wheeze/rhonci; resp are unlabored  Abdominal:  soft, +BS, ND/NT  Skin:  no rashes  Musculoskeletal:  Has no edema  :  no zamarripa  Neurological/Psych:  AA with mild confusion but improved;  JENKINS 4/5; no depression/anxiety        [ X ] Total time spent: 30 Mins and greater than 50% of this time was spent counseling/coordinating care  ** Please Note: Dragon 360 Dictation voice to text software may have been used in the creation of this document   **

## 2018-12-23 NOTE — PROGRESS NOTES
12/23/18 1230   Pain Assessment   Pain Assessment No/denies pain   Pain Score No Pain   Restrictions/Precautions   Precautions Bed/chair alarms;Cognitive; Fall Risk;Supervision on toilet/commode   Weight Bearing Restrictions Yes   RUE Weight Bearing Per Order WBAT   LUE Weight Bearing Per Order WBAT   RLE Weight Bearing Per Order WBAT   LLE Weight Bearing Per Order WBAT   ROM Restrictions Yes   RUE ROM Restriction Other  (THP)   QI: Oral Hygiene   Assistance Needed Incidental touching   Assistance Provided by [place] Less than 25%   Comment CGA while in stance at sink   Oral Hygiene CARE Score 3   Grooming   Able To Brush/Clean Teeth;Wash/Dry Hands   Limitation Noted In Strength   Findings Pt completed handwashing following toileting at requested to brush teeth, completed at [facility] while in stance at [facility]  Grooming (FIM) 4 - Patient requires steadying assist or light touching   QI: Sit to Stand   Assistance Needed Incidental touching   Assistance Provided by [place] No physical assistance   Comment CGA w/ 1-step commands   Sit to Stand CARE Score 4   QI: Chair/Bed-to-Chair Transfer   Assistance Needed Incidental touching   Assistance Provided by [place] Less than 25%   Comment CGA for stand pivot xfers   Chair/Bed-to-Chair Transfer CARE Score 3   Transfer Bed/Chair/Wheelchair   Limitations Noted In Balance; Sequencing   Adaptive Equipment Roller Walker   Stand Pivot Contact Guard   Findings Pt performed 3 stand pivot xfers t/o session  Improvements noted w/ carryover sit>stand, cont to req 1-step commands for sequencing stand pivots and stand>sit  Bed, Chair, Wheelchair Transfer (FIM) 4 - Patient requires steadying assist or light touching   QI: [address] Needed Physical assistance   Assistance Provided by [place] 25%-49%   Comment Pt req A for thoroughness following BM  Bladder management performed at [facility] while in stance     Toileting Hygiene CARE Score 3   Toileting   Able to [address] down yes, up yes  Able to Manage Clothing Closures No   Manage Hygiene Bladder; Bowel   Limitations Noted In Balance; Safety;LE Strength   Findings Toileting performed 2 times this session  Pt cont to req inc time to complete all tasks  CGA for toilet xfer w/ vc's for hand placement  Pt able to perform bladder hygiene w/ CGA while in stance  Pt req A for thoroughness following BM  Pt req vc's to initiate CM up, performed at [facility]  Toileting (FIM) 4 - Patient requires steadying assist or light touching   QI: Toilet Transfer   Assistance Needed Incidental touching   Assistance Provided by [place] Less than 25%   Toilet Transfer CARE Score 3   Toilet Transfer   Surface Assessed Standard Commode   Transfer Technique Stand Pivot   Limitations Noted In Balance;Confidence; Sequencing;LE Strength   Findings vc's for hand placement  Toilet Transfer (FIM) 4 - Patient requires steadying assist or light touching   Functional Standing Tolerance   Time 5 minutes 25 seconds, 3 minutes 15 seconds   Activity Pt participated in pegboard ther act x2 trials while standing at table to focus on static standing balance, crossing midline, fxnl reach, and b/l integration  Pt tolerated well, w/ occ unilateral support to self right  Pt tolerated well w/ no c/o pain or fatigue  Assessment   Treatment Assessment Pt seen for skilled OT session focusing on toileting, fxnl xfer training, and fxnl standing tolerance  Pt performed toileting x2 trials, details noted above  Pt cont to req significant time for all fxnl tasks due to cognitive deficits and to dec feelings of anxiety  Pt cont to req 1-step commands and vc's for task initiation and sequencing to maximize fxnl performance  Pt's  present for session providing encouragement and support  Pt's  reports inc confidence w/ training provided, more training to be provided at 1330   Pt will cont to benefit from skilled services focusing on family training, fxnl xfers, and carryover of edu/skills provided to return to PLOF  Pt was left resting in OT gym for cont session  Prognosis Fair   Problem List Decreased strength;Decreased endurance; Impaired balance;Decreased mobility; Impaired judgement;Decreased safety awareness;Decreased cognition;Orthopedic restrictions   Plan   Treatment/Interventions Functional transfer training; Therapeutic exercise; Endurance training;Patient/family training;Equipment eval/education; Bed mobility   Progress Progressing toward goals   Recommendation   OT Discharge Recommendation Home with family support   OT Therapy Minutes   OT Time In 1230   OT Time Out 1330   OT Total Time (minutes) 60   OT Mode of treatment - Individual (minutes) 60   OT Mode of treatment - Concurrent (minutes) 0   OT Mode of treatment - Group (minutes) 0   OT Mode of treatment - Co-treat (minutes) 0   OT Mode of Teatment - Total time(minutes) 60 minutes   Therapy Time missed   Time missed?  No

## 2018-12-23 NOTE — PROGRESS NOTES
12/23/18 1030   Pain Assessment   Pain Assessment No/denies pain   Restrictions/Precautions   Precautions Bed/chair alarms;Cognitive; Fall Risk;Supervision on toilet/commode   Braces or Orthoses (TEDs)   General   Change In Medical/Functional Status pt stated she was up all night in the bathroom   Cognition   Overall Cognitive Status Impaired   Subjective   Subjective pt is ready for lunch, agreeable to therapy   QI: Sit to Lying   Assistance Needed Physical assistance   Assistance Provided by [place] Less than 25%   Sit to Lying CARE Score 3   QI: Lying to Sitting on Side of Bed   Assistance Needed Physical assistance   Assistance Provided by [place] Less than 25%   Lying to Sitting on Side of Bed CARE Score 3   QI: Sit to Stand   Assistance Needed Incidental touching;Verbal cues   Sit to Stand CARE Score 4   QI: Chair/Bed-to-Chair Transfer   Assistance Needed Incidental touching;Verbal cues; Adaptive equipment   Chair/Bed-to-Chair Transfer CARE Score 4   Transfer Bed/Chair/Wheelchair   Limitations Noted In Balance; Coordination;Problem Solving;LE Strength; Sequencing   Adaptive Equipment Roller Walker   Stand Pivot Contact Guard   Sit to [facility]   Stand to [facility]   Supine to Sit Minimal Assist   Sit to Supine Minimal Assist   Findings cont to need cues to complete tx safely   performing txs with pt during session   Bed, Chair, Wheelchair Transfer (FIM) 4 - [place] lifts one extremity during transfer   QI: Walk 10 Feet   Assistance Needed Incidental touching; Adaptive equipment   Walk 10 Feet CARE Score 4   Ambulation   Does the patient walk? 2  Yes   Primary Discharge Mode of Locomotion Wheelchair   Walk Assist Level Contact Guard   Gait Pattern Inconsistant Yamilka;Decreased foot clearance; Slow Yamilka; Improper weight shift   Assist Device Napoleon Block Walked (feet) 15 ft   Limitations Noted In Balance; Coordination; Endurance;Speed;Strength   Findings amb in PT gym from w/c to mat table  spouse involved with txs and amb during session   Walking (FIM) 1 - Patient ambulates less than 49 feet regardless of assist/device/set up   Wheelchair mobility   QI: Does the patient use a wheelchair? 0  No   QI: 1 Step (Curb)   Reason if not Attempted Safety concerns   1 Step (Curb) CARE Score 88   QI: 4 Steps   Reason if not Attempted Safety concerns   4 Steps CARE Score 88   QI: 12 Steps   Reason if not Attempted Safety concerns   12 Steps CARE Score 88   QI: Picking Up Object   Reason if not Attempted Safety concerns   Picking Up Object CARE Score 88   QI: Toilet Transfer   Assistance Needed Incidental touching   Toilet Transfer CARE Score 4   Toilet Transfer   Surface Assessed Raised Toilet   Limitations Noted In Balance;LE Strength; Safety   Adaptive Equipment Grab Bar   Toilet Transfer (FIM) 4 - Patient requires steadying assist or light touching   Therapeutic Interventions   Strengthening printed out and reviewed HEP with pt and spouse  Assessment   Treatment Assessment session cont focus on incorporating pt's spouse with txs and short distance amb  Titi Bloom to need reminders for simple cues and all time for pt to process txs  HEP given to pt with review with Daria Hughes and is in good understanding of exs  spoke to pt's dtr for FT tomorrow with bumping w/c in the house if steps are not safe for pt   will cont to work on txs, short distance amb and stairs for safe d/c home  Family/Caregiver Present spouse and dtr   Problem List Decreased strength;Decreased endurance; Impaired balance;Decreased mobility; Impaired judgement;Decreased safety awareness;Decreased cognition;Orthopedic restrictions   Barriers to Discharge Inaccessible home environment;Decreased caregiver support; Other (Comment)   PT Barriers   Physical Impairment Decreased strength;Decreased range of motion;Decreased endurance; Impaired balance;Decreased mobility; Decreased coordination;Decreased cognition;Decreased safety awareness;Decreased skin integrity;Orthopedic restrictions;Pain   Functional Limitation Walking;Transfers;Standing;Stair negotiation;Car transfers   Plan   Treatment/Interventions Functional transfer training;LE strengthening/ROM; Endurance training;Patient/family training;Bed mobility;Gait training   Progress Progressing toward goals   Recommendation   Recommendation 24 hour supervision/assist;Home PT; Home with family support   Equipment Recommended Wheelchair   PT Therapy Minutes   PT Time In 1030   PT Time Out 1130   PT Total Time (minutes) 60   PT Mode of treatment - Individual (minutes) 60   PT Mode of treatment - Concurrent (minutes) 0   PT Mode of treatment - Group (minutes) 0   PT Mode of treatment - Co-treat (minutes) 0   PT Mode of Teatment - Total time(minutes) 60 minutes   Therapy Time missed   Time missed?  No

## 2018-12-24 PROBLEM — K59.00 CONSTIPATION: Status: ACTIVE | Noted: 2018-12-24

## 2018-12-24 PROBLEM — E55.9 VITAMIN D INSUFFICIENCY: Status: ACTIVE | Noted: 2018-12-24

## 2018-12-24 LAB
ANION GAP SERPL CALCULATED.3IONS-SCNC: 6 MMOL/L (ref 4–13)
BASOPHILS # BLD AUTO: 0.01 THOUSANDS/ΜL (ref 0–0.1)
BASOPHILS NFR BLD AUTO: 0 % (ref 0–1)
BUN SERPL-MCNC: 22 MG/DL (ref 5–25)
CALCIUM SERPL-MCNC: 8.8 MG/DL (ref 8.3–10.1)
CHLORIDE SERPL-SCNC: 106 MMOL/L (ref 100–108)
CO2 SERPL-SCNC: 27 MMOL/L (ref 21–32)
CREAT SERPL-MCNC: 0.83 MG/DL (ref 0.6–1.3)
EOSINOPHIL # BLD AUTO: 0.06 THOUSAND/ΜL (ref 0–0.61)
EOSINOPHIL NFR BLD AUTO: 1 % (ref 0–6)
ERYTHROCYTE [DISTWIDTH] IN BLOOD BY AUTOMATED COUNT: 15 % (ref 11.6–15.1)
GFR SERPL CREATININE-BSD FRML MDRD: 68 ML/MIN/1.73SQ M
GLUCOSE SERPL-MCNC: 94 MG/DL (ref 65–140)
HCT VFR BLD AUTO: 33.6 % (ref 34.8–46.1)
HGB BLD-MCNC: 10.3 G/DL (ref 11.5–15.4)
IMM GRANULOCYTES # BLD AUTO: 0.04 THOUSAND/UL (ref 0–0.2)
IMM GRANULOCYTES NFR BLD AUTO: 1 % (ref 0–2)
LYMPHOCYTES # BLD AUTO: 0.71 THOUSANDS/ΜL (ref 0.6–4.47)
LYMPHOCYTES NFR BLD AUTO: 16 % (ref 14–44)
MCH RBC QN AUTO: 30.8 PG (ref 26.8–34.3)
MCHC RBC AUTO-ENTMCNC: 30.7 G/DL (ref 31.4–37.4)
MCV RBC AUTO: 101 FL (ref 82–98)
MONOCYTES # BLD AUTO: 0.31 THOUSAND/ΜL (ref 0.17–1.22)
MONOCYTES NFR BLD AUTO: 7 % (ref 4–12)
NEUTROPHILS # BLD AUTO: 3.4 THOUSANDS/ΜL (ref 1.85–7.62)
NEUTS SEG NFR BLD AUTO: 75 % (ref 43–75)
NRBC BLD AUTO-RTO: 0 /100 WBCS
PLATELET # BLD AUTO: 188 THOUSANDS/UL (ref 149–390)
PMV BLD AUTO: 10.5 FL (ref 8.9–12.7)
POTASSIUM SERPL-SCNC: 3.9 MMOL/L (ref 3.5–5.3)
RBC # BLD AUTO: 3.34 MILLION/UL (ref 3.81–5.12)
SODIUM SERPL-SCNC: 139 MMOL/L (ref 136–145)
WBC # BLD AUTO: 4.53 THOUSAND/UL (ref 4.31–10.16)

## 2018-12-24 PROCEDURE — 99232 SBSQ HOSP IP/OBS MODERATE 35: CPT

## 2018-12-24 PROCEDURE — 85025 COMPLETE CBC W/AUTO DIFF WBC: CPT | Performed by: NURSE PRACTITIONER

## 2018-12-24 PROCEDURE — 97530 THERAPEUTIC ACTIVITIES: CPT

## 2018-12-24 PROCEDURE — 97535 SELF CARE MNGMENT TRAINING: CPT

## 2018-12-24 PROCEDURE — 97110 THERAPEUTIC EXERCISES: CPT

## 2018-12-24 PROCEDURE — 80048 BASIC METABOLIC PNL TOTAL CA: CPT | Performed by: NURSE PRACTITIONER

## 2018-12-24 RX ORDER — LANOLIN ALCOHOL/MO/W.PET/CERES
3 CREAM (GRAM) TOPICAL
Qty: 30 TABLET | Refills: 0 | Status: SHIPPED | OUTPATIENT
Start: 2018-12-24

## 2018-12-24 RX ORDER — DOCUSATE SODIUM 100 MG/1
100 CAPSULE, LIQUID FILLED ORAL 2 TIMES DAILY
Qty: 60 CAPSULE | Refills: 0 | Status: SHIPPED | OUTPATIENT
Start: 2018-12-24

## 2018-12-24 RX ORDER — PRAVASTATIN SODIUM 40 MG
40 TABLET ORAL
Qty: 30 TABLET | Refills: 0 | Status: SHIPPED | OUTPATIENT
Start: 2018-12-24 | End: 2019-02-15 | Stop reason: SDUPTHER

## 2018-12-24 RX ORDER — AMIODARONE HYDROCHLORIDE 200 MG/1
200 TABLET ORAL
Qty: 30 TABLET | Refills: 0 | Status: SHIPPED | OUTPATIENT
Start: 2018-12-25

## 2018-12-24 RX ORDER — POLYETHYLENE GLYCOL 3350 17 G/17G
17 POWDER, FOR SOLUTION ORAL DAILY PRN
Qty: 14 EACH | Refills: 0 | Status: SHIPPED | OUTPATIENT
Start: 2018-12-24

## 2018-12-24 RX ORDER — ACETAMINOPHEN 325 MG/1
TABLET ORAL
Qty: 100 TABLET | Refills: 0 | Status: SHIPPED | OUTPATIENT
Start: 2018-12-24

## 2018-12-24 RX ORDER — ONDANSETRON 4 MG/1
4 TABLET, ORALLY DISINTEGRATING ORAL 3 TIMES DAILY PRN
Qty: 20 TABLET | Refills: 0 | Status: SHIPPED | OUTPATIENT
Start: 2018-12-24

## 2018-12-24 RX ORDER — CALCIUM CARBONATE 200(500)MG
500 TABLET,CHEWABLE ORAL 2 TIMES DAILY WITH MEALS
Qty: 60 TABLET | Refills: 0 | Status: SHIPPED | OUTPATIENT
Start: 2018-12-24

## 2018-12-24 RX ORDER — METOPROLOL SUCCINATE 25 MG/1
12.5 TABLET, EXTENDED RELEASE ORAL
Qty: 30 TABLET | Refills: 0 | Status: SHIPPED | OUTPATIENT
Start: 2018-12-24

## 2018-12-24 RX ADMIN — DOCUSATE SODIUM 100 MG: 100 CAPSULE, LIQUID FILLED ORAL at 17:01

## 2018-12-24 RX ADMIN — LEVOTHYROXINE SODIUM 50 MCG: 50 TABLET ORAL at 06:38

## 2018-12-24 RX ADMIN — PRAVASTATIN SODIUM 40 MG: 40 TABLET ORAL at 17:01

## 2018-12-24 RX ADMIN — POLYETHYLENE GLYCOL 3350 17 G: 17 POWDER, FOR SOLUTION ORAL at 08:25

## 2018-12-24 RX ADMIN — AMIODARONE HYDROCHLORIDE 200 MG: 200 TABLET ORAL at 07:43

## 2018-12-24 RX ADMIN — ACETAMINOPHEN 975 MG: 325 TABLET, FILM COATED ORAL at 22:34

## 2018-12-24 RX ADMIN — PANTOPRAZOLE SODIUM 20 MG: 20 TABLET, DELAYED RELEASE ORAL at 06:38

## 2018-12-24 RX ADMIN — LIDOCAINE 2 PATCH: 50 PATCH CUTANEOUS at 08:29

## 2018-12-24 RX ADMIN — APIXABAN 5 MG: 5 TABLET, FILM COATED ORAL at 22:34

## 2018-12-24 RX ADMIN — DOCUSATE SODIUM 100 MG: 100 CAPSULE, LIQUID FILLED ORAL at 08:25

## 2018-12-24 RX ADMIN — CALCIUM CARBONATE (ANTACID) CHEW TAB 500 MG 500 MG: 500 CHEW TAB at 17:01

## 2018-12-24 RX ADMIN — CALCIUM CARBONATE (ANTACID) CHEW TAB 500 MG 500 MG: 500 CHEW TAB at 07:43

## 2018-12-24 RX ADMIN — ACETAMINOPHEN 975 MG: 325 TABLET, FILM COATED ORAL at 06:38

## 2018-12-24 RX ADMIN — VITAMIN D, TAB 1000IU (100/BT) 5000 UNITS: 25 TAB at 08:25

## 2018-12-24 RX ADMIN — APIXABAN 5 MG: 5 TABLET, FILM COATED ORAL at 08:25

## 2018-12-24 RX ADMIN — MELATONIN 3 MG: at 22:34

## 2018-12-24 RX ADMIN — ACETAMINOPHEN 975 MG: 325 TABLET, FILM COATED ORAL at 14:08

## 2018-12-24 NOTE — PLAN OF CARE
Maintains adequate nutritional intake Progressing      Maintains hematologic stability Progressing      Absence or prevention of progression during hospitalization Progressing      Patient/family/caregiver demonstrates understanding of disease process, treatment plan, medications, and discharge instructions Progressing      Electrolytes maintained within normal limits Progressing      Fluid balance maintained Progressing      Maintain or return mobility to safest level of function Progressing      Maintain proper alignment of affected body part Progressing      Achieves maximal functionality and self care Progressing      Nutrient/Hydration intake appropriate for improving, restoring or maintaining nutritional needs Progressing      Verbalizes/displays adequate comfort level or baseline comfort level Progressing      Patient will remain free of falls Progressing      Skin integrity is maintained or improved Progressing      Maintain or return to baseline ADL function Progressing

## 2018-12-24 NOTE — PROGRESS NOTES
12/24/18 1210   Pain Assessment   Pain Assessment [place]aker FACES   [place] FACES Pain Rating 2   Pain Location Leg   Pain Orientation Right   Pain Frequency Intermittent   Hospital Pain Intervention(s) Rest;Cold applied;Distraction   Response to Interventions participated well   Restrictions/Precautions   Precautions Fall Risk;Cognitive;Bed/chair alarms;Supervision on toilet/commode;THR   RLE Weight Bearing Per Order WBAT   Cognition   Overall Cognitive Status Impaired   Subjective   Subjective tired at end of session   QI: Roll Left and Right   Assistance Needed Verbal cues; Adaptive equipment   Roll Left and Right CARE Score 4   QI: Sit to Lying   Assistance Needed Physical assistance   Assistance Provided by [place] Less than 25%   Sit to Lying CARE Score 3   QI: Lying to Sitting on Side of Bed   Assistance Needed Incidental touching;Verbal cues; Adaptive equipment   Lying to Sitting on Side of Bed CARE Score 4   QI: Sit to Stand   Assistance Needed Verbal cues; Incidental touching   Sit to Stand CARE Score 4   QI: Chair/Bed-to-Chair Transfer   Assistance Needed Incidental touching;Verbal cues; Adaptive equipment   Chair/Bed-to-Chair Transfer CARE Score 4   Transfer Bed/Chair/Wheelchair   Limitations Noted In Problem Solving; Endurance;LE Strength   Adaptive Equipment Roller [employer], Chair, Wheelchair Transfer (FIM) 4 - Patient requires steadying assist or light touching   QI: Car Transfer   Assistance Needed Physical assistance;Verbal cues; Adaptive equipment   Assistance Provided by [place] 25%-49%   Car Transfer CARE Score 3   QI: Walk 10 Feet   Assistance Needed Verbal cues; Adaptive equipment; Incidental touching   Walk 10 Feet CARE Score 4   QI: Walk 50 Feet with Two Turns   Assistance Needed Incidental touching;Verbal cues; Adaptive equipment   Walk 50 Feet with Two Turns CARE Score 4   QI: Walk 150 Feet   Reason if not Attempted Safety concerns   Walk 150 Feet CARE Score 88   QI: Walking 10 Feet on Uneven Surfaces   Reason if not Attempted Safety concerns   Walking 10 Feet on Uneven Surfaces CARE Score 88   Ambulation   Does the patient walk? 2  Yes   Walk Assist Level Contact Guard   Gait Pattern Decreased foot clearance; Slow Yamilka; Step to; Improper weight shift;Decreased L stance   Assist Device Roller Walker   Distance Walked (feet) 50 ft   Limitations Noted In Strength; Sequencing; Endurance;Device Management   Walking (FIM) 2 - Patient completes 25 - 49% of all tasks AND distance 150 feet or more, no rest   QI: Wheel 50 Feet with [address]  Needed Physical assistance   Assistance Provided by [place] Total assistance   Wheel 50 Feet with Two Turns CARE Score 1   QI: Wheel 150 Feet   Assistance Needed Physical assistance   Assistance Provided by [place] Total assistance   Wheel 150 Feet CARE Score 1   Wheelchair mobility   QI: Does the patient use a wheelchair? 1  Yes   QI: Indicate the type of wheelchair 1  Manual   Wheelchair Assist Level Total Assist   Wheelchair (FIM) 1 - Patient completes less than 25% of all tasks   QI: 1 Step (Curb)   Assistance Needed Physical assistance;Verbal cues; Adaptive equipment   Assistance Provided by [place] 25%-49%   1 Step (Curb) CARE Score 3   QI: 4 Steps   Reason if not Attempted Safety concerns   4 Steps CARE Score 88   QI: 12 Steps   Reason if not Attempted Safety concerns   12 Steps CARE Score 88   Stairs   Type Curb   # of Steps 1   Stairs (FIM) 1 - Patient goes up and down less than 4 stairs regardless of assist/device/set up   Therapeutic Interventions   Flexibility passive stretch B Calves x 2 min   Equipment Use   NuStep 10 min lvl 2   Other Comments   Comments Had pt practice with spouse for short distance ambulation , working on cueing and guarding  Assessment   Treatment Assessment Pt seen with family present  Reviewed and practice w/c management, including anti-tippers, all brakes and leg rests   Practiced bumping up step with w/c, family reports they will bump pt in/out of house for now in w/c  Reviewed car transfers as well  As pt fatigued during session cueing was required more often with transfers and family was able to see that  Pt plan isto ambulate short distances only with RW and use w/c when fatigued and in the community  Pt has increased anxiety with people around, therefore w/c in community is much safer  Pt unable to manage w/c on her own  Recommend pt continue with home therapy to carryover functional mobility and safety in her home setting  Family/Caregiver Present spouse, Dtr and MARAL   Recommendation   Recommendation Home with family support;Home PT;24 hour supervision/assist   PT Therapy Minutes   PT Time In 1210   PT Time Out 1340   PT Total Time (minutes) 90   PT Mode of treatment - Individual (minutes) 90   PT Mode of treatment - Concurrent (minutes) 0   PT Mode of treatment - Group (minutes) 0   PT Mode of treatment - Co-treat (minutes) 0   PT Mode of Teatment - Total time(minutes) 90 minutes   Therapy Time missed   Time missed? No      12/24/18 1210   Pain Assessment   Pain Assessment [place] FACES   [place] FACES Pain Rating 2   Pain Location Leg   Pain Orientation Right   Pain Frequency Intermittent   Hospital Pain Intervention(s) Rest;Cold applied;Distraction   Response to Interventions participated well   Restrictions/Precautions   Precautions Fall Risk;Cognitive;Bed/chair alarms;Supervision on toilet/commode;THR   RLE Weight Bearing Per Order WBAT   Cognition   Overall Cognitive Status Impaired   Subjective   Subjective tired at end of session   QI: Roll Left and Right   Assistance Needed Verbal cues; Adaptive equipment   Roll Left and Right CARE Score 4   QI: Sit to Lying   Assistance Needed Physical assistance   Assistance Provided by [place] Less than 25%   Sit to Lying CARE Score 3   QI: Lying to Sitting on Side of Bed   Assistance Needed Incidental touching;Verbal cues; Adaptive equipment   Lying to Sitting on Side of Bed CARE Score 4   QI: Sit to Stand   Assistance Needed Verbal cues; Incidental touching   Sit to Stand CARE Score 4   QI: Chair/Bed-to-Chair Transfer   Assistance Needed Incidental touching;Verbal cues; Adaptive equipment   Chair/Bed-to-Chair Transfer CARE Score 4   Transfer Bed/Chair/Wheelchair   Limitations Noted In Problem Solving; Endurance;LE Strength   Adaptive Equipment Roller [employer], Chair, Wheelchair Transfer (FIM) 4 - Patient requires steadying assist or light touching   QI: Car Transfer   Assistance Needed Physical assistance;Verbal cues; Adaptive equipment   Assistance Provided by [place] 25%-49%   Car Transfer CARE Score 3   QI: Walk 10 Feet   Assistance Needed Verbal cues; Adaptive equipment; Incidental touching   Walk 10 Feet CARE Score 4   QI: Walk 50 Feet with Two Turns   Assistance Needed Incidental touching;Verbal cues; Adaptive equipment   Walk 50 Feet with Two Turns CARE Score 4   QI: Walk 150 Feet   Reason if not Attempted Safety concerns   Walk 150 Feet CARE Score 88   QI: Walking 10 Feet on Uneven Surfaces   Reason if not Attempted Safety concerns   Walking 10 Feet on Uneven Surfaces CARE Score 88   Ambulation   Does the patient walk? 2  Yes   Walk Assist Level Contact Guard   Gait Pattern Decreased foot clearance; Slow Yamilka; Step to; Improper weight shift;Decreased L stance   Assist Device Roller Walker   Distance Walked (feet) 50 ft   Limitations Noted In Strength; Sequencing; Endurance;Device Management   Walking (FIM) 2 - Patient completes 25 - 49% of all tasks AND distance 150 feet or more, no rest   QI: Wheel 50 Feet with [address]  Needed Physical assistance   Assistance Provided by [place] Total assistance   Wheel 50 Feet with Two Turns CARE Score 1   QI: Wheel 150 Feet   Assistance Needed Physical assistance   Assistance Provided by [place] Total assistance   Wheel 150 Feet CARE Score 1   Wheelchair mobility   QI: Does the patient use a wheelchair? 1   Yes   QI: Indicate the type of wheelchair 1  Manual   Wheelchair Assist Level Total Assist   Wheelchair (FIM) 1 - Patient completes less than 25% of all tasks   QI: 1 Step (Curb)   Assistance Needed Physical assistance;Verbal cues; Adaptive equipment   Assistance Provided by [place] 25%-49%   1 Step (Curb) CARE Score 3   QI: 4 Steps   Reason if not Attempted Safety concerns   4 Steps CARE Score 88   QI: 12 Steps   Reason if not Attempted Safety concerns   12 Steps CARE Score 88   Stairs   Type Curb   # of Steps 1   Stairs (FIM) 1 - Patient goes up and down less than 4 stairs regardless of assist/device/set up   Therapeutic Interventions   Flexibility passive stretch B Calves x 2 min   Equipment Use   NuStep 10 min lvl 2   Other Comments   Comments Had pt practice with spouse for short distance ambulation , working on cueing and guarding  Assessment   Treatment Assessment Pt seen with family present  Reviewed and practice w/c management, including anti-tippers, all brakes and leg rests  Practiced bumping up step with w/c, family reports they will bump pt in/out of house for now in w/c  Reviewed car transfers as well  As pt fatigued during session cueing was required more often with transfers and family was able to see that  Pt plan isto ambulate short distances only with RW and use w/c when fatigued and in the community  Pt has increased anxiety with people around, therefore w/c in community is much safer  Pt unable to manage w/c on her own  Recommend pt continue with home therapy to carryover functional mobility and safety in her home setting      Family/Caregiver Present spouse, Dtr and MARAL   Recommendation   Recommendation Home with family support;Home PT;24 hour supervision/assist   PT Therapy Minutes   PT Time In 1210   PT Time Out 1340   PT Total Time (minutes) 90   PT Mode of treatment - Individual (minutes) 90   PT Mode of treatment - Concurrent (minutes) 0   PT Mode of treatment - Group (minutes) 0   PT Mode of treatment - Co-treat (minutes) 0   PT Mode of Teatment - Total time(minutes) 90 minutes   Therapy Time missed   Time missed?  No

## 2018-12-24 NOTE — DISCHARGE INSTRUCTIONS
Please see your doctors listed in the follow up providers section of your discharge paperwork, and take the discharge paperwork with you to your appointments  If you have any questions or concerns regarding your acute rehabilitation stay including issues with medications, rehabilitation, and follow-up plan, please call:   [address] [place] [address] at [phone]  Eliquis instructions: You have been prescribed apixiban(Eliquis)  This medication is used to prevent clots which can cause severe disability and even death  This medication also does increase risk of bleeding which can also lead to severe disability and even death  At this time, your specialists feel the benefits of being on this medication outweight the risks of not taking this  If you notice black stools, bloody stools, vomit blood, develop new weakness, slurred speech, confusion or have any other concerning symptoms call 911 or obtain transportation to nearest emergency room immediately  Should you develop fevers, chills, new weakness, changes in sensation, difficulty speaking, facial weakness, confusion, or other concerning symptoms please call 911 and/or obtain transportation to nearest ER immediately  Please note you are restricted from driving/operating a motorized vehicle/operating heavy machinery/etc     You are restricted from driving  Please note changes may have been made to your medications please refer to your discharge paperwork for your current medications and take this list with you to all your doctors appointments for your doctors to review  Please do not resume a home medication unless the medication reconciliation sheet indicates to do so, please do not assume that a medication that you were given a prescription for is the same as a medication you have at home based on both medications having the same name as dosages and frequency may have changed        Do not drink alcohol while on current medications as this can increase your risk of injury or severe complication  Please check your blood pressure prior to taking your blood pressure medications and keep a log that you will bring with you to your follow-up doctors' appointments  >Please contact your family doctor or cardiologist immediately for a blood pressure below 100/50 and do not take your blood pressure medications until speaking with them  >Please contact your family doctor or cardiologist as soon as possible for blood pressure greater than 160/100    Please avoid NSAID (including but not limited to advil, aleve, motrin, naproxen, ibuprofen, mobic, meloxicam, diclofenac etc) medications as NSAID medications may increase your risk of bleeding (which can be life-threatening)  Unless specifically noted in your medication list provided to you in your discharge paper work, please discuss with your family doctor prior to resuming any vitamins, minerals, supplements you may have been taking prior to your hospitalization     Please note a summary of your hospital stay with relevant information for your doctors will try to be sent to them  Please confirm with your doctors at your follow up visits that they have received this summary and have them contact [address] [place] [address] if they have not received them along with any other medical records they may require  Stephon Mccabe Phone Number:  [phone]                                          Hip Fracture   WHAT YOU NEED TO KNOW:   A hip fracture is a break in the upper part of your femur (thigh bone)  The upper part of your femur includes the femoral head and the femoral neck  A hip fracture is often caused by a fall or injury on the side of your hip  DISCHARGE INSTRUCTIONS:   Medicines: You may  need any of the following:  · Acetaminophen  decreases pain and fever  It is available without a doctor's order  Ask how much to take and how often to take it   Follow directions  Acetaminophen can cause liver damage if not taken correctly  · Prescription pain medicine  may be given  Ask how to take this medicine safely  · Blood thinners    help prevent blood clots  Examples of blood thinners include heparin and warfarin  Clots can cause strokes, heart attacks, and death  The following are general safety guidelines to follow while you are taking a blood thinner:    ¨ Watch for bleeding and bruising while you take blood thinners  Watch for bleeding from your gums or nose  Watch for blood in your urine and bowel movements  Use a soft washcloth on your skin, and a soft toothbrush to brush your teeth  This can keep your skin and gums from bleeding  If you shave, use an electric shaver  Do not play contact sports  ¨ Tell your dentist and other healthcare providers that you take anticoagulants  Wear a bracelet or necklace that says you take this medicine  ¨ Do not start or stop any medicines unless your healthcare provider tells you to  Many medicines cannot be used with blood thinners  ¨ Tell your healthcare provider right away if you forget to take the medicine, or if you take too much  ¨ Warfarin  is a blood thinner that you may need to take  The following are things you should be aware of if you take warfarin  § Foods and medicines can affect the amount of warfarin in your blood  Do not make major changes to your diet while you take warfarin  Warfarin works best when you eat about the same amount of vitamin K every day  Vitamin K is found in green leafy vegetables and certain other foods  Ask for more information about what to eat when you are taking warfarin  § You will need to see your healthcare provider for follow-up visits when you are on warfarin  You will need regular blood tests  These tests are used to decide how much medicine you need  · Take your medicine as directed    Contact your healthcare provider if you think your medicine is not helping or if you have side effects  Tell him or her if you are allergic to any medicine  Keep a list of the medicines, vitamins, and herbs you take  Include the amounts, and when and why you take them  Bring the list or the pill bottles to follow-up visits  Carry your medicine list with you in case of an emergency  Call 911 for any of the following:   · Your leg feels warm, tender, and painful  It may look swollen and red  · You feel lightheaded, short of breath, and have chest pain  · You cough up blood  Return to the emergency department if:   · You have severe pain, even after you take pain medicine  · Your legs are numb  · You cannot move your leg or foot  Contact your healthcare provider if:   · You have a fever  · You have a blister or open sore  · You have a sore that is red, swollen, or draining pus  · You have increased pain, numbness, tingling, or leg swelling  · You have worsening function or deformity  · You have questions or concerns about your condition or care  Follow up with your healthcare provider as directed: You will need to return for more x-rays  Your healthcare provider may also want to check you for osteoporosis  Osteoporosis is a condition that causes bones to become brittle and break easily after a fall  You will need treatment if you develop osteoporosis  Write down your questions so you remember to ask them during your visits  Prevent falls:   · Talk to your healthcare provider about all of the medicines you take  Some medicines can cause dizziness or drowsiness and increase your risk for falls  · Have your vision checked regularly  Your vision may worsen over time and increase your risk for falls  · Use walking devices , such as canes or walkers, if you have trouble keeping your balance  · Make your home safe:      ¨ Improve the lighting in your home so that you can see where you are walking better       ¨ Add grab bars to the inside and outside of your tub or shower and next to the toilet  ¨ Add railings to both sides of your stairways  ¨ Remove throw rugs and other objects that can cause you to trip and fall  Manage your hip fracture:   · Eat foods that are high in calcium and vitamin D  Your healthcare provider may tell you to eat more dairy products, such as milk and cheese, for calcium  Spinach, salmon, and dried beans are also good sources of calcium  Cereal, bread, and orange juice may be fortified with vitamin D  You also get vitamin D from exposure to sunlight  Your healthcare provider may also suggest a calcium or vitamin D supplement  Do not take supplements unless directed  · Rest as directed  You may need a brace or pillow between your legs while your fracture heals  · Go to physical therapy as directed  A physical therapist will teach you exercises to help improve movement and strength, and to decrease pain during recovery  © 2017 [address] [employer] Information is for End User's use only and may not be sold, redistributed or otherwise used for commercial purposes  All illustrations and images included in CareNotes® are the copyrighted property of [employer]  or [employer]  The above information is an  only  It is not intended as medical advice for individual conditions or treatments  Talk to your doctor, nurse or pharmacist before following any medical regimen to see if it is safe and effective for you  Total Hip Replacement   WHAT YOU NEED TO KNOW:   Total hip replacement (THR) is surgery to replace a hip joint damaged by wear, injury, or osteoarthritis  DISCHARGE INSTRUCTIONS:   Call 911 for any of the following:   · You have chest pain when you take a deep breath or cough  You may cough up blood  · You suddenly feel lightheaded and short of breath  · You have a seizure or feel confused  Seek care immediately if:   · Your leg feels warm, tender, and painful   It may look swollen and red  · Your incision comes apart  · You urinate less than usual or not at all  Contact your healthcare provider if:   · You have a fever or chills  · Your wound is red, swollen, or draining  · Blood soaks through your bandage  · You have nausea or are vomiting  · You have more pain and swelling in your hip joint, even after you take pain medicine  · You have questions or concerns about your condition or care  Self-care:   · Use your assistive devices as directed  Examples include walker, cane, and a reacher  These devices will help decrease your risk for falls  · Do your exercises several times each day  The physical therapist will teach you exercises  Exercises build strength and prevent blood clots  Prevent dislocation of your hip implant:  Do the following for up to 8 weeks after your hip replacement:  · Sit in a straight-backed chair  Use armrests to help you rise from a seated position  Do not  sit on low chairs, sofas, rocking chairs, or stools  · Use assistive devices given to put on socks and shoes  Do not  lean forward to put on pants, socks, or shoes  Do not lean forward or twist to pick items up  · Keep your knees apart  Do not  cross your legs  You may need to put a pillow between your knees to remind you  Medicines:   · Blood thinning medicine  may be needed for some time after your surgery  This medicine will help decrease your risk of blood clots  · Prescription pain medicine  may be given  Ask how to take this medicine safely  · Take your medicine as directed  Contact your healthcare provider if you think your medicine is not helping or if you have side effects  Tell him of her if you are allergic to any medicine  Keep a list of the medicines, vitamins, and herbs you take  Include the amounts, and when and why you take them  Bring the list or the pill bottles to follow-up visits   Carry your medicine list with you in case of an emergency  Follow up with your healthcare provider as directed: You will need to have your stitches or staples removed  Write down your questions so you remember to ask them during your visits  Care for your incision area as directed:  Do not get the area wet until it is completely healed  Ask your healthcare provider when it is okay to get the area wet  Change your bandage as directed and if it gets wet or dirty  Physical therapy:  A physical therapist teaches you exercises to help improve movement and strength, and to decrease pain  If you are not able to be safe at home, you may be admitted to a rehabilitation facility  © 2017 [address] [employer] Information is for End User's use only and may not be sold, redistributed or otherwise used for commercial purposes  All illustrations and images included in CareNotes® are the copyrighted property of [employer]  or [employer]  The above information is an  only  It is not intended as medical advice for individual conditions or treatments  Talk to your doctor, nurse or pharmacist before following any medical regimen to see if it is safe and effective for you  Anemia   WHAT YOU NEED TO KNOW:   Anemia is a low number of red blood cells or a low amount of hemoglobin in your red blood cells  Hemoglobin is a protein that helps carry oxygen throughout your body  Red blood cells use iron to create hemoglobin  Anemia may develop if your body does not have enough iron  It may also develop if your body does not make enough red blood cells or they die faster than your body can make them  DISCHARGE INSTRUCTIONS:   Call 911 or have someone call 911 for any of the following:   · You lose consciousness  · You have severe chest pain  Seek care immediately if:   · You have dark or bloody bowel movements  Contact your healthcare provider if:   · Your symptoms are worse, even after treatment      · You have questions or concerns about your condition or care  Medicines:   · Iron or folic acid supplements  help increase your red blood cell and hemoglobin levels  · Vitamin B12 injections  may help boost your red blood cell level and decrease your symptoms  Ask your healthcare provider how to inject B12  · Take your medicine as directed  Contact your healthcare provider if you think your medicine is not helping or if you have side effects  Tell him of her if you are allergic to any medicine  Keep a list of the medicines, vitamins, and herbs you take  Include the amounts, and when and why you take them  Bring the list or the pill bottles to follow-up visits  Carry your medicine list with you in case of an emergency  Prevent anemia:  Eat healthy foods rich in iron and vitamin C  Nuts, meat, dark leafy green vegetables, and beans are high in iron and protein  Vitamin C helps your body absorb iron  Foods rich in vitamin C include oranges and other citrus fruits  Ask your healthcare provider for a list of other foods that are high in iron or vitamin C  Ask if you need to be on a special diet  Follow up with your healthcare provider as directed:  Write down your questions so you remember to ask them during your visits  © 2017 [address] [facility] Information is for End User's use only and may not be sold, redistributed or otherwise used for commercial purposes  All illustrations and images included in CareNotes® are the copyrighted property of [employer]  or [employer]  The above information is an  only  It is not intended as medical advice for individual conditions or treatments  Talk to your doctor, nurse or pharmacist before following any medical regimen to see if it is safe and effective for you  A-fib (Atrial Fibrillation)   WHAT YOU NEED TO KNOW:   A-fib may come and go, or it may be a long-term condition  A-fib can cause blood clots, stroke, or heart failure   These conditions may become life-threatening  It is important to treat and manage a-fib to help prevent a blood clot, stroke, or heart failure  DISCHARGE INSTRUCTIONS:   Call 911 for any of the following:   · You have any of the following signs of a heart attack:      ¨ Squeezing, pressure, or pain in your chest that lasts longer than 5 minutes or returns    ¨ Discomfort or pain in your back, neck, jaw, stomach, or arm     ¨ Trouble breathing    ¨ Nausea or vomiting    ¨ Lightheadedness or a sudden cold sweat, especially with chest pain or trouble breathing    · You have any of the following signs of a stroke:      ¨ Numbness or drooping on one side of your face     ¨ Weakness in an arm or leg    ¨ Confusion or difficulty speaking    ¨ Dizziness, a severe headache, or vision loss  Seek care immediately if:  You have any of the following signs of a blood clot:  · You feel lightheaded, are short of breath, and have chest pain  · You cough up blood  · You have swelling, redness, pain, or warmth in your arm or leg  Contact your cardiologist or healthcare provider if:   · Your heart rate is higher than your healthcare provider said it should be  · You have new or worsening swelling in your legs, feet, ankles, or abdomen  · You are short of breath, even at rest      · You have questions or concerns about your condition or care  Medicines: You may need any of the following:  · Heart medicines  help control your heart rate and rhythm  You may need more than one medicine to treat your symptoms  · Blood thinners    help prevent blood clots  Examples of blood thinners include heparin and warfarin  Clots can cause strokes, heart attacks, and death  The following are general safety guidelines to follow while you are taking a blood thinner:    ¨ Watch for bleeding and bruising while you take blood thinners  Watch for bleeding from your gums or nose  Watch for blood in your urine and bowel movements   Use a soft washcloth on your skin, and a soft toothbrush to brush your teeth  This can keep your skin and gums from bleeding  If you shave, use an electric shaver  Do not play contact sports  ¨ Tell your dentist and other healthcare providers that you take anticoagulants  Wear a bracelet or necklace that says you take this medicine  ¨ Do not start or stop any medicines unless your healthcare provider tells you to  Many medicines cannot be used with blood thinners  ¨ Tell your healthcare provider right away if you forget to take the medicine, or if you take too much  ¨ Warfarin  is a blood thinner that you may need to take  The following are things you should be aware of if you take warfarin  § Foods and medicines can affect the amount of warfarin in your blood  Do not make major changes to your diet while you take warfarin  Warfarin works best when you eat about the same amount of vitamin K every day  Vitamin K is found in green leafy vegetables and certain other foods  Ask for more information about what to eat when you are taking warfarin  § You will need to see your healthcare provider for follow-up visits when you are on warfarin  You will need regular blood tests  These tests are used to decide how much medicine you need  · Antiplatelets , such as aspirin, help prevent blood clots  Take your antiplatelet medicine exactly as directed  These medicines make it more likely for you to bleed or bruise  If you are told to take aspirin, do not take acetaminophen or ibuprofen instead  · Take your medicine as directed  Contact your healthcare provider if you think your medicine is not helping or if you have side effects  Tell him or her if you are allergic to any medicine  Keep a list of the medicines, vitamins, and herbs you take  Include the amounts, and when and why you take them  Bring the list or the pill bottles to follow-up visits  Carry your medicine list with you in case of an emergency    Follow up with your cardiologist as directed: You will need regular blood tests and monitoring  Write down your questions so you remember to ask them during your visits  Manage A-fib:   · Know your target heart rate  Learn how to take your pulse and monitor your heart rate  · Manage other health conditions  This includes high blood pressure, sleep apnea, thyroid disease, diabetes, and other heart conditions  Take medicine as directed and follow your treatment plan  · Limit or do not drink alcohol  Alcohol can make a-fib hard to manage  Ask your healthcare provider if it is safe for you to drink alcohol  A drink of alcohol is 12 ounces of beer, 5 ounces of wine, or 1½ ounces of liquor  · Do not smoke  Nicotine and other chemicals in cigarettes and cigars can cause heart and lung damage  Ask your healthcare provider for information if you currently smoke and need help to quit  E-cigarettes or smokeless tobacco still contain nicotine  Talk to your healthcare provider before you use these products  · Eat heart-healthy foods  Heart healthy foods will help keep your cholesterol low  These include fruits, vegetables, whole-grain breads, low-fat dairy products, beans, lean meats, and fish  Replace butter and margarine with heart-healthy oils such as olive oil and canola oil  · Maintain a healthy weight  Ask your healthcare provider how much you should weigh  Ask him to help you create a weight loss plan if you are overweight  · Exercise for 30 minutes  most days of the week  Ask your healthcare provider about the best exercise plan for you  © 2017 [address] [employer] Information is for End User's use only and may not be sold, redistributed or otherwise used for commercial purposes  All illustrations and images included in CareNotes® are the copyrighted property of [facility] , [employer]  or [employer]  The above information is an  only   It is not intended as medical advice for individual conditions or treatments  Talk to your doctor, nurse or pharmacist before following any medical regimen to see if it is safe and effective for you  Apixaban (By mouth)   Apixaban (a-PIX-a-ban)  Treats and prevents blood clots  This medicine is a blood thinner  Brand Name(s): Eliquis   There may be other brand names for this medicine  When This Medicine Should Not Be Used: This medicine is not right for everyone  Do not use it if you had an allergic reaction to apixaban or you have active bleeding  How to Use This Medicine:   Tablet  · Your doctor will tell you how much medicine to use  Do not use more than directed  · If you are not able to swallow the tablets whole, they may be crushed and mixed in water, 5% dextrose in water (D5W), apple juice, or applesauce  The crushed tablets may be mixed with 60 mL of water or D5W dose and given through a nasogastric tube (NGT)  · This medicine should come with a Medication Guide  Ask your pharmacist for a copy if you do not have one  · Missed dose: Take a dose as soon as you remember  If it is almost time for your next dose, wait until then and take a regular dose  Do not take extra medicine to make up for a missed dose  · Store the medicine in a closed container at room temperature, away from heat, moisture, and direct light  Drugs and Foods to Avoid:   Ask your doctor or pharmacist before using any other medicine, including over-the-counter medicines, vitamins, and herbal products  · Some medicines can affect how apixaban works   Tell your doctor if you are using any of the following:   ¨ Carbamazepine, clarithromycin, itraconazole, ketoconazole, phenytoin, rifampin, ritonavir, Leah's wort  ¨ Blood thinner (including clopidogrel, heparin, prasugrel, warfarin)  ¨ Medicine to treat depression  ¨ NSAID pain or arthritis medicine (including aspirin, celecoxib, diclofenac, ibuprofen, naproxen)  Warnings While Using This Medicine:   · Tell your doctor if you are pregnant or breastfeeding, or if you have kidney disease, liver disease, bleeding problems, or an artificial heart valve  · Do not stop using this medicine suddenly without asking your doctor  You might have a higher risk of stroke for a short time after you stop using this medicine  · This medicine increases your risk for bleeding that can become serious if not controlled  You may also bruise easily, and it may take longer than usual for bleeding to stop  · This medicine may increase your risk for blood clots in your spine or back if you undergo an epidural or spinal puncture  This could lead to paralysis  Tell your doctor if you ever had spine problems or back surgery  · Tell any doctor or dentist who treats you that you are using this medicine  With your doctor's supervision, you may need to stop using this medicine several days before you have surgery or medical tests  · Your doctor will do lab tests at regular visits to check on the effects of this medicine  Keep all appointments  · Keep all medicine out of the reach of children  Never share your medicine with anyone  Possible Side Effects While Using This Medicine:   Call your doctor right away if you notice any of these side effects:  · Allergic reaction: Itching or hives, swelling in your face or hands, swelling or tingling in your mouth or throat, chest tightness, trouble breathing  · Change in how much or how often you urinate, red or pink urine  · Chest pain, trouble breathing  · Coughing up blood, vomiting blood or material that looks like coffee grounds  · Numbness, tingling, or muscle weakness in your legs or feet  · Red or black, tarry stools  · Unusual bleeding, bruising, or weakness  If you notice other side effects that you think are caused by this medicine, tell your doctor  Call your doctor for medical advice about side effects   You may report side effects to FDA at 6-527-FDA-1676  © 2017 [address] Information is for End User's use only and may not be sold, redistributed or otherwise used for commercial purposes  The above information is an  only  It is not intended as medical advice for individual conditions or treatments  Talk to your doctor, nurse or pharmacist before following any medical regimen to see if it is safe and effective for you  Hypertension   WHAT YOU NEED TO KNOW:   Hypertension is high blood pressure (BP)  Your BP is the force of your blood moving against the walls of your arteries  Normal BP is less than 120/80  Prehypertension is between 120/80 and 139/89  Hypertension is 140/90 or higher  Hypertension causes your BP to get so high that your heart has to work much harder than normal  This can damage your heart  You can control hypertension with a healthy lifestyle or medicines  A controlled blood pressure helps protect your organs, such as your heart, lungs, brain, and kidneys  DISCHARGE INSTRUCTIONS:   Call 911 for any of the following:   · You have discomfort in your chest that feels like squeezing, pressure, fullness, or pain  · You become confused or have difficulty speaking  · You suddenly feel lightheaded or have trouble breathing  · You have pain or discomfort in your back, neck, jaw, stomach, or arm  Seek care immediately if:   · You have a severe headache or vision loss  · You have weakness in an arm or leg  Contact your healthcare provider if:   · You feel faint, dizzy, confused, or drowsy  · You have been taking your BP medicine and your BP is still higher than your healthcare provider says it should be  · You have questions or concerns about your condition or care  Medicines: You may  need any of the following:  · Medicine  may be used to help lower your BP  You may need more than one type of medicine  Take the medicine exactly as directed  · Diuretics  help decrease extra fluid that collects in your body   This will help lower your BP  You may urinate more often while you take this medicine  · Cholesterol medicine  helps lower your cholesterol level  A low cholesterol level helps prevent heart disease and makes it easier to control your blood pressure  · Take your medicine as directed  Contact your healthcare provider if you think your medicine is not helping or if you have side effects  Tell him or her if you are allergic to any medicine  Keep a list of the medicines, vitamins, and herbs you take  Include the amounts, and when and why you take them  Bring the list or the pill bottles to follow-up visits  Carry your medicine list with you in case of an emergency  Follow up with your healthcare provider as directed: You will need to return to have your BP checked and to have other lab tests done  Write down your questions so you remember to ask them during your visits  Manage hypertension:  Talk with your healthcare provider about these and other ways to manage hypertension:  · Check your BP at home  Sit and rest for 5 minutes before you take your BP  Extend your arm and support it on a flat surface  Your arm should be at the same level as your heart  Follow the directions that came with your BP monitor  If possible, take at least 2 BP readings each time  Take your BP at least twice a day at the same times each day, such as morning and evening  Keep a record of your BP readings and bring it to your follow-up visits  Ask your healthcare provider what your BP should be  · Limit sodium (salt) as directed  Too much sodium can affect your fluid balance  Check labels to find low-sodium or no-salt-added foods  Some low-sodium foods use potassium salts for flavor  Too much potassium can also cause health problems  Your healthcare provider will tell you how much sodium and potassium are safe for you to have in a day  He or she may recommend that you limit sodium to 2,300 mg a day             · Follow the meal plan recommended by your healthcare provider  A dietitian or your provider can give you more information on low-sodium plans or the DASH (Dietary Approaches to Stop Hypertension) eating plan  The DASH plan is low in sodium, unhealthy fats, and total fat  It is high in potassium, calcium, and fiber  · Exercise to maintain a healthy weight  Exercise at least 30 minutes per day, on most days of the week  This will help decrease your blood pressure  Ask your healthcare provider about the best exercise plan for you  · Decrease stress  This may help lower your BP  Learn ways to relax, such as deep breathing or listening to music  · Limit alcohol  Women should limit alcohol to 1 drink a day  Men should limit alcohol to 2 drinks a day  A drink of alcohol is 12 ounces of beer, 5 ounces of wine, or 1½ ounces of liquor  · Do not smoke  Nicotine and other chemicals in cigarettes and cigars can increase your BP and also cause lung damage  Ask your healthcare provider for information if you currently smoke and need help to quit  E-cigarettes or smokeless tobacco still contain nicotine  Talk to your healthcare provider before you use these products  · Manage any other health conditions you have  Health conditions such as diabetes can increase your risk for hypertension  Follow your healthcare provider's instructions and take all your medicines as directed  © 2017 [address] Information is for End User's use only and may not be sold, redistributed or otherwise used for commercial purposes  All illustrations and images included in CareNotes® are the copyrighted property of A D A M [employer] Inc  or [employer]  The above information is an  only  It is not intended as medical advice for individual conditions or treatments  Talk to your doctor, nurse or pharmacist before following any medical regimen to see if it is safe and effective for you        Aortic Stenosis   WHAT YOU NEED TO KNOW:   Aortic stenosis is a condition where the aortic valve in your heart is narrowed  The aortic valve is between the left ventricle and the aorta  The left ventricle is the lower left chamber of your heart  The aorta is a blood vessel that pumps blood to your head and body  The aortic valve opens and closes to direct blood flow through your heart  When the aortic valve is narrowed, blood flow may decrease  Your tissues and organs will not have enough oxygen and nutrients to function properly  DISCHARGE INSTRUCTIONS:   Medicines:   · Cholesterol medicine: This medicine will help decrease the amount of cholesterol in your blood  · Antibiotics: This medicine will help fight or prevent an infection  You may need it if you had rheumatic fever in the past  You may need to take the medicine every day, or once a month  · Take your medicine as directed  Contact your healthcare provider if you think your medicine is not helping or if you have side effects  Tell him or her if you are allergic to any medicine  Keep a list of the medicines, vitamins, and herbs you take  Include the amounts, and when and why you take them  Bring the list or the pill bottles to follow-up visits  Carry your medicine list with you in case of an emergency  Follow up with your healthcare provider or cardiologist as directed: You may need to return for more tests to check your heart  Write down your questions so you remember to ask them during your visits  Self-care:   · Eat a variety of healthy foods:  Healthy foods include fruits, vegetables, whole-grain breads, low-fat dairy products, beans, lean meats, and fish  Ask if you need to be on a special diet  · Exercise: This will improve your heart health  Ask your healthcare provider about the best exercise plan for you  · Maintain a healthy weight:  Ask your healthcare provider how much you should weigh   Ask him to help you create a weight loss plan if you are overweight  Contact your healthcare provider or cardiologist if:   · You are bleeding from your nose or gums  · The veins in your neck look swollen or are bulging  · You have a fever  · You have blood in your urine or bowel movements  · You have questions or concerns about your condition or care  Seek care immediately or call 911 if:   · Your arm or leg feels warm, tender, and painful  It may look swollen and red  · Your heart is beating faster than normal for you, and you feel fluttering in your chest     · You suddenly feel lightheaded and short of breath  · You have chest pain that feels like squeezing, pressure, fullness, or pain  · You have chest pain that lasts for more than a few minutes or returns  · You are nauseated and have trouble breathing  · You have a severe headache, cold sweats, and feel lightheaded or dizzy  · You have weakness or numbness on one side of your arm, leg, or face  · You are confused and cannot speak clearly  © 2017 [address] [facility] Information is for End User's use only and may not be sold, redistributed or otherwise used for commercial purposes  All illustrations and images included in CareNotes® are the copyrighted property of A D A M , Inc  or [employer]  The above information is an  only  It is not intended as medical advice for individual conditions or treatments  Talk to your doctor, nurse or pharmacist before following any medical regimen to see if it is safe and effective for you  Acute Delirium   WHAT YOU NEED TO KNOW:   Acute delirium is temporary confusion and change in consciousness  Consciousness is how alert and aware of your surroundings you are  You may have trouble remembering, listening, or doing things you usually do  Acute delirium may be caused by an illness, injury, surgery, medicine, or alcohol or drug use    DISCHARGE INSTRUCTIONS:   Call 911 for the following:   · You want to harm yourself or others  Seek immediate care if:   · You cannot eat or drink, and you feel weak or dizzy  · You have questions or concerns about your condition or care  Contact your healthcare provider if:   · You have trouble remembering  · You have trouble sleeping  · You are depressed  Medicines: You may need any of the following:  · Antipsychotics  help you stop seeing or hearing things that are not there  · Benzodiazepines  are used if your delirium occurs after you suddenly stop using drugs or drinking alcohol  · Take your medicine as directed  Contact your healthcare provider if you think your medicine is not helping or if you have side effects  Tell him of her if you are allergic to any medicine  Keep a list of the medicines, vitamins, and herbs you take  Include the amounts, and when and why you take them  Bring the list or the pill bottles to follow-up visits  Carry your medicine list with you in case of an emergency  Follow up with your healthcare provider as directed:  Ask for help if you have a drug or alcohol problem  You may need several appointments to see if your treatment is working  Write down your questions so you remember to ask them during your visits  Self-care:   · Talk to counselors  Healthcare providers will work with you to help you feel calm and talk about your thoughts and feelings  They will help you remember where you are  They will work to keep you and those around you safe  · Talk to family and friends  Talk to those around you when you feel lonely or sad  Ask for help when you forget the time, place, or names of people around you  · Change your surroundings  Keep your home or room quiet and comfortable  Surround yourself with familiar objects  Keep a calendar and clock nearby to remind you of the date and time  Keep pictures of your family and friends nearby  This will help you stay aware of yourself and the area around you   It may also help you feel safe and calm  © 2017 [address] [facility] Information is for End User's use only and may not be sold, redistributed or otherwise used for commercial purposes  All illustrations and images included in CareNotes® are the copyrighted property of A D A M , Inc  or [employer]  The above information is an  only  It is not intended as medical advice for individual conditions or treatments  Talk to your doctor, nurse or pharmacist before following any medical regimen to see if it is safe and effective for you  Dementia, Ambulatory Care   GENERAL INFORMATION:   Dementia  is a condition that causes loss of memory, thought control, and judgment  Dementia may develop quickly over a few months after a head injury or stroke  It may develop slowly over many years if you have Alzheimer disease  Dementia cannot be cured or prevented, but treatment may slow or reduce your symptoms  Common symptoms include the following:   · Loss of short-term memory, followed by loss of long-term memory    · Trouble remembering to go to the bathroom, to urinate, or have a bowel movement    · Anger or violent behavior     · Depression, anxiety, or hallucinations  Seek immediate care for the following symptoms:   · Signs of delirium, such as extreme confusion, and seeing or hearing things that are not there    · Anger or violence that cannot be calmed down    · Fainting and you cannot be woken  Treatment for dementia  may include medicines to slow memory loss  You may also need medicines to help control anger, decrease anxiety, or improve your mood  Take your medicines as directed  Manage dementia:   · Keep your mind and body active  Do activities that you love, such as art, gardening, or listening to music  Call or visit people often  This will keep your social skills sharp, and may help reduce depression  · Write daily schedules and routines    Record medical appointments, times to take your medicines, meal times, or any other things to remember  Write down reminders to use the bathroom if you have trouble remembering  You may need to ask someone to write things down for you  · Place clocks and calendars where you can see them  This will help you remember appointments and tasks  · Do not smoke  If you smoke, it is never too late to quit  Smoking may slow blood flow in your brain, and make your symptoms worse  Ask your caregiver for information if you need help quitting  · Eat healthy foods  Examples are fruits, vegetables, whole-grain breads, low-fat dairy products, beans, lean meats, and fish  Ask if you need to be on a special diet  · Ask your healthcare provider for a list of organizations that can help  You may begin to need an in-home aide to help you remember your daily tasks  Arrange for help while you are thinking clearly  Follow up with your healthcare provider as directed:  Ask someone to go with you to help you remember what your healthcare provider tells you  The person can take notes for you during the visit and go over the notes with you later  Write down your questions so you remember to ask them during your visits  CARE AGREEMENT:   You have the right to help plan your care  Learn about your health condition and how it may be treated  Discuss treatment options with your caregivers to decide what care you want to receive  You always have the right to refuse treatment  The above information is an  only  It is not intended as medical advice for individual conditions or treatments  Talk to your doctor, nurse or pharmacist before following any medical regimen to see if it is safe and effective for you  © 2014 [address] is for End User's use only and may not be sold, redistributed or otherwise used for commercial purposes   All illustrations and images included in CareNotes® are the copyrighted property of A D A M , Inc  or [employer]

## 2018-12-24 NOTE — SOCIAL WORK
[facility] not able to accept referral for contd hhc due to capacity  Referral made to [facility], awaiting determination  Leyda Andrea accepted referral, pt and spouse aware SOC will be 12/29

## 2018-12-24 NOTE — PROGRESS NOTES
Internal Medicine Progress Note  Patient: Lani Adhikari  Age/sex: 66 y o  female  Medical Record #: [id number]      ASSESSMENT/PLAN:  Lani Adhikari is seen and examined and management for following issues:    Right hip fracture; s/p right MIKE 12/4:  Pain control per primary service  Pian controlled     PAF:  on Amiodarone 200 mg qd (new for her)/Toprol/Eliquis  Had been missing doses of Lopressor 2/2 BP, changed to Toprol 12 5mg qhs        HTN/orthostasis: continue TEDS = hasnt needed abd binder for a few days  Only on Toprol 12 5mg qhs and will keep on since has PAF; no therapy issues noted    ABLA:  3 U PRBCs since admit to [facility] = stable now reviewed today's labs    Severe AS/LVEF 55%: no volume overload at this time        Subjective: offers no complaints    Resting in bed; reviewed labs hgb stable 10 3    ROS:   GI: denies abdominal pain, change bowel habits or reflux symptoms  Neuro: No new neurologic changes  Respiratory: No Cough, SOB  Cardiovascular: No CP, palpitations     Scheduled Meds:    Current Facility-Administered Medications:  acetaminophen 975 mg Oral Q8H [address] Sabine Dangelo MD   amiodarone 200 mg Oral Daily With Breakfast IRMA Gibson   apixaban 5 mg Oral Q12H [address] Sabine Dangelo MD   bisacodyl 10 mg Rectal Daily PRN Sabine Dangelo MD   calcium carbonate 500 mg Oral BID With Meals Sabine Dangelo MD   cholecalciferol 5,000 Units Oral Daily Sabine Dangelo MD   docusate sodium 100 mg Oral BID Renea Garcias MD   levothyroxine 50 mcg Oral Early Morning Sabine Dangelo MD   lidocaine 2 patch Topical Daily Sabine Dangelo MD   melatonin 3 mg Oral HS Renea Garcias MD   metoprolol succinate 12 5 mg Oral HS IRMA Najera   ondansetron 4 mg Oral Q6H PRN IRMA Gibson   oxyCODONE 2 5 mg Oral Q6H PRN Renea Garcias MD   pantoprazole 20 mg Oral Early Morning Sabine Dangelo MD   polyethylene glycol 17 g Oral Daily PRN Sabine Dangelo MD   polyethylene glycol 17 g Oral Once Solo Carpio MD   polyethylene glycol 17 g Oral Daily Solo Carpio MD   pravastatin 40 mg Oral Daily With Delroy Lindsey MD       Labs:       Results from last 7 days  Lab Units 12/24/18  0609 12/20/18  0545   WBC Thousand/uL 4 53 4 94   HEMOGLOBIN g/dL 10 3* 11 6   HEMATOCRIT % 33 6* 37 5   PLATELETS Thousands/uL 188 231       Results from last 7 days  Lab Units 12/24/18  0609 12/20/18  0545   POTASSIUM mmol/L 3 9 4 9   CHLORIDE mmol/L 106 105   CO2 mmol/L 27 28   BUN mg/dL 22 19   CREATININE mg/dL 0 83 0 79   CALCIUM mg/dL 8 8 8 7                  Glucose, i-STAT (mg/dl)   Date Value   12/04/2018 136   12/03/2018 128       Labs reviewed    Physical Examination:  Vitals:   Vitals:    12/23/18 0546 12/23/18 1500 12/23/18 2016 12/24/18 0557   BP: 135/63 135/60 107/62 129/74   BP Location: Left arm Right arm Left arm Left arm   Pulse: 73 79 72 75   Resp: 16 16 18 18   Temp: 99 °F (37 2 °C) 98 1 °F (36 7 °C) 97 9 °F (36 6 °C) 98 3 °F (36 8 °C)   TempSrc: Oral Oral Oral Oral   SpO2: 94% 96% 96% 95%   Weight:       Height:         Constitutional:  NAD; pleasant; nontoxic  Neck: negative for JVD  CV:  +S1, S2;  RRR; no rub/+murmur  Pulmonary:  BBS without crackles/wheeze/rhonci; resp are unlabored  Abdominal:  soft, +BS, ND/NT  Skin:  no rashes  Musculoskeletal:  Has no edema  :  no zamarripa  Neurological/Psych:  AA with mild confusion but improved;  JENKINS 4/5; no depression/anxiety        [ X ] Total time spent: 30 Mins and greater than 50% of this time was spent counseling/coordinating care  ** Please Note: Dragon 360 Dictation voice to text software may have been used in the creation of this document   **

## 2018-12-25 ENCOUNTER — APPOINTMENT (INPATIENT)
Dept: RADIOLOGY | Facility: HOSPITAL | Age: 78
DRG: 559 | End: 2018-12-25
Payer: MEDICARE

## 2018-12-25 VITALS
RESPIRATION RATE: 18 BRPM | HEART RATE: 79 BPM | TEMPERATURE: 98.2 F | SYSTOLIC BLOOD PRESSURE: 138 MMHG | DIASTOLIC BLOOD PRESSURE: 68 MMHG | BODY MASS INDEX: 26.25 KG/M2 | OXYGEN SATURATION: 95 % | WEIGHT: 148.15 LBS | HEIGHT: 63 IN

## 2018-12-25 PROCEDURE — 99239 HOSP IP/OBS DSCHRG MGMT >30: CPT

## 2018-12-25 PROCEDURE — 73552 X-RAY EXAM OF FEMUR 2/>: CPT

## 2018-12-25 PROCEDURE — 73502 X-RAY EXAM HIP UNI 2-3 VIEWS: CPT

## 2018-12-25 RX ADMIN — LEVOTHYROXINE SODIUM 50 MCG: 50 TABLET ORAL at 06:20

## 2018-12-25 RX ADMIN — APIXABAN 5 MG: 5 TABLET, FILM COATED ORAL at 09:53

## 2018-12-25 RX ADMIN — OXYCODONE HYDROCHLORIDE 2.5 MG: 5 TABLET ORAL at 03:06

## 2018-12-25 RX ADMIN — DOCUSATE SODIUM 100 MG: 100 CAPSULE, LIQUID FILLED ORAL at 09:53

## 2018-12-25 RX ADMIN — VITAMIN D, TAB 1000IU (100/BT) 5000 UNITS: 25 TAB at 09:54

## 2018-12-25 RX ADMIN — PANTOPRAZOLE SODIUM 20 MG: 20 TABLET, DELAYED RELEASE ORAL at 06:20

## 2018-12-25 RX ADMIN — CALCIUM CARBONATE (ANTACID) CHEW TAB 500 MG 500 MG: 500 CHEW TAB at 09:56

## 2018-12-25 RX ADMIN — ACETAMINOPHEN 975 MG: 325 TABLET, FILM COATED ORAL at 06:20

## 2018-12-25 RX ADMIN — AMIODARONE HYDROCHLORIDE 200 MG: 200 TABLET ORAL at 09:53

## 2018-12-25 RX ADMIN — POLYETHYLENE GLYCOL 3350 17 G: 17 POWDER, FOR SOLUTION ORAL at 09:55

## 2018-12-25 NOTE — NURSING NOTE
Pt being discharged home today with 24 hr S provided by spouse  Pt is min A with RW needing verbal cues for redirection & safety  Right hip/femur xrays for follow up were done today & reviewed by ortho PA  Thus cleared for discharge home  Hip incision intact without drainage  Pain management per new d/c medication list  Skin surfaces intact  Pt continent of B&B with LBM 12/23/18   1308 pt was provided a new lynette RW from acute side of hospital because of mis commnication of daughter who did not buy one in the [facility], which is now closed for the holidays, before the holiday  Pt now has all her needed equiptment for discahrge

## 2018-12-25 NOTE — PROGRESS NOTES
Physical Medicine and Rehabilitation Progress Note  Russ Landaverde 66 y o  female MRN: [id number]  Unit/Bed#: [facility] 961-01 Encounter: [id number]    Chief Complaints:  Decline in ambulation     Subjective/Interval Events:   Patient reports some hip pain with activity but overall tolerable  She states she a lot in therapies today   reports patient adequately improved and he feels ready to care for her at home  Patient denies SOB, abdominal pain, fever, chills, calf pain, lightheadedness or other complaints  ROS: A 10-point ROS was performed  Negative except as listed above  Reviewed discharge plan with patient and   Overall Assessment/relevant history:  60-year-old female with a past medical history of atrial fibrillation on chronic anticoagulation with apixaban, hypertension, hyperlipidemia, hypothyroidism, chronic left facial droop, severe aortic stenosis, possible dementia, severe osteoporosis with recent fall at home found to have right intertrochanteric femur fracture who underwent right total hip arthroplasty by Dr Sulma Carlos on 12/04/2018  Postoperative  Most notable for transient SVT requiring rapid response and temporary Cardizem drip  Cardiology consulted and assisted with management and patient has been switched since transition to p o  Amiodarone  Postoperative course also notable for acute blood loss anemia requiring 1 unit PRBC transfusion  Patient was evaluated by skilled therapies and was found to have significant decline in ADLs and ambulation [facility]      Functional status (recent):    Transfers min assist; ambulation min assist 50 ft with rolling walker   Bathing and LB dressing mod assist     Functional status on admission to ARC:  OT:  Total assist General in toilet transfers, lower body dressing, toileting, and bathing; upper body dressing mod assist, grooming and eating supervision      * Fracture of femoral neck, right [facility])   Assessment & Plan    -S/p Right MIKE by Dr Leslie Morales on 18  -WBAT RLE  -Daily PT/OT to improve mobility and self care   -Posterior hip precautions  -Maintain hip abduction foam when supine due to patient's cognitive deficits  -Please maintain compressive dressing for 48 hours, then replace if needed      Positional lightheadedness   Assessment & Plan    Improved s/p transfusion   Monitor vitals; orthostatics  Recently started on Amio; also on MTP  HCTZ holding per IM  Abdominal binder PRN     Delirium   Assessment & Plan    Improved   Post-op delirium on possible baseline dementia > fairly significant earlier in ARC course   >oxy 2 5mg Q6H PRN   Medications reviewed; limit sedating medications but adequately treat pain  Frequent redirection, re-orientation, reassurance  Monitor for signs and symptoms of infection  Overstimulation precautions, optimize sleep-wake cycle, agitation behavioral scale, sleep log       Acute pain   Assessment & Plan    Adequate control  -managed on scheduled Tylenol 975mg TID; -PRN oxycodone2 5 for moderate to severe pain   >has not required recent PRN oxy  -ICE [id number] PRN while awake  Counseled on and continue to encourage deep breathing/relaxation/behavioral pain management techniques:     Deep breathin seconds in, 5 seconds out, 5 times per hour when awake and PRN when in pain or anticipate pain; avoid holding breath and tightening muscles and instead breathe slowly and deeply       Acute blood loss anemia   Assessment & Plan    Hb slightly down to 10 3 on  but overall stable  Symptomatic anemia > remains improved Hb 7 9 on  > improved to 10 8 after 2 U PRBCs   >monitor for signs of acute bleed-iron and vitamin-C supplementation  -monitor intermittently     Cognitive impairment   Assessment & Plan    -Patient with baseline dementia and mild cognitive deficits  -Placed on fall precautions and bed alarms - freq checks by staff   -[facility] - consider Aricept in future     Severe aortic stenosis   Assessment & Plan    -Asymptomatic   -Follow-up with cards after d/c  -preload dependent   -encourage PO hydration     Atrial fibrillation (HCC)   Assessment & Plan    -rate controlled on lopressor 12 5mg Q12h and amiodarone 200mg qday  -anticoagulated with Eliquis 5mg BID  -Follow-up with cardiology after d/c      Hypertension   Assessment & Plan    -MTP  -HCTZ d/c'd with earlier with symptomatic orthostasis  -IM to adjust dosing if needed      Vitamin D insufficiency   Assessment & Plan    D3 supplementation      Anxiety   Assessment & Plan    Remains improved overall   - Behavioral techniques  - neuropsych consult not necessary at this time     Insomnia   Assessment & Plan    Remains Improved  Continue low dose melatonin 3mg QHS; Sleep wake log  Recommend appropriate sleep hygiene: patient counselled on this:   Sleep only as much as necessary to feel rested and then get up or sit up in bed, maintain regular sleep schedule (same bedtime and wake time everyday), do not force sleep, avoid caffeinated beverages after lunch, avoid alcohol at home near bedtime, do not smoke particularly in evening, do not go to bed hungry, adjust bedroom environment so you are comfortable prior to settling down for sleep, deal with concerns or worries before bedtime   Make a list of things to work on for next day so anxiety is reduced at night, exercise regularly when cleared by physician       Chronic nausea   Assessment & Plan    Improved   Apparently been worked up by GI in past  PRN Zofran  Ensure optimal stooling     Onychomycosis   Assessment & Plan    Toenails; s/p podiatry consult and debridement      Hypothyroidism   Assessment & Plan    -managed on Synthroid     Dyslipidemia   Assessment & Plan    -managed on pravastatin         # Bowel function:  Stooling appropriately; Colace; MiraLax and as needed regimen    # Bladder function: Intact  Monitor for urinary retention, incontinence, and signs of urinary infection  # Skin  Encourage regular turning and turn patient every 2 hours if not adequately ambulatory; - Rehabilitation team to perform skin checks regularly to monitor for erythema, wounds, rashes (if applicable incisions)    # Other  - Diet: general    - DVT prophy: Eliquis and SCDs    Disposition:   Home with  tomorrow    Follow-up:   PCP, Ortho, Cards, PMR    ---------------------------------------------------------------------------------------------------------------------    Objective: Allergies and Medications per EMR    Physical Exam:  T=97 8 F, P 89, RR 18, /62, SpO2 99% on RA  General: Awake, alert in NAD  HENT:  MMM  Respiratory: Unlabored breathing, breath sounds equal, Lungs CTA, no wheezes, rales, or rhonchi  Cardiovascular: Regular rate and rhythm, no murmurs, rubs, or gallops  Gastrointestinal: Soft, non-tender, non-distended, normoactive bowel sounds  SkiN/MSK/Extremities:  No calf edema or calf tenderness to palpation  Neurologic/Psych:   MENTAL STATUS: orientated to self, month, not year; stable impaired short term memory  Affect: Euthymic       Diagnostic Studies: reviewed, no new imaging  No results found      Laboratory:    Lab Results   Component Value Date    HGB 10 3 (L) 12/24/2018    HCT 33 6 (L) 12/24/2018    WBC 4 53 12/24/2018    K 3 9 12/24/2018     12/24/2018    GLUCOSE 136 12/04/2018    CREATININE 0 83 12/24/2018    BUN 22 12/24/2018         Patient Active Problem List   Diagnosis    Microscopic hematuria    Dyslipidemia    Hypertension    Palpitations    Atrial fibrillation (HCC)    Hypothyroidism    Cognitive impairment    MCI (mild cognitive impairment)    Gait disturbance    Closed intertrochanteric fracture of hip, right, initial encounter ([facility] [address] )    Severe aortic stenosis    Dehydration    [facility] of [facility]   [address] [facility] Ambulatory dysfunction    Physical deconditioning    Osteoporosis    Atrial fibrillation with rapid ventricular response ([facility])    NSTEMI (non-ST elevated myocardial infarction) ([facility])    Acute blood loss anemia    Fracture of femoral neck, right ([facility])    Acute pain    Delirium    Onychomycosis    Positional lightheadedness    Chronic nausea    Insomnia    Anxiety    Vitamin D insufficiency    Constipation       ** Please Note: Fluency Direct voice to text software may have been used in the creation of this document  **    Total visit time:  At least 25 minutes, with more than 50% spent counseling/coordinating care    Solange Reyes MD, [facility]  Physical Medicine and Rehabilitation  Brain Injury Medicine

## 2018-12-25 NOTE — PROGRESS NOTES
As family was packing up equitpment the daughter & spouse stated that pt needs a RW to go home with since they don't have one  I called kashmir enrique, our , & she states that on  12/21/18 she told the daughter , in her office, that medicare will not cover for both a RW & wheelchair  faily opted to go with the wheelchair & that daughter was instructed to get a RW from our [place] pharmacy for $35 00  As of today daughter has not bought a RW  & [facility] the [facility]  I am now reaching out to the [facility]  regarding trying to obtain a lynette brand new RW which they keep in stock on their side  The  needs to get permission from her superivisor to allow this to happen

## 2018-12-25 NOTE — PROGRESS NOTES
Katerina confirmed with her supervisor Kodi Sierra that she could provide family with a lynette RW  A script for the RW was written by dr parry then provided to Conrad RW was sized to height she needed before discharge

## 2018-12-25 NOTE — PROGRESS NOTES
Spoke with ortho WALTER Freitas Current & after review of films pt is cleared for discharge today  He was unable to contact Dr Ellington to relay, I will send him an updated pager message

## 2018-12-25 NOTE — SOCIAL WORK
CM consulted by Godfrey Benito, pt is discharging and needs a lynette rolling walker  CM retrieve jr walker from dm office and delivered to patient  CM rec'd script from RN, cm had Pt signed delivery ticket, and gave pt jr rolling walker  CM unable to process via [facility]IN, placed in [address] [place] [address]

## 2018-12-26 NOTE — CASE MANAGEMENT
Team dc summary - pt made good progress and returned home w/family support and contd [facility] services for rn pt and ot through [facility]  Pt received a w/c and commode through [facility] medical  Spouse and dtr present for dc instructions and aware of pts functional ability

## 2018-12-27 NOTE — PHYSICAL THERAPY NOTE
PT DISCHARGE SUMMARY    Patient made progress with skilled PT intervention during her stay at the [facility]  Patient unable to meet LTGs but was able to achieve the following functional status prior to d/c: Niharika with bed mobility, CGA with transfers, Niharika with car transfer, total A with w/c mobility, Niharika with curb step negotiation, and CGA for ambulation using a RW  Barriers to meeting LTGs included pain, impaired processing and motor planning, impaired static and dynamic standing balance, and impaired cognition  Patient able to safely d/c home with home PT following extensive family training performed with spouse and daughter on the above functional tasks  Recommendation for home: SPT with RW, dependence with W/C mobility, bumping up steps to enter home, and short distance ambulation when necessary  RW, BSC and w/c ordered via CM and arrived prior to d/c  She will benefit from continued skilled PT intervention to progress functional mobility (I) and safety      Vishnu Johnson, PT

## 2018-12-27 NOTE — DISCHARGE SUMMARY
Discharge Summary - Sheryle Malone 66 y o  female MRN: [id number]  Unit/Bed#: -01 Encounter: [id number]    Admission Date: 12/8/2018     Discharge Date: 12/25/2018    Rehabilitation/Etiologic Diagnosis:   08 11  Unilateral Hip Fracture     Discharge Diagnoses:       Dyslipidemia    Hypertension    Atrial fibrillation ([facility] )    Hypothyroidism    Cognitive impairment    Closed intertrochanteric fracture of hip, right, initial encounter ([facility] )    Severe aortic stenosis    Dehydration    Closed fracture of neck of right femur ([facility] )    Fall, history     Ambulatory dysfunction    Physical deconditioning    Osteoporosis    Atrial fibrillation with rapid ventricular response (HCC)    Acute blood loss anemia    Fract[facility] of femoral neck, right ([facility])    Acute pain    Delirium    Onychomycosis    Positional lightheadedness    Chronic nausea    Insomnia    Anxiety    Vitamin D insufficiency    Constipation       Acute Rehabilitation Center Course:   (with significant findings, care, complications, treatment, and services provided):      25-year-old female with a past medical history of atrial fibrillation on chronic anticoagulation with apixaban, hypertension, hyperlipidemia, hypothyroidism, chronic left facial droop, severe aortic stenosis, possible dementia, severe osteoporosis with recent fall at home found to have right intertrochanteric femur fracture who underwent right total hip arthroplasty by Dr Deloris Her on 12/04/2018  Postoperative  Most notable for transient SVT requiring rapid response and temporary Cardizem drip  Cardiology consulted and assisted with management and patient has been switched since transition to p o  Amiodarone  Postoperative course also notable for acute blood loss anemia requiring 1 unit PRBC transfusion    Patient was evaluated by skilled therapies and was found to have significant decline in ADLs and ambulation appropriate for admission to [facility]      Functional status on discharge:    PT: transfers min assist; ambulation 50 ft min assist with rolling walker - scored as max assist due to distance   OT: transfers min assist; grooming and toileting min assist ; UB dressing supervision; LB dressing max assist; bathing mod assist     Functional status on admission to ARC:  OT:  Total assist General in toilet transfers, lower body dressing, toileting, and bathing; upper body dressing mod assist, grooming and eating supervision  PT: Transfers max assist; ambulation 10 ft rolling walker     Patient participated in a comprehensive interdisciplinary inpatient rehabilitation program which included involvment of MD, therapies (PT, OT), RN, CM/SW  She was able to be advanced to a largely min assist level with few ADLs lower level  Caregiver training with family was completed successfully; and patient is considered safe for discharge home with family assistance  Please see below for patient's hospital course and day to day management of medical needs in problem list format      * Fracture of femoral neck, right ([address] )   Assessment & Plan    -S/p Right MIKE by Dr Leslie Morales on 12/4/18  Mando MYLESE  -Daily PT/OT to improve mobility and self care   -Posterior hip precautions  -Hip/Pelvic x-rays 12/25 prior to d/c: MIKE in good position without acute findings  -Follow-up with Dr Leslie Morales after d/c      Positional lightheadedness   Assessment & Plan    Improved s/p transfusion 12/13  Monitor vitals; orthostatics  Recently started on Amio; also on MTP  HCTZ holding per IM  Abdominal binder PRN     Delirium   Assessment & Plan    Improved   Post-op delirium on possible baseline dementia > fairly significant earlier in ARC course        Acute pain   Assessment & Plan    Adequate control overall; some increase day or 2 prior to d/c  >X-rays stable; incision stable   -APAP PRN  -ICE Q2sdhfh PRN while awake  Counseled on and continue to encourage deep breathing/relaxation/behavioral pain management techniques:     Deep breathin seconds in, 5 seconds out, 5 times per hour when awake and PRN when in pain or anticipate pain; avoid holding breath and tightening muscles and instead breathe slowly and deeply       Acute blood loss anemia   Assessment & Plan    Hb slightly down to 10 3 on  but overall stable  Symptomatic anemia early in course remains resolved  Hb 7 9 on  > improved to 10 8 after 2 U PRBCs        Cognitive impairment   Assessment & Plan    -Patient with baseline dementia and mild cognitive deficits  -Placed on fall precautions and bed alarms - freq checks by staff   -Follow-[facility] - consider Aricept in future     Severe aortic stenosis   Assessment & Plan    -Asymptomatic   -Follow-up with cards after d/c  -preload dependent   -encourage PO hydration     Atrial fibrillation (HCC)   Assessment & Plan    -rate controlled on lopressor 12 5mg Q12h and amiodarone 200mg qday  -anticoagulated with Eliquis 5mg BID  -Follow-up with cardiology after d/c      Hypertension   Assessment & Plan    -MTP  -HCTZ d/c'd with earlier with symptomatic orthostasis  -IM to adjust dosing if needed      Vitamin D insufficiency   Assessment & Plan    D3 supplementation      Anxiety   Assessment & Plan    Remains improved overall   - Behavioral techniques       Insomnia   Assessment & Plan    Remains Improved  Continue low dose melatonin 3mg QHS PRN;  Recommend appropriate sleep hygiene: patient counselled on this:   Sleep only as much as necessary to feel rested and then get up or sit up in bed, maintain regular sleep schedule (same bedtime and wake time everyday), do not force sleep, avoid caffeinated beverages after lunch, avoid alcohol at home near bedtime, do not smoke particularly in evening, do not go to bed hungry, adjust bedroom environment so you are comfortable prior to settling down for sleep, deal with concerns or worries before bedtime  Make a list of things to work on for next day so anxiety is reduced at night, exercise regularly when cleared by physician       Chronic nausea   Assessment & Plan    Improved   Apparently been worked up by GI in past  PRN Zofran  Ensure optimal stooling     Onychomycosis   Assessment & Plan    Toenails; s/p podiatry consult and debridement      Hypothyroidism   Assessment & Plan    -managed on Synthroid     Dyslipidemia   Assessment & Plan    -managed on pravastatin       # Bowel function:  Stooling appropriately; Colace; MiraLax as needed regimen     # Bladder function: Intact       # Other  - Diet: general    - DVT prophy: Eliquis     Disposition:   Home with       Follow-up:   PCP, Ortho, Cards, PMR    Imagin18 R hip XR  A total right hip arthroplasty has been performed  The prosthesis is in good position  There is no fracture      No degenerative changes      No lytic or blastic lesions are seen      Soft tissues are unremarkable      IMPRESSION:     Total right hip arthroplasty in good position  Pertinent Recent Labs (See [facility] EMR or request additional records if needed):  Results for Angie Arnold (MRN [id number]) as of 2018 21:23   Ref   Range 2018 06:09   Sodium Latest Ref Range: 136 - 145 mmol/L 139   Potassium Latest Ref Range: 3 5 - 5 3 mmol/L 3 9   Chloride Latest Ref Range: 100 - 108 mmol/L 106   CO2 Latest Ref Range: 21 - 32 mmol/L 27   Anion Gap Latest Ref Range: 4 - 13 mmol/L 6   BUN Latest Ref Range: 5 - 25 mg/dL 22   Creatinine Latest Ref Range: 0 60 - 1 30 mg/dL 0 83   Glucose, Random Latest Ref Range: 65 - 140 mg/dL 94   Calcium Latest Ref Range: 8 3 - 10 1 mg/dL 8 8   eGFR Latest Units: ml/min/1 73sq m 68   WBC Latest Ref Range: 4 31 - 10 16 Thousand/uL 4 53   Red Blood Cell Count Latest Ref Range: 3 81 - 5 12 Million/uL 3 34 (L)   Hemoglobin Latest Ref Range: 11 5 - 15 4 g/dL 10 3 (L)   HCT Latest Ref Range: 34 8 - 46 1 % 33 6 (L) MCV Latest Ref Range: 82 - 98 fL 101 (H)   MCH Latest Ref Range: 26 8 - 34 3 pg 30 8   MCHC Latest Ref Range: 31 4 - 37 4 g/dL 30 7 (L)   RDW Latest Ref Range: 11 6 - 15 1 % 15 0   Platelet Count Latest Ref Range: 149 - 390 Thousands/uL 188       Procedures Performed During ARC Admission: None    Discharge Physical Examination:  Vitals:    12/25/18 0558   BP: 138/68   Pulse: 79   Resp: 18   Temp: 98 2 °F (36 8 °C)   SpO2: 95%       HPI:   Per Dr Soni Fierro: "Jesus Levine is a 66 y o  female with PMH significant for A fib chronically on Eliquis, HTN, HLD, Hypothyroidism, dementia, severe osteoporosis, severe aortic stenosis, ?prev CVA with a mild left facial droop (per family chronic), who presented to the [address] on 12/3/18 after surrfering a mechanical fall while at home  Patient diagnosed with a right intertrochanteric femur fracture  Patient evaluated by Orthopedics  After cardiac clearance, patient was taken to the OR by Dr Randolph Talavera on 12/4/18 for a Right total hip arthroplasty  Patient deemed WBAT RLE post op  Patient had rapid response 12/5/18 for SVT  Patient transfused 1PRBC as hemoglobin was 7 1, started on cardizem gtt initially  Patient was closely followed by Cardiology service  Transitioned to IV amiodarone, then PO amiodarone  Patient transfused another 1 unit PRBC on 12/6/18  Eliquis was eventually restarted  After medical stabilization and clearance for participation by ortho service in an intensive rehabiliation program, patient was admitted to [facility] on 12/8/18         Subjective: Patient reports her hip hurts   in the room  States her delirium post surgery is improving and that patient has baseline cognitive deficits due to her dementia      Review of Systems: A 10-point review of systems was performed   Negative except as listed above "    Condition at Discharge: good     Discharge instructions/Information to patient and family:   See after visit summary for information provided to patient and family  Provisions for Follow-Up Care:  See after visit summary for information related to follow-up care and any pertinent home health orders  Disposition: Home with family     Planned Readmission:  No    Discharge Statement   Total time spent examining patient, counseling patient (or patient/family) on condition, medication, rehabilitation/medical plan, and coordinating care on day of discharge: at least 45 minutes  Greater than 50% of the total time was spent examining patient, answering all questions, directly discussing plan of care and post-discharge instructions with patient (or patient and family)  Additional time spent on coordinating care and other discharge activities  Discharge Medications:  See after visit summary for reconciled discharge medications provided to patient and family

## 2018-12-31 NOTE — PROGRESS NOTES
OT DISCHARGE SUMMARY      Pt made fair progress during acute rehab stay but was unable to achieve all LTGs due to signficant cognitive deficits and anxiety impacting ability to carryover techniques and for safety with fxnl transfers  At time of d/c pt was supervision for grooming, mod A for bathing, supervision for UB dressing, mod A for LB dressing and min A for toileting and all fxnl transfers   engaged in family training sessions throughout hospital stay  It was decided by  and therapist that it is safest for patient to sponge bathe at this time  Pt  able to provide assistance with dressing as repetitive LHAE training was unsuccessful        Maira Ahumada, OT

## 2019-01-02 ENCOUNTER — TELEPHONE (OUTPATIENT)
Dept: NEUROLOGY | Facility: CLINIC | Age: 79
End: 2019-01-02

## 2019-01-02 NOTE — TELEPHONE ENCOUNTER
----- Message from Lucila Quinonez MD sent at 12/24/2018 11:49 AM EST -----  Regarding: HFU  Post-ARC Follow-up with Dr Carissa Gutierrez

## 2019-01-10 ENCOUNTER — HOSPITAL ENCOUNTER (EMERGENCY)
Facility: HOSPITAL | Age: 79
Discharge: HOME | End: 2019-01-10
Attending: EMERGENCY MEDICINE | Admitting: EMERGENCY MEDICINE
Payer: MEDICARE

## 2019-01-10 ENCOUNTER — APPOINTMENT (EMERGENCY)
Dept: RADIOLOGY | Facility: HOSPITAL | Age: 79
End: 2019-01-10
Payer: MEDICARE

## 2019-01-10 ENCOUNTER — APPOINTMENT (EMERGENCY)
Dept: NON INVASIVE DIAGNOSTICS | Facility: HOSPITAL | Age: 79
End: 2019-01-10
Payer: MEDICARE

## 2019-01-10 VITALS
SYSTOLIC BLOOD PRESSURE: 134 MMHG | HEART RATE: 74 BPM | DIASTOLIC BLOOD PRESSURE: 59 MMHG | TEMPERATURE: 99.5 F | RESPIRATION RATE: 20 BRPM | OXYGEN SATURATION: 97 %

## 2019-01-10 DIAGNOSIS — M79.89 LEG SWELLING: Primary | ICD-10-CM

## 2019-01-10 LAB
ALBUMIN SERPL BCP-MCNC: 3.5 G/DL (ref 3.5–5)
ALP SERPL-CCNC: 76 U/L (ref 46–116)
ALT SERPL W P-5'-P-CCNC: 16 U/L (ref 12–78)
ANION GAP SERPL CALCULATED.3IONS-SCNC: 8 MMOL/L (ref 4–13)
AST SERPL W P-5'-P-CCNC: 22 U/L (ref 5–45)
BASOPHILS # BLD AUTO: 0.02 THOUSANDS/ΜL (ref 0–0.1)
BASOPHILS NFR BLD AUTO: 0 % (ref 0–1)
BILIRUB SERPL-MCNC: 1.35 MG/DL (ref 0.2–1)
BUN SERPL-MCNC: 27 MG/DL (ref 5–25)
CALCIUM SERPL-MCNC: 9.3 MG/DL (ref 8.3–10.1)
CHLORIDE SERPL-SCNC: 105 MMOL/L (ref 100–108)
CO2 SERPL-SCNC: 25 MMOL/L (ref 21–32)
CREAT SERPL-MCNC: 0.99 MG/DL (ref 0.6–1.3)
EOSINOPHIL # BLD AUTO: 0.04 THOUSAND/ΜL (ref 0–0.61)
EOSINOPHIL NFR BLD AUTO: 1 % (ref 0–6)
ERYTHROCYTE [DISTWIDTH] IN BLOOD BY AUTOMATED COUNT: 14.6 % (ref 11.6–15.1)
GFR SERPL CREATININE-BSD FRML MDRD: 55 ML/MIN/1.73SQ M
GLUCOSE SERPL-MCNC: 95 MG/DL (ref 65–140)
HCT VFR BLD AUTO: 37.9 % (ref 34.8–46.1)
HGB BLD-MCNC: 12 G/DL (ref 11.5–15.4)
IMM GRANULOCYTES # BLD AUTO: 0.06 THOUSAND/UL (ref 0–0.2)
IMM GRANULOCYTES NFR BLD AUTO: 1 % (ref 0–2)
LYMPHOCYTES # BLD AUTO: 1.21 THOUSANDS/ΜL (ref 0.6–4.47)
LYMPHOCYTES NFR BLD AUTO: 19 % (ref 14–44)
MCH RBC QN AUTO: 31.1 PG (ref 26.8–34.3)
MCHC RBC AUTO-ENTMCNC: 31.7 G/DL (ref 31.4–37.4)
MCV RBC AUTO: 98 FL (ref 82–98)
MONOCYTES # BLD AUTO: 0.49 THOUSAND/ΜL (ref 0.17–1.22)
MONOCYTES NFR BLD AUTO: 8 % (ref 4–12)
NEUTROPHILS # BLD AUTO: 4.67 THOUSANDS/ΜL (ref 1.85–7.62)
NEUTS SEG NFR BLD AUTO: 71 % (ref 43–75)
NRBC BLD AUTO-RTO: 0 /100 WBCS
NT-PROBNP SERPL-MCNC: 117 PG/ML
PLATELET # BLD AUTO: 143 THOUSANDS/UL (ref 149–390)
PMV BLD AUTO: 11.1 FL (ref 8.9–12.7)
POTASSIUM SERPL-SCNC: 3.8 MMOL/L (ref 3.5–5.3)
PROT SERPL-MCNC: 6.8 G/DL (ref 6.4–8.2)
RBC # BLD AUTO: 3.86 MILLION/UL (ref 3.81–5.12)
SODIUM SERPL-SCNC: 138 MMOL/L (ref 136–145)
TROPONIN I SERPL-MCNC: <0.02 NG/ML
WBC # BLD AUTO: 6.49 THOUSAND/UL (ref 4.31–10.16)

## 2019-01-10 PROCEDURE — 80053 COMPREHEN METABOLIC PANEL: CPT | Performed by: EMERGENCY MEDICINE

## 2019-01-10 PROCEDURE — 83880 ASSAY OF NATRIURETIC PEPTIDE: CPT | Performed by: EMERGENCY MEDICINE

## 2019-01-10 PROCEDURE — 36415 COLL VENOUS BLD VENIPUNCTURE: CPT | Performed by: EMERGENCY MEDICINE

## 2019-01-10 PROCEDURE — 93971 EXTREMITY STUDY: CPT

## 2019-01-10 PROCEDURE — 93005 ELECTROCARDIOGRAM TRACING: CPT

## 2019-01-10 PROCEDURE — 85025 COMPLETE CBC W/AUTO DIFF WBC: CPT | Performed by: EMERGENCY MEDICINE

## 2019-01-10 PROCEDURE — 71046 X-RAY EXAM CHEST 2 VIEWS: CPT

## 2019-01-10 PROCEDURE — 99284 EMERGENCY DEPT VISIT MOD MDM: CPT

## 2019-01-10 PROCEDURE — 84484 ASSAY OF TROPONIN QUANT: CPT | Performed by: EMERGENCY MEDICINE

## 2019-01-10 NOTE — ED ATTENDING ATTESTATION
I, Macarena Jain DO, saw and evaluated the patient  I have discussed the patient with the resident/non-physician practitioner and agree with the resident's/non-physician practitioner's findings, Plan of Care, and MDM as documented in the resident's/non-physician practitioner's note, except where noted  All available labs and Radiology studies were reviewed  At this point I agree with the current assessment done in the Emergency Department  I have conducted an independent evaluation of this patient a history and physical is as follows:      Critical Care Time  CritCare Time    Procedures     66 yr fem to the ED with leg swelling noted in the right but appears to be in both with the right more than the left  Tender to touch  Redness to both feet and they itchy  No fever  Hip surgery one month ago and on blood thinner for afib  Exm;BLEE 2-3+  Redness of feet but does not seem cellulitic  Pln: edema velasquez to include cardiac and CHF

## 2019-01-11 LAB
ATRIAL RATE: 159 BPM
ATRIAL RATE: 326 BPM
QRS AXIS: -16 DEGREES
QRS AXIS: -26 DEGREES
QRSD INTERVAL: 104 MS
QRSD INTERVAL: 76 MS
QT INTERVAL: 420 MS
QT INTERVAL: 424 MS
QTC INTERVAL: 473 MS
QTC INTERVAL: 493 MS
T WAVE AXIS: 16 DEGREES
T WAVE AXIS: 17 DEGREES
VENTRICULAR RATE: 75 BPM
VENTRICULAR RATE: 83 BPM

## 2019-01-11 PROCEDURE — 93010 ELECTROCARDIOGRAM REPORT: CPT | Performed by: INTERNAL MEDICINE

## 2019-01-11 PROCEDURE — 93971 EXTREMITY STUDY: CPT | Performed by: SURGERY

## 2019-01-11 NOTE — DISCHARGE INSTRUCTIONS
Leg Edema   WHAT YOU NEED TO KNOW:   Leg edema is swelling caused by fluid buildup  Your legs may swell if you sit or stand for long periods of time, are pregnant, or are injured  Swelling may also occur if you have heart failure or circulation problems  This means that your heart does not pump blood through your body as it should  DISCHARGE INSTRUCTIONS:   Self-care:   · Elevate your legs:  Raise your legs above the level of your heart as often as you can  This will help decrease swelling and pain  Prop your legs on pillows or blankets to keep them elevated comfortably  · Wear pressure stockings: These tight stockings put pressure on your legs to promote blood flow and prevent blood clots  Wear the stockings during the day  Do not wear them while you sleep  · Apply heat:  Heat helps decrease pain and swelling  Apply heat on the area for 20 to 30 minutes every 2 hours for as many days as directed  · Stay active:  Do not stand or sit for long periods of time  Ask your healthcare provider about the best exercise plan for you  · Eat healthy foods:  Healthy foods include fruits, vegetables, whole-grain breads, low-fat dairy products, beans, lean meats, and fish  Ask if you need to be on a special diet  Limit salt  Salt will make your body hold even more fluid  Follow up with your healthcare provider as directed:  Write down your questions so you remember to ask them during your visits  Contact your healthcare provider if:   · You have a fever or feel more tired than usual     · The veins in your legs look larger than usual  They may look full or bulging  · Your legs itch or feel heavy  · You have red or white areas or sores on your legs  The skin may also appear dimpled or have indentations  · You are gaining weight  · You have trouble moving your ankles  · The swelling does not go away, or other parts of your body swell      · You have questions or concerns about your condition or care   Return to the emergency department if:   · You cannot walk  · You feel faint or confused  · Your skin turns blue or gray  · Your leg feels warm, tender, and painful  It may be swollen and red  · You have chest pain or trouble breathing that is worse when you lie down  · You suddenly feel lightheaded and have trouble breathing  · You have new and sudden chest pain  You may have more pain when you take deep breaths or cough  You may also cough up blood  © 2017 [employer]  Information is for End User's use only and may not be sold, redistributed or otherwise used for commercial purposes  All illustrations and images included in CareNotes® are the copyrighted property of [employer]  or [employer]  The above information is an  only  It is not intended as medical advice for individual conditions or treatments  Talk to your doctor, nurse or pharmacist before following any medical regimen to see if it is safe and effective for you

## 2019-01-11 NOTE — ED PROVIDER NOTES
History  Chief Complaint   Patient presents with    Leg Swelling     Pt notes swelling/redness, right lower leg, started this morning  Hx of hip replacement in early December  Denies all other complaints at this time  66 yof presents for evaluation of acute on chronic lower extremity swelling  Patient reports she noticed her RLE become more swollen than usual this morning  Reports associated pain that is presents in b/l LE  She reports getting hip surgery 1 month ago  Currently on eliquis for afib  Pain worse with palpation, and movement  She uses a walker to get around  Denies chest pain, SOB, fever  Prior to Admission Medications   Prescriptions Last Dose Informant Patient Reported? Taking?    Cholecalciferol ([employer] VITAMIN D3) 1000 units capsule   No No   Sig: Take 2 capsules (2,000 Units total) by mouth daily   acetaminophen (TYLENOL) 325 mg tablet   No No   Sig: Take 2-3 tabs by mouth up to 3 times per day as needed for pain   amiodarone 200 mg tablet   No No   Sig: Take 1 tablet (200 mg total) by mouth daily with breakfast   apixaban (ELIQUIS) 5 mg   No No   Sig: Take 1 tablet (5 mg total) by mouth 2 (two) times a day   calcium carbonate (TUMS) 500 mg chewable tablet   No No   Sig: Chew 1 tablet (500 mg total) 2 (two) times a day with meals   docusate sodium (COLACE) 100 mg capsule   No No   Sig: Take 1 capsule (100 mg total) by mouth 2 (two) times a day   levothyroxine 50 mcg tablet  Self Yes No   Sig: Take 50 mcg by mouth daily   melatonin 3 mg   No No   Sig: Take 1 tablet (3 mg total) by mouth daily at bedtime   metoprolol succinate (TOPROL-XL) 25 mg 24 hr tablet   No No   Sig: Take 0 5 tablets (12 5 mg total) by mouth daily at bedtime   omeprazole (PriLOSEC) 20 mg delayed release capsule  Self Yes No   Sig: Take 20 mg by mouth daily   ondansetron (ZOFRAN-ODT) 4 mg disintegrating tablet   No No   Sig: Take 1 tablet (4 mg total) by mouth 3 (three) times a day as needed for nausea polyethylene glycol (MIRALAX) 17 g packet   No No   Sig: Take 17 g by mouth daily as needed (Constipation)   pravastatin (PRAVACHOL) 40 mg tablet   No No   Sig: Take 1 tablet (40 mg total) by mouth daily with dinner      Facility-Administered Medications: None       Past Medical History:   Diagnosis Date    Atrial fibrillation (HCC)     Disease of thyroid gland     Hyperlipidemia     Hypertension     Psychiatric disorder     dementia       Past Surgical History:   Procedure Laterality Date    HIP ARTHROPLASTY Right 12/4/2018    Procedure: ARTHROPLASTY HIP TOTAL;  Surgeon: Gautam Pickens MD;  Location: [facility];  Service: Orthopedics    HYSTERECTOMY         History reviewed  No pertinent family history  I have reviewed and agree with the history as documented  Social History   Substance Use Topics    Smoking status: Former Smoker     Quit date: 1960    Smokeless tobacco: Never Used    Alcohol use No        Review of Systems   Constitutional: Negative for chills, diaphoresis and fever  HENT: Negative for congestion and rhinorrhea  Eyes: Negative for pain and visual disturbance  Respiratory: Negative for cough, shortness of breath and wheezing  Cardiovascular: Positive for leg swelling  Negative for chest pain  Gastrointestinal: Negative for abdominal pain, diarrhea, nausea and vomiting  Genitourinary: Negative for difficulty urinating, dysuria, frequency and urgency  Musculoskeletal: Negative for back pain and neck pain  Skin: Negative for color change and rash  Neurological: Negative for syncope, numbness and headaches  All other systems reviewed and are negative        Physical Exam  ED Triage Vitals [01/10/19 1716]   Temperature Pulse Respirations Blood Pressure SpO2   99 5 °F (37 5 °C) 80 18 153/69 100 %      Temp Source Heart Rate Source Patient Position - Orthostatic VS BP Location FiO2 (%)   Oral Monitor Lying Right arm --      Pain Score       --           Orthostatic Vital Signs  Vitals:    01/10/19 1716 01/10/19 1850 01/10/19 1932   BP: 153/69 108/56 134/59   Pulse: 80 73 74   Patient Position - Orthostatic VS: Lying Lying Lying       Physical Exam   Constitutional: She is oriented to person, place, and time  She appears well-developed and well-nourished  No distress  HENT:   Head: Normocephalic and atraumatic  Eyes: Conjunctivae and EOM are normal    Neck: Normal range of motion  Neck supple  Cardiovascular: Normal rate and regular rhythm  Pulmonary/Chest: Effort normal and breath sounds normal  No respiratory distress  She has no wheezes  She has no rales  Abdominal: Soft  Bowel sounds are normal  There is no tenderness  There is no guarding  Musculoskeletal: Normal range of motion  She exhibits edema and tenderness  B/L LE edema with R>L  Calf tenderness bilaterally  Neurological: She is alert and oriented to person, place, and time  No cranial nerve deficit  Skin: Skin is warm  She is not diaphoretic  No erythema  Psychiatric: She has a normal mood and affect  Her behavior is normal    Nursing note and vitals reviewed        ED Medications  Medications - No data to display    Diagnostic Studies  Results Reviewed     Procedure Component Value Units Date/Time    Comprehensive metabolic panel [[id number]]  (Abnormal) Collected:  01/10/19 1818    Lab Status:  Final result Specimen:  Blood from Arm, Right Updated:  01/10/19 1852     Sodium 138 mmol/L      Potassium 3 8 mmol/L      Chloride 105 mmol/L      CO2 25 mmol/L      ANION GAP 8 mmol/L      BUN 27 (H) mg/dL      Creatinine 0 99 mg/dL      Glucose 95 mg/dL      Calcium 9 3 mg/dL      AST 22 U/L      ALT 16 U/L      Alkaline Phosphatase 76 U/L      Total Protein 6 8 g/dL      Albumin 3 5 g/dL      Total Bilirubin 1 35 (H) mg/dL      eGFR 55 ml/min/1 73sq m     Narrative:         National Kidney Disease Education Program recommendations are as follows:  GFR calculation is accurate only with a steady state creatinine  Chronic Kidney disease less than 60 ml/min/1 73 sq  meters  Kidney failure less than 15 ml/min/1 73 sq  meters  NT-BNP PRO [[id number]]  (Normal) Collected:  01/10/19 1818    Lab Status:  Final result Specimen:  Blood from Arm, Right Updated:  01/10/19 1851     NT-proBNP 117 pg/mL     Troponin I [[id number]]  (Normal) Collected:  01/10/19 1818    Lab Status:  Final result Specimen:  Blood from Arm, Right Updated:  01/10/19 1850     Troponin I <0 02 ng/mL     CBC and differential [[id number]]  (Abnormal) Collected:  01/10/19 1818    Lab Status:  Final result Specimen:  Blood from Arm, Right Updated:  01/10/19 1827     WBC 6 49 Thousand/uL      RBC 3 86 Million/uL      Hemoglobin 12 0 g/dL      Hematocrit 37 9 %      MCV 98 fL      MCH 31 1 pg      MCHC 31 7 g/dL      RDW 14 6 %      MPV 11 1 fL      Platelets 115 (L) Thousands/uL      nRBC 0 /100 WBCs      Neutrophils Relative 71 %      Immat GRANS % 1 %      Lymphocytes Relative 19 %      Monocytes Relative 8 %      Eosinophils Relative 1 %      Basophils Relative 0 %      Neutrophils Absolute 4 67 Thousands/µL      Immature Grans Absolute 0 06 Thousand/uL      Lymphocytes Absolute 1 21 Thousands/µL      Monocytes Absolute 0 49 Thousand/µL      Eosinophils Absolute 0 04 Thousand/µL      Basophils Absolute 0 02 Thousands/µL                  VAS lower limb venous duplex study, unilateral/limited    (Results Pending)   XR chest 2 views    (Results Pending)         Procedures  Procedures      Phone Consults  ED Phone Contact    ED Course  ED Course as of Artem 10 2036   Thu Artem 10, 2019   1923 Negative for DVT per vasc tech            Identification of Seniors at Risk      Most Recent Value   (ISAR) Identification of Seniors at Risk   Before the illness or injury that brought you to the Emergency, did you need someone to help you on a regular basis?   1 Filed at: 01/10/2019 1715   In the last 24 hours, have you needed more help than usual?  0 Filed at: 01/10/2019 1715   Have you been hospitalized for one or more nights during the past 6 months? 1 Filed at: 01/10/2019 1715   In general, do you see well?  0 Filed at: 01/10/2019 1715   In general, do you have serious problems with your memory? 0 Filed at: 01/10/2019 1715   Do you take more than three different medications every day? 1 Filed at: 01/10/2019 1715   ISAR Score  3 Filed at: 01/10/2019 1715                          MDM  Number of Diagnoses or Management Options  Leg swelling:   Diagnosis management comments: 66 yof presenting with le edema  Will obtain labs, CXR, duplex  Patient received PT 3 times/week  Duplex negative  Follow up with PCP  CritCare Time    Disposition  Final diagnoses:   Leg swelling     Time reflects when diagnosis was documented in both MDM as applicable and the Disposition within this note     Time User Action Codes Description Comment    1/10/2019  7:27 PM Vivian Mario Add [Z86 [id number]] Leg swelling       ED Disposition     ED Disposition Condition Comment    Discharge  Pinkie Litter discharge to home/self care      Condition at discharge: Stable        Follow-up Information     Follow up With Specialties Details Why [address], DO Family Medicine In 3 days For reassessment [address]  [address] [address] [address] Emergency Department Emergency Medicine  If symptoms worsen [address] [place] [address]  [phone]  ED, [address] [place] [address], [phone]          Discharge Medication List as of 1/10/2019  7:35 PM      CONTINUE these medications which have NOT CHANGED    Details   acetaminophen (TYLENOL) 325 mg tablet Take 2-3 tabs by mouth up to 3 times per day as needed for pain, Normal      amiodarone 200 mg tablet Take 1 tablet (200 mg total) by mouth daily with breakfast, Starting Tue 12/25/2018, Normal      apixaban (ELIQUIS) 5 mg Take 1 tablet (5 mg total) by mouth 2 (two) times a day, Starting Mon 7/16/2018, Normal      calcium carbonate (TUMS) 500 mg chewable tablet Chew 1 tablet (500 mg total) 2 (two) times a day with meals, Starting Mon 12/24/2018, Normal      Cholecalciferol ([employer] VITAMIN D3) 1000 units capsule Take 2 capsules (2,000 Units total) by mouth daily, Starting Mon 12/24/2018, Normal      docusate sodium (COLACE) 100 mg capsule Take 1 capsule (100 mg total) by mouth 2 (two) times a day, Starting Mon 12/24/2018, Normal      levothyroxine 50 mcg tablet Take 50 mcg by mouth daily, Starting Tue 1/23/2018, Historical Med      melatonin 3 mg Take 1 tablet (3 mg total) by mouth daily at bedtime, Starting Mon 12/24/2018, Normal      metoprolol succinate (TOPROL-XL) 25 mg 24 hr tablet Take 0 5 tablets (12 5 mg total) by mouth daily at bedtime, Starting Mon 12/24/2018, Normal      omeprazole (PriLOSEC) 20 mg delayed release capsule Take 20 mg by mouth daily, Starting Tue 1/23/2018, Historical Med      ondansetron (ZOFRAN-ODT) 4 mg disintegrating tablet Take 1 tablet (4 mg total) by mouth 3 (three) times a day as needed for nausea, Starting Mon 12/24/2018, Normal      polyethylene glycol (MIRALAX) 17 g packet Take 17 g by mouth daily as needed (Constipation), Starting Mon 12/24/2018, Normal      pravastatin (PRAVACHOL) 40 mg tablet Take 1 tablet (40 mg total) by mouth daily with dinner, Starting Mon 12/24/2018, Normal           No discharge procedures on file  ED Provider  Attending physically available and evaluated Brian Gutiérrez I managed the patient along with the ED Attending      Electronically Signed by         Iona Gray DO  01/10/19 2036

## 2019-01-28 DIAGNOSIS — I48.91 ATRIAL FIBRILLATION, UNSPECIFIED TYPE (HCC): ICD-10-CM

## 2019-01-30 RX ORDER — AMIODARONE HYDROCHLORIDE 200 MG/1
200 TABLET ORAL
Qty: 30 TABLET | Refills: 6 | OUTPATIENT
Start: 2019-01-30

## 2019-02-06 ENCOUNTER — TELEPHONE (OUTPATIENT)
Dept: GERIATRICS | Age: 79
End: 2019-02-06

## 2019-02-06 NOTE — TELEPHONE ENCOUNTER
Pt's daughter called to get an appt for pt to come in for anxiety attack as she called the pt's  PCP office and they told her to come and see us as they do not start meds for anxiety and that they should have taken her to the emergency room  I make her aware that we are a specialty office and would not be able to get her in to see us until May-June or if we have a cancellation  I did offer to call the PCP office and explain to them that we are not a PCP office and see if they will be able to see her  I was informed by Nurse [facility]: PETER TIJERINA that they do not start meds for anxiety and that she had already informed the patients daughter of this and that the EMS should have taken her to the ER

## 2019-02-12 ENCOUNTER — TELEPHONE (OUTPATIENT)
Dept: OBGYN CLINIC | Facility: HOSPITAL | Age: 79
End: 2019-02-12

## 2019-02-12 NOTE — TELEPHONE ENCOUNTER
Laxmi from [facility] called and said they need a script faxed over for continue PT in the home  please advise      Callback# [phone]  Fax# [phone]

## 2019-02-12 NOTE — TELEPHONE ENCOUNTER
This message reviewed,  Also her chart reviewed    Of 1st note,      I see no follow-up appointment for this patient since December 4th,    her surgery date  Therefore,  Without knowing the patient's progress I recommend a follow-up appointment rather than refill of a therapy script, without the knowledge of her progress,   And safety      Please arrange to make this patient a follow-up appointment    Thank you,  BELÉN

## 2019-02-13 NOTE — TELEPHONE ENCOUNTER
Called Laxmi back from [facility] and gave the information from [facility] said that she will have someone callback to schedule the follow up appointment

## 2019-02-15 DIAGNOSIS — E78.5 DYSLIPIDEMIA: ICD-10-CM

## 2019-02-15 RX ORDER — PRAVASTATIN SODIUM 40 MG
40 TABLET ORAL
Qty: 30 TABLET | Refills: 11 | Status: SHIPPED | OUTPATIENT
Start: 2019-02-15

## 2019-02-27 ENCOUNTER — APPOINTMENT (EMERGENCY)
Dept: RADIOLOGY | Facility: HOSPITAL | Age: 79
DRG: 199 | End: 2019-02-27
Payer: MEDICARE

## 2019-02-27 ENCOUNTER — HOSPITAL ENCOUNTER (INPATIENT)
Facility: HOSPITAL | Age: 79
LOS: 3 days | Discharge: NURSING, REHAB OR LONG-TERM CARE | DRG: 199 | End: 2019-03-02
Attending: EMERGENCY MEDICINE | Admitting: EMERGENCY MEDICINE
Payer: MEDICARE

## 2019-02-27 DIAGNOSIS — S22.49XA RIB FRACTURES: ICD-10-CM

## 2019-02-27 DIAGNOSIS — S27.0XXA TRAUMATIC FRACTURE OF RIBS WITH PNEUMOTHORAX, LEFT, CLOSED, INITIAL ENCOUNTER: Primary | ICD-10-CM

## 2019-02-27 DIAGNOSIS — J93.9 PNEUMOTHORAX ON LEFT: ICD-10-CM

## 2019-02-27 DIAGNOSIS — S22.42XA TRAUMATIC FRACTURE OF RIBS WITH PNEUMOTHORAX, LEFT, CLOSED, INITIAL ENCOUNTER: Primary | ICD-10-CM
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
## 2019-02-27 LAB
ABO GROUP BLD: NORMAL
ALBUMIN SERPL BCP-MCNC: 3.7 G/DL (ref 3.5–5)
ALP SERPL-CCNC: 58 U/L (ref 46–116)
ALT SERPL W P-5'-P-CCNC: 20 U/L (ref 12–78)
ANION GAP SERPL CALCULATED.3IONS-SCNC: 5 MMOL/L (ref 4–13)
AST SERPL W P-5'-P-CCNC: 23 U/L (ref 5–45)
ATRIAL RATE: 174 BPM
ATRIAL RATE: 227 BPM
BASOPHILS # BLD AUTO: 0.01 THOUSANDS/ΜL (ref 0–0.1)
BASOPHILS NFR BLD AUTO: 0 % (ref 0–1)
BILIRUB SERPL-MCNC: 1.15 MG/DL (ref 0.2–1)
BLD GP AB SCN SERPL QL: NEGATIVE
BUN SERPL-MCNC: 23 MG/DL (ref 5–25)
CALCIUM SERPL-MCNC: 9 MG/DL (ref 8.3–10.1)
CHLORIDE SERPL-SCNC: 105 MMOL/L (ref 100–108)
CO2 SERPL-SCNC: 29 MMOL/L (ref 21–32)
CREAT SERPL-MCNC: 1.38 MG/DL (ref 0.6–1.3)
EOSINOPHIL # BLD AUTO: 0.02 THOUSAND/ΜL (ref 0–0.61)
EOSINOPHIL NFR BLD AUTO: 0 % (ref 0–6)
ERYTHROCYTE [DISTWIDTH] IN BLOOD BY AUTOMATED COUNT: 13.3 % (ref 11.6–15.1)
GFR SERPL CREATININE-BSD FRML MDRD: 36 ML/MIN/1.73SQ M
GLUCOSE SERPL-MCNC: 102 MG/DL (ref 65–140)
HCT VFR BLD AUTO: 36.4 % (ref 34.8–46.1)
HGB BLD-MCNC: 11.5 G/DL (ref 11.5–15.4)
IMM GRANULOCYTES # BLD AUTO: 0.03 THOUSAND/UL (ref 0–0.2)
IMM GRANULOCYTES NFR BLD AUTO: 1 % (ref 0–2)
LYMPHOCYTES # BLD AUTO: 0.85 THOUSANDS/ΜL (ref 0.6–4.47)
LYMPHOCYTES NFR BLD AUTO: 15 % (ref 14–44)
MCH RBC QN AUTO: 31.2 PG (ref 26.8–34.3)
MCHC RBC AUTO-ENTMCNC: 31.6 G/DL (ref 31.4–37.4)
MCV RBC AUTO: 99 FL (ref 82–98)
MONOCYTES # BLD AUTO: 0.36 THOUSAND/ΜL (ref 0.17–1.22)
MONOCYTES NFR BLD AUTO: 6 % (ref 4–12)
NEUTROPHILS # BLD AUTO: 4.58 THOUSANDS/ΜL (ref 1.85–7.62)
NEUTS SEG NFR BLD AUTO: 78 % (ref 43–75)
NRBC BLD AUTO-RTO: 0 /100 WBCS
PLATELET # BLD AUTO: 164 THOUSANDS/UL (ref 149–390)
PMV BLD AUTO: 10.9 FL (ref 8.9–12.7)
POTASSIUM SERPL-SCNC: 3.9 MMOL/L (ref 3.5–5.3)
PROT SERPL-MCNC: 7 G/DL (ref 6.4–8.2)
QRS AXIS: -1 DEGREES
QRS AXIS: -12 DEGREES
QRSD INTERVAL: 84 MS
QRSD INTERVAL: 94 MS
QT INTERVAL: 342 MS
QT INTERVAL: 376 MS
QTC INTERVAL: 404 MS
QTC INTERVAL: 457 MS
RBC # BLD AUTO: 3.69 MILLION/UL (ref 3.81–5.12)
RH BLD: POSITIVE
SODIUM SERPL-SCNC: 139 MMOL/L (ref 136–145)
SPECIMEN EXPIRATION DATE: NORMAL
T WAVE AXIS: 0 DEGREES
T WAVE AXIS: 15 DEGREES
TROPONIN I SERPL-MCNC: <0.02 NG/ML
VENTRICULAR RATE: 84 BPM
VENTRICULAR RATE: 89 BPM
WBC # BLD AUTO: 5.85 THOUSAND/UL (ref 4.31–10.16)

## 2019-02-27 PROCEDURE — 74176 CT ABD & PELVIS W/O CONTRAST: CPT

## 2019-02-27 PROCEDURE — 36415 COLL VENOUS BLD VENIPUNCTURE: CPT | Performed by: EMERGENCY MEDICINE

## 2019-02-27 PROCEDURE — 80053 COMPREHEN METABOLIC PANEL: CPT | Performed by: EMERGENCY MEDICINE

## 2019-02-27 PROCEDURE — 86901 BLOOD TYPING SEROLOGIC RH(D): CPT | Performed by: EMERGENCY MEDICINE

## 2019-02-27 PROCEDURE — 70450 CT HEAD/BRAIN W/O DYE: CPT

## 2019-02-27 PROCEDURE — 93010 ELECTROCARDIOGRAM REPORT: CPT | Performed by: INTERNAL MEDICINE

## 2019-02-27 PROCEDURE — 86850 RBC ANTIBODY SCREEN: CPT | Performed by: EMERGENCY MEDICINE

## 2019-02-27 PROCEDURE — 71250 CT THORAX DX C-: CPT

## 2019-02-27 PROCEDURE — 72125 CT NECK SPINE W/O DYE: CPT

## 2019-02-27 PROCEDURE — 85025 COMPLETE CBC W/AUTO DIFF WBC: CPT | Performed by: EMERGENCY MEDICINE

## 2019-02-27 PROCEDURE — 99223 1ST HOSP IP/OBS HIGH 75: CPT | Performed by: INTERNAL MEDICINE

## 2019-02-27 PROCEDURE — 86900 BLOOD TYPING SEROLOGIC ABO: CPT | Performed by: EMERGENCY MEDICINE

## 2019-02-27 PROCEDURE — 84484 ASSAY OF TROPONIN QUANT: CPT | Performed by: EMERGENCY MEDICINE

## 2019-02-27 PROCEDURE — 99223 1ST HOSP IP/OBS HIGH 75: CPT | Performed by: EMERGENCY MEDICINE

## 2019-02-27 PROCEDURE — 93005 ELECTROCARDIOGRAM TRACING: CPT

## 2019-02-27 PROCEDURE — 99285 EMERGENCY DEPT VISIT HI MDM: CPT

## 2019-02-27 RX ORDER — DOCUSATE SODIUM 100 MG/1
100 CAPSULE, LIQUID FILLED ORAL 2 TIMES DAILY
Status: DISCONTINUED | OUTPATIENT
Start: 2019-02-27 | End: 2019-03-02 | Stop reason: HOSPADM

## 2019-02-27 RX ORDER — CALCIUM CARBONATE 200(500)MG
500 TABLET,CHEWABLE ORAL 2 TIMES DAILY WITH MEALS
Status: DISCONTINUED | OUTPATIENT
Start: 2019-02-27 | End: 2019-03-02 | Stop reason: HOSPADM

## 2019-02-27 RX ORDER — LEVOTHYROXINE SODIUM 0.05 MG/1
50 TABLET ORAL
Status: DISCONTINUED | OUTPATIENT
Start: 2019-02-27 | End: 2019-03-02 | Stop reason: HOSPADM

## 2019-02-27 RX ORDER — OLANZAPINE 10 MG/1
2.5 INJECTION, POWDER, LYOPHILIZED, FOR SOLUTION INTRAMUSCULAR EVERY 8 HOURS PRN
Status: DISCONTINUED | OUTPATIENT
Start: 2019-02-27 | End: 2019-03-02 | Stop reason: HOSPADM

## 2019-02-27 RX ORDER — METOPROLOL SUCCINATE 25 MG/1
12.5 TABLET, EXTENDED RELEASE ORAL
Status: DISCONTINUED | OUTPATIENT
Start: 2019-02-27 | End: 2019-03-02 | Stop reason: HOSPADM

## 2019-02-27 RX ORDER — MIRTAZAPINE 15 MG/1
7.5 TABLET, FILM COATED ORAL
Status: DISCONTINUED | OUTPATIENT
Start: 2019-02-27 | End: 2019-03-02 | Stop reason: HOSPADM

## 2019-02-27 RX ORDER — LANOLIN ALCOHOL/MO/W.PET/CERES
3 CREAM (GRAM) TOPICAL
Status: DISCONTINUED | OUTPATIENT
Start: 2019-02-27 | End: 2019-03-02 | Stop reason: HOSPADM

## 2019-02-27 RX ORDER — ACETAMINOPHEN 325 MG/1
975 TABLET ORAL EVERY 8 HOURS SCHEDULED
Status: DISCONTINUED | OUTPATIENT
Start: 2019-02-27 | End: 2019-03-02 | Stop reason: HOSPADM

## 2019-02-27 RX ORDER — PANTOPRAZOLE SODIUM 40 MG/1
40 TABLET, DELAYED RELEASE ORAL
Status: DISCONTINUED | OUTPATIENT
Start: 2019-02-27 | End: 2019-03-02 | Stop reason: HOSPADM

## 2019-02-27 RX ORDER — PRAVASTATIN SODIUM 40 MG
40 TABLET ORAL
Status: DISCONTINUED | OUTPATIENT
Start: 2019-02-27 | End: 2019-03-02 | Stop reason: HOSPADM

## 2019-02-27 RX ORDER — GABAPENTIN 100 MG/1
100 CAPSULE ORAL
Status: DISCONTINUED | OUTPATIENT
Start: 2019-02-27 | End: 2019-03-02 | Stop reason: HOSPADM

## 2019-02-27 RX ORDER — HYDROMORPHONE HCL/PF 1 MG/ML
0.2 SYRINGE (ML) INJECTION EVERY 4 HOURS PRN
Status: DISCONTINUED | OUTPATIENT
Start: 2019-02-27 | End: 2019-03-02 | Stop reason: HOSPADM

## 2019-02-27 RX ORDER — OXYCODONE HYDROCHLORIDE 5 MG/1
2.5 TABLET ORAL EVERY 4 HOURS PRN
Status: DISCONTINUED | OUTPATIENT
Start: 2019-02-27 | End: 2019-03-02 | Stop reason: HOSPADM

## 2019-02-27 RX ORDER — MELATONIN
1000 DAILY
Status: DISCONTINUED | OUTPATIENT
Start: 2019-02-27 | End: 2019-03-02 | Stop reason: HOSPADM

## 2019-02-27 RX ORDER — LIDOCAINE 50 MG/G
1 PATCH TOPICAL DAILY
Status: DISCONTINUED | OUTPATIENT
Start: 2019-02-27 | End: 2019-03-02 | Stop reason: HOSPADM

## 2019-02-27 RX ORDER — ESCITALOPRAM OXALATE 10 MG/1
5 TABLET ORAL DAILY
Status: DISCONTINUED | OUTPATIENT
Start: 2019-02-27 | End: 2019-02-27

## 2019-02-27 RX ORDER — AMIODARONE HYDROCHLORIDE 200 MG/1
200 TABLET ORAL
Status: DISCONTINUED | OUTPATIENT
Start: 2019-02-28 | End: 2019-03-02 | Stop reason: HOSPADM

## 2019-02-27 RX ORDER — OXYCODONE HYDROCHLORIDE 5 MG/1
5 TABLET ORAL EVERY 4 HOURS PRN
Status: DISCONTINUED | OUTPATIENT
Start: 2019-02-27 | End: 2019-03-02 | Stop reason: HOSPADM

## 2019-02-27 RX ADMIN — PRAVASTATIN SODIUM 40 MG: 40 TABLET ORAL at 17:36

## 2019-02-27 RX ADMIN — APIXABAN 5 MG: 5 TABLET, FILM COATED ORAL at 17:36

## 2019-02-27 RX ADMIN — METOPROLOL SUCCINATE 12.5 MG: 25 TABLET, EXTENDED RELEASE ORAL at 21:41

## 2019-02-27 RX ADMIN — LIDOCAINE 1 PATCH: 50 PATCH CUTANEOUS at 10:23

## 2019-02-27 RX ADMIN — MELATONIN 3 MG: at 21:42

## 2019-02-27 RX ADMIN — MIRTAZAPINE 7.5 MG: 15 TABLET, FILM COATED ORAL at 21:41

## 2019-02-27 RX ADMIN — HYDROMORPHONE HYDROCHLORIDE 0.2 MG: 1 INJECTION, SOLUTION INTRAMUSCULAR; INTRAVENOUS; SUBCUTANEOUS at 10:23

## 2019-02-27 RX ADMIN — ACETAMINOPHEN 975 MG: 325 TABLET ORAL at 21:41

## 2019-02-27 RX ADMIN — GABAPENTIN 100 MG: 100 CAPSULE ORAL at 21:42

## 2019-02-27 RX ADMIN — CALCIUM CARBONATE (ANTACID) CHEW TAB 500 MG 500 MG: 500 CHEW TAB at 17:36

## 2019-02-27 RX ADMIN — ACETAMINOPHEN 975 MG: 325 TABLET ORAL at 13:44

## 2019-02-27 NOTE — ED PROVIDER NOTES
History  Chief Complaint   Patient presents with    Fall     pt had a fall from standing yesterday landing on her L side  pt reports increasing L rib pain  pt hx of dementia  pt is on eliquis      HPI  The 71-year-old woman comes in for evaluation after fall  the patient is on Eliquis for atrial fibrillation  Fall happened yesterday, patient was being assisted on the commode when she fell backward landing on her left side  The patient's  witnessed the event, denies patient hitting her head  The patient endorses hip pain, knee pain, back pain  Patient is demented at baseline  Prior to Admission Medications   Prescriptions Last Dose Informant Patient Reported? Taking?    Cholecalciferol ([employer] VITAMIN D3) 1000 units capsule 2/26/2019 at Unknown time  No Yes   Sig: Take 2 capsules (2,000 Units total) by mouth daily   acetaminophen (TYLENOL) 325 mg tablet Unknown at Unknown time  No No   Sig: Take 2-3 tabs by mouth up to 3 times per day as needed for pain   amiodarone 200 mg tablet 2/26/2019 at Unknown time  No Yes   Sig: Take 1 tablet (200 mg total) by mouth daily with breakfast   apixaban (ELIQUIS) 5 mg 2/26/2019 at Unknown time  No Yes   Sig: Take 1 tablet (5 mg total) by mouth 2 (two) times a day   calcium carbonate (TUMS) 500 mg chewable tablet Unknown at Unknown time  No No   Sig: Chew 1 tablet (500 mg total) 2 (two) times a day with meals   docusate sodium (COLACE) 100 mg capsule 2/26/2019 at Unknown time  No Yes   Sig: Take 1 capsule (100 mg total) by mouth 2 (two) times a day   levothyroxine 50 mcg tablet 2/26/2019 at Unknown time Self Yes Yes   Sig: Take 50 mcg by mouth daily   melatonin 3 mg 2/26/2019 at Unknown time  No Yes   Sig: Take 1 tablet (3 mg total) by mouth daily at bedtime   metoprolol succinate (TOPROL-XL) 25 mg 24 hr tablet 2/26/2019 at Unknown time  No Yes   Sig: Take 0 5 tablets (12 5 mg total) by mouth daily at bedtime   omeprazole (PriLOSEC) 20 mg delayed release capsule 2/26/2019 at Unknown time Self Yes Yes   Sig: Take 20 mg by mouth daily   ondansetron (ZOFRAN-ODT) 4 mg disintegrating tablet Unknown at Unknown time  No No   Sig: Take 1 tablet (4 mg total) by mouth 3 (three) times a day as needed for nausea   polyethylene glycol (MIRALAX) 17 g packet 2019 at Unknown time  No Yes   Sig: Take 17 g by mouth daily as needed (Constipation)   pravastatin (PRAVACHOL) 40 mg tablet 2019 at Unknown time  No Yes   Sig: Take 1 tablet (40 mg total) by mouth daily with dinner      Facility-Administered Medications: None       Past Medical History:   Diagnosis Date    Atrial fibrillation ([address] )     Disease of thyroid gland     Hyperlipidemia     Hypertension     Psychiatric disorder     dementia       Past Surgical History:   Procedure Laterality Date    HIP ARTHROPLASTY Right 2018    Procedure: ARTHROPLASTY HIP TOTAL;  Surgeon: Linda West MD;  [facility];  Service: Orthopedics    HYSTERECTOMY         History reviewed  No pertinent family history  I have reviewed and agree with the history as documented      Social History     Tobacco Use    Smoking status: Former Smoker     Last attempt to quit: 1960     Years since quittin 1    Smokeless tobacco: Never Used   Substance Use Topics    Alcohol use: Never     Frequency: Never    Drug use: No        Review of Systems   Unable to perform ROS: Dementia       Physical Exam  ED Triage Vitals   Temperature Pulse Respirations Blood Pressure SpO2   19 0709 19 0708 19 0708 19 0708 19 0708   (!) 97 4 °F (36 3 °C) 93 18 161/74 94 %      Temp Source Heart Rate Source Patient Position - Orthostatic VS BP Location FiO2 (%)   19 0709 19 0708 19 0708 19 0708 --   Oral Monitor Lying Left arm       Pain Score       --                  Orthostatic Vital Signs  Vitals:    19 0830 19 0930 19 1000 19 1100   BP: 120/58 123/58 129/73 127/63   Pulse: 84 82 90 76   Patient Position - Orthostatic VS:           Physical Exam   Constitutional: She is oriented to person, place, and time  She appears well-developed and well-nourished  No distress  HENT:   Head: Normocephalic and atraumatic  Right Ear: External ear normal    Left Ear: External ear normal    Mouth/Throat: Oropharynx is clear and moist    Eyes: Pupils are equal, round, and reactive to light  Conjunctivae and EOM are normal  Right eye exhibits no discharge  Left eye exhibits no discharge  No scleral icterus  Neck: Normal range of motion  Neck supple  No tracheal deviation present  No thyromegaly present  Cardiovascular: Normal rate, regular rhythm and intact distal pulses  Exam reveals no gallop and no friction rub  No murmur heard  Pulmonary/Chest: Effort normal and breath sounds normal  No stridor  No respiratory distress  She has no wheezes  She has no rales  Abdominal: Soft  Bowel sounds are normal  She exhibits no distension  There is tenderness  There is no rebound and no guarding  Patient mildly tender throughout comma cannot localize   Musculoskeletal: Normal range of motion  She exhibits tenderness  She exhibits no edema or deformity  Patient has tenderness to palpation on the left side her chest   She is tender on her back, she is tender in her hips  Neurological: She is alert and oriented to person, place, and time  No cranial nerve deficit  Skin: Skin is warm and dry  No rash noted  She is not diaphoretic  No erythema  Bruising left chest,    Psychiatric: She has a normal mood and affect  Her behavior is normal  Thought content normal    Nursing note and vitals reviewed        ED Medications  Medications   acetaminophen (TYLENOL) tablet 975 mg (has no administration in time range)   gabapentin (NEURONTIN) capsule 100 mg (has no administration in time range)   lidocaine (LIDODERM) 5 % patch 1 patch (1 patch Topical Medication Applied 2/27/19 1023)   oxyCODONE (ROXICODONE) IR tablet 2 5 mg (has no administration in time range)   oxyCODONE (ROXICODONE) IR tablet 5 mg (has no administration in time range)   HYDROmorphone (DILAUDID) injection 0 2 mg (0 2 mg Intravenous Given 2/27/19 1023)   amiodarone tablet 200 mg (has no administration in time range)   apixaban (ELIQUIS) tablet 5 mg (has no administration in time range)   calcium carbonate (TUMS) chewable tablet 500 mg (has no administration in time range)   cholecalciferol (VITAMIN D3) tablet 1,000 Units (1,000 Units Oral Not Given 2/27/19 1218)   docusate sodium (COLACE) capsule 100 mg (has no administration in time range)   levothyroxine tablet 50 mcg (50 mcg Oral Not Given 2/27/19 1217)   melatonin tablet 3 mg (has no administration in time range)   metoprolol succinate (TOPROL-XL) 24 hr tablet 12 5 mg (has no administration in time range)   pantoprazole (PROTONIX) EC tablet 40 mg (40 mg Oral Not Given 2/27/19 1218)   pravastatin (PRAVACHOL) tablet 40 mg (has no administration in time range)   OLANZapine (ZyPREXA) IM injection 2 5 mg (has no administration in time range)   mirtazapine (REMERON) tablet 7 5 mg (has no administration in time range)    EMS REPLENISHMENT MED ( Does not apply Given to EMS 2/27/19 0719)       Diagnostic Studies  Results Reviewed     Procedure Component Value Units Date/Time    Comprehensive metabolic panel [[id number]]  (Abnormal) Collected:  02/27/19 0805    Lab Status:  Final result Specimen:  Blood from Arm, Left Updated:  02/27/19 1217     Sodium 139 mmol/L      Potassium 3 9 mmol/L      Chloride 105 mmol/L      CO2 29 mmol/L      ANION GAP 5 mmol/L      BUN 23 mg/dL      Creatinine 1 38 mg/dL      Glucose 102 mg/dL      Calcium 9 0 mg/dL      AST 23 U/L      ALT 20 U/L      Alkaline Phosphatase 58 U/L      Total Protein 7 0 g/dL      Albumin 3 7 g/dL      Total Bilirubin 1 15 mg/dL      eGFR 36 ml/min/1 73sq m     Narrative:       National Kidney Disease Education Program recommendations are as follows:  GFR calculation is accurate only with a steady state creatinine  Chronic Kidney disease less than 60 ml/min/1 73 sq  meters  Kidney failure less than 15 ml/min/1 73 sq  meters  Troponin I [[id number]]  (Normal) Collected:  02/27/19 0805    Lab Status:  Final result Specimen:  Blood from Arm, Left Updated:  02/27/19 0831     Troponin I <0 02 ng/mL     CBC and differential [[id number]]  (Abnormal) Collected:  02/27/19 0805    Lab Status:  Final result Specimen:  Blood from Arm, Left Updated:  02/27/19 0821     WBC 5 85 Thousand/uL      RBC 3 69 Million/uL      Hemoglobin 11 5 g/dL      Hematocrit 36 4 %      MCV 99 fL      MCH 31 2 pg      MCHC 31 6 g/dL      RDW 13 3 %      MPV 10 9 fL      Platelets 400 Thousands/uL      nRBC 0 /100 WBCs      Neutrophils Relative 78 %      Immat GRANS % 1 %      Lymphocytes Relative 15 %      Monocytes Relative 6 %      Eosinophils Relative 0 %      Basophils Relative 0 %      Neutrophils Absolute 4 58 Thousands/µL      Immature Grans Absolute 0 03 Thousand/uL      Lymphocytes Absolute 0 85 Thousands/µL      Monocytes Absolute 0 36 Thousand/µL      Eosinophils Absolute 0 02 Thousand/µL      Basophils Absolute 0 01 Thousands/µL                  CT head without contrast   Final Result by Noelle Caruso MD (02/27 7892)      No acute intracranial abnormality  Chronic ischemic changes as described, similar from December 2018  Workstation performed: [id number]         CT cervical spine without contrast   Final Result by Noelle Caruso MD (02/27 3369)      No cervical spine fracture or traumatic malalignment  Tiny left apical pneumothorax better evaluated on CT examination of the chest, abdomen and pelvis  Please see concurrent report of chest, abdomen, and pelvis CT dictated under separate accession number               Workstation performed: [id number]         CT chest abdomen pelvis wo contrast   Final Result by Noelle Caruso MD (02/27 8353)      Fractures of the posterolateral left 7th through 11th ribs  The 8th through 11th rib fractures are slightly displaced and there is a small left apical lateral pneumothorax as well as a tiny bubble of subcutaneous emphysema in the lower lateral left    chest wall adjacent to the left 11th rib fracture  No other fractures noted  No other visceral injury noted in the chest, abdomen or pelvis  Incidental findings including aortic valvular calcification suggesting some element of aortic valvular stenosis as well as mild fusiform aneurysmal enlargement of ascending thoracic aorta measuring up to 40 mm  Also noted is cholelithiasis, colonic    diverticulosis, and a 7 mm splenic artery aneurysm  The study was marked in [facility] for immediate notification  Workstation performed: [id number]         XR chest pa & lateral (24 hours after admission)    (Results Pending)         Procedures  Procedures      Phone Consults  ED Phone Contact    ED Course                               MDM  Number of Diagnoses or Management Options  Rib fractures:   Diagnosis management comments: 66-year-old woman presents for evaluation after fall  Patient is a poor historian  She is on Eliquis  Will get a CT of her head, C-spine, chest given the changes in skin, will get a CT abdomen pelvis in the setting of back pain, tenderness in her hips, nonspecific abdominal pain    Disposition will be pending results of workup      Disposition  Final diagnoses:   Rib fractures   Pneumothorax on left     Time reflects when diagnosis was documented in both MDM as applicable and the Disposition within this note     Time User Action Codes Description Comment    2/27/2019  9:40 AM Malik Kruger [S22 39XA] Rib fractures     2/27/2019  1:20 PM Malik Hernandez Add [[id number]] Pneumothorax on left       ED Disposition     None      Follow-up Information    None         Current Discharge Medication List      CONTINUE these medications which have NOT CHANGED Details   amiodarone 200 mg tablet Take 1 tablet (200 mg total) by mouth daily with breakfast  Qty: 30 tablet, Refills: 0    Associated Diagnoses: Atrial fibrillation (HCC)      apixaban (ELIQUIS) 5 mg Take 1 tablet (5 mg total) by mouth 2 (two) times a day  Qty: 180 tablet, Refills: 3    Associated Diagnoses: Paroxysmal atrial fibrillation (HCC)      Cholecalciferol ([employer]S VITAMIN D3) 1000 units capsule Take 2 capsules (2,000 Units total) by mouth daily  Qty: 100 capsule, Refills: 0    Associated Diagnoses: Vitamin D insufficiency      docusate sodium (COLACE) 100 mg capsule Take 1 capsule (100 mg total) by mouth 2 (two) times a day  Qty: 60 capsule, Refills: 0    Associated Diagnoses: Constipation      levothyroxine 50 mcg tablet Take 50 mcg by mouth daily  Refills: 3      melatonin 3 mg Take 1 tablet (3 mg total) by mouth daily at bedtime  Qty: 30 tablet, Refills: 0    Associated Diagnoses: Insomnia      metoprolol succinate (TOPROL-XL) 25 mg 24 hr tablet Take 0 5 tablets (12 5 mg total) by mouth daily at bedtime  Qty: 30 tablet, Refills: 0    Associated Diagnoses: Atrial fibrillation (HCC)      omeprazole (PriLOSEC) 20 mg delayed release capsule Take 20 mg by mouth daily  Refills: 3      polyethylene glycol (MIRALAX) 17 g packet Take 17 g by mouth daily as needed (Constipation)  Qty: 14 each, Refills: 0    Associated Diagnoses: Constipation      pravastatin (PRAVACHOL) 40 mg tablet Take 1 tablet (40 mg total) by mouth daily with dinner  Qty: 30 tablet, Refills: 11    Associated Diagnoses: Dyslipidemia      acetaminophen (TYLENOL) 325 mg tablet Take 2-3 tabs by mouth up to 3 times per day as needed for pain  Qty: 100 tablet, Refills: 0    Associated Diagnoses: Acute pain      calcium carbonate (TUMS) 500 mg chewable tablet Chew 1 tablet (500 mg total) 2 (two) times a day with meals  Qty: 60 tablet, Refills: 0    Associated Diagnoses: Closed fracture of neck of right femur with routine healing, subsequent encounter ondansetron (ZOFRAN-ODT) 4 mg disintegrating tablet Take 1 tablet (4 mg total) by mouth 3 (three) times a day as needed for nausea  Qty: 20 tablet, Refills: 0    Associated Diagnoses: Chronic nausea           No discharge procedures on file  ED Provider  Attending physically available and evaluated Gumaro Borges I managed the patient along with the ED Attending      Electronically Signed by         Mo Gambino MD  02/27/19 5142

## 2019-02-27 NOTE — RESPIRATORY THERAPY NOTE
RT Protocol Note  Cheng Waterman 78 y o  female MRN: [id number]  Unit/Bed#: ED 29 Encounter: [id number]    Assessment    Principal Problem:    Fall from ground level  Active Problems:    Traumatic fracture of ribs with pneumothorax, left, closed, initial encounter      Home Pulmonary Medications:  none       Past Medical History:   Diagnosis Date    Atrial fibrillation ([facility] [address] )     Disease of thyroid gland     Hyperlipidemia     Hypertension     Psychiatric disorder     dementia     Social History     Socioeconomic History    Marital status: /Civil Union     Spouse name: None    Number of children: None    Years of education: None    Highest education level: None   Occupational History    None   Social Needs    Financial resource strain: None    Food insecurity:     Worry: None     Inability: None    Transportation needs:     Medical: None     Non-medical: None   Tobacco Use    Smoking status: Former Smoker     Last attempt to quit: 1960     Years since quittin 1    Smokeless tobacco: Never Used   Substance and Sexual Activity    Alcohol use: No    Drug use: No    Sexual activity: None   Lifestyle    Physical activity:     Days per week: None     Minutes per session: None    Stress: None   Relationships    Social connections:     Talks on phone: None     Gets together: None     Attends [employer] service: None     Active member of club or organization: None     Attends meetings of clubs or organizations: None     Relationship status: None    Intimate partner violence:     Fear of current or ex partner: None     Emotionally abused: None     Physically abused: None     Forced sexual activity: None   Other Topics Concern    None   Social History Narrative    None       Subjective         Objective    Physical Exam:   Assessment Type: (P) Assess only  General Appearance: (P) Awake  Respiratory Pattern: (P) Normal  Chest Assessment: (P) Chest expansion symmetrical  Bilateral Breath Sounds: (P) Diminished    Vitals:  Blood pressure 127/63, pulse 76, temperature (!) 97 4 °F (36 3 °C), temperature source Oral, resp  rate 15, weight 67 kg (147 lb 11 3 oz), SpO2 95 %  Imaging and other studies: I have personally reviewed pertinent reports  Plan    Respiratory Plan: (P) Discontinue Protocol  Airway Clearance Plan: (P) Incentive Spirometer     Resp Comments: (P) Pt admitted to ED after falling from ground level  Pt has traumatic fracture of ribs w/ pneumothorax, left  PMH includes osteoporosis, Afib and hypertension  No pulm hx listed per pt, she does not take any breathing medications at home  No resp interventions needed at this time  Will dc resp protocol and continue to follow pt via ACP and IS

## 2019-02-27 NOTE — ED ATTENDING ATTESTATION
Rai Myers DO, saw and evaluated the patient  I have discussed the patient with the resident/non-physician practitioner and agree with the resident's/non-physician practitioner's findings, Plan of Care, and MDM as documented in the resident's/non-physician practitioner's note, except where noted  All available labs and Radiology studies were reviewed  I was present for key portions of any procedure(s) performed by the resident/non-physician practitioner and I was immediately available to provide assistance  At this point I agree with the current assessment done in the Emergency Department  I have conducted an independent evaluation of this patient a history and physical is as follows:    77 yo female presents for evaluation of L sided chest pain s/p mech fall yesterday while being transferred to the commode  Pt fell onto her L side  Pt on eliquis  Pt has dementia, screams when palpated over her Cspine, L chest wall  Imp: fall on eliquis, L chest wall pain  Plan: CT head, Cspine, C/A/P        Critical Care Time  Procedures

## 2019-02-27 NOTE — H&P
H&P Exam - Emely Stands 78 y o  female MRN: [id number]  Unit/Bed#: [facility] Encounter: [id number]    Assessment/Plan   Trauma Alert: Evaluation  Model of Arrival: Self  Trauma Team: Attending Noé Banegas and Residents Rod  Consultants: None    Trauma Active Problems:   L rib fractures, 7-11  Small L pneumothorax    Trauma Plan:   Trauma admission  Rib fx protocol, acute pain consult  Geriatrics consult  PT/OT    Chief Complaint: L chest pain    History of Present Illness   HPI:  Solo Gonzalez is a 78 y o  female with a history of dementia, a fib on eliquis who presents as a consult from [address] [place] [address] ED for multiple L-sided rib fractures and small apical pneumothorax 2/2 a fall  Per , patient normally walks with a walker and his assistance  She fell while attempting to sit in a recliner yesterday  Presented today with L chest pain and R hip pain  CTAP was significant for the findings above  Mechanism:Fall    Review of Systems   Constitutional: Negative for chills, fatigue and fever  HENT: Negative for congestion and sore throat  Eyes: Negative for visual disturbance  Respiratory: Negative for cough and shortness of breath  Cardiovascular: Positive for chest pain  Gastrointestinal: Negative for abdominal pain, diarrhea, nausea and vomiting  Endocrine: Negative for polyuria  Genitourinary: Negative for difficulty urinating and dysuria  Musculoskeletal: Positive for gait problem  Negative for arthralgias and neck pain  R hip pain   Skin: Negative for rash  Neurological: Positive for weakness  Negative for dizziness, light-headedness and headaches  All other systems reviewed and are negative  Historical Information   History is unobtainable from the patient due to dementia    Efforts to obtain history included the following sources: family member, other medical personnel    Past Medical History:   Diagnosis Date    At[address] [address] )     Disease of thyroid gland     Hyperlipidemia     Hypertension     Psychiatric disorder     dementia     Past Surgical History:   Procedure Laterality Date    HIP ARTHROPLASTY Right 2018    Procedure: ARTHROPLASTY HIP TOTAL;  Surgeon: Dk Grant MD;  [facility];  Service: Orthopedics    HYSTERECTOMY       Social History   Social History     Substance and Sexual Activity   Alcohol Use No     Social History     Substance and Sexual Activity   Drug Use No     Social History     Tobacco Use   Smoking Status Former Smoker    Last attempt to quit: Lisa Burks Years since quittin 1   Smokeless Tobacco Never Used       There is no immunization history on file for this patient  Last Tetanus: not applicable  Family History: Non-contributory  Unable to obtain/limited by dementia      Meds/Allergies   all current active meds have been reviewed    No Known Allergies      PHYSICAL EXAM    Objective   Vitals:   First set: Temperature: (!) 97 4 °F (36 3 °C) (19)  Pulse: 93 (19)  Respirations: 18 (19)  Blood Pressure: 161/74 (19)    Primary Survey:   (A) Airway: intact  (B) Breathing: equal bilateral  (C) Circulation: Pulses:   normal  (D) Disabliity:  GCS Total:  15  (E) Expose:  Completed    Secondary Survey: (Click on Physical Exam tab above)  Physical Exam   Constitutional: She appears well-developed  No distress  Elderly, frail   HENT:   Head: Normocephalic and atraumatic  Eyes: Pupils are equal, round, and reactive to light  EOM are normal    Neck: Normal range of motion  Neck supple  Cardiovascular: Normal rate  An irregularly irregular rhythm present  Murmur heard  Systolic murmur is present with a grade of 3/6  Pulmonary/Chest: Effort normal and breath sounds normal  No respiratory distress  She exhibits tenderness  She exhibits no crepitus  Abdominal: Soft  Bowel sounds are normal  There is no tenderness  Musculoskeletal: Normal range of motion  Legs:  Neurological: She is alert  No sensory deficit  GCS eye subscore is 4  GCS verbal subscore is 5  GCS motor subscore is 6  Skin: Skin is warm, dry and intact  Psychiatric: She has a normal mood and affect  Nursing note and vitals reviewed        Invasive Devices     Peripheral Intravenous Line            Peripheral IV 02/27/19 Right Hand less than 1 day                Lab Results:   BMP/CMP:   Lab Results   Component Value Date    SODIUM 139 02/27/2019    K 3 9 02/27/2019     02/27/2019    CO2 29 02/27/2019    BUN 23 02/27/2019    CREATININE 1 38 (H) 02/27/2019    CALCIUM 9 0 02/27/2019    AST 23 02/27/2019    ALT 20 02/27/2019    ALKPHOS 58 02/27/2019    EGFR 36 02/27/2019   , CBC:   Lab Results   Component Value Date    WBC 5 85 02/27/2019    HGB 11 5 02/27/2019    HCT 36 4 02/27/2019    MCV 99 (H) 02/27/2019     02/27/2019    MCH 31 2 02/27/2019    MCHC 31 6 02/27/2019    RDW 13 3 02/27/2019    MPV 10 9 02/27/2019    NRBC 0 02/27/2019    and Coagulation: No results found for: PT, INR, APTT  Imaging/EKG Studies: CT Scan Head: neg, CT Scan C-Spine: neg, CT Chest: 7-11 rib fx, small L apical pneumothorax, bubble of subcutaneous emphysema, CT Scan Abdomen/Pelvis: no acute traumatic abdormality      Code Status: Level 1 - Full Code  Advance Directive and Living Will:      Power of :    POLST:

## 2019-02-27 NOTE — RESPIRATORY THERAPY NOTE
RT Protocol Note  Bethel Olvera 78 y o  female MRN: [id number]  Unit/Bed#: ED 29 Encounter: [id number]    Assessment    Principal Problem:    Fall from ground level  Active Problems:    Traumatic fracture of ribs with pneumothorax, left, closed, initial encounter      Home Pulmonary Medications:       Past Medical History:   Diagnosis Date    Atrial fibrillation ([address] )     Disease of thyroid gland     Hyperlipidemia     Hypertension     Psychiatric disorder     dementia     Social History     Socioeconomic History    Marital status: /Civil Union     Spouse name: None    Number of children: None    Years of education: None    Highest education level: None   Occupational History    None   Social Needs    Financial resource strain: None    Food insecurity:     Worry: None     Inability: None    Transportation needs:     Medical: None     Non-medical: None   Tobacco Use    Smoking status: Former Smoker     Last attempt to quit: 1960     Years since quittin 1    Smokeless tobacco: Never Used   Substance and Sexual Activity    Alcohol use: No    Drug use: No    Sexual activity: None   Lifestyle    Physical activity:     Days per week: None     Minutes per session: None    Stress: None   Relationships    Social connections:     Talks on phone: None     Gets together: None     Attends Baptism service: None     Active member of club or organization: None     Attends meetings of clubs or organizations: None     Relationship status: None    Intimate partner violence:     Fear of current or ex partner: None     Emotionally abused: None     Physically abused: None     Forced sexual activity: None   Other Topics Concern    None   Social History Narrative    None       Subjective         Objective    Physical Exam:   Assessment Type: Assess only  General Appearance: Awake  Respiratory Pattern: Normal  Chest Assessment: Chest expansion symmetrical  Bilateral Breath Sounds: Diminished    Vitals:  Blood pressure 127/63, pulse 76, temperature (!) 97 4 °F (36 3 °C), temperature source Oral, resp  rate 15, weight 67 kg (147 lb 11 3 oz), SpO2 95 %  Imaging and other studies: I have personally reviewed pertinent reports  Plan    Respiratory Plan: Discontinue Protocol  Airway Clearance Plan: Incentive Spirometer     Resp Comments: (P) Pt crying and refusing to try IS at this time  Will cont to follow pt via ACP and encourage IS

## 2019-02-27 NOTE — CONSULTS
Consultation - Brook Santiago 78 y o  female MRN: [id number]  [facility] Encounter: [id number]      Assessment/Plan  1  Fall  Recurrent  Fall precautions  Home meds unremarkable  Continue with vitamin D3 1000 international units daily patient's medication regimen    2  Ambulatory dysfunction  Patient ambulating with rolled walker  Patient is starting to have difficulty following commands per , and falling frequently at home  PT, OT  Patient will need rehab, and possibly placement, as she has had a significant decline in functioning in cognition recently, unsure if  is going to be able to handle patient at home by himself, he has not been sleeping lately secondary to patient's disruptions overnight    3  Cognitive impairment  Likely dementia, worsening  Patient now hallucinating  Over past 2 weeks patient has not been sleeping well  Patient also having issues with anxiety  Will start patient on Remeron 7 5 mg q h s  Continue supportive care  [facility] to see if patient could have follow-up appointment sooner than may    4  Delirium  Worse at night, with hallucinations  May be process of her dementia  Agree with gabapentin 100 mg q h s , melatonin 3 mg q h s  Continue with Tylenol 975 mg Q 8  Continue with Lidoderm patch  Continue with p r n  Oxycodone as ordered  Continue with bowel regimen as ordered   Patient at risk for developing delirium, delirium preventing tactics advised  Redirect unwanted patient behaviors as first line tx  Reorient patient frequently  Avoid deliriogenic meds including tramadol, benzodiazepines, benadryl  Good sleep hygiene important, limit night time interruptions  Encourage patient to stay awake during the day  Ensure adequate hydration/nutrition  Mobilize often   Use Zyprexa 2 5 mg IM p r n  Q 8 for agitation, combativeness    5   Deconditioning  Secondary to injuries, hospital stay, and chronic deconditioning at home  Mobilize frequently to prevent further decline  PT, OT  Patient may need long-term placement    6  Anxiety  Will add Remeron 7 5 mg q h s     7  Left 7th through 11th rib fractures  Continue with pain meds as ordered, trauma following    History of Present Illness   Physician Requesting Consult: Paula Ji MD  Reason for Consult / Principal Problem: isar  Hx and PE limited by: n/a  HPI: Kathya Jain is a 78y o  year old female with past medical history delirium, cognitive impairment, ambulatory dysfunction, history of fall, deconditioning, who presents to [facility] after falling  Patient was found to have left 7th through 11th rib fractures, small left pneumothorax  Patient was admitted under trauma team, geriatrics was consulted  Over the past 2 weeks patient has progressively declined per family at bedside  Her dementia is worsening, she is hallucinating, at times the hallucinations are concerning to her  She is having issues with anxiety  She is not sleeping well at night  Prior to arrival she lives at home with her   They had home OT, PT twice a week per daughter at bedside  She has been needing assistance with ADLs, IADLs  Ambulates with rolled walker  Upon exam patient is alert and oriented to self  Family reports for the past 2 weeks has been baseline, as her mental status has been gradually getting worse  They report they have tried to see Neurology, and have appointment scheduled with geriatrics and May  [facility] called [facility] to see if patient could be seen sooner given decline in her status          Consults    Review of Systems   Unable to perform ROS: Dementia       Historical Information   Past Medical History:   Diagnosis Date    [address] [address]     Disease of thyroid gland     Hyperlipidemia     Hypertension     Psychiatric disorder     dementia     Past Surgical History:   Procedure Laterality Date    HIP ARTHROPLASTY Right 2018    Procedure: ARTHROPLASTY HIP TOTAL;  Surgeon: James Luna MD;  [facility];  Service: Orthopedics    HYSTERECTOMY       Social History   Social History     Substance and Sexual Activity   Alcohol Use No     Social History     Substance and Sexual Activity   Drug Use No     Social History     Tobacco Use   Smoking Status Former Smoker    Last attempt to quit: [address] Years since quittin 1   Smokeless Tobacco Never Used         Family History: non-contributory    Meds/Allergies   Current meds:   Current Facility-Administered Medications   Medication Dose Route Frequency    acetaminophen (TYLENOL) tablet 975 mg  975 mg Oral Q8H [facility]    [START ON 2019] amiodarone tablet 200 mg  200 mg Oral Daily With Breakfast    apixaban (ELIQUIS) tablet 5 mg  5 mg Oral BID    calcium carbonate (TUMS) chewable tablet 500 mg  500 mg Oral BID With Meals    cholecalciferol (VITAMIN D3) tablet 1,000 Units  1,000 Units Oral Daily    docusate sodium (COLACE) capsule 100 mg  100 mg Oral BID    escitalopram (LEXAPRO) tablet 5 mg  5 mg Oral Daily    gabapentin (NEURONTIN) capsule 100 mg  100 mg Oral HS    HYDROmorphone (DILAUDID) injection 0 2 mg  0 2 mg Intravenous Q4H PRN    levothyroxine tablet 50 mcg  50 mcg Oral Early Morning    lidocaine (LIDODERM) 5 % patch 1 patch  1 patch Topical Daily    melatonin tablet 3 mg  3 mg Oral HS    metoprolol succinate (TOPROL-XL) 24 hr tablet 12 5 mg  12 5 mg Oral HS    OLANZapine (ZyPREXA) IM injection 2 5 mg  2 5 mg Intramuscular Q8H PRN    oxyCODONE (ROXICODONE) IR tablet 2 5 mg  2 5 mg Oral Q4H PRN    oxyCODONE (ROXICODONE) IR tablet 5 mg  5 mg Oral Q4H PRN    pantoprazole (PROTONIX) EC tablet 40 mg  40 mg Oral Early Morning    pravastatin (PRAVACHOL) tablet 40 mg  40 mg Oral Daily With Dinner      Current PTA meds:  Medications Prior to Admission   Medication    amiodarone 200 mg tablet    apixaban (ELIQUIS) 5 mg    Cholecalciferol ([employer] VITAMIN D3) 1000 units capsule    docusate sodium (COLACE) 100 mg capsule    levothyroxine 50 mcg tablet    melatonin 3 mg    metoprolol succinate (TOPROL-XL) 25 mg 24 hr tablet    omeprazole (PriLOSEC) 20 mg delayed release capsule    polyethylene glycol (MIRALAX) 17 g packet    pravastatin (PRAVACHOL) 40 mg tablet    acetaminophen (TYLENOL) 325 mg tablet    calcium carbonate (TUMS) 500 mg chewable tablet    ondansetron (ZOFRAN-ODT) 4 mg disintegrating tablet        No Known Allergies    Objective   Vitals: Blood pressure 127/63, pulse 76, temperature (!) 97 4 °F (36 3 °C), temperature source Oral, resp  rate 15, height 5' 5" (1 651 m), weight 67 kg (147 lb 11 3 oz), SpO2 95 %  ,Body mass index is 24 58 kg/m²  Physical Exam   Constitutional: She appears well-developed  No distress  HENT:   Head: Normocephalic and atraumatic  Mouth/Throat: No oropharyngeal exudate  Eyes: Conjunctivae and EOM are normal  No scleral icterus  Neck: Neck supple  Cardiovascular: Normal rate  Pulmonary/Chest: Effort normal and breath sounds normal  She has no wheezes  She has no rales  Abdominal: Soft  Bowel sounds are normal  She exhibits no distension  Musculoskeletal: Normal range of motion  She exhibits no edema  Neurological: She is alert  Skin: Skin is warm and dry  Psychiatric: She is not agitated and not actively hallucinating  Cognition and memory are impaired  She expresses impulsivity and inappropriate judgment  She exhibits abnormal recent memory and abnormal remote memory  Patient alert and oriented to self  Does not know where she is  Does not know month or year  Family at bedside reports patient's cognition has been like this for the past 2 weeks, she has also been hallucinating  Suspect it is process of her dementia worsening, but may be delirium as well given poor sleep    Patient has also been anxious per family at bedside   Nursing note and vitals reviewed  Lab Results:   Results from last 7 days   Lab Units 02/27/19  0805   WBC Thousand/uL 5 85   HEMOGLOBIN g/dL 11 5   HEMATOCRIT % 36 4   PLATELETS Thousands/uL 164        Results from last 7 days   Lab Units 02/27/19  0805   POTASSIUM mmol/L 3 9   CHLORIDE mmol/L 105   CO2 mmol/L 29   BUN mg/dL 23   CREATININE mg/dL 1 38*   CALCIUM mg/dL 9 0   ALK PHOS U/L 58   ALT U/L 20   AST U/L 23       Imaging Studies: I have personally reviewed pertinent reports  EKG, Pathology, and Other Studies: I have personally reviewed pertinent reports      VTE Prophylaxis: Sequential compression device (Venodyne)     Code Status: Level 1 - Full Code

## 2019-02-27 NOTE — CONSULTS
Inpatient consult to Acute Pain Service (see Comments)  Consult performed by: IRMA Garcia  Consult ordered by: Garcia Samaniego MD          Consultation - Anesthesia Acute Pain Management   Lani Adhikari 78 y o  female MRN: [id number]  Unit/Bed#: [facility] 618-01 Encounter: [id number]               Consult Time:  39 minutes    [facility]; Patient aged 72 years & older:  [address] No  [facility] was not discussed  Assessment/Plan     Assessment:   · 78year old female having acute pain located to left rib cage 2/2 trauma sustained after falling at home  · Positive for fractures of the posterolateral left 7th through 11th ribs  The 8th through 11th rib fractures are slightly displaced  · Opioid naive  · Geriatric frail patient with active dementia    Plan:   1  Acute pain:  Discontinue  · Oxycodone IR 2 5 mg p o  q 4 hours p r n  Moderate pain, hold for sedation  · Oxycodone IR 5 mg p o  q 4 hours p r n  Severe pain, hold for sedation    Continue  · Tylenol 975 mg p o  q  8 hours scheduled (denies liver DX, AST23/ALT20)  · Gabapentin 100 mg p o  q  H s  · Lidoderm patch   · Dilaudid 0 2 mg IV q 4 hours p r n  Use before transferring/rolling patient if needed  2  Rib fracture protocol: patient is not a candidate for pain control via epidural at this time 2/2 daily Eliquis use, in addition, pain appears controlled on minimal opioid use  Patient is pulling 250-500 on IS  I do not feel poor use is 2/2 uncontrolled pain  Rather patient's baseline dementia is inferring with her ability to understand the use of IS  Monitor patient closely  Encourage and assist patient with IS every hour  Patient is on RA 95%      3  PDMP REVIEWED- OPIOID NAIVE     History of Present Illness    Admit Date:  2/27/2019  Hospital Day:  0 days  Primary Service:  Trauma  Attending Provider:  Luis Miguel Bonds MD  Physician Requesting Consult: Luis Miguel Bonds MD  Reason for Consult / Principal Problem: ACUTE PAIN HPI: Cely Paris is a 78y o  year old female who presents acute pain located to left rib cage 2/2 trauma sustained after falling at home  Found positive for fractures of the posterolateral left 7th through 11th ribs  The 8th through 11th rib fractures are slightly displaced  Opioid naive  She is a geriatric frail patient with active dementia  Unable to participate in exam   Pain appears controlled  Patient answers all questions inappropriately  See recs above       Pain History:unable 2/2 baseline dementia   Pain History:   Current pain location(s):   Pain Scale:    Severity:    Quality:   Aggravating and alleviating factors:   Pain Relief Goal:  5    Historical Information   Past Medical History:   Diagnosis Date    Atrial fibrillation (HCC)     Disease of thyroid gland     Hyperlipidemia     Hypertension     Psychiatric disorder     dementia     Past Surgical History:   Procedure Laterality Date    HIP ARTHROPLASTY Right 2018    Procedure: ARTHROPLASTY HIP TOTAL;  Surgeon: Kathy Arteaga MD;  [facility];  Service: Orthopedics    HYSTERECTOMY       Social History   Social History     Substance and Sexual Activity   Alcohol Use Never    Frequency: Never     Social History     Substance and Sexual Activity   Drug Use No     Social History     Tobacco Use   Smoking Status Former Smoker    Last attempt to quit: 1960    Years since quittin 1   Smokeless Tobacco Never Used     Family History: non-contributory    Meds/Allergies   Current Facility-Administered Medications   Medication Dose Route Frequency    acetaminophen (TYLENOL) tablet 975 mg  975 mg Oral Q8H [facility]    [START ON 2019] amiodarone tablet 200 mg  200 mg Oral Daily With Breakfast    apixaban (ELIQUIS) tablet 5 mg  5 mg Oral BID    calcium carbonate (TUMS) chewable tablet 500 mg  500 mg Oral BID With Meals    cholecalciferol (VITAMIN D3) tablet 1,000 Units  1,000 Units Oral Daily    docusate sodium (COLACE) capsule 100 mg  100 mg Oral BID    gabapentin (NEURONTIN) capsule 100 mg  100 mg Oral HS    HYDROmorphone (DILAUDID) injection 0 2 mg  0 2 mg Intravenous Q4H PRN    levothyroxine tablet 50 mcg  50 mcg Oral Early Morning    lidocaine (LIDODERM) 5 % patch 1 patch  1 patch Topical Daily    melatonin tablet 3 mg  3 mg Oral HS    metoprolol succinate (TOPROL-XL) 24 hr tablet 12 5 mg  12 5 mg Oral HS    mirtazapine (REMERON) tablet 7 5 mg  7 5 mg Oral HS    OLANZapine (ZyPREXA) IM injection 2 5 mg  2 5 mg Intramuscular Q8H PRN    oxyCODONE (ROXICODONE) IR tablet 2 5 mg  2 5 mg Oral Q4H PRN    oxyCODONE (ROXICODONE) IR tablet 5 mg  5 mg Oral Q4H PRN    pantoprazole (PROTONIX) EC tablet 40 mg  40 mg Oral Early Morning    pravastatin (PRAVACHOL) tablet 40 mg  40 mg Oral Daily With Dinner     Medications Prior to Admission   Medication    amiodarone 200 mg tablet    apixaban (ELIQUIS) 5 mg    Cholecalciferol ([employer] VITAMIN D3) 1000 units capsule    docusate sodium (COLACE) 100 mg capsule    levothyroxine 50 mcg tablet    melatonin 3 mg    metoprolol succinate (TOPROL-XL) 25 mg 24 hr tablet    omeprazole (PriLOSEC) 20 mg delayed release capsule    polyethylene glycol (MIRALAX) 17 g packet    pravastatin (PRAVACHOL) 40 mg tablet    acetaminophen (TYLENOL) 325 mg tablet    calcium carbonate (TUMS) 500 mg chewable tablet    ondansetron (ZOFRAN-ODT) 4 mg disintegrating tablet         No Known Allergies    Objective   Temp:  [97 4 °F (36 3 °C)] 97 4 °F (36 3 °C)  HR:  [70-93] 76  Resp:  [15-20] 15  BP: (120-161)/(58-74) 127/63  No intake or output data in the 24 hours ending 02/27/19 1333    Lab Results: I have personally reviewed pertinent labs  Counseling / Coordination of Care  Total floor / unit time spent today 45 minutes minutes  Greater than 50% of total time was spent with the patient and / or family counseling and / or coordination of care     Please note that the APS provides consultative services regarding pain management only  With the exception of ketamine and epidural infusions and except when indicated, final decisions regarding starting or changing doses of analgesic medications are at the discretion of the consulting service  Off hours consultation and/or medication management is generally not available      IRMA Rodas  February 27, 2019  1:33 PM

## 2019-02-27 NOTE — PLAN OF CARE
Problem: Potential for Falls  Goal: Patient will remain free of falls  Description  INTERVENTIONS:  - Assess patient frequently for physical needs  -  Identify cognitive and physical deficits and behaviors that affect risk of falls    -  [place] fall precautions as indicated by assessment   - Educate patient/family on patient safety including physical limitations  - Instruct patient to call for assistance with activity based on assessment  - Modify environment to reduce risk of injury  - Consider OT/PT consult to assist with strengthening/mobility  Outcome: Progressing     Problem: PAIN - ADULT  Goal: Verbalizes/displays adequate comfort level or baseline comfort level  Description  Interventions:  - Encourage patient to monitor pain and request assistance  - Assess pain using appropriate pain scale  - Administer analgesics based on type and severity of pain and evaluate response  - Implement non-pharmacological measures as appropriate and evaluate response  - Consider cultural and social influences on pain and pain management  - Notify physician/advanced practitioner if interventions unsuccessful or patient reports new pain  Outcome: Progressing     Problem: INFECTION - ADULT  Goal: Absence or prevention of progression during hospitalization  Description  INTERVENTIONS:  - Assess and monitor for signs and symptoms of infection  - Monitor lab/diagnostic results  - Monitor all insertion sites, i e  indwelling lines, tubes, and drains  - Monitor endotracheal (as able) and nasal secretions for changes in amount and color  - Wilder appropriate cooling/warming therapies per order  - Administer medications as ordered  - Instruct and encourage patient and family to use good hand hygiene technique  - Identify and instruct in appropriate isolation precautions for identified infection/condition  Outcome: Progressing  Goal: Absence of fever/infection during neutropenic period  Description  INTERVENTIONS:  - Monitor WBC  - Implement neutropenic guidelines  Outcome: Progressing     Problem: SAFETY ADULT  Goal: Maintain or return to baseline ADL function  Description  INTERVENTIONS:  -  Assess patient's ability to carry out ADLs; assess patient's baseline for ADL function and identify physical deficits which impact ability to perform ADLs (bathing, care of mouth/teeth, toileting, grooming, dressing, etc )  - Assess/evaluate cause of self-care deficits   - Assess range of motion  - Assess patient's mobility; develop plan if impaired  - Assess patient's need for assistive devices and provide as appropriate  - Encourage maximum independence but intervene and supervise when necessary  ¯ Involve family in performance of ADLs  ¯ Assess for home care needs following discharge   ¯ Request OT consult to assist with ADL evaluation and planning for discharge  ¯ Provide patient education as appropriate  Outcome: Progressing  Goal: Maintain or return mobility status to optimal level  Description  INTERVENTIONS:  - Assess patient's baseline mobility status (ambulation, transfers, stairs, etc )    - Identify cognitive and physical deficits and behaviors that affect mobility  - Identify mobility aids required to assist with transfers and/or ambulation (gait belt, sit-to-stand, lift, walker, cane, etc )  - [place] fall precautions as indicated by assessment  - Record patient progress and toleration of activity level on Mobility SBAR; progress patient to next Phase/Stage  - Instruct patient to call for assistance with activity based on assessment  - Request Rehabilitation consult to assist with strengthening/weightbearing, etc   Outcome: Progressing     Problem: DISCHARGE PLANNING  Goal: Discharge to home or other facility with appropriate resources  Description  INTERVENTIONS:  - Identify barriers to discharge w/patient and caregiver  - Arrange for needed discharge resources and transportation as appropriate  - Identify discharge learning needs (meds, wound care, etc )  - Arrange for interpretive services to assist at discharge as needed  - Refer to Case Management Department for coordinating discharge planning if the patient needs post-hospital services based on physician/advanced practitioner order or complex needs related to functional status, cognitive ability, or social support system  Outcome: Progressing     Problem: Knowledge Deficit  Goal: Patient/family/caregiver demonstrates understanding of disease process, treatment plan, medications, and discharge instructions  Description  Complete learning assessment and assess knowledge base    Interventions:  - Provide teaching at level of understanding  - Provide teaching via preferred learning methods  Outcome: Progressing

## 2019-02-27 NOTE — ED NOTES
Charge nurse aware of pts arrival to floor   Receiving nurse can call [phone] when available      Charleen Simon RN  02/27/19 6506

## 2019-02-28 ENCOUNTER — APPOINTMENT (INPATIENT)
Dept: RADIOLOGY | Facility: HOSPITAL | Age: 79
DRG: 199 | End: 2019-02-28
Payer: MEDICARE

## 2019-02-28 LAB
ANION GAP SERPL CALCULATED.3IONS-SCNC: 4 MMOL/L (ref 4–13)
BASOPHILS # BLD AUTO: 0.02 THOUSANDS/ΜL (ref 0–0.1)
BASOPHILS NFR BLD AUTO: 0 % (ref 0–1)
BUN SERPL-MCNC: 21 MG/DL (ref 5–25)
CALCIUM SERPL-MCNC: 9 MG/DL (ref 8.3–10.1)
CHLORIDE SERPL-SCNC: 106 MMOL/L (ref 100–108)
CO2 SERPL-SCNC: 30 MMOL/L (ref 21–32)
CREAT SERPL-MCNC: 1.09 MG/DL (ref 0.6–1.3)
EOSINOPHIL # BLD AUTO: 0.06 THOUSAND/ΜL (ref 0–0.61)
EOSINOPHIL NFR BLD AUTO: 1 % (ref 0–6)
ERYTHROCYTE [DISTWIDTH] IN BLOOD BY AUTOMATED COUNT: 13.2 % (ref 11.6–15.1)
GFR SERPL CREATININE-BSD FRML MDRD: 48 ML/MIN/1.73SQ M
GLUCOSE SERPL-MCNC: 95 MG/DL (ref 65–140)
HCT VFR BLD AUTO: 35.7 % (ref 34.8–46.1)
HGB BLD-MCNC: 11.6 G/DL (ref 11.5–15.4)
IMM GRANULOCYTES # BLD AUTO: 0.02 THOUSAND/UL (ref 0–0.2)
IMM GRANULOCYTES NFR BLD AUTO: 0 % (ref 0–2)
LYMPHOCYTES # BLD AUTO: 1.3 THOUSANDS/ΜL (ref 0.6–4.47)
LYMPHOCYTES NFR BLD AUTO: 22 % (ref 14–44)
MCH RBC QN AUTO: 31.6 PG (ref 26.8–34.3)
MCHC RBC AUTO-ENTMCNC: 32.5 G/DL (ref 31.4–37.4)
MCV RBC AUTO: 97 FL (ref 82–98)
MONOCYTES # BLD AUTO: 0.39 THOUSAND/ΜL (ref 0.17–1.22)
MONOCYTES NFR BLD AUTO: 7 % (ref 4–12)
NEUTROPHILS # BLD AUTO: 4.18 THOUSANDS/ΜL (ref 1.85–7.62)
NEUTS SEG NFR BLD AUTO: 70 % (ref 43–75)
NRBC BLD AUTO-RTO: 0 /100 WBCS
PLATELET # BLD AUTO: 154 THOUSANDS/UL (ref 149–390)
PMV BLD AUTO: 11.2 FL (ref 8.9–12.7)
POTASSIUM SERPL-SCNC: 3.3 MMOL/L (ref 3.5–5.3)
RBC # BLD AUTO: 3.67 MILLION/UL (ref 3.81–5.12)
SODIUM SERPL-SCNC: 140 MMOL/L (ref 136–145)
WBC # BLD AUTO: 5.97 THOUSAND/UL (ref 4.31–10.16)

## 2019-02-28 PROCEDURE — G8978 MOBILITY CURRENT STATUS: HCPCS

## 2019-02-28 PROCEDURE — G8987 SELF CARE CURRENT STATUS: HCPCS

## 2019-02-28 PROCEDURE — G8988 SELF CARE GOAL STATUS: HCPCS

## 2019-02-28 PROCEDURE — 71046 X-RAY EXAM CHEST 2 VIEWS: CPT

## 2019-02-28 PROCEDURE — 99232 SBSQ HOSP IP/OBS MODERATE 35: CPT | Performed by: INTERNAL MEDICINE

## 2019-02-28 PROCEDURE — 85025 COMPLETE CBC W/AUTO DIFF WBC: CPT | Performed by: EMERGENCY MEDICINE

## 2019-02-28 PROCEDURE — 97163 PT EVAL HIGH COMPLEX 45 MIN: CPT

## 2019-02-28 PROCEDURE — G8979 MOBILITY GOAL STATUS: HCPCS

## 2019-02-28 PROCEDURE — 80048 BASIC METABOLIC PNL TOTAL CA: CPT | Performed by: EMERGENCY MEDICINE

## 2019-02-28 PROCEDURE — 99232 SBSQ HOSP IP/OBS MODERATE 35: CPT | Performed by: PHYSICIAN ASSISTANT

## 2019-02-28 PROCEDURE — 97167 OT EVAL HIGH COMPLEX 60 MIN: CPT

## 2019-02-28 RX ORDER — POTASSIUM CHLORIDE 20 MEQ/1
40 TABLET, EXTENDED RELEASE ORAL ONCE
Status: COMPLETED | OUTPATIENT
Start: 2019-02-28 | End: 2019-02-28

## 2019-02-28 RX ADMIN — PRAVASTATIN SODIUM 40 MG: 40 TABLET ORAL at 17:13

## 2019-02-28 RX ADMIN — ACETAMINOPHEN 975 MG: 325 TABLET ORAL at 06:40

## 2019-02-28 RX ADMIN — GABAPENTIN 100 MG: 100 CAPSULE ORAL at 22:27

## 2019-02-28 RX ADMIN — LIDOCAINE 1 PATCH: 50 PATCH CUTANEOUS at 09:15

## 2019-02-28 RX ADMIN — MIRTAZAPINE 7.5 MG: 15 TABLET, FILM COATED ORAL at 22:26

## 2019-02-28 RX ADMIN — POTASSIUM CHLORIDE 40 MEQ: 1500 TABLET, EXTENDED RELEASE ORAL at 09:15

## 2019-02-28 RX ADMIN — DOCUSATE SODIUM 100 MG: 100 CAPSULE, LIQUID FILLED ORAL at 09:15

## 2019-02-28 RX ADMIN — DOCUSATE SODIUM 100 MG: 100 CAPSULE, LIQUID FILLED ORAL at 17:13

## 2019-02-28 RX ADMIN — AMIODARONE HYDROCHLORIDE 200 MG: 200 TABLET ORAL at 06:40

## 2019-02-28 RX ADMIN — ACETAMINOPHEN 975 MG: 325 TABLET ORAL at 22:26

## 2019-02-28 RX ADMIN — APIXABAN 5 MG: 5 TABLET, FILM COATED ORAL at 17:13

## 2019-02-28 RX ADMIN — LEVOTHYROXINE SODIUM 50 MCG: 50 TABLET ORAL at 06:40

## 2019-02-28 RX ADMIN — METOPROLOL SUCCINATE 12.5 MG: 25 TABLET, EXTENDED RELEASE ORAL at 22:27

## 2019-02-28 RX ADMIN — APIXABAN 5 MG: 5 TABLET, FILM COATED ORAL at 09:15

## 2019-02-28 RX ADMIN — VITAMIN D, TAB 1000IU (100/BT) 1000 UNITS: 25 TAB at 09:15

## 2019-02-28 RX ADMIN — MELATONIN 3 MG: at 22:27

## 2019-02-28 RX ADMIN — PANTOPRAZOLE SODIUM 40 MG: 40 TABLET, DELAYED RELEASE ORAL at 06:40

## 2019-02-28 NOTE — PLAN OF CARE
Problem: PHYSICAL THERAPY ADULT  Goal: Performs mobility at highest level of function for planned discharge setting  See evaluation for individualized goals  Description  Treatment/Interventions: Functional transfer training, LE strengthening/ROM, Therapeutic exercise, Endurance training, Cognitive reorientation, Bed mobility, Spoke to nursing  Equipment Recommended: Dajuan Prime, Wheelchair       See flowsheet documentation for full assessment, interventions and recommendations  Note:   Prognosis: Guarded  Problem List: Decreased strength, Decreased range of motion, Decreased endurance, Impaired balance, Decreased mobility, Decreased coordination, Decreased cognition, Impaired judgement, Decreased safety awareness, Pain  Assessment: Pt is 78 y o  female seen for PT evaluation s/p admit to [facility] on 2/27/19  Two pt identifiers were used to confirm via pt bracelet as pt unable to confirm verbally  Pt presented s/p fall which occurred on 2/27/19  Pt was admitted with a primary dx of: fall from ground level  PT now consulted for assessment of mobility and d/c needs  Pts current co morbidities effecting treatment include: hypothyroidism, atrial fibrillation, dyslipidemia, HTN, cognitive impairment, severe aortic stenosis, osteoporosis and personal factors including 3 ILYA home environment  Pt currently lives at home with her   Pts current clinical presentation is Unstable/ Unpredictable (high complexity) due to Ongoing medical management for primary dx, Increased reliance on more restrictive AD compared to baseline, decreased activity tolerance compared to baseline, fall risk, increased assistance needed from caregiver at current time, severe cognitive deficits, continuous pulse oximetry monitoring, multiple lines, decline in overall functional mobility status  Prior to admission, pt received help from  for all functional mobility tasks per documentation   Upon evaluation, pt currently is requiring max A x 2 for bed mobility; max A x 2 for transfers  Pt currently not safe to attempt ambulation trials  Pt presents at PT eval functioning below baseline and currently w/ overall mobility deficits secondary to: decreased strength, decreased endurance, impaired balance, impaired cognition  Pt currently at a fall risk secondary to impairments listed above  Based on PT evaluation, pt will continue to benefit from skilled acute PT interventions to address stated impairments; to maximize functional mobility; for ongoing pt/ family training; and DME needs  At conclusion of PT session pt was left seated in bedside chair, chair alarm intact and PCA present assisting pt with breakfast  Provided pt education regarding PT plan to improve functional mobility status  PT is currently recommending post acute inpatient rehab  PT will continue to follow during hospital stay  Recommendation: Post acute IP rehab     PT - OK to Discharge: Yes(to rehab when medically stable)    See flowsheet documentation for full assessment

## 2019-02-28 NOTE — PROGRESS NOTES
Progress Note - Vic Lai 1940, 78 y o  female MRN: [id number]    Unit/Bed#: [facility] 893-94 Encounter: [id number]    Primary Care Provider: Mitzy Jane DO   Date and time admitted to hospital: 2/27/2019  7:03 AM        Traumatic fracture of ribs with pneumothorax, left, closed, initial encounter  Assessment & Plan  -follow-up repeat chest x-ray today   -continue rib fracture protocol  -monitor saturations  -continue aggressive pulmonary toilet  -incentive spirometer at bedside    Physical deconditioning  Assessment & Plan  -geriatrics consulted, appreciate input  -added Remeron last night  -continue frequent reorientation redirection  -able to illustrate her name and date of birth today on exam    Hypothyroidism  Assessment & Plan  -restarted on home dose of levothyroxine    Atrial fibrillation ([address] )  Assessment & Plan  -restarted on Eliquis 5 mg b i d   -restarted on amiodarone 200 mg daily  -metoprolol 24 hour tablet 12 5 mg at bedtime    * Fall from ground level  Assessment & Plan  -PT and OT eval  -out of bed to chair today    DVT prophylaxis: SCDs and Eliquis  PT and OT: eval and treat    Disposition:     Bedside nurse rounds completed with nurse [facility]  Code status:  Level 1 - Full Code    Consultants: Geriatrics, PT, OT, APS    Is the patient 72 years or older?: YES:    1  Before the illness or injury that brought you to the Emergency, did you need someone to help you on a regular basis? 1=Yes   2  Since the illness or injury that brought you to the Emergency, have you needed more help than usual to take care of yourself? 1=Yes   3  Have you been hospitalized for one or more nights during the past 6 months (excluding a stay in the Emergency Department)? 1=Yes   4  In general, do you see well? 0=Yes   5  In general, do you have serious problems with your memory? 1=Yes   6  Do you take more than three different medications everyday?  1=Yes   TOTAL   5     Did you order a geriatric consult if the score was 2 or greater?: yes    SUBJECTIVE:     Transfer from:  Not a transfer  Outside Films Received: not applicable  Tertiary Exam Due on:  02/28/2019    Mechanism of Injury:Fall    Chief Complaint: "No new complaints '    HPI/Last 24 hour events:  Patient is out of bed to a chair  She is alert and oriented to self  She states that she did sleep last night  Reports her pain is not well controlled  However patient is noted to be confused and demented at baseline  Does not appear to be in any distress at the time of evaluation      Active medications:           Current Facility-Administered Medications:     acetaminophen (TYLENOL) tablet 975 mg, 975 mg, Oral, Q8H [facility], 975 mg at 02/28/19 0640    amiodarone tablet 200 mg, 200 mg, Oral, Daily With Breakfast, 200 mg at 02/28/19 0640    apixaban (ELIQUIS) tablet 5 mg, 5 mg, Oral, BID, 5 mg at 02/28/19 0915    calcium carbonate (TUMS) chewable tablet 500 mg, 500 mg, Oral, BID With Meals, 500 mg at 02/28/19 0915    cholecalciferol (VITAMIN D3) tablet 1,000 Units, 1,000 Units, Oral, Daily, 1,000 Units at 02/28/19 0915    docusate sodium (COLACE) capsule 100 mg, 100 mg, Oral, BID, 100 mg at 02/28/19 0915    gabapentin (NEURONTIN) capsule 100 mg, 100 mg, Oral, HS, 100 mg at 02/27/19 2142    HYDROmorphone (DILAUDID) injection 0 2 mg, 0 2 mg, Intravenous, Q4H PRN, 0 2 mg at 02/27/19 1023    levothyroxine tablet 50 mcg, 50 mcg, Oral, Early Morning, 50 mcg at 02/28/19 0640    lidocaine (LIDODERM) 5 % patch 1 patch, 1 patch, Topical, Daily, 1 patch at 02/27/19 1023    melatonin tablet 3 mg, 3 mg, Oral, HS, 3 mg at 02/27/19 2142    metoprolol succinate (TOPROL-XL) 24 hr tablet 12 5 mg, 12 5 mg, Oral, HS, 12 5 mg at 02/27/19 2141    mirtazapine (REMERON) tablet 7 5 mg, 7 5 mg, Oral, HS, 7 5 mg at 02/27/19 2141    OLANZapine (ZyPREXA) IM injection 2 5 mg, 2 5 mg, Intramuscular, Q8H PRN    oxyCODONE (ROXICODONE) IR tablet 2 5 mg, 2 5 mg, Oral, Q4H PRN   oxyCODONE (ROXICODONE) IR tablet 5 mg, 5 mg, Oral, Q4H PRN    pantoprazole (PROTONIX) EC tablet 40 mg, 40 mg, Oral, Early Morning, 40 mg at 02/28/19 0640    pravastatin (PRAVACHOL) tablet 40 mg, 40 mg, Oral, Daily With Dinner, 40 mg at 02/27/19 1736      OBJECTIVE:     Vitals:   Vitals:    02/28/19 0717   BP: 118/60   Pulse: 71   Resp:    Temp:    SpO2: 94%       Physical Exam:   GENERAL APPEARANCE:  No acute distress, pleasantly confused  NEURO:  Cranial nerves 2-12 grossly intact, GCS 14, 1 off for confusion  HEENT:  Normocephalic  CV:  Regular rate and rhythm  LUNGS:  CTA bilaterally, no rales or rhonchi  GI:  Bowel sounds x4, nontender  :  No Ahumada in place  MSK:  +2 pulses on extremities, no edema noted  SKIN:  Warm, dry, intact      I/O:   I/O       02/26 0701 - 02/27 0700 02/27 0701 - 02/28 0700 02/28 0701 - 03/01 0700    P  O   290     Total Intake(mL/kg)  290 (5)     Urine (mL/kg/hr)  150     Total Output  150     Net  +140            Unmeasured Urine Occurrence  1 x           Invasive Devices: Invasive Devices     Peripheral Intravenous Line            Peripheral IV 02/28/19 Right Forearm less than 1 day                  Imaging:   Ct Chest Abdomen Pelvis Wo Contrast    Result Date: 2/27/2019  Impression: Fractures of the posterolateral left 7th through 11th ribs  The 8th through 11th rib fractures are slightly displaced and there is a small left apical lateral pneumothorax as well as a tiny bubble of subcutaneous emphysema in the lower lateral left chest wall adjacent to the left 11th rib fracture  No other fractures noted  No other visceral injury noted in the chest, abdomen or pelvis  Incidental findings including aortic valvular calcification suggesting some element of aortic valvular stenosis as well as mild fusiform aneurysmal enlargement of ascending thoracic aorta measuring up to 40 mm  Also noted is cholelithiasis, colonic diverticulosis, and a 7 mm splenic artery aneurysm   The study was marked in EPIC for immediate notification  Workstation performed: [id number]     Ct Head Without Contrast    Result Date: 2/27/2019  Impression: No acute intracranial abnormality  Chronic ischemic changes as described, similar from December 2018  Workstation performed: [id number]     Ct Cervical Spine Without Contrast    Result Date: 2/27/2019  Impression: No cervical spine fracture or traumatic malalignment  Tiny left apical pneumothorax better evaluated on CT examination of the chest, abdomen and pelvis  Please see concurrent report of chest, abdomen, and pelvis CT dictated under separate accession number    Workstation performed: [id number]       Labs:   CBC:   Lab Results   Component Value Date    WBC 5 97 02/28/2019    HGB 11 6 02/28/2019    HCT 35 7 02/28/2019    MCV 97 02/28/2019     02/28/2019    MCH 31 6 02/28/2019    MCHC 32 5 02/28/2019    RDW 13 2 02/28/2019    MPV 11 2 02/28/2019    NRBC 0 02/28/2019     CMP:   Lab Results   Component Value Date     02/28/2019    CO2 30 02/28/2019    BUN 21 02/28/2019    CREATININE 1 09 02/28/2019    CALCIUM 9 0 02/28/2019    EGFR 48 02/28/2019

## 2019-02-28 NOTE — PHYSICIAN ADVISOR
Current patient class: Inpatient  The patient is currently on Hospital Day: 2 at [facility]      The patient was admitted to the hospital at [id number] [id number] on 2/27/19 for the following diagnosis:  Rib fractures [S22 39XA]  Unspecified multiple injuries, initial encounter [T07  XXXA]       There is documentation in the medical record of an expected length of stay of at least 2 midnights  The patient is therefore expected to satisfy the 2 midnight benchmark and given the 2 midnight presumption is appropriate for INPATIENT ADMISSION  Given this expectation of a satisfying stay, CMS instructs us that the patient is most often appropriate for inpatient admission under part A provided medical necessity is documented in the chart  After review of the relevant documentation, labs, vital signs and test results, the patient is appropriate for INPATIENT ADMISSION  Admission to the hospital as an inpatient is a complex decision making process which requires the practitioner to consider the patients presenting complaint, history and physical examination and all relevant testing  With this in mind, in this case, the patient was deemed appropriate for INPATIENT ADMISSION  After review of the documentation and testing available at the time of the admission I concur with this clinical determination of medical necessity  Rationale is as follows: The patient is a 70-year-old female who presented to the hospital on February 27, 2019 with traumatic fracture of the ribs and left pneumothorax  The patient has physical deconditioning  She will require repeat chest x-ray today and will be continued on the rib fracture protocol  Oxygen saturations are being closely monitored  Her pain was not well controlled today  The patient does have some confusion but has dementia at baseline  The patient is not yet considered medically stable for discharge   Inpatient level care remains appropriate      The patients vitals on arrival were ED Triage Vitals   Temperature Pulse Respirations Blood Pressure SpO2   02/27/19 0709 02/27/19 0708 02/27/19 0708 02/27/19 0708 02/27/19 0708   (!) 97 4 °F (36 3 °C) 93 18 161/74 94 %      Temp Source Heart Rate Source Patient Position - Orthostatic VS BP Location FiO2 (%)   02/27/19 0709 02/27/19 0708 02/27/19 0708 02/27/19 0708 --   Oral Monitor Lying Left arm       Pain Score       02/27/19 1344       Worst Possible Pain           Past Medical History:   Diagnosis Date    Atrial fibrillation (HCC)     Disease of thyroid gland     Hyperlipidemia     Hypertension     Psychiatric disorder     dementia     Past Surgical History:   Procedure Laterality Date    HIP ARTHROPLASTY Right 12/4/2018    Procedure: ARTHROPLASTY HIP TOTAL;  Surgeon: Jared Aparicio MD;  [facility]: [facility];  Service: Orthopedics    HYSTERECTOMY             Consults have been placed to:   IP CONSULT TO TRAUMA SURGERY  IP CONSULT TO ACUTE PAIN SERVICE  IP CONSULT TO GERONTOLOGY  IP CONSULT TO CASE MANAGEMENT    Vitals:    02/27/19 2141 02/27/19 2331 02/28/19 0714 02/28/19 0717   BP: 134/75 137/76  118/60   BP Location:  Left arm     Pulse: 86 85 73 71   Resp:  18 18    Temp:  97 5 °F (36 4 °C) 98 1 °F (36 7 °C)    TempSrc:  Oral     SpO2: 97% 97% 97% 94%   Weight:       Height:           Most recent labs:    Recent Labs     02/27/19  0805 02/28/19  0509   WBC 5 85 5 97   HGB 11 5 11 6   HCT 36 4 35 7    154   K 3 9 3 3*   CALCIUM 9 0 9 0   BUN 23 21   CREATININE 1 38* 1 09   TROPONINI <0 02  --    AST 23  --    ALT 20  --    ALKPHOS 58  --        Scheduled Meds:  Current Facility-Administered Medications:  acetaminophen 975 mg Oral Q8H [facility] Frida Mark MD   amiodarone 200 mg Oral Daily With Breakfast Frida Mark MD   apixaban 5 mg Oral BID Frida Mark MD   calcium carbonate 500 mg Oral BID With Meals Frida Mark MD   cholecalciferol 1,000 Units Oral Daily Juanjose Berger MD   docusate sodium 100 mg Oral BID Juanjose Berger MD   gabapentin 100 mg Oral HS Juanjose Berger MD   HYDROmorphone 0 2 mg Intravenous Q4H PRN Juanjose Berger MD   levothyroxine 50 mcg Oral Early Morning Juanjose Berger MD   lidocaine 1 patch Topical Daily Juanjose Berger MD   melatonin 3 mg Oral HS Juanjose Berger MD   metoprolol succinate 12 5 mg Oral HS Juanjose Berger MD   mirtazapine 7 5 mg Oral HS Sarai Ge PA-C   OLANZapine 2 5 mg Intramuscular Q8H PRN Ngoc Herminia, PA-C   oxyCODONE 2 5 mg Oral Q4H PRN Juanjose Berger MD   oxyCODONE 5 mg Oral Q4H PRN Juanjose Berger MD   pantoprazole 40 mg Oral Early Morning Juanjose Berger MD   pravastatin 40 mg Oral Daily With Mell Shay MD     Continuous Infusions:   PRN Meds:  HYDROmorphone    OLANZapine    oxyCODONE    oxyCODONE    Surgical procedures (if appropriate):

## 2019-02-28 NOTE — INCIDENTAL FINDINGS
The following findings require follow up:  Radiographic finding   Finding:   Incidental findings includin  Aortic valvular calcification suggesting some element of aortic valvular stenosis as well as mild fusiform aneurysmal enlargement of ascending thoracic aorta measuring up to 40 mm  Also noted is   2  Cholelithiasis  3   Colonic Diverticulosis  4  7 mm splenic artery aneurysm       Follow up required: Yes   Follow up should be done within 2-4 week(s)    Please notify the following clinician to assist with the follow up:   Primary care provider

## 2019-02-28 NOTE — MALNUTRITION/BMI
This medical record reflects one or more clinical indicators suggestive of malnutrition and/or morbid obesity  Malnutrition Findings:   Malnutrition type: Chronic illness(severe pro/esther malnutriiton r/t inadequate oral intake as evidenced by 13% unintentional weight loss since 12/8/18, consuming < 75% energy intake compared to energy needs for > 1 month  Treated with diet)  Degree of Malnutrition: Other severe protein calorie malnutrition  Malnutrition Characteristics: Inadequate energy, Weight loss    BMI Findings: Body mass index is 21 3 kg/m²  See Nutrition note dated 2/28/19 for additional details  Completed nutrition assessment is viewable in the nutrition documentation

## 2019-02-28 NOTE — PROGRESS NOTES
Progress Note - Acute Pain Service    Vic Lai 78 y o  female MRN: [id number]  Unit/Bed#: [facility] 035-65 Encounter: [id number]      Assessment:  · 78year old female having acute pain located to left rib cage 2/2 trauma sustained after falling at home  · Positive for fractures of the posterolateral left 7th through 11th ribs   The 8th through 11th rib fractures are slightly displaced  · Opioid naive  · Geriatric frail patient with active dementia     Plan:   Discontinue  · All oxycodone  · IV dilaudid     Continue  · Tylenol 975 mg p o  q  8 hours scheduled (denies liver DX, AST23/ALT20)  · Gabapentin 100 mg p o  q  H s  · Lidoderm patch     APS sign off  Thank you for the Consult  Please call  / [phone] ( [facility] [phone]) with any further questions    Pain History  24 hour history: In room with patient  Patient is OOB to chair  Appears well  On RA, SaO2 is 94-97%  Patient has active dementia  Unable to tell me where she is at  She is unable to discuss her pain or answer questions appropriately  According to her daughter patient is at her mental status baseline  I recommend no opioids 2/2 advanced age, active dementia, and patient is high risk for hospital complications  Pain appears controlled as patient displays no distress  24 hour opoid requirement: NONE   Meds/Allergies   all current active meds have been reviewed    No Known Allergies    Objective     Temp:  [97 5 °F (36 4 °C)-98 1 °F (36 7 °C)] 98 1 °F (36 7 °C)  HR:  [71-87] 71  Resp:  [15-18] 18  BP: (118-157)/(60-87) 118/60    Physical Exam   Constitutional: She is oriented to person, place, and time  She appears well-developed  No distress  HENT:   Head: Normocephalic  Eyes: Pupils are equal, round, and reactive to light  Neck: Normal range of motion  Cardiovascular: Normal rate  Pulmonary/Chest: Effort normal    Abdominal: Soft  Musculoskeletal: She exhibits edema, tenderness and deformity     Neurological: She is alert and oriented to person, place, and time  Skin: Skin is warm  Psychiatric: She has a normal mood and affect  Her behavior is normal  Judgment and thought content normal        Lab Results: I have personally reviewed pertinent labs      Levels of Care: Level 1 = 15 minutes'

## 2019-02-28 NOTE — SOCIAL WORK
Pt is known to the trauma service as she was a recent d/c  CM spoke to pt's dtr as it was noted for the pt to be confused  Pt lives with her  in a 1 story home which has 3STE  Pt doesn't drive and requires some assistance with her ADLs  Pt owns a cane, wheelchair and shower seat  Pt doesn't have a living will  Pt's pharmacy is [facility] on [address] in [place]  Pt reports no hx of mental health or substance abuse  Pt was [facility] at [facility]  Pt also used [place] for VNA  Family would like [facility] for SNF if pt is recommended  CM reviewed d/c planning process including the following: identifying help at home, patient preference for d/c planning needs, Discharge Lounge, [facility] Meds to Bed program, availability of treatment team to discuss questions or concerns patient and/or family may have regarding understanding medications and recognizing signs and symptoms once discharged  CM also encouraged patient to follow up with all recommended appointments after discharge  Patient advised of importance for patient and family to participate in managing patients medical well being

## 2019-02-28 NOTE — ASSESSMENT & PLAN NOTE
-geriatrics consulted, appreciate input  -added Antony last night  -continue frequent reorientation redirection  -able to illustrate her name and date of birth today on exam

## 2019-02-28 NOTE — ASSESSMENT & PLAN NOTE
-restarted on Eliquis 5 mg b i d   -restarted on amiodarone 200 mg daily  -metoprolol 24 hour tablet 12 5 mg at bedtime

## 2019-02-28 NOTE — PLAN OF CARE
Problem: OCCUPATIONAL THERAPY ADULT  Goal: Performs self-care activities at highest level of function for planned discharge setting  See evaluation for individualized goals  Description  Treatment Interventions: ADL retraining, Functional transfer training, Endurance training, Cognitive reorientation, Patient/family training, Equipment evaluation/education, Compensatory technique education, Activityengagement          See flowsheet documentation for full assessment, interventions and recommendations  Note:   Limitation: Decreased ADL status, Decreased Safe judgement during ADL, Decreased cognition, Decreased endurance, Decreased self-care trans, Decreased high-level ADLs  Prognosis: Fair  Assessment: Pt is a 78 y o  female who was admitted to [facility] on 2/27/2019 with Fall from ground level   Pt's problem list also includes PMH of dementia and afib, thyroid disease, hld, htn  At baseline pt was completing adls with assist from spouse -ambulates w/o device - spouse manages all iadls  Pt lives with spouse in 1 story home with 3STE  Currently pt requires max assist for overall ADLS and max a x 2 (per physical therapy notes) for functional mobility/transfers  Pt currently presents with impairments in the following categories -steps to enter environment, behavioral pattern, difficulty performing ADLS, difficulty performing IADLS , limited insight into deficits, compliance, flat affect, decreased initiation and engagement  and health management  activity tolerance, endurance, standing balance/tolerance, sitting balance/tolerance, arousal, memory, insight, safety , judgement , attention , sequencing , task initiation  and task termination    These impairments, as well as pt's fatigue, pain, impulsivity and risk for falls  limit pt's ability to safely engage in all baseline areas of occupation, includingeating, grooming, bathing, dressing, toileting, functional mobility/transfers, community mobility, house maintenance, social participation  and leisure activities  From OT standpoint, recommend inpt rehab with likely need for alternate living arrangements in future pending spouse's ability to provide ongoing care in home  OT will continue to follow to address the below stated goals        OT Discharge Recommendation: Short Term Rehab

## 2019-02-28 NOTE — PHYSICAL THERAPY NOTE
PHYSICAL THERAPY EVALUATION          Patient Name: Maria Eugenia HEREDIA Date: 2/28/2019     Past Medical History:   Diagnosis Date    Atrial fibrillation ([address] )      Disease of thyroid gland      Hyperlipidemia      Hypertension      Psychiatric disorder       dementia              02/28/19 0845   Note Type   Note type Eval only   Pain Assessment   Pain [facility] Repositioned; Ambulation/increased activity   Pain Rating: FLACC (Rest) - Face 1   Pain Rating: FLACC (Rest) - Legs 1   Pain Rating: FLACC (Rest) - Activity 2   Pain Rating: FLACC (Rest) - Cry 1   Pain Rating: FLACC (Rest) - Consolability 1   Score: FLACC (Rest) 6   Pain Rating: FLACC (Activity) - Face 2   Pain Rating: FLACC (Activity) - Legs 2   Pain Rating: FLACC (Activity) - Activity 2   Pain Rating: FLACC (Activity) - Cry 2   Pain Rating: FLACC (Activity) - Consolability 2   Score: FLACC (Activity) 10   Home Living   Type of Home House   Home Layout One level;Stairs to enter with rails  (3 ILYA)   Bathroom Shower/Tub Tub/shower unit   [address]; Wheelchair-manual   Additional Comments Home and PLOF information obtained from previous documentation as pt is poor historian   Prior Function   Level of [place] Needs assistance with IADLs; Needs assistance with ADLs and functional mobility   Lives With Spouse   Receives Help From Family   ADL Assistance Needs assistance   IADLs Needs assistance   Falls in the last 6 months 1 to 4   Vocational Retired   Restrictions/Precautions   Wells Starla Bearing Precautions Per Order No   Other Precautions Cognitive; Chair Alarm; Bed Alarm;Multiple lines; Fall Risk;Pain   General   Family/Caregiver Present No   Cognition   Overall Cognitive Status Impaired   Arousal/Participation Responsive   Orientation Level Disoriented to place; Disoriented to time;Disoriented to situation  (pt reports she knows her name and  but refused to tell PT)   Following Commands Follows one step commands inconsistently   RUE Assessment   RUE Assessment WFL  (forward reach)   LUE Assessment   LUE Assessment WFL  (forward reach)   RLE Assessment   RLE Assessment X   Strength RLE   RLE Overall Strength 2/5   LLE Assessment   LLE Assessment X   Strength LLE   LLE Overall Strength 2/5   Coordination   Movements are Fluid and Coordinated 0   Coordination and Movement Description guarded posture, legs flexed throughout movements   Bed Mobility   Rolling R 2  Maximal assistance   Additional items Assist x 1; Increased time required;Verbal cues;LE management   Rolling L 2  Maximal assistance   Additional items Assist x 1; Increased time required;Verbal cues;LE management   Supine to Sit 2  Maximal assistance   Additional items Assist x 2; Increased time required;Verbal cues;LE management   Additional Comments Pt left sitting in bedside chair at termination of session, chair alarm intact, all needs within reach, PCA assisting setting pt up for breakfast   Transfers   Sit to Stand 2  Maximal assistance   Additional items Assist x 2; Increased time required;Verbal cues   Stand to Sit 2  Maximal assistance   Additional items Assist x 2; Increased time required;Verbal cues   Stand pivot 2  Maximal assistance   Additional items Assist x 2; Increased time required;Verbal cues   Ambulation/Elevation   Gait pattern Not appropriate   Balance   Static Sitting Poor +   Dynamic Sitting Poor   Static Standing Poor -   Dynamic Standing Poor -   Ambulatory Zero   Endurance Deficit   Endurance Deficit Yes   Endurance Deficit Description cognitive deficits, high anxiety with movements   Activity Tolerance   Activity Tolerance Treatment limited secondary to agitation   Medical Staff Made Aware PCA present to assist during session   Nurse Made Aware SHAUN Raines cleared pt for PT eval   Assessment   Prognosis Guarded   Problem List Decreased strength;Decreased range of motion;Decreased endurance; Impaired balance;Decreased mobility; Decreased coordination;Decreased cognition; Impaired judgement;Decreased safety awareness;Pain   Assessment Pt is 78 y o  female seen for PT evaluation s/p admit [facility] on 2/27/19  Two pt identifiers were used to confirm via pt bracelet as pt unable to confirm verbally  Pt presented s/p fall which occurred on 2/27/19  Pt was admitted with a primary dx of: fall from ground level  PT now consulted for assessment of mobility and d/c needs  Pts current co morbidities effecting treatment include: hypothyroidism, atrial fibrillation, dyslipidemia, HTN, cognitive impairment, severe aortic stenosis, osteoporosis and personal factors including 3 ILYA home environment  Pt currently lives at home with her   Pts current clinical presentation is Unstable/ Unpredictable (high complexity) due to Ongoing medical management for primary dx, Increased reliance on more restrictive AD compared to baseline, decreased activity tolerance compared to baseline, fall risk, increased assistance needed from caregiver at current time, severe cognitive deficits, continuous pulse oximetry monitoring, multiple lines, decline in overall functional mobility status  Prior to admission, pt received help from  for all functional mobility tasks per documentation  Upon evaluation, pt currently is requiring max A x 2 for bed mobility; max A x 2 for transfers  Pt currently not safe to attempt ambulation trials  Pt presents at PT eval functioning below baseline and currently w/ overall mobility deficits secondary to: decreased strength, decreased endurance, impaired balance, impaired cognition  Pt currently at a fall risk secondary to impairments listed above    Based on PT evaluation, pt will continue to benefit from skilled acute PT interventions to address stated impairments; to maximize functional mobility; for ongoing pt/ family training; and DME needs  At conclusion of PT session pt was left seated in bedside chair, chair alarm intact and PCA present assisting pt with breakfast  Provided pt education regarding PT plan to improve functional mobility status  PT is currently recommending post acute inpatient rehab  PT will continue to follow during hospital stay  Goals   Patient Goals none expressed   STG Expiration Date 03/10/19   Short Term Goal #1 1  Pt will increased strength by 1 grade in order to increase functional independence with transfers  2  Pt will increase balance grade by 1 in order to improve safety  3  Pt will improve transfer ability to mod A x 1 to increase functional independence and decrease caregiver burden  4  Pt will perform bed mobility with min A to increase functional independence and decrease caregiver burden  5  Pt will be able to appropriate follow 1 step commands 50% of PT session  6  PT to see when appropriate to attempt ambulation trial    Treatment Day 1   Plan   Treatment/Interventions Functional transfer training;LE strengthening/ROM; Therapeutic exercise; Endurance training;Cognitive reorientation; Bed mobility;Spoke to nursing   PT Frequency Other (Comment)  (3-5x/wk)   Recommendation   Recommendation Post acute IP rehab   Equipment Recommended Santiago Cranker; Wheelchair   PT - OK to Discharge Yes  (to rehab when medically stable)   Modified McCormick Scale   Modified McCormick Scale 5   Barthel Index   Feeding 5   Bathing 0   Grooming Score 0   Dressing Score 0   Bladder Score 5   Bowels Score 5   Toilet Use Score 5   Transfers (Bed/Chair) Score 5   Mobility (Level Surface) Score 0   Stairs Score 0   Barthel Index Score 25     Collin Reece PT, DPT

## 2019-02-28 NOTE — UTILIZATION REVIEW
Initial Clinical Review    Admission: Date/Time/Statement: 2/27/19 @ 0941     Orders Placed This Encounter   Procedures    Inpatient Admission     Standing Status:   Standing     Number of Occurrences:   1     Order Specific Question:   Admitting Physician     Answer:   Bin Blount [[id number]]     Order Specific Question:   Level of Care     Answer:   Med Surg [16]     Order Specific Question:   Estimated length of stay     Answer:   More than 2 Midnights     Order Specific Question:   Certification     Answer:   I certify that inpatient services are medically necessary for this patient for a duration of greater than two midnights  See H&P and MD Progress Notes for additional information about the patient's course of treatment  ED: Date/Time/Mode of Arrival:   ED Arrival Information     Expected Arrival Acuity Means of Arrival Escorted By Service Admission Type    2/27/2019 2/27/2019 07:03 [facility]) Trauma Emergency    Arrival Complaint    fall        Chief Complaint:   Chief Complaint   Patient presents with    Fall     pt had a fall from standing yesterday landing on her L side  pt reports increasing L rib pain  pt hx of dementia  pt is on eliquis      History of Illness: 60-year-old woman comes in for evaluation after fall  ED Vital Signs:   ED Triage Vitals   Temperature Pulse Respirations Blood Pressure SpO2   02/27/19 0709 02/27/19 0708 02/27/19 0708 02/27/19 0708 02/27/19 0708   (!) 97 4 °F (36 3 °C) 93 18 161/74 94 %      Temp Source Heart Rate Source Patient Position - Orthostatic VS BP Location FiO2 (%)   02/27/19 0709 02/27/19 0708 02/27/19 0708 02/27/19 0708 --   Oral Monitor Lying Left arm       Pain Score       02/27/19 1344       Worst Possible Pain        Wt Readings from Last 1 Encounters:   02/27/19 58 1 kg (128 lb)     Vital Signs (abnormal): WNL    Pertinent Labs/Diagnostic Test Results:   CT Head - No acute intracranial abnormality       CT Spine Cervical - Tiny left apical pneumothorax better evaluated on CT examination of the chest, abdomen and pelvis  CT Chest/ Abd/ Pelvis - Fractures of the posterolateral left 7th through 11th ribs  The 8th through 11th rib fractures are slightly displaced and there is a small left apical lateral pneumothorax as well as a tiny bubble of subcutaneous emphysema in the lower lateral left chest wall adjacent to the left 11th rib fracture  Creat = 1 38  K = 3 3     ED Treatment:   Medication Administration from 02/27/2019 0703 to 02/27/2019 1150       Date/Time Order Dose Route Action     02/27/2019 1023 lidocaine (LIDODERM) 5 % patch 1 patch 1 patch Topical Medication Applied     02/27/2019 1023 HYDROmorphone (DILAUDID) injection 0 2 mg 0 2 mg Intravenous Given        Past Medical/Surgical History:    Active Ambulatory Problems     Diagnosis Date Noted    Microscopic hematuria 04/05/2018    Dyslipidemia 01/31/2014    Hypertension 01/31/2014    Palpitations 01/31/2014    Atrial fibrillation ([facility] ) 05/26/2018    Hypothyroidism 05/26/2018    Cognitive impairment 05/26/2018    MCI (mild cognitive impairment) 06/06/2018    Gait disturbance 06/06/2018    Closed [facility]ter[facility] right[facility] 12/03/2018    Severe aortic stenosis 12/03/2018    Dehydration 12/03/2018    Closed fracture of neck of right femur ([facility] ) 12/03/2018    Fall 12/03/2018    Ambulatory dysfunction 12/03/2018    Physical deconditioning 12/03/2018    Osteoporosis 12/05/2018    Atrial fibrillation with rapid ventricular response ([facility] ) 12/05/2018    NSTEMI (non-ST elevated myocardial infarction) ([facility] ) 12/05/2018    Acute blood loss anemia 12/05/2018    Fracture of femoral neck, right ([address] ) 12/08/2018    Acute pain 12/08/2018    Delirium 12/10/2018    Onychomycosis 12/10/2018    Positional lightheadedness 12/10/2018    Chronic nausea 12/10/2018    Insomnia 12/15/2018    Anxiety 12/15/2018    Vitamin D insufficiency 12/24/2018    Constipation 12/24/2018     Resolved Ambulatory Problems     Diagnosis Date Noted    No Resolved Ambulatory Problems     Past Medical History:   Diagnosis Date    At[facility] fibrillation ([facility] )     Disease of thyroid gland     Hyperlipidemia     Hypertension     Psychiatric disorder      Admitting Diagnosis: Rib fractures [S22 39XA]  Unspecified multiple injuries, initial encounter [T07  XXXA]     Age/Sex: 78 y o  female     Assessment/Plan:   66y Female to ED with left chest pain, multiple left sided rib fractures and small apical pneumothorax secondary to fall  Presented today with L chest pain and R hip pain       Trauma Active Problems:   L rib fractures, 7-11  Small L pneumothorax     Plan:   Rib fx protocol, acute pain consult  Geriatrics consult  PT/OT      Admission Orders:  Gerontology cons  Acute Pain Service cons  PT/OT eval and treat    Scheduled Meds:   Current Facility-Administered Medications:  acetaminophen 975 mg Oral Q8H [address]   amiodarone 200 mg Oral Daily With Breakfast   apixaban 5 mg Oral BID   calcium carbonate 500 mg Oral BID With Meals   cholecalciferol 1,000 Units Oral Daily   docusate sodium 100 mg Oral BID   gabapentin 100 mg Oral HS   levothyroxine 50 mcg Oral Early Morning   lidocaine 1 patch Topical Daily   melatonin 3 mg Oral HS   metoprolol succinate 12 5 mg Oral HS   mirtazapine 7 5 mg Oral HS   pantoprazole 40 mg Oral Early Morning   pravastatin 40 mg Oral Daily With Dinner     Continuous Infusions:    PRN Meds: HYDROmorphone    OLANZapine    oxyCODONE

## 2019-02-28 NOTE — ASSESSMENT & PLAN NOTE
-follow-up repeat chest x-ray today   -continue rib fracture protocol  -monitor saturations  -continue aggressive pulmonary toilet  -incentive spirometer at bedside

## 2019-02-28 NOTE — OCCUPATIONAL THERAPY NOTE
[address]  Evaluation     Patient Name: Adriana Zuleta  IQEXG'Y Date: 2/28/2019  Problem List  Patient Active Problem List   Diagnosis    Microscopic hematuria    Dyslipidemia    Hypertension    Palpitations    [facility])    Hypothyroidism    Cognitive impairment    MCI (mild cognitive impairment)    Gait disturbance    Closed intertrochanteric fracture of hip, right, initial encounter ([facility] )    Severe aortic stenosis    Dehydration    Closed fracture of neck of right femur ([facility] )    Fall    Ambulatory dysfunction    Physical deconditioning    Osteoporosis    Atrial fibrillation with rapid ventricular response ([facility])    NSTEMI (non-ST elevated myocardial infarction) ([facility])    Acute blood loss anemia    Fracture of femoral neck, right ([facility])    Acute pain    Delirium    Onychomycosis    Positional lightheadedness    Chronic nausea    Insomnia    Anxiety    Vitamin D insufficiency    Constipation    Fall from ground level    Traumatic fracture of ribs with pneumothorax, left, closed, initial encounter     Past Medical History  Past Medical History:   Diagnosis Date    Atrial fibrillation ([facility] )     Disease of thyroid gland     Hyperlipidemia     Hypertension     Psychiatric disorder     dementia     Past Surgical History  Past Surgical History:   Procedure Laterality Date    HIP ARTHROPLASTY Right 12/4/2018    Procedure: ARTHROPLASTY HIP TOTAL;  Surgeon: Amadou Bunn MD;  [facility]: [facility];  Service: Orthopedics    HYSTERECTOMY             02/28/19 0945   Note Type   Note type Eval/Treat   Restrictions/Precautions   Weight Bearing Precautions Per Order No   Other Precautions Cognitive; Chair Alarm; Bed Alarm; Fall Risk   Pain Assessment   Pain Assessment FLACC   Pain Location Rib cage   Pain Orientation Left   Hospital Pain Intervention(s) Repositioned; Ambulation/increased activity; Emotional support   Pain Rating: FLACC (Rest) - Face 0   Pain Rating: FLACC (Rest) - Legs 0   Pain Rating: FLACC (Rest) - Activity 0   Pain Rating: FLACC (Rest) - Cry 0   Pain Rating: FLACC (Rest) - Consolability 0   Score: FLACC (Rest) 0   Pain Rating: FLACC (Activity) - Face 1   Pain Rating: FLACC (Activity) - Legs 1   Pain Rating: FLACC (Activity) - Activity 1   Pain Rating: FLACC (Activity) - Cry 1   Pain Rating: FLACC (Activity) - Consolability 2   Score: FLACC (Activity) 6   Home Living   Type of Home House   Home Layout One level;Stairs to enter with rails   Bathroom Shower/Tub Tub/shower unit   Home Equipment Walker;Cane;Wheelchair-manual   Prior Function   Level of [place] Needs assistance with IADLs; Needs assistance with ADLs and functional mobility   Lives With Spouse   Receives Help From Family   ADL Assistance Needs assistance   IADLs Needs assistance   Falls in the last 6 months 1 to 4   Vocational Retired   Lifestyle   Autonomy spouse has been assisting pt with adls pta (increased assist required recently) ambulates w/o ad - spouse manages all iadls   Reciprocal Relationships supportive family however ? if spouse is able to manage pt's care needs in home    Service to Others retired   Intrinsic Gratification unable to id   Subjective   Subjective "i'm all wet" - pt dropped water cup in her lap   ADL   Eating Assistance 4  Minimal Assistance   Eating Deficit   (difficulty holding and manipulating cup)   Grooming Assistance 3  Moderate Assistance   UB Bathing Assistance 2  Maximal Assistance   LB [address] 2  Maximal [address] 2  [address]; Thread LUE;Pull around back; Fasteners   LB Dressing Assistance 2  Maximal Assistance   LB Dressing Deficit Don/doff R sock; Don/doff L sock   Bed Mobility   Additional Comments pt oob in chair on approach    Transfers   Additional Comments pt demonstrated ability to lift buttocks from chair to assist in removal of soiled gown however would not stand from chair despite several attempts 2* pain - returned at later time to re-try mobility w/o success - pt becoming more agitated with ongoing attempts to have her stand up from chair to mobilize therefore further mobility deferred   Activity Tolerance   Activity Tolerance Patient limited by fatigue;Patient limited by pain;Treatment limited secondary to medical complications (Comment); Treatment limited secondary to agitation   RUE Assessment   RUE Assessment WFL   LUE Assessment   LUE Assessment WFL   Cognition   Overall Cognitive Status Impaired   Arousal/Participation Arousable; Uncooperative   Attention Difficulty attending to directions   Orientation Level Oriented to person;Disoriented to place; Disoriented to time;Disoriented to situation   Memory Decreased long term memory;Decreased recall of biographical information;Decreased short term memory;Decreased recall of precautions;Decreased recall of recent events   Following Commands Follows one step commands inconsistently   Assessment   Limitation Decreased ADL status; Decreased Safe judgement during ADL;Decreased cognition;Decreased endurance;Decreased self-care trans;Decreased high-level ADLs   Prognosis Fair   Assessment Pt is a 78 y o  female who was admitted to [facility] on 2/27/2019 with Fall from ground level   Pt's problem list also includes PMH of dementia and afib, thyroid disease, hld, htn  At baseline pt was completing adls with assist from spouse -ambulates w/o device - spouse manages all iadls  Pt lives with spouse in 1 story home with 3STE  Currently pt requires max assist for overall ADLS and max a x 2 (per physical therapy notes) for functional mobility/transfers   Pt currently presents with impairments in the following categories -steps to enter environment, behavioral pattern, difficulty performing ADLS, difficulty performing IADLS , limited insight into deficits, compliance, flat affect, decreased initiation and engagement  and health management  activity tolerance, endurance, standing balance/tolerance, sitting balance/tolerance, arousal, memory, insight, safety , judgement , attention , sequencing , task initiation  and task termination   These impairments, as well as pt's fatigue, pain, impulsivity and risk for falls  limit pt's ability to safely engage in all baseline areas of occupation, includingeating, grooming, bathing, dressing, toileting, functional mobility/transfers, community mobility, house maintenance, social participation  and leisure activities  From OT standpoint, recommend inpt rehab with likely need for alternate living arrangements in future pending spouse's ability to provide ongoing care in home  OT will continue to follow to address the below stated goals  Goals   Patient Goals none stated 2* cognitive impairment    LTG Time Frame 10-14   Long Term Goal #1 refer to established goals below   Plan   Treatment Interventions ADL retraining;Functional transfer training; Endurance training;Cognitive reorientation;Patient/family training;Equipment evaluation/education; Compensatory technique education; Activityengagement   Goal Expiration Date 03/14/19   OT Frequency 2-3x/wk   Recommendation   OT Discharge Recommendation Short Term Rehab   Barthel Index   Feeding 5   Bathing 0   Grooming Score 0   Dressing Score 0   Bladder Score 5   Bowels Score 5   Toilet Use Score 5   Transfers (Bed/Chair) Score 5   Mobility (Level Surface) Score 0   Stairs Score 0   Barthel Index Score 25       OCCUPATIONAL THERAPY GOALS:    *S FEEDING/GROOMING AFTER SETUP   *MIN A  ADLS AFTER SETUP WITH USE OF ADAPTIVE DEVICES PRN  *MIN A  TOILETING AND CLOTHING MANAGEMENT   *DEMONSTRATE FAIR CARRYOVER WITH SAFE USE OF RW DURING FUNCTIONAL TASKS  *ASSESS DME NEEDS  *INCREASE ACTIVITY TOLERANCE TO 30-35 MIN FOR PARTICIPATION IN ADLS AND ENJOYABLE ACTIVITIES     *PT TO PARTICIPATE IN ONGOING FUNCTIONAL COGNITIVE ASSESSMENT WITH FAIR ATTENTION/PARTICIPATION TO ASSIST WITH SAFE D/C RECOMMENDATIONS   *OTR TO ASSESS OOB TRANSFERS AS ABLE     Mikey Landau, OT

## 2019-02-28 NOTE — PROGRESS NOTES
Progress Note - Emily Tran 78 y o  female MRN: [id number]    [facility] Encounter: [id number]      Assessment/Plan  1  Fall  Recurrent  Fall precautions  Home meds unremarkable  Continue with vitamin D3 1000 international units daily patient's medication regimen     2  Ambulatory dysfunction  Recommendations remain the same  Patient ambulating with rolled walker  Patient is starting to have difficulty following commands per , and falling frequently at home  PT, OT, rehab has been recommended  Patient will need rehab, and possibly placement, as she has had a significant decline in functioning in cognition recently, unsure if  is going to be able to handle patient at home by himself, he has not been sleeping lately secondary to patient's disruptions overnight     3  Cognitive impairment  Likely dementia, worsening  Patient alert and oriented to person and place this a m , nursing reports up all night with gabapentin, melatonin combination  Continue with Remeron 7 5 mg q h s  Continue supportive care  [facility] for positive aging to see if patient could have follow-up appointment sooner than May     4  Delirium  Patient slept all night per nursing  Continue with gabapentin 100 mg q h s , melatonin 3 mg q h s  -- can continue at discharge  Continue Remeron 7 5 mg q h s  -- continue at discharge   Continue with Tylenol 975 mg Q 8  Continue with Lidoderm patch  Continue with p r n  Oxycodone as ordered  Continue with bowel regimen as ordered   Patient at risk for developing delirium, delirium preventing tactics advised  Redirect unwanted patient behaviors as first line tx  Reorient patient frequently    Avoid deliriogenic meds including tramadol, benzodiazepines, benadryl  Good sleep hygiene important, limit night time interruptions  Encourage patient to stay awake during the day  Ensure adequate hydration/nutrition  Mobilize often   Use Zyprexa 2 5 mg IM p r n  Q 8 for agitation, combativeness, has not required in last 24 hours     5  Deconditioning  Recommendations remain the same  Secondary to injuries, hospital stay, and chronic deconditioning at home  Mobilize frequently to prevent further decline  PT, OT  Patient may need long-term placement     6  Anxiety  Continue with Remeron 7 5 mg q h s      7  Left 7th through 11th rib fractures  Continue with pain meds as ordered, trauma following        Subjective:   Nursing reports patient slept all night  Patient is alert oriented x2 this a m  Which is an improvement from yesterday  Pertinent review of systems can't be gathered secondary to patient's dementia  Called patient's , provided update  Spoke with trauma team, CM and nursing  Objective:     Vitals: Blood pressure 118/60, pulse 71, temperature 98 1 °F (36 7 °C), resp  rate 18, height 5' 5" (1 651 m), weight 58 1 kg (128 lb), SpO2 94 %  ,Body mass index is 21 3 kg/m²  Intake/Output Summary (Last 24 hours) at 2/28/2019 1027  Last data filed at 2/28/2019 0930  Gross per 24 hour   Intake 410 ml   Output 350 ml   Net 60 ml       Physical Exam: General : WD in NAD  HEENT : MMM no erythema or exudates  EOMI, sclera anicteric  Heart : Normal rate  Lungs : CTA  Abdomen : Soft, NT/ND, BS auscultated in all 4 quads  No organomegaly  Ext :  Pulses +2/4 B/L  Neg edema B/L  Neg calf swelling B/L  Skin : Pink, warm, dry, age appropriate turgor and mobility  Neuro : Nonfocal  Psych : A * O x 2, dementia at baseline       Invasive Devices     Peripheral Intravenous Line            Peripheral IV 02/28/19 Right Forearm less than 1 day                Lab, Imaging and other studies: I have personally reviewed pertinent reports      VTE Pharmacologic Prophylaxis: Sequential compression device (Venodyne)   VTE Mechanical Prophylaxis: sequential compression device

## 2019-03-01 LAB
ANION GAP SERPL CALCULATED.3IONS-SCNC: 3 MMOL/L (ref 4–13)
BASOPHILS # BLD AUTO: 0.01 THOUSANDS/ΜL (ref 0–0.1)
BASOPHILS NFR BLD AUTO: 0 % (ref 0–1)
BUN SERPL-MCNC: 26 MG/DL (ref 5–25)
CALCIUM SERPL-MCNC: 9 MG/DL (ref 8.3–10.1)
CHLORIDE SERPL-SCNC: 109 MMOL/L (ref 100–108)
CO2 SERPL-SCNC: 29 MMOL/L (ref 21–32)
CREAT SERPL-MCNC: 1.07 MG/DL (ref 0.6–1.3)
EOSINOPHIL # BLD AUTO: 0.06 THOUSAND/ΜL (ref 0–0.61)
EOSINOPHIL NFR BLD AUTO: 1 % (ref 0–6)
ERYTHROCYTE [DISTWIDTH] IN BLOOD BY AUTOMATED COUNT: 13.3 % (ref 11.6–15.1)
GFR SERPL CREATININE-BSD FRML MDRD: 49 ML/MIN/1.73SQ M
GLUCOSE SERPL-MCNC: 92 MG/DL (ref 65–140)
HCT VFR BLD AUTO: 36.5 % (ref 34.8–46.1)
HGB BLD-MCNC: 11.4 G/DL (ref 11.5–15.4)
IMM GRANULOCYTES # BLD AUTO: 0.04 THOUSAND/UL (ref 0–0.2)
IMM GRANULOCYTES NFR BLD AUTO: 1 % (ref 0–2)
LYMPHOCYTES # BLD AUTO: 1.26 THOUSANDS/ΜL (ref 0.6–4.47)
LYMPHOCYTES NFR BLD AUTO: 22 % (ref 14–44)
MCH RBC QN AUTO: 31.1 PG (ref 26.8–34.3)
MCHC RBC AUTO-ENTMCNC: 31.2 G/DL (ref 31.4–37.4)
MCV RBC AUTO: 100 FL (ref 82–98)
MONOCYTES # BLD AUTO: 0.34 THOUSAND/ΜL (ref 0.17–1.22)
MONOCYTES NFR BLD AUTO: 6 % (ref 4–12)
NEUTROPHILS # BLD AUTO: 4.08 THOUSANDS/ΜL (ref 1.85–7.62)
NEUTS SEG NFR BLD AUTO: 70 % (ref 43–75)
NRBC BLD AUTO-RTO: 0 /100 WBCS
PLATELET # BLD AUTO: 144 THOUSANDS/UL (ref 149–390)
PMV BLD AUTO: 10.5 FL (ref 8.9–12.7)
POTASSIUM SERPL-SCNC: 4.2 MMOL/L (ref 3.5–5.3)
RBC # BLD AUTO: 3.66 MILLION/UL (ref 3.81–5.12)
SODIUM SERPL-SCNC: 141 MMOL/L (ref 136–145)
WBC # BLD AUTO: 5.79 THOUSAND/UL (ref 4.31–10.16)

## 2019-03-01 PROCEDURE — 80048 BASIC METABOLIC PNL TOTAL CA: CPT | Performed by: NURSE PRACTITIONER

## 2019-03-01 PROCEDURE — 85025 COMPLETE CBC W/AUTO DIFF WBC: CPT | Performed by: NURSE PRACTITIONER

## 2019-03-01 PROCEDURE — 99232 SBSQ HOSP IP/OBS MODERATE 35: CPT | Performed by: NURSE PRACTITIONER

## 2019-03-01 RX ORDER — SODIUM CHLORIDE 9 MG/ML
75 INJECTION, SOLUTION INTRAVENOUS CONTINUOUS
Status: DISCONTINUED | OUTPATIENT
Start: 2019-03-01 | End: 2019-03-02 | Stop reason: HOSPADM

## 2019-03-01 RX ADMIN — METOPROLOL SUCCINATE 12.5 MG: 25 TABLET, EXTENDED RELEASE ORAL at 21:41

## 2019-03-01 RX ADMIN — LIDOCAINE 1 PATCH: 50 PATCH CUTANEOUS at 09:01

## 2019-03-01 RX ADMIN — AMIODARONE HYDROCHLORIDE 200 MG: 200 TABLET ORAL at 09:01

## 2019-03-01 RX ADMIN — APIXABAN 5 MG: 5 TABLET, FILM COATED ORAL at 09:01

## 2019-03-01 RX ADMIN — MIRTAZAPINE 7.5 MG: 15 TABLET, FILM COATED ORAL at 21:41

## 2019-03-01 RX ADMIN — MELATONIN 3 MG: at 21:40

## 2019-03-01 RX ADMIN — SODIUM CHLORIDE 75 ML/HR: 0.9 INJECTION, SOLUTION INTRAVENOUS at 22:27

## 2019-03-01 RX ADMIN — PANTOPRAZOLE SODIUM 40 MG: 40 TABLET, DELAYED RELEASE ORAL at 05:49

## 2019-03-01 RX ADMIN — GABAPENTIN 100 MG: 100 CAPSULE ORAL at 21:41

## 2019-03-01 RX ADMIN — ACETAMINOPHEN 975 MG: 325 TABLET ORAL at 15:14

## 2019-03-01 RX ADMIN — DOCUSATE SODIUM 100 MG: 100 CAPSULE, LIQUID FILLED ORAL at 09:01

## 2019-03-01 RX ADMIN — ACETAMINOPHEN 975 MG: 325 TABLET ORAL at 21:40

## 2019-03-01 RX ADMIN — CALCIUM CARBONATE (ANTACID) CHEW TAB 500 MG 500 MG: 500 CHEW TAB at 09:01

## 2019-03-01 RX ADMIN — ACETAMINOPHEN 975 MG: 325 TABLET ORAL at 05:49

## 2019-03-01 RX ADMIN — PRAVASTATIN SODIUM 40 MG: 40 TABLET ORAL at 17:42

## 2019-03-01 RX ADMIN — LEVOTHYROXINE SODIUM 50 MCG: 50 TABLET ORAL at 05:49

## 2019-03-01 RX ADMIN — DOCUSATE SODIUM 100 MG: 100 CAPSULE, LIQUID FILLED ORAL at 17:42

## 2019-03-01 RX ADMIN — CALCIUM CARBONATE (ANTACID) CHEW TAB 500 MG 500 MG: 500 CHEW TAB at 17:42

## 2019-03-01 RX ADMIN — APIXABAN 5 MG: 5 TABLET, FILM COATED ORAL at 17:42

## 2019-03-01 RX ADMIN — VITAMIN D, TAB 1000IU (100/BT) 1000 UNITS: 25 TAB at 09:01

## 2019-03-01 NOTE — ASSESSMENT & PLAN NOTE
-geriatrics consulted, appreciate input  -added Antony last night  -continue frequent reorientation redirection  -able to illustrate her name and date of birth today on exam  Tentative rehab placement tomorrow

## 2019-03-01 NOTE — ASSESSMENT & PLAN NOTE
-restarted on Eliquis 5 mg b i d   -restarted on amiodarone 200 mg daily  -metoprolol 24 hour tablet 12 5 mg at bedtime  Heart rate regular this morning

## 2019-03-01 NOTE — SOCIAL WORK
Pt will d/c tomorrow to [facility] Byron Nair @7929 via [facility]  Pt's dtr and  are aware

## 2019-03-01 NOTE — PROGRESS NOTES
Progress Note - Luma Gallegos 1940, 78 y o  female MRN: [id number]    Unit/Bed#: [facility] 111-23 Encounter: [id number]    Primary Care Provider: Chandu Campos DO   Date and time admitted to hospital: 2/27/2019  7:03 AM        Traumatic fracture of ribs with pneumothorax, left, closed, initial encounter  Assessment & Plan  -follow-up repeat chest x-ray today   -continue rib fracture protocol  -monitor saturations  -continue aggressive pulmonary toilet  -incentive spirometer at bedside    Physical deconditioning  Assessment & Plan  -geriatrics consulted, appreciate input  -added Remeron last night  -continue frequent reorientation redirection  -able to illustrate her name and date of birth today on exam  Tentative rehab placement tomorrow    Hypothyroidism  Assessment & Plan  -restarted on home dose of levothyroxine    Atrial fibrillation ([address] )  Assessment & Plan  -restarted on Eliquis 5 mg b i d   -restarted on amiodarone 200 mg daily  -metoprolol 24 hour tablet 12 5 mg at bedtime  Heart rate regular this morning    * Fall from ground level  Assessment & Plan  -PT and OT eval  -out of bed to chair today      Progress Note - Trauma   Luma Gallegos 78 y o  female MRN: [id number]  Unit/Bed#: [facility] 618-01 Encounter: [id number]    Assessment: S/P Fall  Atrial fibrillation  Hypothyroidism  Physical decondition  Rib fractures  PTX    Plan: Admitted to trauma  Pulmonary toilet  Incentive spirometer  Pain management  DVT prophylaxis  Therapy    Chief Complaint: rib pain    Subjective: I'm okay    Objective: comfortable    Meds/Allergies   Medications Prior to Admission   Medication Sig Dispense Refill Last Dose    amiodarone 200 mg tablet Take 1 tablet (200 mg total) by mouth daily with breakfast 30 tablet 0 2/26/2019 at Unknown time    apixaban (ELIQUIS) 5 mg Take 1 tablet (5 mg total) by mouth 2 (two) times a day 180 tablet 3 2/26/2019 at Unknown time    Cholecalciferol ([employer]S VITAMIN D3) 1000 units capsule Take 2 capsules (2,000 Units total) by mouth daily 100 capsule 0 2/26/2019 at Unknown time    docusate sodium (COLACE) 100 mg capsule Take 1 capsule (100 mg total) by mouth 2 (two) times a day 60 capsule 0 2/26/2019 at Unknown time    levothyroxine 50 mcg tablet Take 50 mcg by mouth daily  3 2/26/2019 at Unknown time    melatonin 3 mg Take 1 tablet (3 mg total) by mouth daily at bedtime 30 tablet 0 2/26/2019 at Unknown time    metoprolol succinate (TOPROL-XL) 25 mg 24 hr tablet Take 0 5 tablets (12 5 mg total) by mouth daily at bedtime 30 tablet 0 2/26/2019 at Unknown time    omeprazole (PriLOSEC) 20 mg delayed release capsule Take 20 mg by mouth daily  3 2/26/2019 at Unknown time    polyethylene glycol (MIRALAX) 17 g packet Take 17 g by mouth daily as needed (Constipation) 14 each 0 2/26/2019 at Unknown time    pravastatin (PRAVACHOL) 40 mg tablet Take 1 tablet (40 mg total) by mouth daily with dinner 30 tablet 11 2/26/2019 at Unknown time    acetaminophen (TYLENOL) 325 mg tablet Take 2-3 tabs by mouth up to 3 times per day as needed for pain 100 tablet 0 Unknown at Unknown time    calcium carbonate (TUMS) 500 mg chewable tablet Chew 1 tablet (500 mg total) 2 (two) times a day with meals 60 tablet 0 Unknown at Unknown time    ondansetron (ZOFRAN-ODT) 4 mg disintegrating tablet Take 1 tablet (4 mg total) by mouth 3 (three) times a day as needed for nausea 20 tablet 0 Unknown at Unknown time       Vitals: Blood pressure 121/59, pulse 71, temperature 98 78 °F (37 1 °C), resp  rate 16, height 5' 5" (1 651 m), weight 58 1 kg (128 lb), SpO2 98 %  Body mass index is 21 3 kg/m²       ABG: No results found for: PHART, WBH4VWC, PO2ART, AHI2KWH, O9IWNGHW, BEART, SOURCE      Intake/Output Summary (Last 24 hours) at 3/1/2019 1119  Last data filed at 2/28/2019 1400  Gross per 24 hour   Intake 240 ml   Output 150 ml   Net 90 ml       Invasive Devices     Peripheral Intravenous Line            Peripheral IV 02/28/19 Right Forearm 1 day                Nutrition/GI Proph/Bowel Reg: regular    Physical Exam:   GENERAL APPEARANCE: comfortable, plesant  HEENT: EOM's intact  CV: RRR, no complaint of chest pain  LUNGS: CTA bilaterally, no shortness of breath  ABD: soft  EXT: moving all four extremities  NEURO: intact  SKIN: warm and dry      Lab Results: Results for Nadiya Alvarez (MRN [id number]) as of 3/1/2019 14:47   Ref  Range 3/1/2019 11:02   eGFR Latest Units: ml/min/1 73sq m 49   Sodium Latest Ref Range: 136 - 145 mmol/L 141   Potassium Latest Ref Range: 3 5 - 5 3 mmol/L 4 2   Chloride Latest Ref Range: 100 - 108 mmol/L 109 (H)   CO2 Latest Ref Range: 21 - 32 mmol/L 29   Anion Gap Latest Ref Range: 4 - 13 mmol/L 3 (L)   BUN Latest Ref Range: 5 - 25 mg/dL 26 (H)   Creatinine Latest Ref Range: 0 60 - 1 30 mg/dL 1 07   Glucose, Random Latest Ref Range: 65 - 140 mg/dL 92   Calcium Latest Ref Range: 8 3 - 10 1 mg/dL 9 0   WBC Latest Ref Range: 4 31 - 10 16 Thousand/uL 5 79   Red Blood Cell Count Latest Ref Range: 3 81 - 5 12 Million/uL 3 66 (L)   Hemoglobin Latest Ref Range: 11 5 - 15 4 g/dL 11 4 (L)   HCT Latest Ref Range: 34 8 - 46 1 % 36 5   MCV Latest Ref Range: 82 - 98 fL 100 (H)   MCH Latest Ref Range: 26 8 - 34 3 pg 31 1   MCHC Latest Ref Range: 31 4 - 37 4 g/dL 31 2 (L)   RDW Latest Ref Range: 11 6 - 15 1 % 13 3   Platelet Count Latest Ref Range: 149 - 390 Thousands/uL 144 (L)   MPV Latest Ref Range: 8 9 - 12 7 fL 10 5   nRBC Latest Units: /100 WBCs 0   Neutrophils % Latest Ref Range: 43 - 75 % 70   Immat GRANS % Latest Ref Range: 0 - 2 % 1     Imaging/EKG Studies: none  Other Studies: none  VTE Prophylaxis: SCD's      Nurse Nhan Mcelroy rounds completed with trauma team, patient and staff nurse

## 2019-03-02 VITALS
OXYGEN SATURATION: 95 % | RESPIRATION RATE: 16 BRPM | TEMPERATURE: 98.78 F | SYSTOLIC BLOOD PRESSURE: 128 MMHG | DIASTOLIC BLOOD PRESSURE: 69 MMHG | HEART RATE: 75 BPM | WEIGHT: 128 LBS | HEIGHT: 65 IN | BODY MASS INDEX: 21.33 KG/M2

## 2019-03-02 PROCEDURE — 99238 HOSP IP/OBS DSCHRG MGMT 30/<: CPT | Performed by: NURSE PRACTITIONER

## 2019-03-02 RX ORDER — OXYCODONE HYDROCHLORIDE 5 MG/1
2.5 TABLET ORAL EVERY 4 HOURS PRN
Qty: 30 TABLET | Refills: 0 | Status: SHIPPED | OUTPATIENT
Start: 2019-03-02 | End: 2019-03-12

## 2019-03-02 RX ORDER — GABAPENTIN 100 MG/1
100 CAPSULE ORAL
Qty: 30 CAPSULE | Refills: 0 | Status: SHIPPED | OUTPATIENT
Start: 2019-03-02

## 2019-03-02 RX ORDER — MIRTAZAPINE 7.5 MG/1
7.5 TABLET, FILM COATED ORAL
Qty: 10 TABLET | Refills: 0 | Status: SHIPPED | OUTPATIENT
Start: 2019-03-02

## 2019-03-02 RX ADMIN — CALCIUM CARBONATE (ANTACID) CHEW TAB 500 MG 500 MG: 500 CHEW TAB at 09:30

## 2019-03-02 RX ADMIN — AMIODARONE HYDROCHLORIDE 200 MG: 200 TABLET ORAL at 09:31

## 2019-03-02 RX ADMIN — APIXABAN 5 MG: 5 TABLET, FILM COATED ORAL at 09:31

## 2019-03-02 RX ADMIN — OXYCODONE HYDROCHLORIDE 5 MG: 5 TABLET ORAL at 12:09

## 2019-03-02 RX ADMIN — VITAMIN D, TAB 1000IU (100/BT) 1000 UNITS: 25 TAB at 09:30

## 2019-03-02 RX ADMIN — DOCUSATE SODIUM 100 MG: 100 CAPSULE, LIQUID FILLED ORAL at 09:31

## 2019-03-02 RX ADMIN — LIDOCAINE 1 PATCH: 50 PATCH CUTANEOUS at 09:31

## 2019-03-02 NOTE — DISCHARGE SUMMARY
Discharge- Lashanda Cortez 1940, 78 y o  female MRN: [id number]    Unit/Bed#: [facility] 752-98 Encounter: [id number]    Primary Care Provider: Mayito Callahan DO   Date and time admitted to hospital: 2/27/2019  7:03 AM        Traumatic fracture of ribs with pneumothorax, left, closed, initial encounter  Assessment & Plan  -follow-up repeat chest x-ray today   -continue rib fracture protocol  -monitor saturations  -continue aggressive pulmonary toilet  -incentive spirometer at bedside    Hypothyroidism  Assessment & Plan  -restarted on home dose of levothyroxine    Atrial fibrillation ([address] )  Assessment & Plan  -restarted on Eliquis 5 mg b i d   -restarted on amiodarone 200 mg daily  -metoprolol 24 hour tablet 12 5 mg at bedtime  Heart rate regular this morning    * Fall from ground level  Assessment & Plan  -PT and OT eval  -out of bed to chair today                Resolved Problems  Date Reviewed: 3/2/2019    None          Admission Date:   Admission Orders (From admission, onward)    Ordered        02/27/19 0941  Inpatient Admission  Once     Order ID Start Status   [id number] 02/27/19 9464 Completed                Admitting Diagnosis: Rib fractures [S22 39XA]  Unspecified multiple injuries, initial encounter [T07  XXXA]    HPI: per resident: Hussain Cifuentes:  "Lashanda Cortez is a 78 y o  female with a mechanical fall from standing  On eliquis  Fall occurred 24 hours prior to presentation"     Procedures Performed: No orders of the defined types were placed in this encounter  Summary of Hospital Course: admitted after a mechanical fall, Also takes Eliqous, diagnosed with traumatic rib fractures and a PTX on the left  Rib fracture protocol, repeat CXR's and saturation stayed stable  Continued aggressive pulmonary toilet and also involved Geriatrics  Restarted on her Eliqous for the a-fib and is doing well    OOB to chair and ready for discharge today    Significant Findings, Care, Treatment and Services Provided: Ct Chest Abdomen Pelvis Wo Contrast    Result Date: 2/27/2019  Impression: Fractures of the posterolateral left 7th through 11th ribs  The 8th through 11th rib fractures are slightly displaced and there is a small left apical lateral pneumothorax as well as a tiny bubble of subcutaneous emphysema in the lower lateral left chest wall adjacent to the left 11th rib fracture  No other fractures noted  No other visceral injury noted in the chest, abdomen or pelvis  Incidental findings including aortic valvular calcification suggesting some element of aortic valvular stenosis as well as mild fusiform aneurysmal enlargement of ascending thoracic aorta measuring up to 40 mm  Also noted is cholelithiasis, colonic diverticulosis, and a 7 mm splenic artery aneurysm  The study was marked in [facility] for immediate notification  Workstation performed: [id number]     Xr Chest Pa & Lateral (24 Hours After Admission)    Result Date: 3/1/2019  Impression: Left lower lobe atelectasis  No evidence of pneumothorax  Workstation performed: [id number]     Ct Head Without Contrast    Result Date: 2/27/2019  Impression: No acute intracranial abnormality  Chronic ischemic changes as described, similar from December 2018  Workstation performed: [id number]     Ct Cervical Spine Without Contrast    Result Date: 2/27/2019  Impression: No cervical spine fracture or traumatic malalignment  Tiny left apical pneumothorax better evaluated on CT examination of the chest, abdomen and pelvis  Please see concurrent report of chest, abdomen, and pelvis CT dictated under separate accession number  Workstation performed: [id number]       Complications: none    Condition at Discharge: stable         Discharge instructions/Information to patient and family:   See after visit summary for information provided to patient and family        Provisions for Follow-Up Care:  See after visit summary for information related to follow-up care and any pertinent home health orders  PCP: Jg Montanez DO    Disposition: [address]    Planned Readmission: No    Discharge Statement   I spent 30 minutes discharging the patient  This time was spent on the day of discharge  I had direct contact with the patient on the day of discharge  Additional documentation is required if more than 30 minutes were spent on discharge  Discharge Medications:  See after visit summary for reconciled discharge medications provided to patient and family

## 2019-03-02 NOTE — QUICK NOTE
Pt kirsten had lunch tray in room during shift change, her  stated that his daughter came in prior and tried to feed her and she did not eat  [employer] was offered to her multiple times, she refused to even open her eyes or mouth just to try and have some dinner  Offered dinner again later on and she only had a bite of the applesauce  Small sips throughout the shift  Pt was checked for incontinency  Every hour  She has not urinated since 17:43  Nurse was notified, bladder scanned for 324 @ 21:57

## 2019-03-14 ENCOUNTER — OFFICE VISIT (OUTPATIENT)
Dept: SURGERY | Facility: CLINIC | Age: 79
End: 2019-03-14
Payer: MEDICARE

## 2019-03-14 VITALS — SYSTOLIC BLOOD PRESSURE: 128 MMHG | TEMPERATURE: 98.4 F | DIASTOLIC BLOOD PRESSURE: 68 MMHG

## 2019-03-14 DIAGNOSIS — S22.42XA TRAUMATIC FRACTURE OF RIBS WITH PNEUMOTHORAX, LEFT, CLOSED, INITIAL ENCOUNTER: Primary | ICD-10-CM

## 2019-03-14 DIAGNOSIS — S27.0XXA TRAUMATIC FRACTURE OF RIBS WITH PNEUMOTHORAX, LEFT, CLOSED, INITIAL ENCOUNTER: Primary | ICD-10-CM

## 2019-03-14 PROCEDURE — 1124F ACP DISCUSS-NO DSCNMKR DOCD: CPT | Performed by: PHYSICIAN ASSISTANT

## 2019-03-14 PROCEDURE — 99212 OFFICE O/P EST SF 10 MIN: CPT | Performed by: PHYSICIAN ASSISTANT

## 2019-03-14 NOTE — PATIENT INSTRUCTIONS
Patient appears to be healing well from left-sided rib fractures  May begin resuming light activities as tolerated and should have PT and OT evaluation and treatment as indicated  Recommend continued use of an incentive spirometer until return to normal activity  Further follow up in the Trauma Clinic as needed  Please call for appointment as needed and/or with any questions or concerns  May continue to follow with primary care provider

## 2019-03-14 NOTE — PROGRESS NOTES
Office Visit - General Surgery  Carmelo Dyson MRN: [id number]  Encounter: [id number]    Assessment and Plan    Problem List Items Addressed This Visit        Respiratory    Traumatic fracture of ribs with pneumothorax, left, closed, initial encounter - Primary     - Multiple left-sided rib fractures (7 through 11) with associated pneumothorax that has since resolved  Most recent chest x-ray prior to discharge demonstrated no evidence of pneumothorax  - At the time of today's visit, the patient had no tenderness and no difficulty breathing   - Rib fractures appear to be healing appropriately  - Recommended continued use of incentive spirometer and adequate pulmonary hygiene until return to baseline activity   - Recommend resuming activity as tolerated and continued PT and OT evaluation and treatment as indicated  - Further trauma follow-up as needed  Please call for appointment if needed and/or with any questions or concerns  Chief Complaint:  Carmelo Dyson is a 78 y o  female who presents for Fall (f/u fx ribs)    Subjective  Patient is feeling okay at the time of today's visit  She was intermittently tearful, but was noted be confused as to her situation and has baseline dementia and confusion  The care provider with her is not familiar with her, and was unable to provide any history  There was also limited history available from her skilled nursing facility, but the documents provided were reviewed  The patient denies any chest pain, shortness breath, or difficulty breathing  She states she is eating very well without nausea or vomiting      Past Medical History  Past Medical History:   Diagnosis Date    Atrial fibrillation ([address] )     Disease of thyroid gland     Hyperlipidemia     Hypertension     Psychiatric disorder     dementia       Past Surgical History  Past Surgical History:   Procedure Laterality Date    HIP ARTHROPLASTY Right 12/4/2018    Procedure: ARTHROPLASTY HIP TOTAL;  Surgeon: Marques Onofre MD;  Location: [facility];  Service: Orthopedics    HYSTERECTOMY         Family History  Family History   Problem Relation Age of Onset    No Known Problems Mother     No Known Problems Father        Social History  Social History     Socioeconomic History    Marital status: /Civil Union     Spouse name: None    Number of children: None    Years of education: None    Highest education level: None   Occupational History    None   Social Needs    Financial resource strain: None    Food insecurity:     Worry: None     Inability: None    Transportation needs:     Medical: None     Non-medical: None   Tobacco Use    Smoking status: Former Smoker     Last attempt to quit: 1960     Years since quittin 2    Smokeless tobacco: Never Used   Substance and Sexual Activity    Alcohol use: Never     Frequency: Never    Drug use: No    Sexual activity: None   Lifestyle    Physical activity:     Days per week: None     Minutes per session: None    Stress: None   Relationships    Social connections:     Talks on phone: None     Gets together: None     Attends [employer] witness service: None     Active member of club or organization: None     Attends meetings of clubs or organizations: None     Relationship status: None    Intimate partner violence:     Fear of current or ex partner: None     Emotionally abused: None     Physically abused: None     Forced sexual activity: None   Other Topics Concern    None   Social History Narrative    None        Medications  Current Outpatient Medications on File Prior to Visit   Medication Sig Dispense Refill    acetaminophen (TYLENOL) 325 mg tablet Take 2-3 tabs by mouth up to 3 times per day as needed for pain 100 tablet 0    amiodarone 200 mg tablet Take 1 tablet (200 mg total) by mouth daily with breakfast 30 tablet 0    apixaban (ELIQUIS) 5 mg Take 1 tablet (5 mg total) by mouth 2 (two) times a day 180 tablet 3    calcium carbonate (TUMS) 500 mg chewable tablet Chew 1 tablet (500 mg total) 2 (two) times a day with meals 60 tablet 0    Cholecalciferol ([employer]S VITAMIN D3) 1000 units capsule Take 2 capsules (2,000 Units total) by mouth daily 100 capsule 0    docusate sodium (COLACE) 100 mg capsule Take 1 capsule (100 mg total) by mouth 2 (two) times a day 60 capsule 0    gabapentin (NEURONTIN) 100 mg capsule Take 1 capsule (100 mg total) by mouth daily at bedtime 30 capsule 0    levothyroxine 50 mcg tablet Take 50 mcg by mouth daily  3    melatonin 3 mg Take 1 tablet (3 mg total) by mouth daily at bedtime 30 tablet 0    mirtazapine (REMERON) 7 5 MG tablet Take 1 tablet (7 5 mg total) by mouth daily at bedtime 10 tablet 0    omeprazole (PriLOSEC) 20 mg delayed release capsule Take 20 mg by mouth daily  3    pravastatin (PRAVACHOL) 40 mg tablet Take 1 tablet (40 mg total) by mouth daily with dinner 30 tablet 11    metoprolol succinate (TOPROL-XL) 25 mg 24 hr tablet Take 0 5 tablets (12 5 mg total) by mouth daily at bedtime (Patient not taking: Reported on 3/14/2019) 30 tablet 0    ondansetron (ZOFRAN-ODT) 4 mg disintegrating tablet Take 1 tablet (4 mg total) by mouth 3 (three) times a day as needed for nausea (Patient not taking: Reported on 3/14/2019) 20 tablet 0    polyethylene glycol (MIRALAX) 17 g packet Take 17 g by mouth daily as needed (Constipation) (Patient not taking: Reported on 3/14/2019) 14 each 0     No current facility-administered medications on file prior to visit  Allergies  No Known Allergies    Review of Systems   Unable to perform ROS: Dementia       Objective  Vitals:    03/14/19 1528   BP: 128/68   Temp: 98 4 °F (36 9 °C)       Physical Exam   Constitutional: She appears well-developed and well-nourished  No distress  HENT:   Head: Normocephalic and atraumatic     Right Ear: External ear normal    Left Ear: External ear normal    Nose: Nose normal    Mouth/Throat: Oropharynx is clear and moist  Eyes: EOM are normal    Neck: Neck supple  Cardiovascular: Normal rate, regular rhythm and intact distal pulses  Exam reveals no gallop and no friction rub  Murmur heard  Pulmonary/Chest: Effort normal and breath sounds normal  No tachypnea  No respiratory distress  She has no decreased breath sounds  She has no wheezes  She has no rhonchi  She has no rales  She exhibits no tenderness, no crepitus and no deformity  Abdominal: Soft  Bowel sounds are normal  She exhibits no distension  There is no tenderness  There is no rebound and no guarding  Musculoskeletal: She exhibits no edema  Neurological: She is alert  Skin: Skin is warm and dry  No rash noted  She is not diaphoretic  No erythema  No pallor  Nursing note and vitals reviewed

## 2019-03-14 NOTE — ASSESSMENT & PLAN NOTE
- Multiple left-sided rib fractures (7 through 11) with associated pneumothorax that has since resolved  Most recent chest x-ray prior to discharge demonstrated no evidence of pneumothorax  - At the time of today's visit, the patient had no tenderness and no difficulty breathing   - Rib fractures appear to be healing appropriately  - Recommended continued use of incentive spirometer and adequate pulmonary hygiene until return to baseline activity   - Recommend resuming activity as tolerated and continued PT and OT evaluation and treatment as indicated  - Further trauma follow-up as needed  Please call for appointment if needed and/or with any questions or concerns
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
## 2019-05-03 PROBLEM — S22.42XA TRAUMATIC FRACTURE OF RIBS WITH PNEUMOTHORAX, LEFT, CLOSED, INITIAL ENCOUNTER: Status: RESOLVED | Noted: 2019-02-27 | Resolved: 2019-05-03

## 2019-05-03 PROBLEM — R53.81 DEBILITY: Status: ACTIVE | Noted: 2019-05-03

## 2019-05-03 PROBLEM — D64.9 ANEMIA: Status: ACTIVE | Noted: 2019-05-03

## 2019-05-03 PROBLEM — F06.30 MOOD DISORDER DUE TO A GENERAL MEDICAL CONDITION: Status: ACTIVE | Noted: 2019-05-03

## 2019-05-03 PROBLEM — S27.0XXA TRAUMATIC FRACTURE OF RIBS WITH PNEUMOTHORAX, LEFT, CLOSED, INITIAL ENCOUNTER: Status: RESOLVED | Noted: 2019-02-27 | Resolved: 2019-05-03

## 2019-05-03 PROBLEM — Z71.89 GOALS OF CARE, COUNSELING/DISCUSSION: Status: ACTIVE | Noted: 2019-05-03

## 2019-05-03 PROBLEM — K21.9 GASTROESOPHAGEAL REFLUX DISEASE WITHOUT ESOPHAGITIS: Status: ACTIVE | Noted: 2019-05-03

## 2019-05-07 ENCOUNTER — TELEPHONE (OUTPATIENT)
Dept: GERIATRICS | Age: 79
End: 2019-05-07

## 2019-06-03 ENCOUNTER — NURSING HOME VISIT (OUTPATIENT)
Dept: OTHER | Facility: HOSPITAL | Age: 79
End: 2019-06-03
Payer: MEDICARE

## 2019-06-03 DIAGNOSIS — R53.81 DEBILITY: Primary | ICD-10-CM

## 2019-06-03 DIAGNOSIS — G47.00 INSOMNIA, UNSPECIFIED TYPE: ICD-10-CM

## 2019-06-03 DIAGNOSIS — D64.9 ANEMIA, UNSPECIFIED TYPE: ICD-10-CM

## 2019-06-03 DIAGNOSIS — R53.81 PHYSICAL DECONDITIONING: ICD-10-CM

## 2019-06-03 DIAGNOSIS — I48.91 ATRIAL FIBRILLATION (HCC): ICD-10-CM

## 2019-06-03 DIAGNOSIS — R41.89 COGNITIVE IMPAIRMENT: ICD-10-CM

## 2019-06-03 DIAGNOSIS — K59.09 OTHER CONSTIPATION: ICD-10-CM

## 2019-06-03 PROCEDURE — 99309 SBSQ NF CARE MODERATE MDM 30: CPT | Performed by: NURSE PRACTITIONER

## 2019-06-14 ENCOUNTER — NURSING HOME VISIT (OUTPATIENT)
Dept: GERIATRICS | Facility: OTHER | Age: 79
End: 2019-06-14
Payer: MEDICARE

## 2019-06-14 DIAGNOSIS — I87.2 VENOUS INSUFFICIENCY: Primary | ICD-10-CM

## 2019-06-14 PROCEDURE — 99307 SBSQ NF CARE SF MDM 10: CPT | Performed by: INTERNAL MEDICINE

## 2019-07-08 ENCOUNTER — NURSING HOME VISIT (OUTPATIENT)
Dept: GERIATRICS | Facility: OTHER | Age: 79
End: 2019-07-08
Payer: MEDICARE

## 2019-07-08 DIAGNOSIS — H61.23 IMPACTED CERUMEN OF BOTH EARS: Primary | ICD-10-CM

## 2019-07-08 PROCEDURE — 69209 REMOVE IMPACTED EAR WAX UNI: CPT | Performed by: NURSE PRACTITIONER

## 2019-07-08 NOTE — ASSESSMENT & PLAN NOTE
-s/p course of debrox   -performed ear lavage today ( B/L ears )  -hearing improved  -will evaluate again in 3 mo or sooner if clinical status changes

## 2019-07-08 NOTE — PROGRESS NOTES
[facility]  [address]  [phone]) [address]  Code 32      NAME: Jessee Chakraborty  AGE: 78 y o  SEX: female    : 1940    Code Status: AND/DNR    DATE OF ENCOUNTER: 2019    Assessment and Plan     Problem List Items Addressed This Visit        Nervous and Auditory    Impacted cerumen of both ears - Primary     -s/p course of debrox   -performed ear lavage today ( B/L ears )  -hearing improved  -will evaluate again in 3 mo or sooner if clinical status changes                Orders Placed This Encounter   Procedures    Ear cerumen removal         Chief Complaint     Cerumen impaction       History of Present Illness     77 yo female resident of [address] [place] [address], seen in collaboration with nursing to evaluate impacted cerumen of bilateral ears  Upon procedure, resident sitting comfortably in her wheelchair  Denied complaints of pain, shortness of breath, headache or feeling lightheaded  C/o decreased hearing secondary to impacted cerumen  No other concerns per nursing  The following portions of the patient's history were reviewed and updated as appropriate: allergies, current medications, past family history, past medical history, past social history, past surgical history and problem list     Review of Systems     Review of Systems   Constitutional: Negative for activity change and fatigue  HENT: Positive for hearing loss  Negative for congestion, ear discharge and ear pain  Respiratory: Negative for shortness of breath  Cardiovascular: Negative for chest pain  Neurological: Negative for dizziness, light-headedness and headaches         Active Problem List     Patient Active Problem List   Diagnosis    Microscopic hematuria    Dyslipidemia    Hypertension    Palpitations    Atrial fibrillation (HCC)    Hypothyroidism    Cognitive impairment    MCI (mild cognitive impairment)    Gait disturbance    Closed intertrochanteric fracture of hip, right, initial encounter ([facility] [address] )    Severe aortic stenosis    Closed fracture of neck of right femur ([facility] [address] )    Fall    Ambulatory dysfunction    Physical deconditioning    Osteoporosis    Atrial fibrillation with rapid ventricular response ([facility])    NSTEMI (non-ST elevated myocardial infarction) ([facility])    Acute blood loss anemia    Fracture of femoral neck, right ([facility])    Delirium    Onychomycosis    Positional lightheadedness    Chronic nausea    Insomnia    Anxiety    Vitamin D insufficiency    Other constipation    Debility    Anemia    Gastroesophageal reflux disease without esophagitis    Goals of care, counseling/discussion    Mood disorder due to a general medical condition    Venous insufficiency    Impacted cerumen of both ears       Objective     Ear cerumen removal  Date/Time: 7/8/2019 4:00 PM  Performed by: IRMA Moncada  Authorized by: IRMA Moncada     Patient location:  Bedside  Consent:     Consent obtained:  Verbal    Risks discussed:  Pain, incomplete removal and dizziness  Universal protocol:     Procedure explained and questions answered to patient or proxy's satisfaction: yes      Patient identity confirmed:  Verbally with patient  Procedure details:     Location:  R ear and L ear    Procedure type: irrigation      Approach:  External and internal    Equipment used: Graham clear ear lavage   Post-procedure details:     Hearing quality:  Improved    Patient tolerance of procedure: Tolerated well, no immediate complications  Comments:      Successful lavage of B/L ear canals  B/L TM visualized  Physical Exam   Constitutional: She appears well-developed and well-nourished  No distress  HENT:   Right Ear: Tympanic membrane, external ear and ear canal normal  No drainage, swelling or tenderness  Decreased hearing (improved after lavage ) is noted     Left Ear: Tympanic membrane, external ear and ear canal normal  No drainage, swelling or tenderness  Decreased hearing (improved after lavage ) is noted  (Documented exam after ear lavage)   Skin: She is not diaphoretic  Pertinent Laboratory/Diagnostic Studies:  n/a    Current Medications       Medications reviewed and updated, see facility [facility] for details         IRMA Lucia   [facility]  7/8/2019 4:32 PM

## 2019-08-02 ENCOUNTER — NURSING HOME VISIT (OUTPATIENT)
Dept: GERIATRICS | Facility: OTHER | Age: 79
End: 2019-08-02
Payer: MEDICARE

## 2019-08-02 DIAGNOSIS — I10 ESSENTIAL HYPERTENSION: ICD-10-CM

## 2019-08-02 DIAGNOSIS — I35.0 SEVERE AORTIC STENOSIS: ICD-10-CM

## 2019-08-02 DIAGNOSIS — I48.91 ATRIAL FIBRILLATION, UNSPECIFIED TYPE (HCC): ICD-10-CM

## 2019-08-02 DIAGNOSIS — D62 ACUTE BLOOD LOSS ANEMIA: ICD-10-CM

## 2019-08-02 DIAGNOSIS — R53.81 DEBILITY: Primary | ICD-10-CM

## 2019-08-02 DIAGNOSIS — F06.30 MOOD DISORDER DUE TO A GENERAL MEDICAL CONDITION: ICD-10-CM

## 2019-08-02 DIAGNOSIS — E03.9 HYPOTHYROIDISM, UNSPECIFIED TYPE: ICD-10-CM

## 2019-08-02 PROBLEM — S72.141A CLOSED INTERTROCHANTERIC FRACTURE OF HIP, RIGHT, INITIAL ENCOUNTER (HCC): Status: RESOLVED | Noted: 2018-12-03 | Resolved: 2019-08-02

## 2019-08-02 PROBLEM — R26.9 GAIT DISTURBANCE: Status: RESOLVED | Noted: 2018-06-06 | Resolved: 2019-08-02

## 2019-08-02 PROBLEM — S72.001A CLOSED FRACTURE OF NECK OF RIGHT FEMUR (HCC): Status: RESOLVED | Noted: 2018-12-03 | Resolved: 2019-08-02

## 2019-08-02 PROBLEM — S72.001A FRACTURE OF FEMORAL NECK, RIGHT (HCC): Status: RESOLVED | Noted: 2018-12-08 | Resolved: 2019-08-02

## 2019-08-02 PROCEDURE — 99309 SBSQ NF CARE MODERATE MDM 30: CPT | Performed by: INTERNAL MEDICINE

## 2019-08-02 NOTE — PROGRESS NOTES
[address]  [address]  Facility: [facility]  [place] [address]    NAME: Antonio Reaves  AGE: 78 y o  SEX: female    DATE OF ENCOUNTER: 8/2/2019    Code status:  AND/DNR    Assessment and Plan     Problem List Items Addressed This Visit        Endocrine    Hypothyroidism       Cardiovascular and Mediastinum    Atrial fibrillation ([address] )    Hypertension    Severe aortic stenosis       Nervous and Auditory    Mood disorder due to a general medical condition       Other    Acute blood loss anemia    Debility - Primary      1  Debility secondary to her chronic medical conditions-still requiring 24/7 care/support of her ADLs  Continue with care/support of her ADLs at long-term care facility level of care  Continue monitoring  2  Anemia-felt secondary to acute blood loss anemia  Asymptomatic  Will continue with clinical and periodic laboratory monitoring  3  Hypothyroidism-doing well with levothyroxine  Continue with clinical and periodic laboratory monitoring  4  Atrial fibrillation-review of her AP log shows that she is doing well without rate control and with Eliquis  Continue monitoring    5  Hypertension-review of her BP log shows that she is doing well with TLC  Continue with monitoring  6  GERD-continue with Prilosec and monitoring  7  Mood disorder secondary to her chronic medical conditions-doing well with mirtazapine  Continue monitoring  8  Severe aortic stenosis  9  Insomnia-continue with mirtazapine and melatonin  10  Constipation-continue with MiraLax and monitoring  11  Vitamin-D deficiency-continue with supplement and periodic laboratory monitoring  12  See note of 05/03/2019 for further assessment and plan  All medications and routine orders were reviewed and updated as needed      Plan discussed with: Nurse    Chief Complaint     She is seen with female physician for a follow-up visit to update the care and treatment of her debility secondary to her chronic medical conditions, hypothyroidism, hypertension, and atrial fibrillation  History of Present Illness     She is a pleasant 35-year-old woman seen with a female physician for a follow-up visit to update the care and treatment of her debility secondary to her chronic medical conditions-still requiring 24/7 care/support of her ADLs, hypothyroidism-doing well with levothyroxine, hypertension-doing well with TLC, and atrial fibrillation-doing well with Eliquis  The following portions of the patient's history were reviewed and updated as appropriate: current medications, past family history, past medical history, past social history, past surgical history and problem list     Allergies:  No Known Allergies    Review of Systems     Review of Systems   Respiratory: Negative for shortness of breath  Cardiovascular: Negative for chest pain  Gastrointestinal: Negative for abdominal pain  Medications and orders     All medications reviewed and updated in Nursing Home EMR  Objective     Vitals:  Weight 122 5 lb (stable), blood pressure 122/68  Physical Exam   Constitutional: Vital signs are normal  She appears well-developed and well-nourished  She is cooperative  Non-toxic appearance  No distress  She appears comfortable sitting in her wheelchair, stated age, and chronically ill  Eyes: No scleral icterus  Cardiovascular: Normal rate and regular rhythm  Exam reveals no gallop and no friction rub  Murmur (3/6 holosystolic murmur heard acrossher precordium ) heard  Pulmonary/Chest: Effort normal and breath sounds normal  No stridor  No respiratory distress  She has no wheezes  She has no rales  Abdominal: Soft  She exhibits no distension and no mass  There is no tenderness  There is no guarding  Musculoskeletal: She exhibits no edema  Neurological: She is alert         Pertinent Laboratory/Diagnostic Studies: The following labs were reviewed please see chart or hospital paperwork for details  July 18, 2019:  TSH-8 05, free T4-1 0 34     - Treatment plan reviewed with nursing      ANISH Santana   8/2/2019 4:25 PM

## 2019-08-05 ENCOUNTER — NURSING HOME VISIT (OUTPATIENT)
Dept: GERIATRICS | Facility: OTHER | Age: 79
End: 2019-08-05
Payer: MEDICARE

## 2019-08-05 DIAGNOSIS — F06.30 MOOD DISORDER DUE TO A GENERAL MEDICAL CONDITION: Primary | ICD-10-CM

## 2019-08-05 PROCEDURE — 99307 SBSQ NF CARE SF MDM 10: CPT | Performed by: NURSE PRACTITIONER

## 2019-08-05 NOTE — PROGRESS NOTES
[facility]  [address]  [phone]) [address]  Code 32      NAME: Eden Willoughby  AGE: 78 y o  SEX: female    : 1940    Code Status: AND/DNR    DATE OF ENCOUNTER: 2019    Assessment and Plan     Problem List Items Addressed This Visit        Nervous and Auditory    Mood disorder due to a general medical condition - Primary     -nursing staff reports consistent tearfulness with 2+ episodes/day   -tearfulness r/t to not living with her  anymore and frustration centered on her cognition decline (I e  not remembering place/year)  -stable sleeping patterns and appetite   -started on remeron in 3/19 and increased to 15 mg in   Will increase to 30 mg at HS; will check BMP in 2 weeks (S/E hyponatremia)  -continue melatonin 3 mg at HS  -resident not interested in speaking with psychotherapist and prefers to continue therapeutic communication with myself, Dr Shruthi Cisneros and the nursing staff   -continue conservative measures  -continue to monitor weights    -DWN               No orders of the defined types were placed in this encounter  Chief Complaint     Consistent tearfulness  History of Present Illness     77 yo female, resident of [address], seen in collaboration with nursing to evaluate her mood disorder and consistent tearfulness  Resident was started on Remeron 7 5 mg at [facility] in 2019 and then increased to 15 mg in 2019  Nursing still reports tearfulness a couple times a day  No other concerns from nursing  Upon exam, resident sitting comfortably in her wheelchair without any distress  Per nursing, she has a difficult time dealing with stress and change  During visit, resident became tearful when talking about her  who comes to visit  She reports sadness when her  and daughter leave to go home after visiting here at [facility]   She denied poor appetite and reports sleeping well at night  The following portions of the patient's history were reviewed and updated as appropriate: allergies, current medications, past family history, past medical history, past social history, past surgical history and problem list     Review of Systems     Review of Systems   Constitutional: Negative for activity change, appetite change, chills and fatigue  Respiratory: Negative for cough, chest tightness and shortness of breath  Cardiovascular: Negative for chest pain and palpitations  Psychiatric/Behavioral: Negative for agitation, behavioral problems and sleep disturbance  The patient is nervous/anxious  Reports sadness when her  leaves after visiting        Active Problem List     Patient Active Problem List   Diagnosis    Microscopic hematuria    Dyslipidemia    Hypertension    Atrial fibrillation ([facility] [address] )    Hypothyroidism    Cognitive impairment    MCI (mild cognitive impairment)    Severe aortic stenosis    Fall    Ambulatory dysfunction    Physical deconditioning    Osteoporosis    NSTEMI (non-ST elevated myocardial infarction) ([facility]    Acute blood loss anemia    Delirium    Onychomycosis    Insomnia    Anxiety    Vitamin D insufficiency    Other constipation    Debility    Anemia    Gastroesophageal reflux disease without esophagitis    Goals of care, counseling/discussion    Mood disorder due to a general medical condition    Venous insufficiency    Impacted cerumen of both ears       Objective     /68, HR 68, weight: 122 5 lbs () -->131 lbs (2019)    Physical Exam   Constitutional: She appears well-developed and well-nourished  No distress  Appearing chronically ill    Cardiovascular: Normal rate, regular rhythm and normal heart sounds  Pulmonary/Chest: Effort normal and breath sounds normal  No respiratory distress  She has no wheezes  Neurological: She is alert  Oriented to name and    Skin: Skin is warm and dry   She is not diaphoretic  No erythema  No pallor  Psychiatric:   Cooperative but, tearful during exam    Nursing note and vitals reviewed  Pertinent Laboratory/Diagnostic Studies:  Reviewed   BMP 6/20/19: sodium 144, creatinine 0 86, GFR 64    Current Medications       Medications reviewed and updated, see facility [facility] for details       IRMA Wilson   [facility]  8/5/2019 10:28 AM

## 2019-08-05 NOTE — ASSESSMENT & PLAN NOTE
-nursing staff reports consistent tearfulness with 2+ episodes/day   -tearfulness r/t to not living with her  anymore and frustration centered on her cognition decline (I e  not remembering place/year)  -stable sleeping patterns and appetite   -started on remeron in 3/19 and increased to 15 mg in 6/19   Will increase to 30 mg at HS; will check BMP in 2 weeks (S/E hyponatremia)  -continue melatonin 3 mg at HS  -resident not interested in speaking with psychotherapist and prefers to continue therapeutic communication with myself, Dr Murtaza Gomez and the nursing staff   -continue conservative measures  -continue to monitor weights    -AAYUSH

## 2019-08-16 NOTE — PLAN OF CARE
Problem: PHYSICAL THERAPY ADULT  Goal: Performs mobility at highest level of function for planned discharge setting  See evaluation for individualized goals  Treatment/Interventions: Functional transfer training, LE strengthening/ROM, Elevations, Endurance training, Therapeutic exercise, Patient/family training, Equipment eval/education, Bed mobility, Gait training, Spoke to nursing, Family  Equipment Recommended: Jolie Hill (AMISH)       See flowsheet documentation for full assessment, interventions and recommendations  Prognosis: Fair  Problem List: Decreased strength, Decreased range of motion, Decreased endurance, Impaired balance, Decreased mobility, Decreased cognition, Decreased safety awareness, Pain, Orthopedic restrictions  Assessment: Pt is 66 y o  female seen for PT evaluation s/p admit to [facility] on 12/3/2018  Two pt identifiers were used to confirm  Pt presented s/p fall at home  Pt was admitted with a primary dx of: closed intertrochanteric fx of R hip  Pt underwent R THR posterolateral approach which was performed on 12/4/2018  PT now consulted for assessment of mobility and d/c needs  Pt with Activity as tolerated orders  Pts current co morbidities effecting treatment include: a fib, HLD, HTN, psychiatric disorder, and personal factors including ILYA home and being alone at times  Pts current clinical presentation is Unstable/ Unpredictable (high complexity) due to Ongoing medical management for primary dx, Increased reliance on more restrictive AD compared to baseline, Decreased activity tolerance compared to baseline, Fall risk, Increased assistance needed from caregiver at current time, Cog status, Hip precautions, Continuous pulse oximetry monitoring  , telemetry monitoring   Prior to admission, pt was I with ambulation without the use of an AD as per pt  Upon evaluation, pt currently is requiring mod A x2 for bed mobility; mod A x2 for transfers and mod Ax2 for ambulation w/ RW    Pt denies any lightheadedness or dizziness with ambulation  Pt presents at PT eval functioning below baseline and currently w/ overall mobility deficits 2* to: BLE weakness, decreased ROM, impaired balance, decreased endurance, gait deviations, pain, decreased activity tolerance compared to baseline, fall risk, orthopedic restrictions  Pt currently at a fall risk 2* to impairments listed above  Based on the aforementioned PT evaluation, pt will continue to benefit from skilled Acute PT interventions to address stated impairments; to maximize functional mobility; for ongoing pt/ family training; and DME needs  At conclusion of PT session pt returned BTB and bed alarm engaged with phone and call bell within reach  Pt denies any further questions at this time  PT is currently recommending rehab due to decreased functional mobility compared to baseline and increased A needed from caregiver at current time  Pt/ family agreeable to plan and goals as stated on evaluation  PT will continue to follow during hospital stay  Barriers to Discharge: Inaccessible home environment, Decreased caregiver support  Barriers to Discharge Comments: ILYA home   Recommendation: Other (Comment) (rehab )     PT - OK to Discharge: Yes (to rehab when medically cleared )    See flowsheet documentation for full assessment  Epidermal Closure: running and interrupted

## 2019-09-16 ENCOUNTER — NURSING HOME VISIT (OUTPATIENT)
Dept: GERIATRICS | Facility: OTHER | Age: 79
End: 2019-09-16
Payer: MEDICARE

## 2019-09-16 DIAGNOSIS — M79.671 FOOT PAIN, BILATERAL: Primary | ICD-10-CM

## 2019-09-16 DIAGNOSIS — M79.672 FOOT PAIN, BILATERAL: Primary | ICD-10-CM

## 2019-09-16 PROCEDURE — 99307 SBSQ NF CARE SF MDM 10: CPT | Performed by: NURSE PRACTITIONER

## 2019-09-16 NOTE — ASSESSMENT & PLAN NOTE
-B/L legs purple/red in dependent position; normal pigment when elevated   -c/o aching pain when legs are hanging   -no visible skin breakdown   -possibly related to arterial insufficiency; at risk secondary to her hx of HL and Aortic stenosis  -no aspirin therapy (resident currently on eliquis)  -will not gather a definitive diagnosis and perform further studies for arterial insufficieny per family; resident AND/DNR   -pain management with scheduled Tylenol 650 mg q 8 hours  -increase acitivty as tolerated   -elevate leg and apply compression as needed   -Podiatry following   -DWN  -continue to monitor

## 2019-09-16 NOTE — PROGRESS NOTES
[facility]  [address]  [phone]) [address]  Code 32    NAME: Zeferino Britt  AGE: 78 y o  SEX: female    : 1940    Code Status: AND/DNR    DATE OF ENCOUNTER: 2019    Assessment and Plan     Problem List Items Addressed This Visit        Other    Foot pain, bilateral - Primary     -B/L legs purple/red in dependent position; normal pigment when elevated   -c/o aching pain when legs are hanging   -no visible skin breakdown   -possibly related to arterial insufficiency; at risk secondary to her hx of HL and Aortic stenosis  -no aspirin therapy (resident currently on eliquis)  -will not gather a definitive diagnosis and perform further studies for arterial insufficieny per family; resident AND/DNR   -pain management with scheduled Tylenol 650 mg q 8 hours  -increase acitivty as tolerated   -elevate leg and apply compression as needed   -Podiatry following   -DWN  -continue to monitor                  No orders of the defined types were placed in this encounter  Chief Complaint     Reddened feet     History of Present Illness     79 yo female, resident of [address] [place] [address], seen in collaboration with nursing to evaluate discoloration of bilateral feet  No other concerns from nursing  Resident was seen for a visit to evaluate same condition in 2019 by previous provider  At that time, resident was asymptomatic  Upon exam, resident sitting in her wheelchair with mild distress related to pain in her feet and states, "they hurt "  Further ROS limited secondary to dementia                 The following portions of the patient's history were reviewed and updated as appropriate: allergies, current medications, past medical history,  and problem list     Review of Systems     ROS limited secondary to dementia    Review of Systems   Cardiovascular:        C/o achy pain bilateral legs         Active Problem List     Patient Active Problem List   Diagnosis    Microscopic hematuria    Dyslipidemia    Hypertension    Atrial fibrillation ([facility]    Hypothyroidism    Cognitive impairment    MCI (mild cognitive impairment)    Severe aortic stenosis    Fall    Ambulatory dysfunction    Physical deconditioning    Osteoporosis    NSTEMI (non-ST elevated myocardial infarction) ([facility]    Acute blood loss anemia    Delirium    Onychomycosis    Insomnia    Anxiety    Vitamin D insufficiency    Other constipation    Debility    Anemia    Gastroesophageal reflux disease without esophagitis    Goals of care, counseling/discussion    Mood disorder due to a general medical condition    Venous insufficiency    Impacted cerumen of both ears    Foot pain, bilateral       Objective       Physical Exam   Constitutional: She appears distressed (mild; secondary to foot pain )  Cardiovascular:   +1 pedal pulses bilaterally    Neurological: She is alert  Skin: Skin is warm  Capillary refill takes 2 to 3 seconds  No rash noted  She is not diaphoretic  There is erythema (purple; B/L feet)  Nursing note and vitals reviewed  Pertinent Laboratory/Diagnostic Studies:  n/a    Current Medications       Medications reviewed and updated, see facility [facility] for details         IRMA Camara   [facility]  9/16/2019 8:45 PM

## 2019-09-20 ENCOUNTER — NURSING HOME VISIT (OUTPATIENT)
Dept: GERIATRICS | Facility: OTHER | Age: 79
End: 2019-09-20
Payer: MEDICARE

## 2019-09-20 DIAGNOSIS — E55.9 VITAMIN D INSUFFICIENCY: ICD-10-CM

## 2019-09-20 DIAGNOSIS — K21.9 GASTROESOPHAGEAL REFLUX DISEASE WITHOUT ESOPHAGITIS: ICD-10-CM

## 2019-09-20 DIAGNOSIS — I48.91 ATRIAL FIBRILLATION, UNSPECIFIED TYPE (HCC): ICD-10-CM

## 2019-09-20 DIAGNOSIS — E03.9 HYPOTHYROIDISM, UNSPECIFIED TYPE: ICD-10-CM

## 2019-09-20 DIAGNOSIS — R41.89 COGNITIVE IMPAIRMENT: ICD-10-CM

## 2019-09-20 DIAGNOSIS — F06.30 MOOD DISORDER DUE TO A GENERAL MEDICAL CONDITION: ICD-10-CM

## 2019-09-20 DIAGNOSIS — R53.81 DEBILITY: Primary | ICD-10-CM

## 2019-09-20 DIAGNOSIS — I35.0 SEVERE AORTIC STENOSIS: ICD-10-CM

## 2019-09-20 DIAGNOSIS — K59.09 OTHER CONSTIPATION: ICD-10-CM

## 2019-09-20 DIAGNOSIS — G47.00 INSOMNIA, UNSPECIFIED TYPE: ICD-10-CM

## 2019-09-20 PROCEDURE — 99309 SBSQ NF CARE MODERATE MDM 30: CPT | Performed by: NURSE PRACTITIONER

## 2019-09-20 NOTE — ASSESSMENT & PLAN NOTE
-no s/sx of chest pain, fatigue or shortness of breath on exam   -continue to monitor for any change in clinical condition

## 2019-09-20 NOTE — ASSESSMENT & PLAN NOTE
-continue 24/7 support at [facility] with ADLs  -continue conservative measures and reorient as needed   -encourage activity as tolerated and socialization with other residents   -continue to monitor for any further decline and intervene as appropriate

## 2019-09-20 NOTE — ASSESSMENT & PLAN NOTE
-reports sleeping well and eating "okay  "  -mood improved since increase of Mirtazapine last month  -continue mirtazapine 30 mg at HS    -provide support when needed   -continue conservative measures and encourage participation in acitivies  -continue to monitor for any change in mood or behavior

## 2019-09-20 NOTE — ASSESSMENT & PLAN NOTE
-reviewed HR logs; stable  -denies s/sx  -continue medication management with eliquis 5 mg q 12 hours and Amiodarone 200 mg daily  -continue chronic monitoring

## 2019-09-20 NOTE — ASSESSMENT & PLAN NOTE
-stable and doing well with mirtazapine 30 mg and Melatonin 3 mg at HS  -continue to promote sleep-wake cycle   -avoid caffeine after 2 pm

## 2019-09-20 NOTE — PROGRESS NOTES
[facility]  [address]  [phone]) [address]  Code 32      NAME: Kari Nyhan  AGE: 78 y o  SEX: female    : 1940    Code Status: AND/DNR    DATE OF ENCOUNTER: 2019    Assessment and Plan     Problem List Items Addressed This Visit        Digestive    Gastroesophageal reflux disease without esophagitis     -continue medication management with Omeprazole 20 mg daily   -continue diet modifications             Endocrine    Hypothyroidism     -stable and doing well with medication management   reviewed recent TSH and T4, free   -continue Levothyroxine 50 mcg daily   -continue to monitor             Cardiovascular and Mediastinum    Atrial fibrillation ([facility] [address] )     -reviewed HR logs; stable  -denies s/sx  -continue medication management with eliquis 5 mg q 12 hours and Amiodarone 200 mg daily  -continue chronic monitoring          Severe aortic stenosis     -no s/sx of chest pain, fatigue or shortness of breath on exam   -continue to monitor for any change in clinical condition             Nervous and Auditory    Mood disorder due to a general medical condition     -reports sleeping well and eating "okay  "  -mood improved since increase of Mirtazapine last month  -continue mirtazapine 30 mg at HS    -provide support when needed   -continue conservative measures and encourage participation in acitivies  -continue to monitor for any change in mood or behavior             Other    Cognitive impairment     -continue  support at LTCF with ADLs  -continue conservative measures and reorient as needed   -encourage activity as tolerated and socialization with other residents   -continue to monitor for any further decline and intervene as appropriate          Insomnia     -stable and doing well with mirtazapine 30 mg and Melatonin 3 mg at HS  -continue to promote sleep-wake cycle   -avoid caffeine after 2 pm         Vitamin D insufficiency     -continue supplementation with Vitamin D3 1000 units daily   -continue chronic monitoring          Other constipation     -denies s/sx on exam   -continue management with miralax daily          Debility - Primary     -secondary to her chronic medical conditions   -continue 24/7 support at LTCF               No orders of the defined types were placed in this encounter  Chief Complaint     Debility and follow-up of chronic medical conditions  History of Present Illness     68-year-old female, resident of [facility], seen in collaboration with nursing, to evaluate debility secondary to her chronic medical conditions including but not limited to atrial fibrillation, mood disorder, hypothyroidism, and constipation  No concerns from nursing  During visit, resident appears at ease, sitting comfortably in her wheelchair without any s/sx of discomfort or distress  She denies acute pain, chest pain or shortness of breath  Evaluated resident earlier in the week for foot pain possibly related to arterial insufficiency; scheduled tylenol ordered  On exam today, resident denies pain in her feet  ROS limited secondary to dementia  The following portions of the patient's history were reviewed and updated as appropriate: allergies, current medications, past family history (no pertinent family history), past medical history and problem list     Review of Systems     ROS limited to dementia  Review of Systems   Constitutional: Negative for activity change and appetite change  Respiratory: Negative for shortness of breath  Cardiovascular: Negative for chest pain  Gastrointestinal: Negative for constipation and nausea  Neurological: Negative for dizziness and headaches  Psychiatric/Behavioral: Negative for sleep disturbance  The patient is nervous/anxious ("sometimes")          Active Problem List     Patient Active Problem List   Diagnosis    Microscopic hematuria    Dyslipidemia    Hypertension    Atrial fibrillation (HCC)    Hypothyroidism    Cognitive impairment    MCI (mild cognitive impairment)    Severe aortic stenosis    Fall    Ambulatory dysfunction    Physical deconditioning    Osteoporosis    [facility]STEMI [facility]non-ST elevated [facility] infarction) [facility])    Acute blood loss anemia    Delirium    Onychomycosis    Insomnia    Anxiety    Vitamin D insufficiency    Other constipation    Debility    Anemia    Gastroesophageal reflux disease without esophagitis    Goals of care, counseling/discussion    Mood disorder due to a general medical condition    Venous insufficiency    Impacted cerumen of both ears    Foot pain, bilateral       Objective     /64, HR 80, RR 18, temp: 97 2, Weight: 120 lbs ( 5 lb weight loss since 5/2019)    Physical Exam   Constitutional: No distress  Appearing chronically ill    Eyes: Conjunctivae are normal  Right eye exhibits no discharge  Left eye exhibits no discharge  Cardiovascular: Normal rate and regular rhythm  Murmur heard  + 1 pedal pulses; B/L feet red/purple when in dependent position    Pulmonary/Chest: Effort normal and breath sounds normal  No respiratory distress  She has no wheezes  She has no rales  Abdominal: Soft  Bowel sounds are normal  She exhibits no distension  There is no tenderness  Musculoskeletal: Normal range of motion  She exhibits no edema, tenderness or deformity  Neurological: She is alert  Observed resident self-propelling in wheelchair    Skin: Skin is warm and dry  Capillary refill takes 2 to 3 seconds  She is not diaphoretic  Psychiatric: She has a normal mood and affect  Pleasant and cooperative    Nursing note and vitals reviewed        Pertinent Laboratory/Diagnostic Studies:  Reviewed   Liver function panel 7/2019  BMP 9/19/19; creatinine 1 02, sodium 141, GFR 51, T4 free 1 11  CBC 9/19/19; hgb 11 3, hct 34    Consults  Podiatry 9/6/19    Current Medications Medications reviewed and updated, see facility [facility] for details  PRN usage: Tylenol x 3 in the past 7 days       IRMA Camara   [facility]  9/20/2019 4:17 PM

## 2019-09-20 NOTE — ASSESSMENT & PLAN NOTE
-stable and doing well with medication management   reviewed recent TSH and T4, free   -continue Levothyroxine 50 mcg daily   -continue to monitor

## 2019-11-11 ENCOUNTER — NURSING HOME VISIT (OUTPATIENT)
Dept: GERIATRICS | Facility: OTHER | Age: 79
End: 2019-11-11
Payer: MEDICARE

## 2019-11-11 DIAGNOSIS — H61.23 IMPACTED CERUMEN OF BOTH EARS: Primary | ICD-10-CM

## 2019-11-11 PROCEDURE — 99307 SBSQ NF CARE SF MDM 10: CPT | Performed by: NURSE PRACTITIONER

## 2019-11-11 NOTE — PROGRESS NOTES
[facility]  [address]  [phone]) [address]ume[address]ong [address]  Code 32    NAME: Sharyle Jean  AGE: 78 y o  SEX: female    : 1940    Code Status: AND/DNR    DATE OF ENCOUNTER: 2019    Assessment and Plan     Problem List Items Addressed This Visit        Nervous and Auditory    Impacted cerumen of both ears - Primary     -ears evaluated and treated for recurrent problem in 2019   -no other associated s/sx   -start debrox otic gtts, 5 gtts in B/L ears q HS x 4 days   -will evaluate if ear lavage is appropriate later this week   -DWN and resident                No orders of the defined types were placed in this encounter  Chief Complaint     Left Earwax noted by nursing      History of Present Illness     77 yo female, resident of [address] [place] [address], seen in collaboration with nursing to evaluate nursing's assessment of cerumen to left ear  No other concerns from nursing  Upon exam, resident sitting comfortably in her wheelchair with no s/sx of distress or discomfort  She has no complaints during visit and denies ear pain, drainage or decreased hearing  The following portions of the patient's history were reviewed and updated as appropriate: allergies, current medications, past medical history, and problem list     Review of Systems     Review of Systems   Constitutional: Negative for activity change and appetite change  HENT: Negative for ear discharge, ear pain and hearing loss  Neurological: Negative for dizziness         Active Problem List     Patient Active Problem List   Diagnosis    Microscopic hematuria    Dyslipidemia    Hypertension    Atrial fibrillation ([facility] [address] )    Hypothyroidism    Cognitive impairment    MCI (mild cognitive impairment)    Severe aortic stenosis    Fall    Ambulatory dysfunction    Physical deconditioning    Osteoporosis    NSTEMI (non-ST elevated myocardial infarction) ([address] )  Acute blood loss anemia    Delirium    Onychomycosis    Insomnia    Anxiety    Vitamin D insufficiency    Other constipation    Debility    Anemia    Gastroesophageal reflux disease without esophagitis    Goals of care, counseling/discussion    Mood disorder due to a general medical condition    Venous insufficiency    Impacted cerumen of both ears    Foot pain, bilateral       Objective      Physical Exam   Constitutional: No distress  Appearing chronically ill    HENT:   Impacted cerumen bilaterally; unable to visualize TM or canal 2/2 to cerumen    Neurological: She is alert  Skin: She is not diaphoretic  Psychiatric: She has a normal mood and affect  Nursing note and vitals reviewed  Pertinent Laboratory/Diagnostic Studies:  N/A    Current Medications       Medications reviewed and updated, see facility [facility] for details         IRMA Gipson   [facility]  11/11/2019 2:19 PM

## 2019-11-11 NOTE — ASSESSMENT & PLAN NOTE
-ears evaluated and treated for recurrent problem in July 2019   -no other associated s/sx   -start debrox otic gtts, 5 gtts in B/L ears q HS x 4 days   -will evaluate if ear lavage is appropriate later this week   -AAYUSH and resident

## 2019-11-15 ENCOUNTER — NURSING HOME VISIT (OUTPATIENT)
Dept: GERIATRICS | Facility: OTHER | Age: 79
End: 2019-11-15
Payer: MEDICARE

## 2019-11-15 DIAGNOSIS — H61.23 IMPACTED CERUMEN OF BOTH EARS: ICD-10-CM

## 2019-11-15 DIAGNOSIS — H57.89 EYE DRAINAGE: Primary | ICD-10-CM

## 2019-11-15 PROCEDURE — 99307 SBSQ NF CARE SF MDM 10: CPT | Performed by: NURSE PRACTITIONER

## 2019-11-15 PROCEDURE — 69209 REMOVE IMPACTED EAR WAX UNI: CPT | Performed by: NURSE PRACTITIONER

## 2019-11-15 NOTE — PROGRESS NOTES
[facility]  [address]  [phone]) [address]ume[address]ong [address]  Code 32    NAME: Leticia Mora  AGE: 78 y o  SEX: female    : 1940    Code Status: AND/DNR    DATE OF ENCOUNTER: 11/15/2019    Assessment and Plan     Problem List Items Addressed This Visit        Other    Eye drainage - Primary     -nursing noted drainage from resident's eyes since yesterday   -upon exam today, + scant amount of yellow crust from left eye  -no s/sx of bacterial infection   -no complaints from resident   -encourage eye hygiene daily in the AM with warm washcloths   -promote strict hand hygiene   -continue to monitor for any decline in condition   -DWN                Orders Placed This Encounter   Procedures    Ear cerumen removal     Chief Complaint     Drainage from eyes and f/u on impacted cerumen and the indication for ear lavage     History of Present Illness     79 yo female, resident of [address], seen in collaboration with nursing to evaluate drainage from eyes per nursing assessment and to follow up on impacted cerumen s/p 4 days of debrox otic gtts  No other concerns from nursing  Upon exam, resident sitting comfortably in her wheelchair without any s/sx of discomfort or distress  She denies discomfort, eye pain, irritation or blurry vision  The following portions of the patient's history were reviewed and updated as appropriate: allergies, current medications, past medical history and problem list     Review of Systems     Review of Systems   HENT: Negative for ear discharge, ear pain and hearing loss  Eyes: Negative for pain, discharge and redness  Neurological: Negative for dizziness and light-headedness         Active Problem List     Patient Active Problem List   Diagnosis    Microscopic hematuria    Dyslipidemia    Hypertension    Atrial fibrillation ([address] )    Hypothyroidism    Cognitive impairment    MCI (mild cognitive impairment)    Severe aortic stenosis    Fall    Ambulatory dysfunction    Physical deconditioning    Osteoporosis    NSTEMI (non-ST elevated myocardial infarction) ([facility])    Acute blood loss anemia    Delirium    Onychomycosis    Insomnia    Anxiety    Vitamin D insufficiency    Other constipation    Debility    Anemia    Gastroesophageal reflux disease without esophagitis    Goals of care, counseling/discussion    Mood disorder due to a general medical condition    Venous insufficiency    Impacted cerumen of both ears    Foot pain, bilateral    Eye drainage       Objective       Physical Exam   Constitutional: No distress  Appearing chronically ill    Eyes: Conjunctivae and EOM are normal  Right eye exhibits no discharge  Left eye exhibits no discharge (scant amount of crust )  Neurological: She is alert  Skin: She is not diaphoretic  Psychiatric:   Pleasant and cooperative during exam    Nursing note and vitals reviewed  Ear cerumen removal  Date/Time: 11/15/2019 1:00 PM  Performed by: IRMA Teran  Authorized by: Ventura Teran     Patient location:  Bedside  Other Assisting Provider: No    Consent:     Consent obtained:  Verbal    Consent given by:  Patient    Risks discussed:  Pain, dizziness and incomplete removal    Alternatives discussed:  Observation  Universal protocol:     Procedure explained and questions answered to patient or proxy's satisfaction: yes      Patient identity confirmed:  Verbally with patient  Procedure details:     Local anesthetic:  None    Location:  L ear and R ear    Procedure type: irrigation only      Equipment used: [place] clear   Post-procedure details:     Complication:  None    Hearing quality:  Normal    Patient tolerance of procedure: Tolerated well, no immediate complications  Comments:      Ear canal and TM visualized with no s/sx of complication  Continue to evaluate q 3 months         Pertinent Laboratory/Diagnostic Studies:  N/A    Current Medications       Medications reviewed and updated, see facility [facility] for details         IRMA Jack   [facility]  11/15/2019 4:47 PM

## 2019-11-15 NOTE — ASSESSMENT & PLAN NOTE
-nursing noted drainage from resident's eyes since yesterday   -upon exam today, + scant amount of yellow crust from left eye  -no s/sx of bacterial infection   -no complaints from resident   -encourage eye hygiene daily in the AM with warm washcloths   -promote strict hand hygiene   -continue to monitor for any decline in condition   -AAYUSH

## 2019-11-17 ENCOUNTER — NURSING HOME VISIT (OUTPATIENT)
Dept: GERIATRICS | Facility: OTHER | Age: 79
End: 2019-11-17
Payer: MEDICARE

## 2019-11-17 DIAGNOSIS — I35.0 SEVERE AORTIC STENOSIS: ICD-10-CM

## 2019-11-17 DIAGNOSIS — E03.9 HYPOTHYROIDISM, UNSPECIFIED TYPE: ICD-10-CM

## 2019-11-17 DIAGNOSIS — R53.81 DEBILITY: Primary | ICD-10-CM

## 2019-11-17 DIAGNOSIS — I10 ESSENTIAL HYPERTENSION: ICD-10-CM

## 2019-11-17 DIAGNOSIS — F03.90 DEMENTIA WITHOUT BEHAVIORAL DISTURBANCE, UNSPECIFIED DEMENTIA TYPE (HCC): ICD-10-CM

## 2019-11-17 DIAGNOSIS — I48.91 ATRIAL FIBRILLATION, UNSPECIFIED TYPE (HCC): ICD-10-CM

## 2019-11-17 DIAGNOSIS — D62 ACUTE BLOOD LOSS ANEMIA: ICD-10-CM

## 2019-11-17 PROBLEM — R41.0 DELIRIUM: Status: RESOLVED | Noted: 2018-12-10 | Resolved: 2019-11-17
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
## 2019-11-17 PROBLEM — R41.89 COGNITIVE IMPAIRMENT: Status: RESOLVED | Noted: 2018-05-26 | Resolved: 2019-11-17

## 2019-11-17 PROCEDURE — 99309 SBSQ NF CARE MODERATE MDM 30: CPT | Performed by: INTERNAL MEDICINE

## 2019-11-17 NOTE — PROGRESS NOTES
[address]  [address]  Facility: [facility]  [place] [address]      NAME: Tye Butler  AGE: 78 y o  SEX: female    DATE OF ENCOUNTER: 11/17/2019    Code status:  AND/DNR    Assessment and Plan     Problem List Items Addressed This Visit        Endocrine    Hypothyroidism       Cardiovascular and Mediastinum    Atrial fibrillation ([address] )    Hypertension    Severe aortic stenosis       Nervous and Auditory    Dementia without behavioral disturbance ([address] )       Other    Acute blood loss anemia    Debility - Primary      1  Debility secondary to her chronic medical conditions-she still requires 24/7 care/support of her ADLs  I recommend that she continue with care/support of her ADLs at long-term care facility level of care  I recommend continued monitoring  2  Dementia without behavioral disturbance-her mini-mental status exam revealed a score of 4/30  She clearly has a cognitive deficit on exam   Her degree of cognitive impairment impairs her independence  Therefore, she has dementia  I filled out a form for her  and daughter to become her guardians  3  Hypertension-review of her BP log shows that she is doing well with metoprolol succinate ER  I recommend continued monitoring  4  Atrial fibrillation-review of her BP/AP logs shows that she is doing well with Eliquis and metoprolol succinate ER  Continue with monitoring  5  Depression-improved with increasing dosage of mirtazapine  I recommend continued monitoring  6  Severe aortic stenosis-currently, asymptomatic  Continue with monitoring  7  Constipation-continue with MiraLax and monitoring  8  Stage 3 chronic kidney disease-she is clinically euvolemic and her laboratory values are stable  I recommend continued clinical and periodic laboratory monitoring      9  Weight loss-I recommended to nursing that they send IRF to the dietary department  10  Acute blood loss anemia-her lab values have stabilized  Continue clinical and periodic laboratory monitoring  11  Hypothyroidism-she is doing well with levothyroxine  I recommend continued clinical and periodic laboratory monitoring  12  Tremors-as discussed with nursing, I believe the side effects of any treatment outweigh the benefits at this time  Continue with monitoring  See my note of August 2, 2019 for further assessment and plan  All medications and routine orders were reviewed and updated as needed  Plan discussed with: Nurse    Chief Complaint     She is seen with female nursing staff for a follow-up visit to update the care and treatment of her debility secondary to her chronic medical conditions, hypothyroidism, atrial fibrillation, hypertension, and acute blood loss anemia  History of Present Illness     She is a 59-year-old woman who is seen with female nursing staff for a follow-up visit to update the care and treatment of her debility secondary to her chronic medical conditions-she still requires 24/7 care/support of her ADLs, hypothyroidism-doing well with levothyroxine, atrial fibrillation-doing well with metoprolol succinate ER/Eliquis, and acute blood loss anemia-doing well on recent laboratory testing  Review of recent provider notes shows that her mood has improved with increased dosage of mirtazapine  Nursing and resident report difficulty with tremors in her arms/hands  Appears to be more prominent with use of her extremity  The following portions of the patient's history were reviewed and updated as appropriate: current medications, past medical history, past social history, past surgical history and problem list     Allergies:  No Known Allergies    Review of Systems     Review of Systems   Unable to perform ROS: Dementia       Medications and orders     All medications reviewed and updated in Nursing Home EMR        Objective     Vitals: Weight 118 3 lb (decreased 10 lb over the last 6 months), pulse 64, blood pressure 118/70  Physical Exam   Constitutional: Vital signs are normal  She appears well-developed and well-nourished  She is cooperative  Non-toxic appearance  No distress  She is awake and appears comfortable in her wheelchair, stated age, and chronically ill  Eyes: No scleral icterus  Cardiovascular: Normal rate and regular rhythm  Exam reveals no gallop and no friction rub  Murmur (3/6 systolic ejection murmur heard across her precordium ) heard  Pulmonary/Chest: Effort normal and breath sounds normal  No stridor  No respiratory distress  She has no wheezes  She has no rales  Abdominal: Soft  She exhibits no distension and no mass  There is no tenderness  There is no guarding  Musculoskeletal: She exhibits no edema  Neurological: She is alert  She has a bilateral intention tremor  Vitals reviewed  Pertinent Laboratory/Diagnostic Studies: The following labs were reviewed please see chart or hospital paperwork for details  September 19, 2019: BMP-sodium 141, potassium 4 4, BUN 29, creatinine 1 09, fasting blood sugar 80, EGFR 51  TSH -8 9  Free T4-1 0 11    CBC with diff-WBC count 3 6, hemoglobin 11 3, hematocrit 34, platelet count [id number]  - Treatment plan reviewed with nursing      ANISH Kaur   11/17/2019 11:02 PM

## 2019-12-02 ENCOUNTER — NURSING HOME VISIT (OUTPATIENT)
Dept: GERIATRICS | Facility: OTHER | Age: 79
End: 2019-12-02
Payer: MEDICARE

## 2019-12-02 DIAGNOSIS — K13.79 MOUTH PAIN: Primary | ICD-10-CM

## 2019-12-02 PROCEDURE — 99307 SBSQ NF CARE SF MDM 10: CPT | Performed by: NURSE PRACTITIONER

## 2019-12-02 NOTE — PROGRESS NOTES
[facility]  [address]  [phone]) [address]  Code 32      NAME: An Livingston  AGE: 78 y o  SEX: female    : 1940    Code Status: AND/DNR    DATE OF ENCOUNTER: 2019    Assessment and Plan     Problem List Items Addressed This Visit        Other    Mouth pain - Primary     -reports mouth discomfort and intermittent aching pain along jaw line for the past couple of months; possible component of TMJ   -No abcess or s/sx of dental complication on today's exam   -no complaints of headaches   -No recent dental eval-->c/s dentist today for further recommendations (on site today for visits)  -promote soft foods for symptom management   -continue tylenol TID   -continue to monitor for improvement and or worsening in condition   -DWN                No orders of the defined types were placed in this encounter  Chief Complaint     Mouth/tooth pain     History of Present Illness     77 yo female, resident of [address] [facility], seen in collaboration with nursing to evaluate mouth pain  No other concerns from nursing  Upon exam, resident sitting comfortably in her wheelchair without any s/sx of distress or discomfort  She reports an intermittent ache in her mouth and jaw line more so in the AM for the past couple of months  Per nursing, Tylenol partly helps to lessen her discomfort  ROS limited secondary to dementia  The following portions of the patient's history were reviewed and updated as appropriate: allergies, current medications, past medical history and problem list     Review of Systems     ROS limited secondary to dementia     Review of Systems   Constitutional: Negative for appetite change  HENT: Negative for dental problem, ear pain, mouth sores and trouble swallowing  Jaw pain    Neurological: Negative for headaches         Active Problem List     Patient Active Problem List   Diagnosis    Microscopic hematuria    Dyslipidemia    Hypertension    Atrial fibrillation (HCC)    Hypothyroidism    Dementia without behavioral disturbance (HCC)    Severe aortic stenosis    Ambulatory dysfunction    Physical deconditioning    Osteoporosis    NSTEMI (non-ST elevated [facility] infarction) ([facility])    Acute blood loss anemia    Onychomycosis    Insomnia    Anxiety    Vitamin D insufficiency    Other constipation    Debility    Anemia    Gastroesophageal reflux disease without esophagitis    Goals of care, counseling/discussion    Mood disorder due to a general medical condition    Venous insufficiency    Impacted cerumen of both ears    Foot pain, bilateral    Eye drainage    Mouth pain       Objective       Physical Exam   Constitutional: No distress  HENT:   Mouth/Throat: Oropharynx is clear and moist  No oral lesions  No dental abscesses  no facial/jaw tenderness upon palpation; no s/sx of infection in oral cavity    Neurological: She is alert  Skin: She is not diaphoretic  Psychiatric:   Pleasant cooperative    Nursing note and vitals reviewed  Pertinent Laboratory/Diagnostic Studies:  N/A    Current Medications       Medications reviewed and updated, see facility [facility] for details         IRMA Britton   [facility]  12/2/2019 3:54 PM

## 2020-01-08 ENCOUNTER — NURSING HOME VISIT (OUTPATIENT)
Dept: GERIATRICS | Facility: OTHER | Age: 80
End: 2020-01-08
Payer: MEDICARE

## 2020-01-08 DIAGNOSIS — I48.91 ATRIAL FIBRILLATION, UNSPECIFIED TYPE (HCC): ICD-10-CM

## 2020-01-08 DIAGNOSIS — I10 ESSENTIAL HYPERTENSION: ICD-10-CM

## 2020-01-08 DIAGNOSIS — I35.0 SEVERE AORTIC STENOSIS: ICD-10-CM

## 2020-01-08 DIAGNOSIS — G47.00 INSOMNIA, UNSPECIFIED TYPE: ICD-10-CM

## 2020-01-08 DIAGNOSIS — F06.30 MOOD DISORDER DUE TO A GENERAL MEDICAL CONDITION: ICD-10-CM

## 2020-01-08 DIAGNOSIS — K59.09 OTHER CONSTIPATION: ICD-10-CM

## 2020-01-08 DIAGNOSIS — F03.90 DEMENTIA WITHOUT BEHAVIORAL DISTURBANCE, UNSPECIFIED DEMENTIA TYPE (HCC): Primary | ICD-10-CM

## 2020-01-08 DIAGNOSIS — E55.9 VITAMIN D INSUFFICIENCY: ICD-10-CM

## 2020-01-08 DIAGNOSIS — E03.9 HYPOTHYROIDISM, UNSPECIFIED TYPE: ICD-10-CM

## 2020-01-08 DIAGNOSIS — K21.9 GASTROESOPHAGEAL REFLUX DISEASE WITHOUT ESOPHAGITIS: ICD-10-CM

## 2020-01-08 DIAGNOSIS — R53.81 DEBILITY: ICD-10-CM

## 2020-01-08 PROCEDURE — 99309 SBSQ NF CARE MODERATE MDM 30: CPT | Performed by: NURSE PRACTITIONER

## 2020-01-09 NOTE — PROGRESS NOTES
[facility]  [address]  [phone]) [address]  Code 32      NAME: Patricia Gusman  AGE: 78 y o   SEX: female    : 1940    Code Status: AND/DNR    DATE OF ENCOUNTER: 2020    Assessment and Plan     Problem List Items Addressed This Visit        Digestive    Gastroesophageal reflux disease without esophagitis     -denies symptoms   -continue diet modifications and medication management with omeprazole 20 mg daily             Endocrine    Hypothyroidism     -stable and doing well with levothyroxine 50 mcg daily   -TSH 8 90 with T4 free 1 11   -continue periodic lab surveillance             Cardiovascular and Mediastinum    Hypertension     -Stable without medication management; isolated BP of 148/82; -BP averaging 100s-120s/70s   -continue lifestyle modifications   -continue to monitor          Atrial fibrillation (HCC)     -no acute decline in condition   -continue rate control with amiodarone 200 mg daily and anticoagulation with eliquis 5 mg q 12 hours   -continue monitoring          Severe aortic stenosis     -no acute change in condition   -asymptomatic   -continue to monitor for any decline and intervene as appropriate             Nervous and Auditory    Dementia without behavioral disturbance ([facility] [address] ) - Primary     -no acute change in mood or behavior   -weight decline; continue EBC diet and house shakes BID; will increase shakes to QID if weight declines further; continue to monitor and c/s dietary as needed   -continue to promote socialization and participation in activities with other residents   -continue to provide safe and supportive environment   -continue to promote family involvement   -continue to monitor          Mood disorder due to a general medical condition     -no acute change in mood or behavior   -continue conservative measures and medication management with mirtazapine 30 mg daily   -continue to encourage activity and socialization as tolerated   -continue monitoring             Other    Insomnia     -reports occasional difficulty sleeping   -continue to promote normal sleep/wake cycle   -discourage naps and caffeine after 1400   -continue melatonin 3 mg at HS          Vitamin D insufficiency     -continue vitamin D3 1000 units daily   -continue periodic lab surveillance          Other constipation     -c/o mild abdominal discomfort   -nursing reports that resident occasional requires PRN bisacodyl secondary to sporadic bowel movements   -continue miralax daily and add colace 100 mg daily   -continue to encourage fluids and promote activity as tolerated            Debility     -secondary to her chronic medical conditions   -continue pain management with Tylenol 650 mg TID and supplement to promote wound healing with prosource PO daily (stage II pressure ulcer)  -continue 24/7 support at [facility]               No orders of the defined types were placed in this encounter  Chief Complaint     Follow-up of chronic medical conditions  History of Present Illness     79 yo female, resident of [address], seen in collaboration with nursing to evaluate her chronic medical conditions including but not limited to Dementia without behavioral disturbance, Hypothyroidism, atrial fibrillation, GERD, Mood disorder and constipation  No new concerns from nursing  Upon exam, resident sitting comfortably in her wheelchair with no s/sx of distress or discomfort  Her  is at bedside and supportive and interactive  Resident denies shortness of breath, chest pain or palpitations  She c/o of anxiety at times, but controlled with medication and conservative measures  Thorough ROS limited secondary to dementia         The following portions of the patient's history were reviewed and updated as appropriate: allergies, current medications, past family history (no pertinent FH), past medical history and problem list     Review of Systems     Review of Systems   Unable to perform ROS: Dementia       Active Problem List     Patient Active Problem List   Diagnosis    Microscopic hematuria    Dyslipidemia    Hypertension    [facility]    Hypothyroidism    Dementia without behavioral disturbance ([facility] )    Severe aortic stenosis    Ambulatory dysfunction    Physical deconditioning    Osteoporosis    NSTEMI (non-ST elevated myocardial infarction) ([facility]    Acute blood loss anemia    Onychomycosis    Insomnia    Anxiety    Vitamin D insufficiency    Other constipation    Debility    Anemia    Gastroesophageal reflux disease without esophagitis    Goals of care, counseling/discussion    Mood disorder due to a general medical condition    Venous insufficiency    Impacted cerumen of both ears    Foot pain, bilateral    Eye drainage    Mouth pain       Objective     /82, HR 84, RR 16, weight: 115 7 lbs (120 lbs in Sept )    Physical Exam   Constitutional: No distress  Appearing chronically ill    Eyes: Pupils are equal, round, and reactive to light  Conjunctivae are normal  Right eye exhibits no discharge  Left eye exhibits no discharge  Cardiovascular: Normal rate, regular rhythm and intact distal pulses  Murmur heard  Pulmonary/Chest: Effort normal and breath sounds normal  No respiratory distress  She has no wheezes  She has no rales  Abdominal: Soft  Bowel sounds are normal  She exhibits no distension  There is no tenderness  Musculoskeletal: She exhibits no edema, tenderness or deformity  Self propels in wheelchair    Neurological: She is alert  Oriented to name and birthday day and month    Skin: Skin is warm and dry  Capillary refill takes less than 2 seconds  She is not diaphoretic  No erythema  No pallor  Stage 2 PU on sacrum    Psychiatric:   Pleasant and cooperative on exam    Nursing note and vitals reviewed        Pertinent Laboratory/Diagnostic Studies:  Reviewed   BMP, Thyroid studies, CBC 9/2019  LFTs 7/2019    Consults   Wound healing solutions 12/2019  Podiatry 11/2019  Dentist 9/2019    Current Medications       Medications reviewed and updated, see facility [facility] for details  PRN usage: Bisacodyl x 2 and Tylenol x 1 in the past 30 days         IRMA Ellis   [facility]  1/9/2020 7:47 PM

## 2020-01-09 NOTE — ASSESSMENT & PLAN NOTE
-Stable without medication management; isolated BP of 148/82; -BP averaging 100s-120s/70s   -continue lifestyle modifications   -continue to monitor

## 2020-01-09 NOTE — ASSESSMENT & PLAN NOTE
-c/o mild abdominal discomfort   -nursing reports that resident occasional requires PRN bisacodyl secondary to sporadic bowel movements   -continue miralax daily and add colace 100 mg daily   -continue to encourage fluids and promote activity as tolerated

## 2020-01-10 NOTE — ASSESSMENT & PLAN NOTE
-stable and doing well with levothyroxine 50 mcg daily   -TSH 8 90 with T4 free 1 11   -continue periodic lab surveillance

## 2020-01-10 NOTE — ASSESSMENT & PLAN NOTE
-no acute change in mood or behavior   -weight decline; continue EBC diet and house shakes BID; will increase shakes to QID if weight declines further; continue to monitor and c/s dietary as needed   -continue to promote socialization and participation in activities with other residents   -continue to provide safe and supportive environment   -continue to promote family involvement   -continue to monitor

## 2020-01-10 NOTE — ASSESSMENT & PLAN NOTE
-no acute decline in condition   -continue rate control with amiodarone 200 mg daily and anticoagulation with eliquis 5 mg q 12 hours   -continue monitoring

## 2020-01-10 NOTE — ASSESSMENT & PLAN NOTE
-denies symptoms   -continue diet modifications and medication management with omeprazole 20 mg daily

## 2020-01-10 NOTE — ASSESSMENT & PLAN NOTE
-reports occasional difficulty sleeping   -continue to promote normal sleep/wake cycle   -discourage naps and caffeine after 1400   -continue melatonin 3 mg at HS

## 2020-01-10 NOTE — ASSESSMENT & PLAN NOTE
-secondary to her chronic medical conditions   -continue pain management with Tylenol 650 mg TID and supplement to promote wound healing with prosource PO daily (stage II pressure ulcer)  -continue 24/7 support at [facility]

## 2020-01-10 NOTE — ASSESSMENT & PLAN NOTE
-no acute change in condition   -asymptomatic   -continue to monitor for any decline and intervene as appropriate

## 2020-01-10 NOTE — ASSESSMENT & PLAN NOTE
-no acute change in mood or behavior   -continue conservative measures and medication management with mirtazapine 30 mg daily   -continue to encourage activity and socialization as tolerated   -continue monitoring

## 2020-01-17 NOTE — PROGRESS NOTES
12/24/18 1000   Pain Assessment   Pain Assessment 0-10   Pain Score 4   Pain Type Acute pain   Pain Location Knee   Pain Orientation Right   Pain Descriptors Dull;Nagging   Pain Frequency Intermittent   Pain Onset Progressive   Effect of Pain on Daily Activities occurred with ext stance and mobility   Patient's Stated Pain Goal No pain   Hospital Pain Intervention(s) Rest   Response to Interventions pt tolerated session   Restrictions/Precautions   Precautions Bed/chair alarms;Cognitive; Fall Risk;Supervision on toilet/commode;THR   Weight Bearing Restrictions Yes   RLE Weight Bearing Per Order WBAT   ROM Restrictions Yes   RLE ROM Restriction (THPs)   Braces or Orthoses (TEDs)   Eating Assessment   Eating (FIM) 6 - Patient requires increased time or safety concern   QI: Oral Hygiene   Assistance Needed Supervision   Assistance Provided by [place] Less than 25%   Oral Hygiene CARE Score 3   Grooming   Able To Initiate Tasks; Acquire Items; Wash/Dry Face;Comb/Brush Hair;Brush/Clean Teeth;Wash/Dry Hands   Limitation Noted In Strength; Coordination   Findings in stance with chair behind her at sink to complete grooming tasks  Pt able to support self using Rw  Grooming (FIM) 5 - Patient requires supervision/monitoring   QI: Shower/Bathe Self   Assistance Needed Physical assistance   Assistance Provided by [place] 25%-49%   Comment Pt cont to req A for bathing b/l LEs below knees and CGA in stance for jhon/rear bathing   Shower/Bathe Self CARE Score 3   Bathing   Assessed Bath Style Sponge Bath   Anticipated D/C Bath Style Sponge Bath   Able to Gather/Transport No   Able to [employer] Temperature No   Able to Wash/Rinse/Dry (body part) Right Arm;Left Arm;L Upper Leg;R Upper Leg;Chest;Abdomen   Limitations Noted in Balance; Endurance;Problem Solving;Strength; Sequencing   Positioning Seated;Standing   Findings  Pt and  completed sponge bathing EOB to simulate discharge bathing plan    collected supplies from bathroom and clothing from drawers while pt doffed shirt while seated  Pt able to bathe UB seated but req cuing for thoroughness from   Pt then req CGA in stance with support from RW to perform jhon/rear bathing   A with bathing b/l LEs below knees 2* THPs and cog deficits limiting LHAE use  Bathing (FIM) 3 - Patient completes 5/10  6/10 or 7/10 parts   Tub/Shower Transfer   Not Assessed Sponge Bath   QI: Upper Body Dressing   Assistance Needed Set-up / clean-up;Supervision   Assistance Provided by [place] No physical assistance   Upper Body Dressing CARE Score 4   QI: Lower Body Dressing   Assistance Needed Physical assistance   Assistance Provided by [place] 25%-49%   Comment Pt's  thread underwear and pants over b/l LEs  Pt then stood in stance with CGA for CM over hips  Pt req encouragement to complete task   Lower Body Dressing CARE Score 3   QI: Putting On/Taking Off Footwear   Assistance Needed Physical assistance   Assistance Provided by [place] Total assistance   Comment Pt's  able to safely complete task   Putting On/Taking Off Footwear CARE Score 1   Dressing/Undressing Clothing   Remove UB Clothes Pullover Shirt   Remove LB Clothes Undergarment;Pants;Socks;TEDs   [address] [facility] [place]; Undergarment;Socks;TEDs   Limitations Noted In Balance; Endurance; Coordination;Strength;ROM   Positioning Sit Edge Of Bed;Standing   Findings Pt's  able to provide A for LB dressing and support in stance during CM     UB Dressing (FIM) 5 - Patient requires supervision/monitoring   LB Dressing (FIM) 3 - Patient completes  50-74% of all tasks   QI: Lying to Sitting on Side of Bed   Assistance Needed Physical assistance   Assistance Provided by [place] Less than 25%   Comment  provided cuing and guiding A for transfer   Lying to Sitting on Side of Bed CARE Score 3   QI: Sit to [address] Provided by [place] No physical assistance   Sit to Stand CARE Score 4   QI: Chair/Bed-to-Chair Transfer   Assistance Needed Incidental touching; Adaptive equipment   Assistance Provided by [place] No physical assistance   Comment RW   Chair/Bed-to-Chair Transfer CARE Score 4   Transfer Bed/Chair/Wheelchair   Positioning Concerns Cognition   Limitations Noted In Balance;Confidence; Sequencing   Adaptive Equipment Roller Walker   Findings  completed all transfers with pt during session from bed, w/c, and recliner  Pt cont to req v/c for sequencing of task but pt is able to recall and verbalize appropriate commands for transfers   demo inc success with positioning surfaces and maintaining appropriate points of control on hips during all transfers   Bed, Chair, Wheelchair Transfer (FIM) 4 - Patient requires steadying assist or light touching   QI: [address] Needed Incidental touching   Assistance Provided by [place] Less than 25%   Comment CGA in stance   [address] Score 3   [place] to [address] down yes, up yes  Manage Hygiene Bladder   Limitations Noted In Balance;ROM;Safety   Findings Pt req encouragement and v/c to complete hygiene during session  Pt able to reach rear with LUE while supported with RUE using RW  Pt edu to pull undergarments and pants up over knees prior to standing to perform CM  Toileting (FIM) 4 - Patient requires steadying assist or light touching   QI: Toilet Transfer   Assistance Needed Incidental touching   Assistance Provided by [place] Less than 25%   Comment RW   Toilet Transfer CARE Score 3   Toilet Transfer   Surface Assessed Standard Commode   Transfer Technique Standard   Limitations Noted In Confidence; Safety;LE Strength   Positioning Concerns Cognition   Findings  provided approrpriate cuing and A for transfer to toilet   Toilet Transfer (FIM) 4 - Patient requires steadying assist or light touching   Functional Standing Tolerance   Time 5minx2 dynamic stance   Activity Pt engaged in bean bag toss with support from RW to promote standing tolerance, dynamic balance, and dynamic reach L and R for ADL performance upon discharge  Pt had no LOB but did report inc discomfort in R knee during ext sstance  Cognition   Overall Cognitive Status Impaired   Arousal/Participation Alert; Cooperative   Attention Attends with cues to redirect   Orientation Level Oriented to person;Oriented to place   Memory Decreased long term memory;Decreased short term memory   Following Commands Follows one step commands with increased time or repetition   Activity Tolerance   Activity Tolerance Patient tolerated treatment well   Assessment   Treatment Assessment Pt and  participated in skilled OT session focusing on ADL retraining and functional transfers  Refer above for details regarding ADLs and functional transfer practice  Pt will have support from , daughter, son-in-law, and son after discharge  Pt will have all bed wedge and bed rails to inc bed mobility upon discharge  Pt will also have RW, w/c, and commode to inc indep with ADL tasks   reports having no concerns or questions regarding discharge home at this time  Assessment of family training  demo inc carryover with safe functional transfers and able to provide all A necessary for self care  Prognosis Fair   Barriers to Discharge None   Plan   Treatment/Interventions Therapeutic exercise; Endurance training;ADL retraining   Progress Progressing toward goals   Recommendation   OT Discharge Recommendation Home with family support   OT Therapy Minutes   OT Time In 1000   OT Time Out 1130   OT Total Time (minutes) 90   OT Mode of treatment - Individual (minutes) 90   OT Mode of treatment - Concurrent (minutes) 0   OT Mode of treatment - Group (minutes) 0   OT Mode of treatment - Co-treat (minutes) 0   OT Mode of Teatment - Total time(minutes) 90 minutes   Therapy Time missed   Time missed?  No breastfeeding exclusively

## 2020-02-02 NOTE — ED NOTES
[facility]  Cardiology  Progress Note    Patient Name: Annette Garcia  MRN: [id number]  Admission Date: 1/30/2020  Hospital Length of Stay: 2 days  Code Status: Full Code   Attending Physician: Alice Barker*   Primary Care Physician: Jose Valles MD  Expected Discharge Date:   Principal Problem:Acute deep vein thrombosis (DVT) of femoral vein of left lower extremity    Subjective:     Hospital Course:   Annette Garcia is an 82 y.o. female with HTN, hypothyroidism, epilepsy, atrial fibrillation, allergy, colon cancer and scoliosis of lumbar spine who presents to the ED complaining of left leg swelling. She was sent by Dr. Medina due to marked LLE swelling.  She denies leg pain, chest pain, lightheadedness, shortness of breath. Patient is unsure how long her leg has been swollen. Ultrasound showed thrombosis within the left common femoral, femoral, greater saphenous, and popliteal veins. Patient has a remote history of colon ca with resection in 10/2019. She is wheelchair bound. No bleeding issues reported.   NPO for potential thrombectomy on Friday 1/31. Due to long case on Friday, the case had to be rescheduled for 2/3.    2/1: Pt seen and examined. No overnight event. Leg looks improved from yesterday, less edema in the ankle. Now only edema in the left foot. Discoloration has also improved. Now on heparin gtt.       Review of Systems   Constitution: Negative for chills, decreased appetite, malaise/fatigue and weight gain.   HENT: Negative for congestion and ear discharge.    Eyes: Negative for blurred vision and double vision.   Cardiovascular: Positive for leg swelling. Negative for chest pain, cyanosis, dyspnea on exertion, irregular heartbeat, near-syncope, orthopnea, palpitations and syncope.   Respiratory: Negative for cough, shortness of breath and sleep disturbances due to breathing.    Skin: Negative for color change and dry skin.   Musculoskeletal: Positive for  Pt assisted to wheelchair, wheeled to waiting room to await ride with  at her side        Olivia Tinsley RN  01/10/19 2028 joint swelling. Negative for joint pain and muscle cramps.   Gastrointestinal: Negative for bloating, heartburn, hematemesis and hematochezia.   Genitourinary: Negative for bladder incontinence and dysuria.   Neurological: Negative for aphonia, excessive daytime sleepiness, dizziness, focal weakness, headaches, light-headedness, loss of balance and weakness.   Psychiatric/Behavioral: Positive for memory loss. Negative for altered mental status and depression. The patient does not have insomnia and is not nervous/anxious.      Objective:     Vital Signs (Most Recent):  Temp: 98.7 °F (37.1 °C) (02/01/20 1604)  Pulse: 69 (02/01/20 1604)  Resp: 17 (02/01/20 1604)  BP: (!) 103/54 (02/01/20 1604)  SpO2: 97 % (02/01/20 1952) Vital Signs (24h Range):  Temp:  [96.3 °F (35.7 °C)-98.7 °F (37.1 °C)] 98.7 °F (37.1 °C)  Pulse:  [64-86] 69  Resp:  [16-17] 17  SpO2:  [93 %-98 %] 97 %  BP: ()/(36-69) 103/54     Weight: 84.4 kg (186 lb 1.1 oz)  Body mass index is 40.26 kg/m².    SpO2: 97 %  O2 Device (Oxygen Therapy): room air      Intake/Output Summary (Last 24 hours) at 2/1/2020 1957  Last data filed at 2/1/2020 1800  Gross per 24 hour   Intake 125 ml   Output 675 ml   Net -550 ml       Lines/Drains/Airways     Central Venous Catheter Line                 Port A Cath Single Lumen 01/30/20 right atrial 2 days          Drain                 Colostomy 10/29/19 1200 LLQ 95 days         Urethral Catheter 11/20/19 1405 Latex 16 Fr. 73 days          Peripheral Intravenous Line                 Peripheral IV - Single Lumen 02/01/20 0208 20 G Posterior;Right Hand less than 1 day                Physical Exam   Constitutional: She is oriented to person, place, and time. She appears well-developed and well-nourished.   HENT:   Head: Normocephalic and atraumatic.   Eyes: Conjunctivae and EOM are normal.   Neck: Normal range of motion. Neck supple. No JVD present.   Cardiovascular: Normal rate, regular rhythm and normal heart sounds.   No  murmur heard.  Pulmonary/Chest: Effort normal and breath sounds normal. No respiratory distress. She has no wheezes. She has no rales.   Abdominal: Soft. Bowel sounds are normal. She exhibits no distension.   Musculoskeletal: Normal range of motion. She exhibits edema.   Left foot is swelling, 2+ pitting edema,  1+ pitting edema on the L leg   Neurological: She is alert and oriented to person, place, and time.   Skin: Skin is warm and dry. No erythema.   Psychiatric: She has a normal mood and affect. Her behavior is normal. Judgment and thought content normal.   Nursing note and vitals reviewed.      Significant Labs:   BMP:   Recent Labs   Lab 01/31/20 0419 02/01/20  0358     --    *  --    K 4.1  --    CL 96  --    CO2 28  --    BUN 27*  --    CREATININE 0.7  --    CALCIUM 8.8  --    MG 2.1 1.9   , CMP   Recent Labs   Lab 01/31/20 0419   *   K 4.1   CL 96   CO2 28      BUN 27*   CREATININE 0.7   CALCIUM 8.8   PROT 6.6   ALBUMIN 3.1*   BILITOT 0.2   ALKPHOS 119   AST 23   ALT 27   ANIONGAP 8   ESTGFRAFRICA >60   EGFRNONAA >60    and CBC   Recent Labs   Lab 01/31/20  0419 01/31/20  1751 02/01/20 0358   WBC 6.14 6.01 6.55  6.04   HGB 9.0* 9.4* 8.1*  8.1*   HCT 27.5* 29.7* 24.9*  25.3*    214 214  211       Significant Imaging: Echocardiogram:   2D echo with color flow doppler:   Results for orders placed or performed during the hospital encounter of 09/16/16   2D echo with color flow doppler   Result Value Ref Range    QEF 60 55 - 65    Diastolic Dysfunction No     Est. PA Systolic Pressure 17.9     Narrative    Date of Procedure: 09/16/2016        TEST DESCRIPTION   Technical Quality: This is a technically adequate study.     Aorta: The aortic root is normal in size, measuring 3.1 cm at sinotubular junction.     Left Atrium: The left atrial volume index is normal, measuring 27.41 cc/m2.     Left Ventricle: The left ventricle is normal in size, with an end-diastolic diameter of  4.3 cm, and an end-systolic diameter of 3.0 cm. LV wall thickness is normal, with the septum measuring 0.7 cm and the posterior wall measuring 0.8 cm across. Relative   wall thickness was normal at 0.37, and the LV mass index was 67.9 g/m2 consistent with normal left ventricular mass. Global left ventricular systolic function appears normal. Visually estimated ejection fraction is 60-65%. The LV Doppler derived stroke   volume equals 84.0 ccs.   The E/e'(lat) is 6, consistent with normal diastolic function.     Right Atrium: The right atrium is normal in size, measuring 4.0 cm in length in the apical view.     Right Ventricle: The right ventricle is normal in size. Global right ventricular systolic function appears normal. The estimated PA systolic pressure is 18 mmHg.     Aortic Valve:  Aortic valve is normal in structure with normal leaflet mobility.     Mitral Valve:  Mitral valve is normal in structure with normal leaflet mobility.     Tricuspid Valve:  Tricuspid valve is normal in structure with normal leaflet mobility.     Pulmonary Valve:  Pulmonary valve is normal in structure with normal leaflet mobility.     IVC: IVC is normal in size and collapses > 50% with a sniff, suggesting normal right atrial pressure of 3 mmHg.     Intracavitary: There is no evidence of pericardial effusion, intracavity mass, thrombi, or vegetation.         CONCLUSIONS     1 - Normal left ventricular systolic function (EF 60-65%).     2 - Normal right ventricular systolic function .     3 - The estimated PA systolic pressure is 18 mmHg.     4 - Grade 1 diastolic dysfunction .             This document has been electronically    SIGNED BY: Ridge Lantigua MD On: 09/16/2016 11:26    and Transthoracic echo (TTE) complete (Cupid Only):   Results for orders placed or performed during the hospital encounter of 10/27/19   Echo Color Flow Doppler? Yes   Result Value Ref Range    BSA 1.73 m2    Ascending aorta 2.61 cm    AV mean  gradient 3 mmHg    Ao peak gennaro 1.23 m/s    Ao VTI 17.79 cm    IVS 1.07 0.6 - 1.1 cm    LA size 3.21 cm    Left Atrium Major Axis 5.12 cm    Left Atrium Minor Axis 4.83 cm    LVIDD 4.17 3.5 - 6.0 cm    LVIDS 2.93 2.1 - 4.0 cm    LVOT diameter 1.96 cm    LVOT peak VTI 14.74 cm    PW 0.98 0.6 - 1.1 cm    PV Peak D Gennaro 0.24 m/s    PV Peak S Gennaro 0.29 m/s    RA Major Axis 3.96 cm    RA Width 2.15 cm    RVDD 1.57 cm    TR Max Gennaro 2.06 m/s    LA WIDTH 2.65 cm    Ao root annulus 3.38 cm    AORTIC VALVE CUSP SEPERATION 1.48 cm    LV Diastolic Volume 77.15 mL    LV Systolic Volume 33.07 mL    LVOT peak gennaro 0.80 m/s    FS 30 %    LA volume 35.94 cm3    LV mass 140.48 g    Left Ventricle Relative Wall Thickness 0.47 cm    AV valve area 2.50 cm2    AV Velocity Ratio 0.65     AV index (prosthetic) 0.83     Pulm vein S/D ratio 1.21     LVOT area 3.0 cm2    LVOT stroke volume 44.45 cm3    AV peak gradient 6 mmHg    LV Systolic Volume Index 19.7 mL/m2    LV Diastolic Volume Index 45.94 mL/m2    LA Volume Index 21.4 mL/m2    LV Mass Index 84 g/m2    Triscuspid Valve Regurgitation Peak Gradient 17 mmHg    TDI SEPTAL 0.09 m/s    TDI LATERAL 0.16 m/s    Mean e' 0.13 m/s    Narrative    · Normal left ventricular systolic function. The estimated ejection   fraction is 55%  · No wall motion abnormalities.  · Concentric left ventricular remodeling.  · Normal right ventricular systolic function.  · Mild mitral regurgitation.  · Diastolic pattern consistent with atrial fibrillation observed.        Assessment and Plan:       Active Diagnoses:    Diagnosis Date Noted POA    PRINCIPAL PROBLEM:  Acute deep vein thrombosis (DVT) of femoral vein of left lower extremity [I82.412] 01/30/2020 Yes    Urinary retention [R33.9] 11/04/2019 Yes    Slow transit constipation [K59.01] 10/28/2019 Yes     Chronic    Hypertension [I10] 08/29/2019 Yes    Acquired hypothyroidism [E03.9] 08/18/2016 Yes     Chronic    Epilepsy [G40.909] 08/18/2016 Yes      Chronic      Problems Resolved During this Admission:     1. Left CFA - saphenous vein DVT  - now on heparin gtt  - drop in hb to 8, continue to trend  - may not be a great candidate for TPA  - will plan for repeat u/s of L leg  veins on Monday, possible thrombolysis will be reconsidered at that time.      VTE Risk Mitigation (From admission, onward)         Ordered     heparin 25,000 units in dextrose 5% 250 mL (100 units/mL) infusion HIGH INTENSITY nomogram - [facility]  Continuous     Question:  Heparin Infusion Adjustment (DO NOT MODIFY ANSWER)  Answer:  [email or web address]\epic\Images\Pharmacy\HeparinInfusions\heparin HIGH INTENSITY nomogram for [facility] [id number]    01/31/20 1741     heparin 25,000 units in dextrose 5% (100 units/ml) IV bolus from bag - ADDITIONAL PRN BOLUS - 60 units/kg  As needed (PRN)     Question:  Heparin Infusion Adjustment (DO NOT MODIFY ANSWER)  Answer:  [email or web address]\epic\Images\Pharmacy\HeparinInfusions\heparin HIGH INTENSITY nomogram for [facility] [id number]    01/31/20 1741     heparin 25,000 units in dextrose 5% (100 units/ml) IV bolus from bag - ADDITIONAL PRN BOLUS - 30 units/kg  As needed (PRN)     Question:  Heparin Infusion Adjustment (DO NOT MODIFY ANSWER)  Answer:  [email or web address]\epic\Images\Pharmacy\HeparinInfusions\heparin HIGH INTENSITY nomogram for [facility] [id number]    01/31/20 1741                Richie Araujo MD  Cardiology  [facility]

## 2020-02-23 ENCOUNTER — NURSING HOME VISIT (OUTPATIENT)
Dept: GERIATRICS | Facility: OTHER | Age: 80
End: 2020-02-23
Payer: MEDICARE

## 2020-02-23 DIAGNOSIS — R53.81 DEBILITY: ICD-10-CM

## 2020-02-23 DIAGNOSIS — H01.00A BLEPHARITIS OF UPPER AND LOWER EYELIDS OF BOTH EYES, UNSPECIFIED TYPE: ICD-10-CM

## 2020-02-23 DIAGNOSIS — I35.0 SEVERE AORTIC STENOSIS: ICD-10-CM

## 2020-02-23 DIAGNOSIS — K59.00 CONSTIPATION, UNSPECIFIED CONSTIPATION TYPE: ICD-10-CM

## 2020-02-23 DIAGNOSIS — F03.90 DEMENTIA WITHOUT BEHAVIORAL DISTURBANCE, UNSPECIFIED DEMENTIA TYPE (HCC): ICD-10-CM

## 2020-02-23 DIAGNOSIS — E03.9 HYPOTHYROIDISM, UNSPECIFIED TYPE: ICD-10-CM

## 2020-02-23 DIAGNOSIS — R54 FRAILTY SYNDROME IN GERIATRIC PATIENT: ICD-10-CM

## 2020-02-23 DIAGNOSIS — G47.00 INSOMNIA, UNSPECIFIED TYPE: ICD-10-CM

## 2020-02-23 DIAGNOSIS — I10 HYPERTENSION, UNSPECIFIED TYPE: ICD-10-CM

## 2020-02-23 DIAGNOSIS — I48.91 ATRIAL FIBRILLATION, UNSPECIFIED TYPE (HCC): Primary | ICD-10-CM

## 2020-02-23 DIAGNOSIS — H01.00B BLEPHARITIS OF UPPER AND LOWER EYELIDS OF BOTH EYES, UNSPECIFIED TYPE: ICD-10-CM

## 2020-02-23 PROBLEM — H57.89 EYE DRAINAGE: Status: RESOLVED | Noted: 2019-11-15 | Resolved: 2020-02-23

## 2020-02-23 PROBLEM — K13.79 MOUTH PAIN: Status: RESOLVED | Noted: 2019-12-02 | Resolved: 2020-02-23

## 2020-02-23 PROBLEM — H61.23 IMPACTED CERUMEN OF BOTH EARS: Status: RESOLVED | Noted: 2019-07-08 | Resolved: 2020-02-23

## 2020-02-23 PROCEDURE — 99309 SBSQ NF CARE MODERATE MDM 30: CPT | Performed by: INTERNAL MEDICINE

## 2020-02-23 NOTE — PROGRESS NOTES
[facility]  [address] [place] [address]  Facility: [address] [facility]      NAME: Rylee Bernal  AGE: [de-identified] y o  SEX: female    DATE OF ENCOUNTER: 2/23/2020    Code status:  DNR     Assessment and Plan     1  Debility secondary to her chronic medical conditions  -she still requires 24/7 care/support of her ADLs  -I advise for her to continue with care/support of her ADLs at long-term care facility level of care  -continue with monitoring    2  Atrial fibrillation  -she is doing well with amiodarone and Eliquis therapy  -I will lower the dosage of her Eliquis from 5 milligrams twice daily to 2 5 milligrams every 12 hours, secondary to her turning [de-identified]years old last month and her weight being less than 60 kilograms at 53 kilograms  -continue with clinical and periodic laboratory monitoring    3  Hypothyroidism  -she is doing well with levothyroxine therapy  -continue with linical and periodic laboratory monitoring    4  GERD  -continue with omeprazole and monitoring    5  Hypertension  -review of her BP log shows that she is doing well with TLC -continue with monitoring    6  Severe aortic stenosis  -asymptomatic  -continue with monitoring    7  Dementia without behavioral disturbance  -MMSE 4/30  -continue with care/support at long-term care facility  -continue with monitoring    8  Adjustment disorder with depression  -doing well with mirtazapine per nursing staff  -continue with monitoring    9  Insomnia  -nursing reports that she is doing well with melatonin  -continue with monitoring    10  Frailty in geriatric patient  -she is a level 8 on the clinical frailty scale consistent with being very severely frail  -continue with care/support/monitoring at long-term care facility    11  Constipation  -she is doing well with MiraLax  -continue with monitoring    12  Vitamin-D deficiency    13  Abnormal CBC  -asymptomatic  -continue with monitoring    14  Bilateral blepharitis  -start Ocusoft lid scrubs twice daily  -continue with monitoring    I ordered a TSH with reflex to free T4 and CBC with diff to be performed on February 25, 2020 to monitor her medication and for laboratory trending  All medications and routine orders were reviewed and updated as needed  Plan discussed with: Nurse    Chief Complaint     She is seen for a follow-up visit to update the care and treatment of her debility secondary to her chronic medical conditions, atrial fibrillation, hypertension, hypothyroidism, and constipation  History of Present Illness     She is a pleasant 26-year-old woman who is seen for a follow-up visit to update the care and treatment of her debility secondary to her chronic medical conditions-she still requires 24/7 care/support of her ADLs, atrial fibrillation-doing well with amiodarone/Eliquis, hypertension-doing well with TLC, hypothyroidism-doing well with levothyroxine, and constipation-doing well with MiraLax  When asked, she has no complaints during her visit  When asked, nursing reports that sleeping and constipation have not been a problem  Review of her dashboard reveals no as needed medication in the last 7 days and 1 Tylenol as needed in the last 14 days  The following portions of the patient's history were reviewed and updated as appropriate: current medications, past family history, past medical history, past social history, past surgical history and problem list     Allergies:  No Known Allergies    Review of Systems     Review of Systems   Unable to perform ROS: Dementia       Medications and orders     All medications reviewed and updated in Nursing Home EMR  Objective     Vitals:  Weight 116 pounds  Monthly vitals-pulse 67, blood pressure 119/64  BP/AP log review looks well  Physical Exam   Constitutional: Vital signs are normal  She appears well-developed and well-nourished  She is cooperative  Non-toxic appearance  No distress  She is awake and appears comfortable in her wheelchair, stated age, and frail  Eyes: Conjunctivae are normal  No scleral icterus  Bilateral blepharitis  Cardiovascular: Normal rate and regular rhythm  Exam reveals no gallop and no friction rub  Murmur (3/6 systolic ejection murmur heard across her precordium ) heard  Pulmonary/Chest: Effort normal and breath sounds normal  No stridor  No respiratory distress  She has no wheezes  She has no rales  Abdominal: Soft  She exhibits no distension and no mass  There is no tenderness  There is no guarding  Musculoskeletal: She exhibits no edema  Neurological: She is alert  Vitals reviewed  Pertinent Laboratory/Diagnostic Studies: The following labs were reviewed please see chart or hospital paperwork for details      September 19, 2019:    CBC with diff-WBC count 3 6, hemoglobin 11 3, hematocrit 34, platelet count [id number], normal differential    BMP-sodium 141, potassium 4 4, BUN 29, creatinine 1 04, fasting blood sugar 80, EGFR 51    TSH - 8 9  Free T4 - 1 11    - Treatment plan reviewed with nursing staff    ANISH Clark   2/23/2020 10:05 AM

## 2020-04-21 ENCOUNTER — TELEMEDICINE (OUTPATIENT)
Dept: GERIATRICS | Facility: OTHER | Age: 80
End: 2020-04-21
Payer: MEDICARE

## 2020-04-21 DIAGNOSIS — R50.9 FEVER, UNSPECIFIED FEVER CAUSE: Primary | ICD-10-CM

## 2020-04-21 PROCEDURE — 99308 SBSQ NF CARE LOW MDM 20: CPT | Performed by: NURSE PRACTITIONER

## 2020-04-27 ENCOUNTER — NURSING HOME VISIT (OUTPATIENT)
Dept: GERIATRICS | Facility: OTHER | Age: 80
End: 2020-04-27
Payer: MEDICARE

## 2020-04-27 DIAGNOSIS — F03.90 DEMENTIA WITHOUT BEHAVIORAL DISTURBANCE, UNSPECIFIED DEMENTIA TYPE (HCC): ICD-10-CM

## 2020-04-27 DIAGNOSIS — J18.9 PNEUMONIA OF RIGHT LUNG DUE TO INFECTIOUS ORGANISM, UNSPECIFIED PART OF LUNG: Primary | ICD-10-CM

## 2020-04-27 PROBLEM — R19.5 LOOSE STOOLS: Status: ACTIVE | Noted: 2020-04-27

## 2020-04-27 PROBLEM — R19.5 LOOSE STOOLS: Status: RESOLVED | Noted: 2020-04-27 | Resolved: 2020-04-27

## 2020-04-27 PROCEDURE — 99308 SBSQ NF CARE LOW MDM 20: CPT | Performed by: NURSE PRACTITIONER

## 2020-05-22 ENCOUNTER — HOSPITAL ENCOUNTER (INPATIENT)
Facility: HOSPITAL | Age: 80
LOS: 5 days | DRG: 082 | End: 2020-05-27
Attending: SURGERY | Admitting: SURGERY
Payer: MEDICARE

## 2020-05-22 ENCOUNTER — APPOINTMENT (EMERGENCY)
Dept: RADIOLOGY | Facility: HOSPITAL | Age: 80
DRG: 082 | End: 2020-05-22
Payer: MEDICARE

## 2020-05-22 DIAGNOSIS — S31.809A WOUND OF BUTTOCK: ICD-10-CM

## 2020-05-22 DIAGNOSIS — U07.1 COVID-19 VIRUS DETECTED: ICD-10-CM

## 2020-05-22 DIAGNOSIS — I61.5 IVH (INTRAVENTRICULAR HEMORRHAGE) (HCC): ICD-10-CM

## 2020-05-22 DIAGNOSIS — I60.9 SUBARACHNOID BLEED (HCC): ICD-10-CM

## 2020-05-22 DIAGNOSIS — I60.9 SAH (SUBARACHNOID HEMORRHAGE) (HCC): Primary | ICD-10-CM

## 2020-05-22 DIAGNOSIS — I61.5 INTRAVENTRICULAR HEMORRHAGE (HCC): ICD-10-CM

## 2020-05-22 LAB
ALBUMIN SERPL BCP-MCNC: 3.5 G/DL (ref 3.5–5)
ALP SERPL-CCNC: 99 U/L (ref 46–116)
ALT SERPL W P-5'-P-CCNC: 29 U/L (ref 12–78)
ANION GAP SERPL CALCULATED.3IONS-SCNC: 9 MMOL/L (ref 4–13)
APTT PPP: 35 SECONDS (ref 23–37)
AST SERPL W P-5'-P-CCNC: 29 U/L (ref 5–45)
ATRIAL RATE: 170 BPM
BACTERIA UR QL AUTO: ABNORMAL /HPF
BASE EXCESS BLDA CALC-SCNC: 0 MMOL/L (ref -2–3)
BASOPHILS # BLD AUTO: 0.02 THOUSANDS/ΜL (ref 0–0.1)
BASOPHILS NFR BLD AUTO: 0 % (ref 0–1)
BILIRUB SERPL-MCNC: 1.16 MG/DL (ref 0.2–1)
BILIRUB UR QL STRIP: NEGATIVE
BUN SERPL-MCNC: 20 MG/DL (ref 5–25)
CA-I BLD-SCNC: 1.14 MMOL/L (ref 1.12–1.32)
CALCIUM SERPL-MCNC: 9.6 MG/DL (ref 8.3–10.1)
CHLORIDE SERPL-SCNC: 104 MMOL/L (ref 100–108)
CK SERPL-CCNC: 54 U/L (ref 26–192)
CLARITY UR: ABNORMAL
CO2 SERPL-SCNC: 23 MMOL/L (ref 21–32)
COLOR UR: YELLOW
COLOR, POC: NORMAL
CREAT SERPL-MCNC: 0.9 MG/DL (ref 0.6–1.3)
EOSINOPHIL # BLD AUTO: 0.07 THOUSAND/ΜL (ref 0–0.61)
EOSINOPHIL NFR BLD AUTO: 1 % (ref 0–6)
ERYTHROCYTE [DISTWIDTH] IN BLOOD BY AUTOMATED COUNT: 14.2 % (ref 11.6–15.1)
GFR SERPL CREATININE-BSD FRML MDRD: 61 ML/MIN/1.73SQ M
GLUCOSE SERPL-MCNC: 162 MG/DL (ref 65–140)
GLUCOSE SERPL-MCNC: 167 MG/DL (ref 65–140)
GLUCOSE SERPL-MCNC: 169 MG/DL (ref 65–140)
GLUCOSE UR STRIP-MCNC: NEGATIVE MG/DL
HCO3 BLDA-SCNC: 23 MMOL/L (ref 24–30)
HCT VFR BLD AUTO: 37.8 % (ref 34.8–46.1)
HCT VFR BLD CALC: 37 % (ref 34.8–46.1)
HGB BLD-MCNC: 12.2 G/DL (ref 11.5–15.4)
HGB BLDA-MCNC: 12.6 G/DL (ref 11.5–15.4)
HGB UR QL STRIP.AUTO: NEGATIVE
HYALINE CASTS #/AREA URNS LPF: ABNORMAL /LPF
IMM GRANULOCYTES # BLD AUTO: 0.07 THOUSAND/UL (ref 0–0.2)
IMM GRANULOCYTES NFR BLD AUTO: 1 % (ref 0–2)
INR PPP: 1.19 (ref 0.84–1.19)
KETONES UR STRIP-MCNC: ABNORMAL MG/DL
LEUKOCYTE ESTERASE UR QL STRIP: NEGATIVE
LYMPHOCYTES # BLD AUTO: 1.18 THOUSANDS/ΜL (ref 0.6–4.47)
LYMPHOCYTES NFR BLD AUTO: 17 % (ref 14–44)
MAGNESIUM SERPL-MCNC: 2 MG/DL (ref 1.6–2.6)
MCH RBC QN AUTO: 32.2 PG (ref 26.8–34.3)
MCHC RBC AUTO-ENTMCNC: 32.3 G/DL (ref 31.4–37.4)
MCV RBC AUTO: 100 FL (ref 82–98)
MONOCYTES # BLD AUTO: 0.4 THOUSAND/ΜL (ref 0.17–1.22)
MONOCYTES NFR BLD AUTO: 6 % (ref 4–12)
NEUTROPHILS # BLD AUTO: 5.33 THOUSANDS/ΜL (ref 1.85–7.62)
NEUTS SEG NFR BLD AUTO: 75 % (ref 43–75)
NITRITE UR QL STRIP: NEGATIVE
NON-SQ EPI CELLS URNS QL MICRO: ABNORMAL /HPF
NRBC BLD AUTO-RTO: 0 /100 WBCS
PCO2 BLD: 24 MMOL/L (ref 21–32)
PCO2 BLD: 32.8 MM HG (ref 42–50)
PH BLD: 7.45 [PH] (ref 7.3–7.4)
PH UR STRIP.AUTO: 7.5 [PH] (ref 4.5–8)
PLATELET # BLD AUTO: 158 THOUSANDS/UL (ref 149–390)
PMV BLD AUTO: 10.4 FL (ref 8.9–12.7)
PO2 BLD: 52 MM HG (ref 35–45)
POTASSIUM BLD-SCNC: 3.6 MMOL/L (ref 3.5–5.3)
POTASSIUM SERPL-SCNC: 3.6 MMOL/L (ref 3.5–5.3)
PROT SERPL-MCNC: 7.5 G/DL (ref 6.4–8.2)
PROT UR STRIP-MCNC: ABNORMAL MG/DL
PROTHROMBIN TIME: 14.7 SECONDS (ref 11.6–14.5)
QRS AXIS: 33 DEGREES
QRSD INTERVAL: 90 MS
QT INTERVAL: 374 MS
QTC INTERVAL: 474 MS
RBC # BLD AUTO: 3.79 MILLION/UL (ref 3.81–5.12)
RBC #/AREA URNS AUTO: ABNORMAL /HPF
SAO2 % BLD FROM PO2: 89 % (ref 60–85)
SARS-COV-2 RNA RESP QL NAA+PROBE: POSITIVE
SODIUM BLD-SCNC: 136 MMOL/L (ref 136–145)
SODIUM SERPL-SCNC: 136 MMOL/L (ref 136–145)
SP GR UR STRIP.AUTO: 1.02 (ref 1–1.03)
SPECIMEN SOURCE: ABNORMAL
T WAVE AXIS: -2 DEGREES
TROPONIN I SERPL-MCNC: 0.1 NG/ML
TROPONIN I SERPL-MCNC: 0.22 NG/ML
TROPONIN I SERPL-MCNC: 0.3 NG/ML
UROBILINOGEN UR QL STRIP.AUTO: 0.2 E.U./DL
VENTRICULAR RATE: 97 BPM
WBC # BLD AUTO: 7.07 THOUSAND/UL (ref 4.31–10.16)
WBC #/AREA URNS AUTO: ABNORMAL /HPF

## 2020-05-22 PROCEDURE — 4A133B1 MONITORING OF ARTERIAL PRESSURE, PERIPHERAL, PERCUTANEOUS APPROACH: ICD-10-PCS | Performed by: EMERGENCY MEDICINE

## 2020-05-22 PROCEDURE — 93010 ELECTROCARDIOGRAM REPORT: CPT | Performed by: INTERNAL MEDICINE

## 2020-05-22 PROCEDURE — U0003 INFECTIOUS AGENT DETECTION BY NUCLEIC ACID (DNA OR RNA); SEVERE ACUTE RESPIRATORY SYNDROME CORONAVIRUS 2 (SARS-COV-2) (CORONAVIRUS DISEASE [COVID-19]), AMPLIFIED PROBE TECHNIQUE, MAKING USE OF HIGH THROUGHPUT TECHNOLOGIES AS DESCRIBED BY CMS-2020-01-R: HCPCS | Performed by: PHYSICIAN ASSISTANT

## 2020-05-22 PROCEDURE — 03HY32Z INSERTION OF MONITORING DEVICE INTO UPPER ARTERY, PERCUTANEOUS APPROACH: ICD-10-PCS | Performed by: EMERGENCY MEDICINE

## 2020-05-22 PROCEDURE — 84132 ASSAY OF SERUM POTASSIUM: CPT

## 2020-05-22 PROCEDURE — C9113 INJ PANTOPRAZOLE SODIUM, VIA: HCPCS | Performed by: STUDENT IN AN ORGANIZED HEALTH CARE EDUCATION/TRAINING PROGRAM

## 2020-05-22 PROCEDURE — 85014 HEMATOCRIT: CPT

## 2020-05-22 PROCEDURE — 82948 REAGENT STRIP/BLOOD GLUCOSE: CPT

## 2020-05-22 PROCEDURE — 84295 ASSAY OF SERUM SODIUM: CPT

## 2020-05-22 PROCEDURE — NC001 PR NO CHARGE: Performed by: EMERGENCY MEDICINE

## 2020-05-22 PROCEDURE — 82803 BLOOD GASES ANY COMBINATION: CPT

## 2020-05-22 PROCEDURE — 99281 EMR DPT VST MAYX REQ PHY/QHP: CPT | Performed by: EMERGENCY MEDICINE

## 2020-05-22 PROCEDURE — 85730 THROMBOPLASTIN TIME PARTIAL: CPT | Performed by: PHYSICIAN ASSISTANT

## 2020-05-22 PROCEDURE — 92610 EVALUATE SWALLOWING FUNCTION: CPT

## 2020-05-22 PROCEDURE — 84484 ASSAY OF TROPONIN QUANT: CPT | Performed by: PHYSICIAN ASSISTANT

## 2020-05-22 PROCEDURE — 85025 COMPLETE CBC W/AUTO DIFF WBC: CPT | Performed by: SURGERY

## 2020-05-22 PROCEDURE — 4A133J1 MONITORING OF ARTERIAL PULSE, PERIPHERAL, PERCUTANEOUS APPROACH: ICD-10-PCS | Performed by: EMERGENCY MEDICINE

## 2020-05-22 PROCEDURE — 83735 ASSAY OF MAGNESIUM: CPT | Performed by: PHYSICIAN ASSISTANT

## 2020-05-22 PROCEDURE — 36620 INSERTION CATHETER ARTERY: CPT | Performed by: EMERGENCY MEDICINE

## 2020-05-22 PROCEDURE — 99285 EMERGENCY DEPT VISIT HI MDM: CPT

## 2020-05-22 PROCEDURE — 80053 COMPREHEN METABOLIC PANEL: CPT | Performed by: SURGERY

## 2020-05-22 PROCEDURE — 93005 ELECTROCARDIOGRAM TRACING: CPT

## 2020-05-22 PROCEDURE — 82947 ASSAY GLUCOSE BLOOD QUANT: CPT

## 2020-05-22 PROCEDURE — 82330 ASSAY OF CALCIUM: CPT

## 2020-05-22 PROCEDURE — 84484 ASSAY OF TROPONIN QUANT: CPT | Performed by: SURGERY

## 2020-05-22 PROCEDURE — 72125 CT NECK SPINE W/O DYE: CPT

## 2020-05-22 PROCEDURE — 85610 PROTHROMBIN TIME: CPT | Performed by: PHYSICIAN ASSISTANT

## 2020-05-22 PROCEDURE — 70450 CT HEAD/BRAIN W/O DYE: CPT

## 2020-05-22 PROCEDURE — 81001 URINALYSIS AUTO W/SCOPE: CPT

## 2020-05-22 PROCEDURE — 99223 1ST HOSP IP/OBS HIGH 75: CPT | Performed by: PHYSICIAN ASSISTANT

## 2020-05-22 PROCEDURE — 36415 COLL VENOUS BLD VENIPUNCTURE: CPT | Performed by: SURGERY

## 2020-05-22 PROCEDURE — 99291 CRITICAL CARE FIRST HOUR: CPT | Performed by: SURGERY

## 2020-05-22 PROCEDURE — C9132 KCENTRA, PER I.U.: HCPCS | Performed by: PHYSICIAN ASSISTANT

## 2020-05-22 PROCEDURE — 82550 ASSAY OF CK (CPK): CPT | Performed by: STUDENT IN AN ORGANIZED HEALTH CARE EDUCATION/TRAINING PROGRAM

## 2020-05-22 RX ORDER — PANTOPRAZOLE SODIUM 40 MG/1
40 INJECTION, POWDER, FOR SOLUTION INTRAVENOUS
Status: DISCONTINUED | OUTPATIENT
Start: 2020-05-22 | End: 2020-05-26

## 2020-05-22 RX ORDER — CHLORHEXIDINE GLUCONATE 0.12 MG/ML
15 RINSE ORAL EVERY 12 HOURS SCHEDULED
Status: DISCONTINUED | OUTPATIENT
Start: 2020-05-22 | End: 2020-05-27 | Stop reason: HOSPADM

## 2020-05-22 RX ORDER — MAGNESIUM SULFATE HEPTAHYDRATE 40 MG/ML
2 INJECTION, SOLUTION INTRAVENOUS ONCE
Status: COMPLETED | OUTPATIENT
Start: 2020-05-22 | End: 2020-05-22

## 2020-05-22 RX ORDER — SODIUM CHLORIDE, SODIUM GLUCONATE, SODIUM ACETATE, POTASSIUM CHLORIDE, MAGNESIUM CHLORIDE, SODIUM PHOSPHATE, DIBASIC, AND POTASSIUM PHOSPHATE .53; .5; .37; .037; .03; .012; .00082 G/100ML; G/100ML; G/100ML; G/100ML; G/100ML; G/100ML; G/100ML
75 INJECTION, SOLUTION INTRAVENOUS CONTINUOUS
Status: DISCONTINUED | OUTPATIENT
Start: 2020-05-22 | End: 2020-05-26

## 2020-05-22 RX ORDER — MIRTAZAPINE 15 MG/1
15 TABLET, FILM COATED ORAL
COMMUNITY
End: 2020-05-27 | Stop reason: HOSPADM

## 2020-05-22 RX ORDER — MELATONIN
1000 DAILY
COMMUNITY
End: 2020-05-27 | Stop reason: HOSPADM

## 2020-05-22 RX ORDER — AMIODARONE HYDROCHLORIDE 200 MG/1
200 TABLET ORAL DAILY
COMMUNITY
End: 2020-05-27 | Stop reason: HOSPADM

## 2020-05-22 RX ORDER — POTASSIUM CHLORIDE 14.9 MG/ML
20 INJECTION INTRAVENOUS ONCE
Status: COMPLETED | OUTPATIENT
Start: 2020-05-22 | End: 2020-05-22

## 2020-05-22 RX ORDER — ACETAMINOPHEN 325 MG/1
650 TABLET ORAL 3 TIMES DAILY
COMMUNITY
End: 2020-05-27 | Stop reason: HOSPADM

## 2020-05-22 RX ORDER — POLYETHYLENE GLYCOL 3350 17 G/17G
17 POWDER, FOR SOLUTION ORAL DAILY
COMMUNITY
End: 2020-05-27 | Stop reason: HOSPADM

## 2020-05-22 RX ORDER — LEVOTHYROXINE SODIUM 0.07 MG/1
75 TABLET ORAL
COMMUNITY
End: 2020-05-27 | Stop reason: HOSPADM

## 2020-05-22 RX ORDER — CHLORHEXIDINE GLUCONATE 0.12 MG/ML
15 RINSE ORAL 2 TIMES DAILY
COMMUNITY

## 2020-05-22 RX ORDER — HYDRALAZINE HYDROCHLORIDE 20 MG/ML
5 INJECTION INTRAMUSCULAR; INTRAVENOUS EVERY 6 HOURS PRN
Status: DISCONTINUED | OUTPATIENT
Start: 2020-05-22 | End: 2020-05-26

## 2020-05-22 RX ORDER — BISACODYL 10 MG
10 SUPPOSITORY, RECTAL RECTAL AS NEEDED
COMMUNITY

## 2020-05-22 RX ORDER — OMEPRAZOLE 20 MG/1
20 CAPSULE, DELAYED RELEASE ORAL DAILY
COMMUNITY
End: 2020-05-27 | Stop reason: HOSPADM

## 2020-05-22 RX ORDER — LANOLIN ALCOHOL/MO/W.PET/CERES
3 CREAM (GRAM) TOPICAL
COMMUNITY
End: 2020-05-27 | Stop reason: HOSPADM

## 2020-05-22 RX ADMIN — PANTOPRAZOLE SODIUM 40 MG: 40 INJECTION, POWDER, FOR SOLUTION INTRAVENOUS at 17:56

## 2020-05-22 RX ADMIN — SODIUM CHLORIDE, SODIUM GLUCONATE, SODIUM ACETATE, POTASSIUM CHLORIDE, MAGNESIUM CHLORIDE, SODIUM PHOSPHATE, DIBASIC, AND POTASSIUM PHOSPHATE 75 ML/HR: .53; .5; .37; .037; .03; .012; .00082 INJECTION, SOLUTION INTRAVENOUS at 13:50

## 2020-05-22 RX ADMIN — Medication 2500 UNITS: at 10:58

## 2020-05-22 RX ADMIN — LEVETIRACETAM 500 MG: 100 INJECTION, SOLUTION INTRAVENOUS at 11:08

## 2020-05-22 RX ADMIN — LEVETIRACETAM 500 MG: 100 INJECTION, SOLUTION INTRAVENOUS at 21:33

## 2020-05-22 RX ADMIN — CHLORHEXIDINE GLUCONATE 0.12% ORAL RINSE 15 ML: 1.2 LIQUID ORAL at 20:57

## 2020-05-22 RX ADMIN — POTASSIUM CHLORIDE 20 MEQ: 200 INJECTION, SOLUTION INTRAVENOUS at 18:45

## 2020-05-22 RX ADMIN — HYDRALAZINE HYDROCHLORIDE 5 MG: 20 INJECTION INTRAMUSCULAR; INTRAVENOUS at 18:43

## 2020-05-22 RX ADMIN — MAGNESIUM SULFATE HEPTAHYDRATE 2 G: 40 INJECTION, SOLUTION INTRAVENOUS at 18:57

## 2020-05-22 RX ADMIN — POTASSIUM CHLORIDE 20 MEQ: 14.9 INJECTION, SOLUTION INTRAVENOUS at 16:32

## 2020-05-23 ENCOUNTER — APPOINTMENT (INPATIENT)
Dept: RADIOLOGY | Facility: HOSPITAL | Age: 80
DRG: 082 | End: 2020-05-23
Payer: MEDICARE

## 2020-05-23 LAB
ANION GAP SERPL CALCULATED.3IONS-SCNC: 3 MMOL/L (ref 4–13)
ATRIAL RATE: 77 BPM
BASOPHILS # BLD AUTO: 0.02 THOUSANDS/ΜL (ref 0–0.1)
BASOPHILS NFR BLD AUTO: 0 % (ref 0–1)
BUN SERPL-MCNC: 13 MG/DL (ref 5–25)
CA-I BLD-SCNC: 1.1 MMOL/L (ref 1.12–1.32)
CALCIUM SERPL-MCNC: 8.2 MG/DL (ref 8.3–10.1)
CHLORIDE SERPL-SCNC: 104 MMOL/L (ref 100–108)
CO2 SERPL-SCNC: 27 MMOL/L (ref 21–32)
CREAT SERPL-MCNC: 0.58 MG/DL (ref 0.6–1.3)
EOSINOPHIL # BLD AUTO: 0.08 THOUSAND/ΜL (ref 0–0.61)
EOSINOPHIL NFR BLD AUTO: 1 % (ref 0–6)
ERYTHROCYTE [DISTWIDTH] IN BLOOD BY AUTOMATED COUNT: 14.3 % (ref 11.6–15.1)
GFR SERPL CREATININE-BSD FRML MDRD: 87 ML/MIN/1.73SQ M
GLUCOSE SERPL-MCNC: 88 MG/DL (ref 65–140)
HCT VFR BLD AUTO: 32.9 % (ref 34.8–46.1)
HGB BLD-MCNC: 10.8 G/DL (ref 11.5–15.4)
IMM GRANULOCYTES # BLD AUTO: 0.05 THOUSAND/UL (ref 0–0.2)
IMM GRANULOCYTES NFR BLD AUTO: 1 % (ref 0–2)
LYMPHOCYTES # BLD AUTO: 1.12 THOUSANDS/ΜL (ref 0.6–4.47)
LYMPHOCYTES NFR BLD AUTO: 16 % (ref 14–44)
MAGNESIUM SERPL-MCNC: 2.8 MG/DL (ref 1.6–2.6)
MCH RBC QN AUTO: 32.8 PG (ref 26.8–34.3)
MCHC RBC AUTO-ENTMCNC: 32.8 G/DL (ref 31.4–37.4)
MCV RBC AUTO: 100 FL (ref 82–98)
MONOCYTES # BLD AUTO: 0.51 THOUSAND/ΜL (ref 0.17–1.22)
MONOCYTES NFR BLD AUTO: 7 % (ref 4–12)
NEUTROPHILS # BLD AUTO: 5.17 THOUSANDS/ΜL (ref 1.85–7.62)
NEUTS SEG NFR BLD AUTO: 75 % (ref 43–75)
NRBC BLD AUTO-RTO: 0 /100 WBCS
PHOSPHATE SERPL-MCNC: 2.1 MG/DL (ref 2.3–4.1)
PLATELET # BLD AUTO: 145 THOUSANDS/UL (ref 149–390)
PMV BLD AUTO: 11.1 FL (ref 8.9–12.7)
POTASSIUM SERPL-SCNC: 3.9 MMOL/L (ref 3.5–5.3)
PR INTERVAL: 786 MS
QRS AXIS: -10 DEGREES
QRSD INTERVAL: 83 MS
QT INTERVAL: 413 MS
QTC INTERVAL: 468 MS
RBC # BLD AUTO: 3.29 MILLION/UL (ref 3.81–5.12)
SODIUM SERPL-SCNC: 134 MMOL/L (ref 136–145)
T WAVE AXIS: 28 DEGREES
VENTRICULAR RATE: 77 BPM
WBC # BLD AUTO: 6.95 THOUSAND/UL (ref 4.31–10.16)

## 2020-05-23 PROCEDURE — 83735 ASSAY OF MAGNESIUM: CPT | Performed by: SURGERY

## 2020-05-23 PROCEDURE — 99233 SBSQ HOSP IP/OBS HIGH 50: CPT | Performed by: SURGERY

## 2020-05-23 PROCEDURE — 70450 CT HEAD/BRAIN W/O DYE: CPT

## 2020-05-23 PROCEDURE — 82330 ASSAY OF CALCIUM: CPT | Performed by: SURGERY

## 2020-05-23 PROCEDURE — 93010 ELECTROCARDIOGRAM REPORT: CPT | Performed by: INTERNAL MEDICINE

## 2020-05-23 PROCEDURE — C9113 INJ PANTOPRAZOLE SODIUM, VIA: HCPCS | Performed by: STUDENT IN AN ORGANIZED HEALTH CARE EDUCATION/TRAINING PROGRAM

## 2020-05-23 PROCEDURE — 99232 SBSQ HOSP IP/OBS MODERATE 35: CPT | Performed by: PHYSICIAN ASSISTANT

## 2020-05-23 PROCEDURE — 84100 ASSAY OF PHOSPHORUS: CPT | Performed by: SURGERY

## 2020-05-23 PROCEDURE — 85025 COMPLETE CBC W/AUTO DIFF WBC: CPT | Performed by: SURGERY

## 2020-05-23 PROCEDURE — NC001 PR NO CHARGE: Performed by: SURGERY

## 2020-05-23 PROCEDURE — 80048 BASIC METABOLIC PNL TOTAL CA: CPT | Performed by: SURGERY

## 2020-05-23 PROCEDURE — 99222 1ST HOSP IP/OBS MODERATE 55: CPT | Performed by: INTERNAL MEDICINE

## 2020-05-23 RX ORDER — SUCCINYLCHOLINE/SOD CL,ISO/PF 100 MG/5ML
100 SYRINGE (ML) INTRAVENOUS ONCE
Status: DISCONTINUED | OUTPATIENT
Start: 2020-05-23 | End: 2020-05-23

## 2020-05-23 RX ORDER — ETOMIDATE 2 MG/ML
20 INJECTION INTRAVENOUS ONCE
Status: DISCONTINUED | OUTPATIENT
Start: 2020-05-23 | End: 2020-05-23

## 2020-05-23 RX ADMIN — CHLORHEXIDINE GLUCONATE 0.12% ORAL RINSE 15 ML: 1.2 LIQUID ORAL at 07:37

## 2020-05-23 RX ADMIN — LEVETIRACETAM 500 MG: 100 INJECTION, SOLUTION INTRAVENOUS at 07:36

## 2020-05-23 RX ADMIN — LEVETIRACETAM 500 MG: 100 INJECTION, SOLUTION INTRAVENOUS at 21:57

## 2020-05-23 RX ADMIN — CHLORHEXIDINE GLUCONATE 0.12% ORAL RINSE 15 ML: 1.2 LIQUID ORAL at 21:57

## 2020-05-23 RX ADMIN — PANTOPRAZOLE SODIUM 40 MG: 40 INJECTION, POWDER, FOR SOLUTION INTRAVENOUS at 07:37

## 2020-05-23 RX ADMIN — SODIUM CHLORIDE, SODIUM GLUCONATE, SODIUM ACETATE, POTASSIUM CHLORIDE, MAGNESIUM CHLORIDE, SODIUM PHOSPHATE, DIBASIC, AND POTASSIUM PHOSPHATE 75 ML/HR: .53; .5; .37; .037; .03; .012; .00082 INJECTION, SOLUTION INTRAVENOUS at 20:31

## 2020-05-23 RX ADMIN — SODIUM CHLORIDE, SODIUM GLUCONATE, SODIUM ACETATE, POTASSIUM CHLORIDE, MAGNESIUM CHLORIDE, SODIUM PHOSPHATE, DIBASIC, AND POTASSIUM PHOSPHATE 75 ML/HR: .53; .5; .37; .037; .03; .012; .00082 INJECTION, SOLUTION INTRAVENOUS at 05:11

## 2020-05-24 PROCEDURE — 97163 PT EVAL HIGH COMPLEX 45 MIN: CPT

## 2020-05-24 PROCEDURE — 99232 SBSQ HOSP IP/OBS MODERATE 35: CPT | Performed by: NURSE PRACTITIONER

## 2020-05-24 PROCEDURE — C9113 INJ PANTOPRAZOLE SODIUM, VIA: HCPCS | Performed by: EMERGENCY MEDICINE

## 2020-05-24 PROCEDURE — 99232 SBSQ HOSP IP/OBS MODERATE 35: CPT | Performed by: INTERNAL MEDICINE

## 2020-05-24 RX ADMIN — PANTOPRAZOLE SODIUM 40 MG: 40 INJECTION, POWDER, FOR SOLUTION INTRAVENOUS at 08:01

## 2020-05-24 RX ADMIN — CHLORHEXIDINE GLUCONATE 0.12% ORAL RINSE 15 ML: 1.2 LIQUID ORAL at 08:12

## 2020-05-24 RX ADMIN — LEVETIRACETAM 500 MG: 100 INJECTION, SOLUTION INTRAVENOUS at 20:31

## 2020-05-24 RX ADMIN — SODIUM CHLORIDE, SODIUM GLUCONATE, SODIUM ACETATE, POTASSIUM CHLORIDE, MAGNESIUM CHLORIDE, SODIUM PHOSPHATE, DIBASIC, AND POTASSIUM PHOSPHATE 75 ML/HR: .53; .5; .37; .037; .03; .012; .00082 INJECTION, SOLUTION INTRAVENOUS at 11:45

## 2020-05-24 RX ADMIN — LEVETIRACETAM 500 MG: 100 INJECTION, SOLUTION INTRAVENOUS at 08:01

## 2020-05-25 PROCEDURE — C9113 INJ PANTOPRAZOLE SODIUM, VIA: HCPCS | Performed by: EMERGENCY MEDICINE

## 2020-05-25 PROCEDURE — 99231 SBSQ HOSP IP/OBS SF/LOW 25: CPT | Performed by: SURGERY

## 2020-05-25 PROCEDURE — 92526 ORAL FUNCTION THERAPY: CPT

## 2020-05-25 RX ADMIN — SODIUM CHLORIDE, SODIUM GLUCONATE, SODIUM ACETATE, POTASSIUM CHLORIDE, MAGNESIUM CHLORIDE, SODIUM PHOSPHATE, DIBASIC, AND POTASSIUM PHOSPHATE 75 ML/HR: .53; .5; .37; .037; .03; .012; .00082 INJECTION, SOLUTION INTRAVENOUS at 17:49

## 2020-05-25 RX ADMIN — LEVETIRACETAM 500 MG: 100 INJECTION, SOLUTION INTRAVENOUS at 22:06

## 2020-05-25 RX ADMIN — SODIUM CHLORIDE, SODIUM GLUCONATE, SODIUM ACETATE, POTASSIUM CHLORIDE, MAGNESIUM CHLORIDE, SODIUM PHOSPHATE, DIBASIC, AND POTASSIUM PHOSPHATE 75 ML/HR: .53; .5; .37; .037; .03; .012; .00082 INJECTION, SOLUTION INTRAVENOUS at 04:05

## 2020-05-25 RX ADMIN — PANTOPRAZOLE SODIUM 40 MG: 40 INJECTION, POWDER, FOR SOLUTION INTRAVENOUS at 08:05

## 2020-05-25 RX ADMIN — LEVETIRACETAM 500 MG: 100 INJECTION, SOLUTION INTRAVENOUS at 08:05

## 2020-05-26 PROCEDURE — 99232 SBSQ HOSP IP/OBS MODERATE 35: CPT | Performed by: PHYSICIAN ASSISTANT

## 2020-05-26 PROCEDURE — 99233 SBSQ HOSP IP/OBS HIGH 50: CPT | Performed by: PHYSICIAN ASSISTANT

## 2020-05-26 PROCEDURE — C9113 INJ PANTOPRAZOLE SODIUM, VIA: HCPCS | Performed by: EMERGENCY MEDICINE

## 2020-05-26 RX ORDER — GLYCOPYRROLATE 0.2 MG/ML
0.2 INJECTION INTRAMUSCULAR; INTRAVENOUS
Status: DISCONTINUED | OUTPATIENT
Start: 2020-05-26 | End: 2020-05-27 | Stop reason: HOSPADM

## 2020-05-26 RX ORDER — LORAZEPAM 2 MG/ML
0.5 INJECTION INTRAMUSCULAR
Status: DISCONTINUED | OUTPATIENT
Start: 2020-05-26 | End: 2020-05-27 | Stop reason: HOSPADM

## 2020-05-26 RX ORDER — HYDROMORPHONE HCL/PF 1 MG/ML
0.2 SYRINGE (ML) INJECTION
Status: DISCONTINUED | OUTPATIENT
Start: 2020-05-26 | End: 2020-05-27 | Stop reason: HOSPADM

## 2020-05-26 RX ORDER — SODIUM CHLORIDE 9 MG/ML
20 INJECTION, SOLUTION INTRAVENOUS CONTINUOUS
Status: DISCONTINUED | OUTPATIENT
Start: 2020-05-26 | End: 2020-05-27 | Stop reason: HOSPADM

## 2020-05-26 RX ORDER — BISACODYL 10 MG
10 SUPPOSITORY, RECTAL RECTAL DAILY PRN
Status: DISCONTINUED | OUTPATIENT
Start: 2020-05-26 | End: 2020-05-27 | Stop reason: HOSPADM

## 2020-05-26 RX ORDER — HALOPERIDOL 5 MG/ML
0.5 INJECTION INTRAMUSCULAR
Status: DISCONTINUED | OUTPATIENT
Start: 2020-05-26 | End: 2020-05-27 | Stop reason: HOSPADM

## 2020-05-26 RX ADMIN — SODIUM CHLORIDE 20 ML/HR: 0.9 INJECTION, SOLUTION INTRAVENOUS at 12:51

## 2020-05-26 RX ADMIN — LEVETIRACETAM 500 MG: 100 INJECTION, SOLUTION INTRAVENOUS at 08:00

## 2020-05-26 RX ADMIN — LEVETIRACETAM 500 MG: 100 INJECTION, SOLUTION INTRAVENOUS at 20:54

## 2020-05-26 RX ADMIN — CHLORHEXIDINE GLUCONATE 0.12% ORAL RINSE 15 ML: 1.2 LIQUID ORAL at 20:58

## 2020-05-26 RX ADMIN — CHLORHEXIDINE GLUCONATE 0.12% ORAL RINSE 15 ML: 1.2 LIQUID ORAL at 08:00

## 2020-05-26 RX ADMIN — LORAZEPAM 0.5 MG: 2 INJECTION INTRAMUSCULAR; INTRAVENOUS at 12:50

## 2020-05-26 RX ADMIN — PANTOPRAZOLE SODIUM 40 MG: 40 INJECTION, POWDER, FOR SOLUTION INTRAVENOUS at 08:00

## 2020-05-27 VITALS
OXYGEN SATURATION: 98 % | RESPIRATION RATE: 16 BRPM | HEART RATE: 75 BPM | SYSTOLIC BLOOD PRESSURE: 143 MMHG | DIASTOLIC BLOOD PRESSURE: 66 MMHG | TEMPERATURE: 99.5 F | BODY MASS INDEX: 22.2 KG/M2 | HEIGHT: 60 IN | WEIGHT: 113.1 LBS

## 2020-05-27 PROCEDURE — 99238 HOSP IP/OBS DSCHRG MGMT 30/<: CPT | Performed by: PHYSICIAN ASSISTANT

## 2020-05-27 RX ADMIN — CHLORHEXIDINE GLUCONATE 0.12% ORAL RINSE 15 ML: 1.2 LIQUID ORAL at 09:42

## 2020-05-27 RX ADMIN — HYDROMORPHONE HYDROCHLORIDE 0.2 MG: 1 INJECTION, SOLUTION INTRAMUSCULAR; INTRAVENOUS; SUBCUTANEOUS at 10:17

## 2020-05-27 RX ADMIN — LORAZEPAM 0.5 MG: 2 INJECTION INTRAMUSCULAR; INTRAVENOUS at 09:42

## 2020-05-31 PROBLEM — R29.898 RIGIDITY: Status: ACTIVE | Noted: 2020-05-31

## 2024-09-12 NOTE — ASSESSMENT & PLAN NOTE
-reports mouth discomfort and intermittent aching pain along jaw line for the past couple of months; possible component of TMJ   -No abcess or s/sx of dental complication on today's exam   -no complaints of headaches   -No recent dental eval-->c/s dentist today for further recommendations (on site today for visits)  -promote soft foods for symptom management   -continue tylenol TID   -continue to monitor for improvement and or worsening in condition   -AAYUSH
Home

## (undated) DEVICE — GLOVE SRG BIOGEL 8

## (undated) DEVICE — BETHLEHEM TOTAL HIP, KIT: Brand: CARDINAL HEALTH

## (undated) DEVICE — INTENDED FOR TISSUE SEPARATION, AND OTHER PROCEDURES THAT REQUIRE A SHARP SURGICAL BLADE TO PUNCTURE OR CUT.: Brand: BARD-PARKER SAFETY BLADES SIZE 10, STERILE

## (undated) DEVICE — SOFT SILICONE HYDROCELLULAR SACRUM DRESSING WITH LOCK AWAY LAYER: Brand: ALLEVYN LIFE SACRUM (LARGE) PACK OF 10

## (undated) DEVICE — CHLORAPREP HI-LITE 26ML ORANGE

## (undated) DEVICE — REM POLYHESIVE ADULT PATIENT RETURN ELECTRODE: Brand: VALLEYLAB

## (undated) DEVICE — CAPIT HIP MOP -METAL ON POLY

## (undated) DEVICE — SUT VICRYL PLUS 2-0 CTB-1 27 IN VCPB259H

## (undated) DEVICE — GLOVE INDICATOR PI UNDERGLOVE SZ 8.5 BLUE

## (undated) DEVICE — SPONGE PVP SCRUB WING STERILE

## (undated) DEVICE — THE SIMPULSE SOLO SYSTEM WITH ULTREX RETRACTABLE SPLASH SHIELD TIP: Brand: SIMPULSE SOLO

## (undated) DEVICE — DRAPE EQUIPMENT RF WAND

## (undated) DEVICE — SUT VICRYL PLUS 1 CTB-1 36 IN VCPB947H

## (undated) DEVICE — DRESSING MEPILEX AG BORDER 4 X 12 IN

## (undated) DEVICE — STOCKINETTE REGULAR

## (undated) DEVICE — HOOD: Brand: FLYTE, SURGICOOL

## (undated) DEVICE — 2108 SERIES SAGITTAL BLADE (18.6 X 0.64 X 61.1MM)

## (undated) DEVICE — PENCIL ELECTROSURG E-Z CLEAN -0035H